# Patient Record
Sex: FEMALE | Race: WHITE | NOT HISPANIC OR LATINO | Employment: FULL TIME | URBAN - METROPOLITAN AREA
[De-identification: names, ages, dates, MRNs, and addresses within clinical notes are randomized per-mention and may not be internally consistent; named-entity substitution may affect disease eponyms.]

---

## 2017-01-30 ENCOUNTER — HISTORICAL (OUTPATIENT)
Dept: ADMINISTRATIVE | Facility: HOSPITAL | Age: 52
End: 2017-01-30

## 2017-01-30 LAB
ALBUMIN SERPL-MCNC: 4.4 G/DL (ref 3.1–4.7)
ALP SERPL-CCNC: 117 IU/L (ref 40–104)
ALT (SGPT): 30 IU/L (ref 3–33)
AST SERPL-CCNC: 30 IU/L (ref 10–40)
BASOPHILS NFR BLD: 0 K/UL (ref 0–0.2)
BASOPHILS NFR BLD: 0.6 %
BILIRUB SERPL-MCNC: 0.5 MG/DL (ref 0.3–1)
BUN SERPL-MCNC: 9 MG/DL (ref 8–20)
CALCIUM SERPL-MCNC: 9.5 MG/DL (ref 7.7–10.4)
CHLORIDE: 102 MMOL/L (ref 98–110)
CO2 SERPL-SCNC: 29.6 MMOL/L (ref 22.8–31.6)
CREATININE: 0.63 MG/DL (ref 0.6–1.4)
EOSINOPHIL NFR BLD: 0 K/UL (ref 0–0.7)
EOSINOPHIL NFR BLD: 0.2 %
ERYTHROCYTE [DISTWIDTH] IN BLOOD BY AUTOMATED COUNT: 18.5 % (ref 12.5–14.5)
GLUCOSE: 83 MG/DL (ref 70–99)
GRAN #: 3.6 K/UL (ref 1.4–6.5)
GRAN%: 71.5 %
HCT VFR BLD AUTO: 33.3 % (ref 36–48)
HGB BLD-MCNC: 11 G/DL (ref 12–15)
IMMATURE GRANS (ABS): 0 K/UL (ref 0–1)
IMMATURE GRANULOCYTES: 0.4 %
LYMPH #: 0.6 K/UL (ref 1.2–3.4)
LYMPH%: 11 %
MCH RBC QN AUTO: 32.6 PG (ref 25–35)
MCHC RBC AUTO-ENTMCNC: 33 G/DL (ref 31–36)
MCV RBC AUTO: 98.8 FL (ref 79–98)
MONO #: 0.8 K/UL (ref 0.1–0.6)
MONO%: 16.3 %
NUCLEATED RBCS: 0 %
NUCLEATED RED BLOOD CELLS: 0 /100 WBC
PERFORMED BY:: ABNORMAL
PLATELET # BLD AUTO: 308 K/UL (ref 140–440)
PMV BLD AUTO: 9.5 FL (ref 7.4–10.4)
POTASSIUM SERPL-SCNC: 3.4 MMOL/L (ref 3.5–5)
PROT SERPL-MCNC: 7.4 G/DL (ref 6–8.2)
RBC # BLD AUTO: 3.37 M/UL (ref 3.5–5.5)
SODIUM: 141 MMOL/L (ref 134–144)
WBC # BLD: 5 K/UL (ref 5–10)

## 2017-02-08 LAB
ALBUMIN SERPL-MCNC: 4.2 G/DL (ref 3.1–4.7)
ALP SERPL-CCNC: 114 IU/L (ref 40–104)
ALT (SGPT): 94 IU/L (ref 3–33)
AST SERPL-CCNC: 71 IU/L (ref 10–40)
BASOPHILS NFR BLD: 0 K/UL (ref 0–0.2)
BASOPHILS NFR BLD: 1.5 %
BILIRUB SERPL-MCNC: 0.7 MG/DL (ref 0.3–1)
BUN SERPL-MCNC: 9 MG/DL (ref 8–20)
CALCIUM SERPL-MCNC: 9.5 MG/DL (ref 7.7–10.4)
CHLORIDE: 98 MMOL/L (ref 98–110)
CO2 SERPL-SCNC: 29.4 MMOL/L (ref 22.8–31.6)
CREATININE: 0.63 MG/DL (ref 0.6–1.4)
EOSINOPHIL NFR BLD: 0 K/UL (ref 0–0.7)
EOSINOPHIL NFR BLD: 1.1 %
ERYTHROCYTE [DISTWIDTH] IN BLOOD BY AUTOMATED COUNT: 16.6 % (ref 12.5–14.5)
GLUCOSE: 86 MG/DL (ref 70–99)
GRAN #: 1.7 K/UL (ref 1.4–6.5)
GRAN%: 66.2 %
HCT VFR BLD AUTO: 31.5 % (ref 36–48)
HGB BLD-MCNC: 10.5 G/DL (ref 12–15)
IMMATURE GRANS (ABS): 0 K/UL (ref 0–1)
IMMATURE GRANULOCYTES: 0.8 %
LYMPH #: 0.6 K/UL (ref 1.2–3.4)
LYMPH%: 22.4 %
MCH RBC QN AUTO: 33.2 PG (ref 25–35)
MCHC RBC AUTO-ENTMCNC: 33.3 G/DL (ref 31–36)
MCV RBC AUTO: 99.7 FL (ref 79–98)
MONO #: 0.2 K/UL (ref 0.1–0.6)
MONO%: 8 %
NUCLEATED RBCS: 0 %
NUCLEATED RED BLOOD CELLS: 0 /100 WBC
PERFORMED BY:: ABNORMAL
PLATELET # BLD AUTO: 235 K/UL (ref 140–440)
PMV BLD AUTO: 9.9 FL (ref 7.4–10.4)
POTASSIUM SERPL-SCNC: 3.4 MMOL/L (ref 3.5–5)
PROT SERPL-MCNC: 7.1 G/DL (ref 6–8.2)
RBC # BLD AUTO: 3.16 M/UL (ref 3.5–5.5)
SODIUM: 138 MMOL/L (ref 134–144)
WBC # BLD: 2.6 K/UL (ref 5–10)

## 2017-02-14 LAB
ALBUMIN SERPL-MCNC: 4.1 G/DL (ref 3.1–4.7)
ALP SERPL-CCNC: 113 IU/L (ref 40–104)
ALT (SGPT): 49 IU/L (ref 3–33)
AST SERPL-CCNC: 33 IU/L (ref 10–40)
BASOPHILS NFR BLD: 0 K/UL (ref 0–0.2)
BASOPHILS NFR BLD: 0.9 %
BILIRUB SERPL-MCNC: 0.6 MG/DL (ref 0.3–1)
BUN SERPL-MCNC: 11 MG/DL (ref 8–20)
CALCIUM SERPL-MCNC: 9.4 MG/DL (ref 7.7–10.4)
CHLORIDE: 103 MMOL/L (ref 98–110)
CO2 SERPL-SCNC: 31.7 MMOL/L (ref 22.8–31.6)
CREATININE: 0.57 MG/DL (ref 0.6–1.4)
EOSINOPHIL NFR BLD: 0.3 K/UL (ref 0–0.7)
EOSINOPHIL NFR BLD: 8.2 %
ERYTHROCYTE [DISTWIDTH] IN BLOOD BY AUTOMATED COUNT: 16 % (ref 12.5–14.5)
GLUCOSE: 82 MG/DL (ref 70–99)
GRAN #: 1.8 K/UL (ref 1.4–6.5)
GRAN%: 55.6 %
HCT VFR BLD AUTO: 32.9 % (ref 36–48)
HGB BLD-MCNC: 10.7 G/DL (ref 12–15)
IMMATURE GRANS (ABS): 0 K/UL (ref 0–1)
IMMATURE GRANULOCYTES: 0.3 %
LYMPH #: 0.6 K/UL (ref 1.2–3.4)
LYMPH%: 19.9 %
MCH RBC QN AUTO: 32.8 PG (ref 25–35)
MCHC RBC AUTO-ENTMCNC: 32.5 G/DL (ref 31–36)
MCV RBC AUTO: 100.9 FL (ref 79–98)
MONO #: 0.5 K/UL (ref 0.1–0.6)
MONO%: 15.1 %
NUCLEATED RBCS: 0 %
NUCLEATED RED BLOOD CELLS: 0 /100 WBC
PERFORMED BY:: ABNORMAL
PLATELET # BLD AUTO: 231 K/UL (ref 140–440)
PMV BLD AUTO: 9.8 FL (ref 7.4–10.4)
POTASSIUM SERPL-SCNC: 3.6 MMOL/L (ref 3.5–5)
PROT SERPL-MCNC: 6.9 G/DL (ref 6–8.2)
RBC # BLD AUTO: 3.26 M/UL (ref 3.5–5.5)
SODIUM: 140 MMOL/L (ref 134–144)
WBC # BLD: 3.2 K/UL (ref 5–10)

## 2017-02-17 ENCOUNTER — OFFICE VISIT (OUTPATIENT)
Dept: PLASTIC SURGERY | Facility: CLINIC | Age: 52
End: 2017-02-17
Payer: COMMERCIAL

## 2017-02-17 DIAGNOSIS — Z09 SURGERY FOLLOW-UP EXAMINATION: Primary | ICD-10-CM

## 2017-02-17 PROCEDURE — 99999 PR PBB SHADOW E&M-EST. PATIENT-LVL I: CPT | Mod: PBBFAC,,, | Performed by: PHYSICIAN ASSISTANT

## 2017-02-17 PROCEDURE — 99024 POSTOP FOLLOW-UP VISIT: CPT | Mod: S$GLB,,, | Performed by: PHYSICIAN ASSISTANT

## 2017-02-17 NOTE — PROGRESS NOTES
[facility]anca [facility]2017 for a follow up visit for expansion of B breast TE.     Current Outpatient Prescriptions on File Prior to Visit   Medication Sig Dispense Refill    cyclobenzaprine (FLEXERIL) 10 MG tablet TK 1 T PO Q 8 H PRF MUSCLE SPASM  1    oxycodone-acetaminophen (PERCOCET) 5-325 mg per tablet Take 1 tablet by mouth every 4 (four) hours as needed for Pain. 41 tablet 0    oxycodone-acetaminophen (PERCOCET) 5-325 mg per tablet Take 1 tablet by mouth every 6 (six) hours as needed for Pain. 31 tablet 0    [DISCONTINUED] clindamycin (CLEOCIN) 300 MG capsule TK ONE C PO Q 8 H FOR 7 DAYS  0     No current facility-administered medications on file prior to visit.      There is no problem list on file for this patient.    No past surgical history on file.    Doing well today. Her last expansion was 10/28/2016, she is currently having chemo. Next infusion is Monday.     She met her radiation doctor and her would like for her to be fully expanded, or close, prior to starting XRT. This will allow for 3 expansions prior to planned start for XRT. I think this should be sufficient to her her expanded.     120ccNS to R TE  140ccNS to L TE    Tolerated well. Will return to clinic in 3 weeks for her next expansion.     The patient was advised to call the clinic with any questions or concerns prior to their next visit.

## 2017-02-21 LAB
ALBUMIN SERPL-MCNC: 4.4 G/DL (ref 3.1–4.7)
ALP SERPL-CCNC: 113 IU/L (ref 40–104)
ALT (SGPT): 41 IU/L (ref 3–33)
AST SERPL-CCNC: 42 IU/L (ref 10–40)
BASOPHILS NFR BLD: 0 K/UL (ref 0–0.2)
BASOPHILS NFR BLD: 1.2 %
BILIRUB SERPL-MCNC: 0.7 MG/DL (ref 0.3–1)
BUN SERPL-MCNC: 12 MG/DL (ref 8–20)
CALCIUM SERPL-MCNC: 9.6 MG/DL (ref 7.7–10.4)
CHLORIDE: 101 MMOL/L (ref 98–110)
CO2 SERPL-SCNC: 29.6 MMOL/L (ref 22.8–31.6)
CREATININE: 0.66 MG/DL (ref 0.6–1.4)
EOSINOPHIL NFR BLD: 0.3 K/UL (ref 0–0.7)
EOSINOPHIL NFR BLD: 7.7 %
ERYTHROCYTE [DISTWIDTH] IN BLOOD BY AUTOMATED COUNT: 15 % (ref 12.5–14.5)
GLUCOSE: 80 MG/DL (ref 70–99)
GRAN #: 1.7 K/UL (ref 1.4–6.5)
GRAN%: 53.6 %
HCT VFR BLD AUTO: 35.7 % (ref 36–48)
HGB BLD-MCNC: 11.8 G/DL (ref 12–15)
IMMATURE GRANS (ABS): 0 K/UL (ref 0–1)
IMMATURE GRANULOCYTES: 0.3 %
LYMPH #: 0.7 K/UL (ref 1.2–3.4)
LYMPH%: 21.8 %
MCH RBC QN AUTO: 33 PG (ref 25–35)
MCHC RBC AUTO-ENTMCNC: 33.1 G/DL (ref 31–36)
MCV RBC AUTO: 99.7 FL (ref 79–98)
MONO #: 0.5 K/UL (ref 0.1–0.6)
MONO%: 15.4 %
NUCLEATED RBCS: 0 %
NUCLEATED RED BLOOD CELLS: 0 /100 WBC
PERFORMED BY:: ABNORMAL
PLATELET # BLD AUTO: 220 K/UL (ref 140–440)
PMV BLD AUTO: 10.2 FL (ref 8.8–12.7)
POTASSIUM SERPL-SCNC: 3.8 MMOL/L (ref 3.5–5)
PROT SERPL-MCNC: 7.3 G/DL (ref 6–8.2)
RBC # BLD AUTO: 3.58 M/UL (ref 3.5–5.5)
SODIUM: 139 MMOL/L (ref 134–144)
WBC # BLD: 3.3 K/UL (ref 5–10)

## 2017-03-01 LAB
ALBUMIN SERPL-MCNC: 4.1 G/DL (ref 3.1–4.7)
ALP SERPL-CCNC: 107 IU/L (ref 40–104)
ALT (SGPT): 71 IU/L (ref 3–33)
AST SERPL-CCNC: 44 IU/L (ref 10–40)
BASOPHILS NFR BLD: 0 K/UL (ref 0–0.2)
BASOPHILS NFR BLD: 1.8 %
BILIRUB SERPL-MCNC: 0.5 MG/DL (ref 0.3–1)
BUN SERPL-MCNC: 11 MG/DL (ref 8–20)
CALCIUM SERPL-MCNC: 9.4 MG/DL (ref 7.7–10.4)
CHLORIDE: 104 MMOL/L (ref 98–110)
CO2 SERPL-SCNC: 26.7 MMOL/L (ref 22.8–31.6)
CREATININE: 0.64 MG/DL (ref 0.6–1.4)
EOSINOPHIL NFR BLD: 0.1 K/UL (ref 0–0.7)
EOSINOPHIL NFR BLD: 5.8 %
ERYTHROCYTE [DISTWIDTH] IN BLOOD BY AUTOMATED COUNT: 13.8 % (ref 12.5–14.5)
GLUCOSE: 89 MG/DL (ref 70–99)
GRAN #: 1.3 K/UL (ref 1.4–6.5)
GRAN%: 59.3 %
HCT VFR BLD AUTO: 33.6 % (ref 36–48)
HGB BLD-MCNC: 11.4 G/DL (ref 12–15)
IMMATURE GRANS (ABS): 0 K/UL (ref 0–1)
IMMATURE GRANULOCYTES: 0.9 %
LYMPH #: 0.6 K/UL (ref 1.2–3.4)
LYMPH%: 28.6 %
MCH RBC QN AUTO: 33.6 PG (ref 25–35)
MCHC RBC AUTO-ENTMCNC: 33.9 G/DL (ref 31–36)
MCV RBC AUTO: 99.1 FL (ref 79–98)
MONO #: 0.1 K/UL (ref 0.1–0.6)
MONO%: 3.6 %
NUCLEATED RBCS: 0 %
NUCLEATED RED BLOOD CELLS: 0 /100 WBC
PERFORMED BY:: ABNORMAL
PLATELET # BLD AUTO: 195 K/UL (ref 140–440)
PMV BLD AUTO: 10.3 FL (ref 8.8–12.7)
POTASSIUM SERPL-SCNC: 3.6 MMOL/L (ref 3.5–5)
PROT SERPL-MCNC: 7.1 G/DL (ref 6–8.2)
RBC # BLD AUTO: 3.39 M/UL (ref 3.5–5.5)
SODIUM: 139 MMOL/L (ref 134–144)
WBC # BLD: 2.2 K/UL (ref 5–10)

## 2017-03-07 ENCOUNTER — TELEPHONE (OUTPATIENT)
Dept: PLASTIC SURGERY | Facility: CLINIC | Age: 52
End: 2017-03-07

## 2017-03-07 ENCOUNTER — HISTORICAL (OUTPATIENT)
Dept: ADMINISTRATIVE | Facility: HOSPITAL | Age: 52
End: 2017-03-07

## 2017-03-07 LAB
ALBUMIN SERPL-MCNC: 4 G/DL (ref 3.1–4.7)
ALP SERPL-CCNC: 257 IU/L (ref 40–104)
ALT (SGPT): 74 IU/L (ref 3–33)
AST SERPL-CCNC: 53 IU/L (ref 10–40)
BASOPHILS NFR BLD: 0 K/UL (ref 0–0.2)
BASOPHILS NFR BLD: 0.1 %
BILIRUB SERPL-MCNC: 0.5 MG/DL (ref 0.3–1)
BUN SERPL-MCNC: 9 MG/DL (ref 8–20)
CALCIUM SERPL-MCNC: 9.3 MG/DL (ref 7.7–10.4)
CHLORIDE: 101 MMOL/L (ref 98–110)
CO2 SERPL-SCNC: 27.5 MMOL/L (ref 22.8–31.6)
CREATININE: 0.69 MG/DL (ref 0.6–1.4)
EOSINOPHIL NFR BLD: 0.2 K/UL (ref 0–0.7)
EOSINOPHIL NFR BLD: 0.6 %
ERYTHROCYTE [DISTWIDTH] IN BLOOD BY AUTOMATED COUNT: 14.5 % (ref 12.5–14.5)
GLUCOSE: 96 MG/DL (ref 70–99)
GRAN #: 20.3 K/UL (ref 1.4–6.5)
GRAN%: 72.8 %
HCT VFR BLD AUTO: 33.8 % (ref 36–48)
HGB BLD-MCNC: 11.2 G/DL (ref 12–15)
IMMATURE GRANS (ABS): 3.5 K/UL (ref 0–1)
IMMATURE GRANULOCYTES: 12.7 %
LYMPH #: 1.2 K/UL (ref 1.2–3.4)
LYMPH%: 4.3 %
MCH RBC QN AUTO: 32.8 PG (ref 25–35)
MCHC RBC AUTO-ENTMCNC: 33.1 G/DL (ref 31–36)
MCV RBC AUTO: 99.1 FL (ref 79–98)
MONO #: 2.7 K/UL (ref 0.1–0.6)
MONO%: 9.5 %
NUCLEATED RBCS: 0 %
NUCLEATED RED BLOOD CELLS: 0 /100 WBC
PERFORMED BY:: ABNORMAL
PLATELET # BLD AUTO: 166 K/UL (ref 140–440)
PMV BLD AUTO: 9.6 FL (ref 8.8–12.7)
POTASSIUM SERPL-SCNC: 3.2 MMOL/L (ref 3.5–5)
PROT SERPL-MCNC: 7.2 G/DL (ref 6–8.2)
RBC # BLD AUTO: 3.41 M/UL (ref 3.5–5.5)
SODIUM: 142 MMOL/L (ref 134–144)
WBC # BLD: 27.9 K/UL (ref 5–10)

## 2017-03-07 NOTE — TELEPHONE ENCOUNTER
Called pt in an attempt to re-schedule her 3/10 clinic appt to a later time due to MD schedule change. No answer. Left a voice message.

## 2017-03-10 ENCOUNTER — OFFICE VISIT (OUTPATIENT)
Dept: PLASTIC SURGERY | Facility: CLINIC | Age: 52
End: 2017-03-10
Payer: COMMERCIAL

## 2017-03-10 DIAGNOSIS — Z09 SURGERY FOLLOW-UP EXAMINATION: Primary | ICD-10-CM

## 2017-03-10 PROCEDURE — 99024 POSTOP FOLLOW-UP VISIT: CPT | Mod: S$GLB,,, | Performed by: SURGERY

## 2017-03-10 PROCEDURE — 99999 PR PBB SHADOW E&M-EST. PATIENT-LVL I: CPT | Mod: PBBFAC,,, | Performed by: SURGERY

## 2017-03-10 NOTE — PROGRESS NOTES
Ms. Loya presents [facility] the [facility], status post bilateral breast   reconstruction.  She was expanded today with 100 mL of saline into each implant.      CRB/PN  dd: 03/10/2017 15:01:40 (CST)  td: 03/11/2017 07:00:10 (CST)  Doc ID   #[id number]  Job ID #[id number]    CC:

## 2017-03-13 LAB
ALBUMIN SERPL-MCNC: 4.3 G/DL (ref 3.1–4.7)
ALP SERPL-CCNC: 169 IU/L (ref 40–104)
ALT (SGPT): 47 IU/L (ref 3–33)
AST SERPL-CCNC: 33 IU/L (ref 10–40)
BASOPHILS NFR BLD: 0 K/UL (ref 0–0.2)
BASOPHILS NFR BLD: 0.2 %
BILIRUB SERPL-MCNC: 0.6 MG/DL (ref 0.3–1)
BUN SERPL-MCNC: 13 MG/DL (ref 8–20)
CALCIUM SERPL-MCNC: 9.3 MG/DL (ref 7.7–10.4)
CHLORIDE: 102 MMOL/L (ref 98–110)
CO2 SERPL-SCNC: 29.3 MMOL/L (ref 22.8–31.6)
CREATININE: 0.6 MG/DL (ref 0.6–1.4)
EOSINOPHIL NFR BLD: 0.1 K/UL (ref 0–0.7)
EOSINOPHIL NFR BLD: 0.8 %
ERYTHROCYTE [DISTWIDTH] IN BLOOD BY AUTOMATED COUNT: 14.2 % (ref 12.5–14.5)
GLUCOSE: 83 MG/DL (ref 70–99)
GRAN #: 8 K/UL (ref 1.4–6.5)
GRAN%: 84.5 %
HCT VFR BLD AUTO: 34.8 % (ref 36–48)
HGB BLD-MCNC: 11.5 G/DL (ref 12–15)
IMMATURE GRANS (ABS): 0 K/UL (ref 0–1)
IMMATURE GRANULOCYTES: 0.4 %
LYMPH #: 0.8 K/UL (ref 1.2–3.4)
LYMPH%: 8.4 %
MCH RBC QN AUTO: 32.6 PG (ref 25–35)
MCHC RBC AUTO-ENTMCNC: 33 G/DL (ref 31–36)
MCV RBC AUTO: 98.6 FL (ref 79–98)
MONO #: 0.5 K/UL (ref 0.1–0.6)
MONO%: 5.7 %
NUCLEATED RBCS: 0 %
NUCLEATED RED BLOOD CELLS: 0 /100 WBC
PERFORMED BY:: ABNORMAL
PLATELET # BLD AUTO: 210 K/UL (ref 140–440)
PMV BLD AUTO: 10.5 FL (ref 8.8–12.7)
POTASSIUM SERPL-SCNC: 3.5 MMOL/L (ref 3.5–5)
PROT SERPL-MCNC: 7.2 G/DL (ref 6–8.2)
RBC # BLD AUTO: 3.53 M/UL (ref 3.5–5.5)
SODIUM: 140 MMOL/L (ref 134–144)
WBC # BLD: 9.5 K/UL (ref 5–10)

## 2017-03-21 LAB
ALBUMIN SERPL-MCNC: 4.3 G/DL (ref 3.1–4.7)
ALP SERPL-CCNC: 116 IU/L (ref 40–104)
ALT (SGPT): 37 IU/L (ref 3–33)
AST SERPL-CCNC: 35 IU/L (ref 10–40)
BASOPHILS NFR BLD: 0 K/UL (ref 0–0.2)
BASOPHILS NFR BLD: 0.6 %
BILIRUB SERPL-MCNC: 0.6 MG/DL (ref 0.3–1)
BUN SERPL-MCNC: 10 MG/DL (ref 8–20)
CALCIUM SERPL-MCNC: 9.4 MG/DL (ref 7.7–10.4)
CHLORIDE: 101 MMOL/L (ref 98–110)
CO2 SERPL-SCNC: 30.5 MMOL/L (ref 22.8–31.6)
CREATININE: 0.65 MG/DL (ref 0.6–1.4)
EOSINOPHIL NFR BLD: 0 K/UL (ref 0–0.7)
EOSINOPHIL NFR BLD: 1.1 %
ERYTHROCYTE [DISTWIDTH] IN BLOOD BY AUTOMATED COUNT: 14.3 % (ref 12.5–14.5)
GLUCOSE: 75 MG/DL (ref 70–99)
GRAN #: 2.2 K/UL (ref 1.4–6.5)
GRAN%: 60.9 %
HCT VFR BLD AUTO: 33.6 % (ref 36–48)
HGB BLD-MCNC: 10.9 G/DL (ref 12–15)
IMMATURE GRANS (ABS): 0 K/UL (ref 0–1)
IMMATURE GRANULOCYTES: 0.3 %
LYMPH #: 0.8 K/UL (ref 1.2–3.4)
LYMPH%: 22.3 %
MCH RBC QN AUTO: 32.1 PG (ref 25–35)
MCHC RBC AUTO-ENTMCNC: 32.4 G/DL (ref 31–36)
MCV RBC AUTO: 98.8 FL (ref 79–98)
MONO #: 0.5 K/UL (ref 0.1–0.6)
MONO%: 14.8 %
NUCLEATED RBCS: 0 %
NUCLEATED RED BLOOD CELLS: 0 /100 WBC
PERFORMED BY:: ABNORMAL
PLATELET # BLD AUTO: 259 K/UL (ref 140–440)
PMV BLD AUTO: 9.6 FL (ref 8.8–12.7)
POTASSIUM SERPL-SCNC: 3.5 MMOL/L (ref 3.5–5)
PROT SERPL-MCNC: 6.9 G/DL (ref 6–8.2)
RBC # BLD AUTO: 3.4 M/UL (ref 3.5–5.5)
SODIUM: 139 MMOL/L (ref 134–144)
WBC # BLD: 3.6 K/UL (ref 5–10)

## 2017-03-28 LAB
ALBUMIN SERPL-MCNC: 4.1 G/DL (ref 3.1–4.7)
ALP SERPL-CCNC: 106 IU/L (ref 40–104)
ALT (SGPT): 53 IU/L (ref 3–33)
AST SERPL-CCNC: 41 IU/L (ref 10–40)
BASOPHILS NFR BLD: 0 K/UL (ref 0–0.2)
BASOPHILS NFR BLD: 0.9 %
BILIRUB SERPL-MCNC: 0.6 MG/DL (ref 0.3–1)
BUN SERPL-MCNC: 12 MG/DL (ref 8–20)
CALCIUM SERPL-MCNC: 9.4 MG/DL (ref 7.7–10.4)
CHLORIDE: 102 MMOL/L (ref 98–110)
CO2 SERPL-SCNC: 30.9 MMOL/L (ref 22.8–31.6)
CREATININE: 0.64 MG/DL (ref 0.6–1.4)
EOSINOPHIL NFR BLD: 0.1 K/UL (ref 0–0.7)
EOSINOPHIL NFR BLD: 2.3 %
ERYTHROCYTE [DISTWIDTH] IN BLOOD BY AUTOMATED COUNT: 14.2 % (ref 12.5–14.5)
GLUCOSE: 85 MG/DL (ref 70–99)
GRAN #: 1.5 K/UL (ref 1.4–6.5)
GRAN%: 65.2 %
HCT VFR BLD AUTO: 34.4 % (ref 36–48)
HGB BLD-MCNC: 11.1 G/DL (ref 12–15)
IMMATURE GRANS (ABS): 0 K/UL (ref 0–1)
IMMATURE GRANULOCYTES: 0.5 %
LYMPH #: 0.6 K/UL (ref 1.2–3.4)
LYMPH%: 27.9 %
MCH RBC QN AUTO: 31.5 PG (ref 25–35)
MCHC RBC AUTO-ENTMCNC: 32.3 G/DL (ref 31–36)
MCV RBC AUTO: 97.7 FL (ref 79–98)
MONO #: 0.1 K/UL (ref 0.1–0.6)
MONO%: 3.2 %
NUCLEATED RBCS: 0 %
NUCLEATED RED BLOOD CELLS: 0 /100 WBC
PERFORMED BY:: ABNORMAL
PLATELET # BLD AUTO: 223 K/UL (ref 140–440)
PMV BLD AUTO: 10.6 FL (ref 8.8–12.7)
POTASSIUM SERPL-SCNC: 3.6 MMOL/L (ref 3.5–5)
PROT SERPL-MCNC: 6.8 G/DL (ref 6–8.2)
RBC # BLD AUTO: 3.52 M/UL (ref 3.5–5.5)
SODIUM: 138 MMOL/L (ref 134–144)
WBC # BLD: 2.2 K/UL (ref 5–10)

## 2017-04-04 LAB
ALBUMIN SERPL-MCNC: 4.3 G/DL (ref 3.1–4.7)
ALP SERPL-CCNC: 119 IU/L (ref 40–104)
ALT (SGPT): 38 IU/L (ref 3–33)
AST SERPL-CCNC: 34 IU/L (ref 10–40)
BASOPHILS NFR BLD: 0 K/UL (ref 0–0.2)
BASOPHILS NFR BLD: 1 %
BILIRUB SERPL-MCNC: 0.4 MG/DL (ref 0.3–1)
BUN SERPL-MCNC: 9 MG/DL (ref 8–20)
CALCIUM SERPL-MCNC: 9.4 MG/DL (ref 7.7–10.4)
CHLORIDE: 102 MMOL/L (ref 98–110)
CO2 SERPL-SCNC: 28.7 MMOL/L (ref 22.8–31.6)
CREATININE: 0.59 MG/DL (ref 0.6–1.4)
EOSINOPHIL NFR BLD: 0.1 K/UL (ref 0–0.7)
EOSINOPHIL NFR BLD: 3.1 %
ERYTHROCYTE [DISTWIDTH] IN BLOOD BY AUTOMATED COUNT: 13.9 % (ref 12.5–14.5)
GLUCOSE: 73 MG/DL (ref 70–99)
GRAN #: 1.6 K/UL (ref 1.4–6.5)
GRAN%: 54.1 %
HCT VFR BLD AUTO: 34.7 % (ref 36–48)
HGB BLD-MCNC: 11.2 G/DL (ref 12–15)
IMMATURE GRANS (ABS): 0 K/UL (ref 0–1)
IMMATURE GRANULOCYTES: 0.3 %
LYMPH #: 0.8 K/UL (ref 1.2–3.4)
LYMPH%: 27.7 %
MCH RBC QN AUTO: 31.5 PG (ref 25–35)
MCHC RBC AUTO-ENTMCNC: 32.3 G/DL (ref 31–36)
MCV RBC AUTO: 97.7 FL (ref 79–98)
MONO #: 0.4 K/UL (ref 0.1–0.6)
MONO%: 13.8 %
NUCLEATED RBCS: 0 %
NUCLEATED RED BLOOD CELLS: 0 /100 WBC
PERFORMED BY:: ABNORMAL
PLATELET # BLD AUTO: 216 K/UL (ref 140–440)
PMV BLD AUTO: 9.4 FL (ref 8.8–12.7)
POTASSIUM SERPL-SCNC: 3.4 MMOL/L (ref 3.5–5)
PROT SERPL-MCNC: 7.4 G/DL (ref 6–8.2)
RBC # BLD AUTO: 3.55 M/UL (ref 3.5–5.5)
SODIUM: 140 MMOL/L (ref 134–144)
WBC # BLD: 2.9 K/UL (ref 5–10)

## 2017-04-07 ENCOUNTER — OFFICE VISIT (OUTPATIENT)
Dept: PLASTIC SURGERY | Facility: CLINIC | Age: 52
End: 2017-04-07
Payer: COMMERCIAL

## 2017-04-07 VITALS — HEIGHT: 64 IN | WEIGHT: 140 LBS | BODY MASS INDEX: 23.9 KG/M2

## 2017-04-07 DIAGNOSIS — Z09 SURGERY FOLLOW-UP EXAMINATION: Primary | ICD-10-CM

## 2017-04-07 PROCEDURE — 99999 PR PBB SHADOW E&M-EST. PATIENT-LVL II: CPT | Mod: PBBFAC,,, | Performed by: PHYSICIAN ASSISTANT

## 2017-04-07 PROCEDURE — 1160F RVW MEDS BY RX/DR IN RCRD: CPT | Mod: S$GLB,,, | Performed by: PHYSICIAN ASSISTANT

## 2017-04-07 PROCEDURE — 99212 OFFICE O/P EST SF 10 MIN: CPT | Mod: S$GLB,,, | Performed by: PHYSICIAN ASSISTANT

## 2017-04-07 NOTE — PROGRESS NOTES
Blanca Loya [facility] on 4[facility]/2017 for a follow up visit for expansion of B breast TE for breast reconstruction     Current Outpatient Prescriptions on File Prior to Visit   Medication Sig Dispense Refill    cyclobenzaprine (FLEXERIL) 10 MG tablet TK 1 T PO Q 8 H PRF MUSCLE SPASM  1    oxycodone-acetaminophen (PERCOCET) 5-325 mg per tablet Take 1 tablet by mouth every 4 (four) hours as needed for Pain. 41 tablet 0    oxycodone-acetaminophen (PERCOCET) 5-325 mg per tablet Take 1 tablet by mouth every 6 (six) hours as needed for Pain. 31 tablet 0     No current facility-administered medications on file prior to visit.      There is no problem list on file for this patient.    No past surgical history on file.    Doing well today. Tolerated her last expansion well.     120ccNS to B TE. Tolerated well    Will return to clinic in 3 weeks for her next expansion. This will be her last expansion as she plans to start XRT following her last expansion.     She understands she will need to wait at least 6 months following completion of XRT for any further reconstruction    The patient was advised to call the clinic with any questions or concerns prior to their next visit.

## 2017-04-10 ENCOUNTER — HISTORICAL (OUTPATIENT)
Dept: ADMINISTRATIVE | Facility: HOSPITAL | Age: 52
End: 2017-04-10

## 2017-04-10 LAB
ALBUMIN SERPL-MCNC: 4.3 G/DL (ref 3.1–4.7)
ALP SERPL-CCNC: 110 IU/L (ref 40–104)
ALT (SGPT): 29 IU/L (ref 3–33)
AST SERPL-CCNC: 30 IU/L (ref 10–40)
BASOPHILS NFR BLD: 0 K/UL (ref 0–0.2)
BASOPHILS NFR BLD: 0.6 %
BILIRUB SERPL-MCNC: 0.7 MG/DL (ref 0.3–1)
BUN SERPL-MCNC: 12 MG/DL (ref 8–20)
CALCIUM SERPL-MCNC: 9.5 MG/DL (ref 7.7–10.4)
CHLORIDE: 98 MMOL/L (ref 98–110)
CO2 SERPL-SCNC: 28.8 MMOL/L (ref 22.8–31.6)
CREATININE: 0.66 MG/DL (ref 0.6–1.4)
EOSINOPHIL NFR BLD: 0.1 K/UL (ref 0–0.7)
EOSINOPHIL NFR BLD: 2.8 %
ERYTHROCYTE [DISTWIDTH] IN BLOOD BY AUTOMATED COUNT: 13.5 % (ref 12.5–14.5)
GLUCOSE: 87 MG/DL (ref 70–99)
GRAN #: 1.8 K/UL (ref 1.4–6.5)
GRAN%: 55.3 %
HCT VFR BLD AUTO: 37.2 % (ref 36–48)
HGB BLD-MCNC: 12.2 G/DL (ref 12–15)
IMMATURE GRANS (ABS): 0 K/UL (ref 0–1)
IMMATURE GRANULOCYTES: 0 %
LYMPH #: 0.9 K/UL (ref 1.2–3.4)
LYMPH%: 26.8 %
MCH RBC QN AUTO: 31.1 PG (ref 25–35)
MCHC RBC AUTO-ENTMCNC: 32.8 G/DL (ref 31–36)
MCV RBC AUTO: 94.9 FL (ref 79–98)
MONO #: 0.5 K/UL (ref 0.1–0.6)
MONO%: 14.5 %
NUCLEATED RBCS: 0 %
NUCLEATED RED BLOOD CELLS: 0 /100 WBC
PERFORMED BY:: ABNORMAL
PLATELET # BLD AUTO: 212 K/UL (ref 140–440)
PMV BLD AUTO: 9.8 FL (ref 8.8–12.7)
POTASSIUM SERPL-SCNC: 3.6 MMOL/L (ref 3.5–5)
PROT SERPL-MCNC: 7.5 G/DL (ref 6–8.2)
RBC # BLD AUTO: 3.92 M/UL (ref 3.5–5.5)
SODIUM: 140 MMOL/L (ref 134–144)
WBC # BLD: 3.2 K/UL (ref 5–10)

## 2017-04-19 LAB
ALBUMIN SERPL-MCNC: 4.3 G/DL (ref 3.1–4.7)
ALP SERPL-CCNC: 104 IU/L (ref 40–104)
ALT (SGPT): 52 IU/L (ref 3–33)
AST SERPL-CCNC: 38 IU/L (ref 10–40)
BASOPHILS NFR BLD: 0 K/UL (ref 0–0.2)
BASOPHILS NFR BLD: 0.9 %
BILIRUB SERPL-MCNC: 0.9 MG/DL (ref 0.3–1)
BUN SERPL-MCNC: 12 MG/DL (ref 8–20)
CALCIUM SERPL-MCNC: 9.5 MG/DL (ref 7.7–10.4)
CHLORIDE: 100 MMOL/L (ref 98–110)
CO2 SERPL-SCNC: 32.1 MMOL/L (ref 22.8–31.6)
CREATININE: 0.7 MG/DL (ref 0.6–1.4)
EOSINOPHIL NFR BLD: 0.1 K/UL (ref 0–0.7)
EOSINOPHIL NFR BLD: 4 %
ERYTHROCYTE [DISTWIDTH] IN BLOOD BY AUTOMATED COUNT: 13.6 % (ref 12.5–14.5)
GLUCOSE: 74 MG/DL (ref 70–99)
GRAN #: 1.3 K/UL (ref 1.4–6.5)
GRAN%: 55.8 %
HCT VFR BLD AUTO: 37.2 % (ref 36–48)
HGB BLD-MCNC: 12.2 G/DL (ref 12–15)
IMMATURE GRANS (ABS): 0 K/UL (ref 0–1)
IMMATURE GRANULOCYTES: 0.9 %
LYMPH #: 0.8 K/UL (ref 1.2–3.4)
LYMPH%: 35.3 %
MCH RBC QN AUTO: 30.8 PG (ref 25–35)
MCHC RBC AUTO-ENTMCNC: 32.8 G/DL (ref 31–36)
MCV RBC AUTO: 93.9 FL (ref 79–98)
MONO #: 0.1 K/UL (ref 0.1–0.6)
MONO%: 3.1 %
NUCLEATED RBCS: 0 %
NUCLEATED RED BLOOD CELLS: 0 /100 WBC
PERFORMED BY:: ABNORMAL
PLATELET # BLD AUTO: 197 K/UL (ref 140–440)
PMV BLD AUTO: 9.7 FL (ref 8.8–12.7)
POTASSIUM SERPL-SCNC: 3.3 MMOL/L (ref 3.5–5)
PROT SERPL-MCNC: 7.4 G/DL (ref 6–8.2)
RBC # BLD AUTO: 3.96 M/UL (ref 3.5–5.5)
SODIUM: 141 MMOL/L (ref 134–144)
WBC # BLD: 2.2 K/UL (ref 5–10)

## 2017-04-25 LAB
ALBUMIN SERPL-MCNC: 4.2 G/DL (ref 3.1–4.7)
ALP SERPL-CCNC: 106 IU/L (ref 40–104)
ALT (SGPT): 43 IU/L (ref 3–33)
AST SERPL-CCNC: 36 IU/L (ref 10–40)
BASOPHILS NFR BLD: 0 K/UL (ref 0–0.2)
BASOPHILS NFR BLD: 0.8 %
BILIRUB SERPL-MCNC: 0.7 MG/DL (ref 0.3–1)
BUN SERPL-MCNC: 11 MG/DL (ref 8–20)
CALCIUM SERPL-MCNC: 9.3 MG/DL (ref 7.7–10.4)
CHLORIDE: 101 MMOL/L (ref 98–110)
CO2 SERPL-SCNC: 28.8 MMOL/L (ref 22.8–31.6)
CREATININE: 0.64 MG/DL (ref 0.6–1.4)
EOSINOPHIL NFR BLD: 0.1 K/UL (ref 0–0.7)
EOSINOPHIL NFR BLD: 3.1 %
ERYTHROCYTE [DISTWIDTH] IN BLOOD BY AUTOMATED COUNT: 13.8 % (ref 12.5–14.5)
GLUCOSE: 71 MG/DL (ref 70–99)
GRAN #: 0.9 K/UL (ref 1.4–6.5)
GRAN%: 35.4 %
HCT VFR BLD AUTO: 36.1 % (ref 36–48)
HGB BLD-MCNC: 12 G/DL (ref 12–15)
IMMATURE GRANS (ABS): 0 K/UL (ref 0–1)
IMMATURE GRANULOCYTES: 0 %
LYMPH #: 1.2 K/UL (ref 1.2–3.4)
LYMPH%: 48.2 %
MCH RBC QN AUTO: 30.8 PG (ref 25–35)
MCHC RBC AUTO-ENTMCNC: 33.2 G/DL (ref 31–36)
MCV RBC AUTO: 92.8 FL (ref 79–98)
MONO #: 0.3 K/UL (ref 0.1–0.6)
MONO%: 12.5 %
NUCLEATED RBCS: 0 %
NUCLEATED RED BLOOD CELLS: 0 /100 WBC
PERFORMED BY:: ABNORMAL
PLATELET # BLD AUTO: 204 K/UL (ref 140–440)
PMV BLD AUTO: 9.6 FL (ref 8.8–12.7)
POTASSIUM SERPL-SCNC: 3.3 MMOL/L (ref 3.5–5)
PROT SERPL-MCNC: 7.3 G/DL (ref 6–8.2)
RBC # BLD AUTO: 3.89 M/UL (ref 3.5–5.5)
SODIUM: 138 MMOL/L (ref 134–144)
WBC # BLD: 2.6 K/UL (ref 5–10)

## 2017-04-28 ENCOUNTER — OFFICE VISIT (OUTPATIENT)
Dept: PLASTIC SURGERY | Facility: CLINIC | Age: 52
End: 2017-04-28
Payer: COMMERCIAL

## 2017-04-28 DIAGNOSIS — Z09 SURGERY FOLLOW-UP EXAMINATION: Primary | ICD-10-CM

## 2017-04-28 PROCEDURE — 99024 POSTOP FOLLOW-UP VISIT: CPT | Mod: S$GLB,,, | Performed by: SURGERY

## 2017-04-28 NOTE — PROGRESS NOTES
Ms. Loya is status post breast reconstruction.  I think she has reached her   maximum at expansion.  We need to wait for her to complete her radiation   treatment.  After a minimum of six months, we will go ahead and do an implant   exchange.  She obviously will need to have capsulotomies medially and   capsulorrhaphies laterally.  Other than that, she has a very nice   reconstruction.      LEXIS/ADRIENNE  dd: 04/28/2017 13:20:25 (CDT)  td: 04/28/2017 17:38:04 (CDT)  Doc ID   #[id number]  Job ID #[id number]    CC:

## 2017-05-02 LAB
ALBUMIN SERPL-MCNC: 4.4 G/DL (ref 3.1–4.7)
ALP SERPL-CCNC: 94 IU/L (ref 40–104)
ALT (SGPT): 30 IU/L (ref 3–33)
AST SERPL-CCNC: 31 IU/L (ref 10–40)
BASOPHILS NFR BLD: 0 K/UL (ref 0–0.2)
BASOPHILS NFR BLD: 0.7 %
BILIRUB SERPL-MCNC: 0.6 MG/DL (ref 0.3–1)
BUN SERPL-MCNC: 13 MG/DL (ref 8–20)
CALCIUM SERPL-MCNC: 9.5 MG/DL (ref 7.7–10.4)
CHLORIDE: 102 MMOL/L (ref 98–110)
CO2 SERPL-SCNC: 25.6 MMOL/L (ref 22.8–31.6)
CREATININE: 0.69 MG/DL (ref 0.6–1.4)
EOSINOPHIL NFR BLD: 0.1 K/UL (ref 0–0.7)
EOSINOPHIL NFR BLD: 1.5 %
ERYTHROCYTE [DISTWIDTH] IN BLOOD BY AUTOMATED COUNT: 14 % (ref 12.5–14.5)
GLUCOSE: 88 MG/DL (ref 70–99)
GRAN #: 1.9 K/UL (ref 1.4–6.5)
GRAN%: 48.3 %
HCT VFR BLD AUTO: 36.9 % (ref 36–48)
HGB BLD-MCNC: 12.2 G/DL (ref 12–15)
IMMATURE GRANS (ABS): 0 K/UL (ref 0–1)
IMMATURE GRANULOCYTES: 0.5 %
LYMPH #: 1.3 K/UL (ref 1.2–3.4)
LYMPH%: 33.1 %
MCH RBC QN AUTO: 30.7 PG (ref 25–35)
MCHC RBC AUTO-ENTMCNC: 33.1 G/DL (ref 31–36)
MCV RBC AUTO: 92.9 FL (ref 79–98)
MONO #: 0.6 K/UL (ref 0.1–0.6)
MONO%: 15.9 %
NUCLEATED RBCS: 0 %
NUCLEATED RED BLOOD CELLS: 0 /100 WBC
PERFORMED BY:: ABNORMAL
PLATELET # BLD AUTO: 237 K/UL (ref 140–440)
PMV BLD AUTO: 9.8 FL (ref 8.8–12.7)
POTASSIUM SERPL-SCNC: 3.2 MMOL/L (ref 3.5–5)
PROT SERPL-MCNC: 7.5 G/DL (ref 6–8.2)
RBC # BLD AUTO: 3.97 M/UL (ref 3.5–5.5)
SODIUM: 139 MMOL/L (ref 134–144)
WBC # BLD: 4 K/UL (ref 5–10)

## 2017-05-12 ENCOUNTER — DOCUMENTATION ONLY (OUTPATIENT)
Dept: RADIATION ONCOLOGY | Facility: CLINIC | Age: 52
End: 2017-05-12

## 2017-05-12 NOTE — PROGRESS NOTES
Blanca Loya  [id number]  1965  5/12/2017    DIAGNOSIS: IIA, [id number] IDC R breast, ER/RI+, her2-    CURRENT DOSE (cGy): 600/5000    CONCURRENT CHEMOTHERAPY: no    SUBJECTIVE:  Ms. Loya has begun her adjuvant CW RT. She has expanders in place. There is no posterior target for boost nor feasible to boost her scar; therefore planning 50Gy/25frx. No complaints at todays visit.    OBJECTIVE:  There were no vitals filed for this visit.  Weight:   KPS: 90%- Able to Carry on Normal Activity: Minor Symptoms of Disease  Nad, ao x 3  Alopecia resolving  No restricted ROM or swelling of R arm    Portal imaging reviewed and approved.    ASSESSMENT AND PLAN:  Doing well.    Will continue as planned.    Treatment chart and setup reviewed and approved.    PHYSICIAN: Rocco Gonsales Jr, MD

## 2017-05-18 ENCOUNTER — DOCUMENTATION ONLY (OUTPATIENT)
Dept: RADIATION ONCOLOGY | Facility: CLINIC | Age: 52
End: 2017-05-18

## 2017-05-18 NOTE — PROGRESS NOTES
Blanca Loya  [id number]  1965  5/18/2017    DIAGNOSIS: IIA, [id number] IDC R breast, ER/VA+, her2-    CURRENT DOSE (cGy): 1600/5000    CONCURRENT CHEMOTHERAPY: prior to RT    SUBJECTIVE:  No complaints. Comfortable with RT. No RUE swelling, no skin changes, no HA, balance or hearing changes.    OBJECTIVE:  There were no vitals filed for this visit.  Weight: 139  KPS: 100%- Normal, No Complaints, No Evidence of Disease  Nad, ao x 3; alopecia  No RT erythema  No restricted ROM or swelling of RUE    Portal imaging reviewed and approved.    ASSESSMENT AND PLAN:  Tolerating RT well. No sx mgmt required. Pt inquiring about imaging her schwannoma (RT in 2010). I recommended waiting until expander removal to permit MRI unless she becomes symptomatic which I don 't expect.    Will continue as planned.    Treatment chart and setup reviewed and approved.    PHYSICIAN: Rocco Gonsales Jr, MD

## 2017-05-23 DIAGNOSIS — Z17.0 ESTROGEN RECEPTOR POSITIVE: ICD-10-CM

## 2017-05-23 DIAGNOSIS — C50.411 MALIGNANT NEOPLASM OF UPPER-OUTER QUADRANT OF RIGHT FEMALE BREAST: ICD-10-CM

## 2017-05-23 RX ORDER — HEPARIN 100 UNIT/ML
500 SYRINGE INTRAVENOUS
Status: CANCELLED | OUTPATIENT
Start: 2017-05-24

## 2017-05-23 RX ORDER — SODIUM CHLORIDE 0.9 % (FLUSH) 0.9 %
10 SYRINGE (ML) INJECTION
Status: CANCELLED | OUTPATIENT
Start: 2017-05-24

## 2017-05-24 RX ORDER — HEPARIN 100 UNIT/ML
500 SYRINGE INTRAVENOUS
Status: CANCELLED | OUTPATIENT
Start: 2017-05-24

## 2017-05-24 RX ORDER — SODIUM CHLORIDE 0.9 % (FLUSH) 0.9 %
10 SYRINGE (ML) INJECTION
Status: CANCELLED | OUTPATIENT
Start: 2017-05-24

## 2017-05-25 ENCOUNTER — DOCUMENTATION ONLY (OUTPATIENT)
Dept: RADIATION ONCOLOGY | Facility: CLINIC | Age: 52
End: 2017-05-25

## 2017-05-25 NOTE — PROGRESS NOTES
Blanca Loya  [id number]  1965  5/25/2017    DIAGNOSIS: IIA, [id number] IDC R breast, ER/MI+, her2-    CURRENT DOSE (cGy): 2200/5000    CONCURRENT CHEMOTHERAPY: prior to RT    SUBJECTIVE:  Comfortable with RT. No RUE swelling, no skin changes, no HA, balance or hearing changes. Sore throat x 2days    OBJECTIVE:  There were no vitals filed for this visit.  KPS: 100%- Normal, No Complaints, No Evidence of Disease  Nad, ao x 3; alopecia  No RT erythema  No restricted ROM or swelling of RUE    Portal imaging reviewed and approved.    ASSESSMENT AND PLAN:  Tolerating RT well with expected esophagitis 2/2 SCV field. Will add esophagitis mix.    Will continue as planned.    Treatment chart and setup reviewed and approved.    PHYSICIAN: Rocco Gonsales Jr, MD

## 2017-06-01 ENCOUNTER — DOCUMENTATION ONLY (OUTPATIENT)
Dept: RADIATION ONCOLOGY | Facility: CLINIC | Age: 52
End: 2017-06-01

## 2017-06-01 NOTE — PROGRESS NOTES
Blanca Loya  [id number]  1965  6/1/2017    DIAGNOSIS: IIA, [id number] IDC R breast, ER/NJ+, her2-    CURRENT DOSE (cGy): 3200/5000    CONCURRENT CHEMOTHERAPY: prior to RT    SUBJECTIVE:  Pt having mild sore throat responsive to rare use of esophagitis mix. Minimal skin changes in UIQ    OBJECTIVE:  There were no vitals filed for this visit.  KPS: 100%- Normal, No Complaints, No Evidence of Disease  Nad, ao x 3; alopecia  Minimal UIQ RT erythema, g1  No restricted ROM or swelling of RUE    Portal imaging reviewed and approved.    ASSESSMENT AND PLAN:  Tolerating RT well with expected esophagitis 2/2 SCV field. Remains full PO and esophagitis mix helps. Aquaphor prn.    Will continue as planned.    Treatment chart and setup reviewed and approved.    PHYSICIAN: Rocco Gonsales Jr, MD

## 2017-06-08 ENCOUNTER — DOCUMENTATION ONLY (OUTPATIENT)
Dept: RADIATION ONCOLOGY | Facility: CLINIC | Age: 52
End: 2017-06-08

## 2017-06-08 NOTE — PROGRESS NOTES
Blanca Loya  [id number]  1965  6/8/2017    DIAGNOSIS: No diagnosis found.    CURRENT DOSE (cGy): 4200 cGy    CONCURRENT CHEMOTHERAPY: no     SUBJECTIVE:  Patient in the beginning of her fifth week of radiation therapy. We'll complete in 4 more fractions.  Clinically doing well having little bit of pruritus in the medial aspect of the chest wall pain symptoms to report remains active no focal bony pain no shortness of breath or cough    OBJECTIVE:  There were no vitals filed for this visit.  Weight:   KPS: 90%- Able to Carry on Normal Activity: Minor Symptoms of Disease    Portal imaging reviewed and approved.    ASSESSMENT AND PLAN:  We will continue with radiation therapy. I told her to start using [place]. She should complete without interruption upon completion she will follow-up with us in 3 weeks    Will continue as planned.    Treatment chart and setup reviewed and approved.    PHYSICIAN: Ren Barillas MD

## 2017-06-09 ENCOUNTER — OFFICE VISIT (OUTPATIENT)
Dept: PLASTIC SURGERY | Facility: CLINIC | Age: 52
End: 2017-06-09
Payer: COMMERCIAL

## 2017-06-09 VITALS — BODY MASS INDEX: 24.22 KG/M2 | WEIGHT: 141.13 LBS

## 2017-06-09 DIAGNOSIS — Z09 SURGERY FOLLOW-UP EXAMINATION: Primary | ICD-10-CM

## 2017-06-09 PROCEDURE — 99212 OFFICE O/P EST SF 10 MIN: CPT | Mod: S$GLB,,, | Performed by: SURGERY

## 2017-06-09 PROCEDURE — 99999 PR PBB SHADOW E&M-EST. PATIENT-LVL I: CPT | Mod: PBBFAC,,, | Performed by: SURGERY

## 2017-06-09 NOTE — PROGRESS NOTES
Blanca Loya [facility].  She had bilateral tissue   expander placement.  She just finished radiation treatment, she looks good.  She   has a small amount of capsular contracture.  She will not be able to have her   implant exchange for at least six months.  I will see her back in the office in   three months.      LEXIS/MADELIN  dd: 06/09/2017 12:06:59 (CDT)  td: 06/09/2017 23:23:56 (CDT)  Doc ID   #[id number]  Job ID #[id number]    CC:

## 2017-06-14 ENCOUNTER — TREATMENT (OUTPATIENT)
Dept: RADIATION ONCOLOGY | Facility: CLINIC | Age: 52
End: 2017-06-14
Payer: COMMERCIAL

## 2017-06-14 PROCEDURE — 77336 RADIATION PHYSICS CONSULT: CPT | Mod: ,,, | Performed by: RADIOLOGY

## 2017-06-14 PROCEDURE — 77014 PR  CT GUIDANCE PLACEMENT RAD THERAPY FIELDS: CPT | Mod: ,,, | Performed by: RADIOLOGY

## 2017-06-14 PROCEDURE — G6015 RADIATION TX DELIVERY IMRT: HCPCS | Mod: ,,, | Performed by: RADIOLOGY

## 2017-06-15 ENCOUNTER — TELEPHONE (OUTPATIENT)
Dept: HEMATOLOGY/ONCOLOGY | Facility: CLINIC | Age: 52
End: 2017-06-15

## 2017-06-15 ENCOUNTER — HISTORICAL (OUTPATIENT)
Dept: ADMINISTRATIVE | Facility: HOSPITAL | Age: 52
End: 2017-06-15

## 2017-06-15 DIAGNOSIS — C50.411 MALIGNANT NEOPLASM OF UPPER-OUTER QUADRANT OF RIGHT FEMALE BREAST: Primary | ICD-10-CM

## 2017-06-15 LAB
ALBUMIN SERPL-MCNC: 4.4 G/DL (ref 3.1–4.7)
ALP SERPL-CCNC: 168 IU/L (ref 40–104)
ALT (SGPT): 67 IU/L (ref 3–33)
AST SERPL-CCNC: 79 IU/L (ref 10–40)
BASOPHILS NFR BLD: 0 K/UL (ref 0–0.2)
BASOPHILS NFR BLD: 0.3 %
BILIRUB SERPL-MCNC: 1.2 MG/DL (ref 0.3–1)
BUN SERPL-MCNC: 11 MG/DL (ref 8–20)
CALCIUM SERPL-MCNC: 9.8 MG/DL (ref 7.7–10.4)
CHLORIDE: 102 MMOL/L (ref 98–110)
CO2 SERPL-SCNC: 29.4 MMOL/L (ref 22.8–31.6)
CREATININE: 0.66 MG/DL (ref 0.6–1.4)
EOSINOPHIL NFR BLD: 0.1 K/UL (ref 0–0.7)
EOSINOPHIL NFR BLD: 3.2 %
ERYTHROCYTE [DISTWIDTH] IN BLOOD BY AUTOMATED COUNT: 16 % (ref 12.5–14.5)
GLUCOSE: 87 MG/DL (ref 70–99)
GRAN #: 2.2 K/UL (ref 1.4–6.5)
GRAN%: 63.3 %
HCT VFR BLD AUTO: 37.4 % (ref 36–48)
HGB BLD-MCNC: 12.4 G/DL (ref 12–15)
IMMATURE GRANS (ABS): 0 K/UL (ref 0–1)
IMMATURE GRANULOCYTES: 0.3 %
LYMPH #: 0.5 K/UL (ref 1.2–3.4)
LYMPH%: 15 %
MCH RBC QN AUTO: 30.9 PG (ref 25–35)
MCHC RBC AUTO-ENTMCNC: 33.2 G/DL (ref 31–36)
MCV RBC AUTO: 93.3 FL (ref 79–98)
MONO #: 0.6 K/UL (ref 0.1–0.6)
MONO%: 17.9 %
NUCLEATED RBCS: 0 %
NUCLEATED RED BLOOD CELLS: 0 /100 WBC
PERFORMED BY:: ABNORMAL
PLATELET # BLD AUTO: 158 K/UL (ref 140–440)
PMV BLD AUTO: 9.1 FL (ref 8.8–12.7)
POTASSIUM SERPL-SCNC: 3.5 MMOL/L (ref 3.5–5)
PROT SERPL-MCNC: 7.7 G/DL (ref 6–8.2)
RBC # BLD AUTO: 4.01 M/UL (ref 3.5–5.5)
SODIUM: 140 MMOL/L (ref 134–144)
WBC # BLD: 3.5 K/UL (ref 5–10)

## 2017-06-19 ENCOUNTER — TELEPHONE (OUTPATIENT)
Dept: RADIATION ONCOLOGY | Facility: CLINIC | Age: 52
End: 2017-06-19

## 2017-06-21 ENCOUNTER — OFFICE VISIT (OUTPATIENT)
Dept: HEMATOLOGY/ONCOLOGY | Facility: CLINIC | Age: 52
End: 2017-06-21
Payer: COMMERCIAL

## 2017-06-21 VITALS
HEART RATE: 85 BPM | WEIGHT: 138.31 LBS | DIASTOLIC BLOOD PRESSURE: 74 MMHG | RESPIRATION RATE: 18 BRPM | BODY MASS INDEX: 23.61 KG/M2 | TEMPERATURE: 98 F | SYSTOLIC BLOOD PRESSURE: 111 MMHG | HEIGHT: 64 IN

## 2017-06-21 DIAGNOSIS — C50.411 MALIGNANT NEOPLASM OF UPPER-OUTER QUADRANT OF RIGHT FEMALE BREAST: Primary | ICD-10-CM

## 2017-06-21 DIAGNOSIS — Z17.0 ESTROGEN RECEPTOR POSITIVE: ICD-10-CM

## 2017-06-21 PROCEDURE — 99214 OFFICE O/P EST MOD 30 MIN: CPT | Mod: ,,, | Performed by: INTERNAL MEDICINE

## 2017-06-21 RX ORDER — ANASTROZOLE 1 MG/1
1 TABLET ORAL DAILY
Qty: 30 TABLET | Refills: 3 | Status: SHIPPED | OUTPATIENT
Start: 2017-06-21 | End: 2017-09-27 | Stop reason: SDUPTHER

## 2017-06-21 NOTE — LETTER
June 21, 2017      Chaparro Shepherd III, MD  [address]  [place] [address]           [facility]  [address] [facility] [address] [facility]  [address]  [place] [address]  Phone: [phone]  Fax: [phone]          Patient: Blanca Loya   MR Number: [id number]   YOB: 1965   Date of Visit: 6/21/2017       Dear Dr. Chaparro Shepherd III:    Thank you for referring Blanca Loya to me for evaluation. Attached you will find relevant portions of my assessment and plan of care.    If you have questions, please do not hesitate to call me. I look forward to following Blanca Loya along with you.    Sincerely,    Bronson Carrillo MD    Enclosure  CC:  No Recipients    If you would like to receive this communication electronically, please contact [email or web address] or [phone] to request more information on [employer] Link access.    For providers and/or their staff who would like to refer a patient to [facility], please contact us through our one-stop-shop provider referral line, [facility], at [phone].    If you feel you have received this communication in error or would no longer like to receive these types of communications, please e-mail [email or web address]

## 2017-06-21 NOTE — PROGRESS NOTES
"       [facility]            PROGRESS NOTE      Subjective:       Patient ID:   NAME: Blanca Loya : 1965     51 y.o. female    Referring Doc: Chaparro Shepherd III, *  Other Physicians: Lyndsay Gonsales [place]    Chief Complaint:  Breast ca    History of Present Illness:     Patient returns today for a regularly scheduled follow-up visit.  The patient completed XRT last Wednesday per direction of Dr Gonsales. Discussed the next step which is the antihormone medication. She is doing ok and had minimal burning. No Cp, SOB, Ha's or N/V. Some residual fatigue.             ROS:   GEN: normal without any fever, night sweats or weight loss  HEENT: normal with no HA's, sore throat, stiff neck, changes in vision  CV: normal with no CP, SOB, PND, RUGGIERO or orthopnea  PULM: normal with no SOB, cough, hemoptysis, sputum or pleuritic pain  GI: normal with no abdominal pain, nausea, vomiting, constipation, diarrhea, melanotic stools, BRBPR, or hematemesis  : normal with no hematuria, dysuria  BREAST: normal with no mass, discharge, pain  SKIN: normal with no rash, erythema, bruising, or swelling    Allergies:  Review of patient's allergies indicates:   Allergen Reactions    Demerol [meperidine] Anxiety    Pcn [penicillins] Rash       Medications:    Current Outpatient Prescriptions:     cyclobenzaprine (FLEXERIL) 10 MG tablet, TK 1 T PO Q 8 H PRF MUSCLE SPASM, Disp: , Rfl: 1    PMHx/PSHx Updates:  See patient's last visit with me on 5/3/2017.  See H&P on 2016      Pathology:  See path 16        Objective:     Vitals:  Blood pressure 111/74, pulse 85, temperature 98.3 °F (36.8 °C), resp. rate 18, height 5' 4" (1.626 m), weight 62.7 kg (138 lb 4.8 oz), last menstrual period 09/10/2016.    Physical Examination:   GEN: no apparent distress, comfortable; AAOx3  HEAD: atraumatic and normocephalic  EYES: no pallor, no icterus, PERRLA  ENT: OMM, no pharyngeal erythema, external ears WNL; no nasal " discharge; no thrush  NECK: no masses, thyroid normal, trachea midline, no LAD/LN's, supple  CV: RRR with no murmur; normal pulse; normal S1 and S2; no pedal edema  CHEST: Normal respiratory effort; CTAB; normal breath sounds; no wheeze or crackles  ABDOM: nontender and nondistended; soft; normal bowel sounds; no rebound/guarding  MUSC/Skeletal: ROM normal; no crepitus; joints normal; no deformities or arthropathy  EXTREM: no clubbing, cyanosis, inflammation or swelling  SKIN: no rashes, lesions, ulcers, petechiae or subcutaneous nodules  : no tyson  NEURO: grossly intact; motor/sensory WNL; AAOx3; no tremors  PSYCH: normal mood, affect and behavior  LYMPH: normal cervical, supraclavicular, axillary and groin LN's            Labs:   Lab Results   Component Value Date    WBC 3.5 (L) 06/15/2017    HGB 12.4 06/15/2017    HCT 37.4 06/15/2017     06/15/2017    CMP  Sodium   Date Value Ref Range Status   06/15/2017 140 134 - 144 mmol/L      Potassium   Date Value Ref Range Status   06/15/2017 3.5 3.5 - 5.0 mmol/L      Chloride   Date Value Ref Range Status   06/15/2017 102 98 - 110 mmol/L      CO2   Date Value Ref Range Status   06/15/2017 29.4 22.8 - 31.6 mmol/L      Glucose   Date Value Ref Range Status   06/15/2017 87 70 - 99 mg/dL      BUN, Bld   Date Value Ref Range Status   06/15/2017 11 8 - 20 mg/dL      Creatinine   Date Value Ref Range Status   06/15/2017 0.66 0.60 - 1.40 mg/dL      Calcium   Date Value Ref Range Status   06/15/2017 9.8 7.7 - 10.4 mg/dL      Total Protein   Date Value Ref Range Status   06/15/2017 7.7 6.0 - 8.2 g/dL      Albumin   Date Value Ref Range Status   06/15/2017 4.4 3.1 - 4.7 g/dL      Total Bilirubin   Date Value Ref Range Status   06/15/2017 1.2 (H) 0.3 - 1.0 mg/dL      Alkaline Phosphatase   Date Value Ref Range Status   06/15/2017 168 (H) 40 - 104 IU/L      AST   Date Value Ref Range Status   06/15/2017 79 (H) 10 - 40 IU/L      I have reviewed all available lab results and  radiology reports.    Radiology/Diagnostic Studies:        Assessment/Plan:   (1) 51 y.o. female with diagnosis of right breast cancer  - s/p bilateral mastectomies on 9/26/16 followed by reconstruction  - followed by Dr Bear with [facility]  - she had 2 out of 7 LN's that were positive  - she was a Stage IIA  - ER+/OK+ and her2nu neg  - s/p AC x4 and taxol x4  - she has seen Dr Gonsales with rad/onc for XRT and completed 5 weeks last Wednesday  - discussed the pro's and con's of tamoxifen versus arimidex, and in light of the pap issues, arimidex is probably the better choice    (2) Mild leucopenia/anemia secondary to chemotherapy  - latest hgb is recovered; wbc is at 3.5    (3) Prior abnormal PAP issues - followed by Dr Cline with GYN    (4) Hx of acoustic neuroma in past - s/p XRT in 2010    (5) Chronic fibromyalgia    PLAN:  1. She needs to f/u with Dr Ren Cline with GYN  2. Start her on arimidex oral antihormone  -   3. obtain consents for the arimidex  4. Will plan to order a repeat PET in Sept 2017  5. check labs monthly and PF q 4-6 weeks  6. Fax note to Dr Bear, Ilda, Marlyn Shepherd, and jaz      I spent over 25 mins with the patient, of which over half was face to face with the patient.         Discussion:     I have explained all of the above in detail and the patient understands all of the current recommendation(s). I have answered all of their questions to the best of my ability and to their complete satisfaction.   The patient is to continue with the current management plan.    RTC in  1 month          Electronically signed by Bronson Carrillo MD

## 2017-06-22 ENCOUNTER — TELEPHONE (OUTPATIENT)
Dept: HEMATOLOGY/ONCOLOGY | Facility: CLINIC | Age: 52
End: 2017-06-22

## 2017-06-22 NOTE — TELEPHONE ENCOUNTER
----- Message from Marcia Barcenas sent at 6/22/2017 10:21 AM CDT -----  Contact: call back [phone]   Pt called in she had an appoitnment yesterday but forgot to ask when her next port flush is. Pt said her last one ws 4 weeks ago and would like a nurse to call her back to advise when to get it done again.

## 2017-07-05 ENCOUNTER — OFFICE VISIT (OUTPATIENT)
Dept: RADIATION ONCOLOGY | Facility: CLINIC | Age: 52
End: 2017-07-05
Payer: COMMERCIAL

## 2017-07-05 VITALS — WEIGHT: 140 LBS | BODY MASS INDEX: 24.03 KG/M2

## 2017-07-05 DIAGNOSIS — C50.411 MALIGNANT NEOPLASM OF UPPER-OUTER QUADRANT OF RIGHT FEMALE BREAST: Primary | ICD-10-CM

## 2017-07-05 PROCEDURE — 99024 POSTOP FOLLOW-UP VISIT: CPT | Mod: ,,, | Performed by: RADIOLOGY

## 2017-07-05 NOTE — PROGRESS NOTES
"Blanca Loya  [id number]  1965  7/5/2017  Bronson Carrillo Md  [address]  [address]  [place] [address]    DIAGNOSIS: IIA, [id number] IDC R breast, ER/MD+, her2-  REASON FOR VISIT: Routine scheduled follow-up.    HISTORY OF PRESENT ILLNESS:   51F with mammogram detected, bx proven multifocal IDC of R breast with B mastectomies + expander placement. Pathology revealed a 12mm and 7mm IDC with 2/7 LNs+ and close posterior margin; ER/MD+, her2-. She completed ACT chemotherapy.    Completed 50Gy EBRT to R "breast" and comp jamie groups of ax I-III/SCV/IM LNs on 6/14/17.    INTERVAL HISTORY:   Ms. Loya reports near complete skin recovery from RT. No swelling in RUE or restricted ROM at shoulder. No dysphagia. + residual fatigue. Has plans to meet with surgeon in September and eventual implant placement in 01/18. She has started anti-estrogen Rx with Dr. Carrillo. PET/CT planned for September.    Review of Systems   Constitutional: Positive for fatigue. Negative for appetite change, chills, fever and unexpected weight change.   HENT:   Negative for lump/mass, mouth sores, sore throat, trouble swallowing and voice change.    Eyes: Negative for eye problems and icterus.   Respiratory: Negative for cough, hemoptysis and shortness of breath.    Cardiovascular: Negative for chest pain and leg swelling.   Gastrointestinal: Negative for abdominal pain, constipation, diarrhea, nausea and vomiting.   Genitourinary: Negative for dysuria, frequency, hematuria, nocturia and vaginal bleeding.    Musculoskeletal: Negative for back pain, gait problem, neck pain and neck stiffness.   Neurological: Negative for extremity weakness, gait problem, headaches, numbness and seizures.   Hematological: Negative for adenopathy.     Past Medical History:   Diagnosis Date    Estrogen receptor positive 5/23/2017    Malignant neoplasm of upper-outer quadrant of right female breast 5/23/2017     No past surgical history on file.  Social " History     Social History    Marital status:      Spouse name: N/A    Number of children: N/A    Years of education: N/A     Social History Main Topics    Smoking status: Never Smoker    Smokeless tobacco: Never Used    Alcohol use No    Drug use: No    Sexual activity: Not on file     Other Topics Concern    Not on file     Social History Narrative    No narrative on file     Family History   Problem Relation Age of Onset    No Known Problems Mother     No Known Problems Father      Medication List with Changes/Refills   Current Medications    ANASTROZOLE (ARIMIDEX) 1 MG TAB    Take 1 tablet (1 mg total) by mouth once daily.    CYCLOBENZAPRINE (FLEXERIL) 10 MG TABLET    TK 1 T PO Q 8 H PRF MUSCLE SPASM     Review of patient's allergies indicates:   Allergen Reactions    Demerol [meperidine] Anxiety    Pcn [penicillins] Rash       QUALITY OF LIFE: 90%- Able to Carry on Normal Activity: Minor Symptoms of Disease    Vitals:    07/05/17 1101   Weight: 63.5 kg (140 lb)   PainSc: 0-No pain       PHYSICAL EXAM:   GENERAL: alert; in no apparent distress.   HEAD: normocephalic, atraumatic. Hair growing back  EYES: pupils are equal, round, reactive to light and accommodation. Sclera anicteric. Conjunctiva not injected.   NOSE/THROAT: no nasal erythema or rhinorrhea. Oropharynx pink, without erythema, ulcerations or thrush.   NECK: no cervical motion rigidity; supple with no masses.  CHEST:  Patient is speaking comfortably on room air with normal work of breathing without using accessory muscles of respiration.  MUSCULOSKELETAL: no tenderness to palpation along the spine or scapulae. Normal range of motion.  NEUROLOGIC: cranial nerves II-XII intact bilaterally. Strength 5/5 in bilateral upper and lower extremities. Normal gait.  LYMPHATIC: no cervical, supraclavicular or axillary adenopathy appreciated bilaterally.   EXTREMITIES: no clubbing, cyanosis, edema.  SKIN: no erythema, rashes, ulcerations  noted.   BREAST: B expanders in place; no nodularity or blanca[reciable skin toxicity; incisions are well healed and smooth; no fibrosis.    ANCILLARY DATA: none    ASSESSMENT: 51F with stage IIA, [id number] IDC R breast, ER/TN+, her2- s/p mastectomy and adjuvant RT ending 6/17.  PLAN:  Ms. Loya did very well with RT and by ROS and PE today is without evidence of disease. She is on anti-estrogen therapy and has plans for PET/CT f/u in Sept. She will be meeting with Dr. Umnaa and plans for implant placement in ~1/18. I'd like to see her following this at ~6mos and she will contact us to arrange. We will also arrange for MRI B imaging of her treated schwannoma at that time.     All questions answered and contact information provided. Patient understands free to call us anytime with any questions or concerns regarding radiation therapy.    TIME SPENT WITH PATIENT: I have personally seen and evaluated this patient. Approximately 30 minutes were spent with the patient discussing follow-up careplan.     PHYSICIAN: Rocco Gonsales Jr, MD

## 2017-07-06 RX ORDER — HEPARIN 100 UNIT/ML
500 SYRINGE INTRAVENOUS
Status: CANCELLED | OUTPATIENT
Start: 2017-07-06

## 2017-07-06 RX ORDER — SODIUM CHLORIDE 0.9 % (FLUSH) 0.9 %
10 SYRINGE (ML) INJECTION
Status: CANCELLED | OUTPATIENT
Start: 2017-07-06

## 2017-07-17 ENCOUNTER — HISTORICAL (OUTPATIENT)
Dept: ADMINISTRATIVE | Facility: HOSPITAL | Age: 52
End: 2017-07-17

## 2017-07-17 LAB
ALBUMIN SERPL-MCNC: 4.4 G/DL (ref 3.1–4.7)
ALP SERPL-CCNC: 173 IU/L (ref 40–104)
ALT (SGPT): 41 IU/L (ref 3–33)
AST SERPL-CCNC: 42 IU/L (ref 10–40)
BASOPHILS NFR BLD: 0 K/UL (ref 0–0.2)
BASOPHILS NFR BLD: 0.3 %
BILIRUB SERPL-MCNC: 0.9 MG/DL (ref 0.3–1)
BUN SERPL-MCNC: 13 MG/DL (ref 8–20)
CALCIUM SERPL-MCNC: 9.9 MG/DL (ref 7.7–10.4)
CHLORIDE: 102 MMOL/L (ref 98–110)
CO2 SERPL-SCNC: 28.8 MMOL/L (ref 22.8–31.6)
CREATININE: 0.65 MG/DL (ref 0.6–1.4)
EOSINOPHIL NFR BLD: 0.1 K/UL (ref 0–0.7)
EOSINOPHIL NFR BLD: 2.3 %
ERYTHROCYTE [DISTWIDTH] IN BLOOD BY AUTOMATED COUNT: 15.1 % (ref 12.5–14.5)
GLUCOSE: 88 MG/DL (ref 70–99)
GRAN #: 3.8 K/UL (ref 1.4–6.5)
GRAN%: 66.9 %
HCT VFR BLD AUTO: 37 % (ref 36–48)
HGB BLD-MCNC: 12.5 G/DL (ref 12–15)
IMMATURE GRANS (ABS): 0 K/UL (ref 0–1)
IMMATURE GRANULOCYTES: 0.3 %
LYMPH #: 1.2 K/UL (ref 1.2–3.4)
LYMPH%: 21.1 %
MCH RBC QN AUTO: 32.1 PG (ref 25–35)
MCHC RBC AUTO-ENTMCNC: 33.8 G/DL (ref 31–36)
MCV RBC AUTO: 95.1 FL (ref 79–98)
MONO #: 0.5 K/UL (ref 0.1–0.6)
MONO%: 9.1 %
NUCLEATED RBCS: 0 %
NUCLEATED RED BLOOD CELLS: 0 /100 WBC
PERFORMED BY:: ABNORMAL
PLATELET # BLD AUTO: 196 K/UL (ref 140–440)
PMV BLD AUTO: 9.3 FL (ref 8.8–12.7)
POTASSIUM SERPL-SCNC: 3.5 MMOL/L (ref 3.5–5)
PROT SERPL-MCNC: 7.7 G/DL (ref 6–8.2)
RBC # BLD AUTO: 3.89 M/UL (ref 3.5–5.5)
SODIUM: 141 MMOL/L (ref 134–144)
WBC # BLD: 5.7 K/UL (ref 5–10)

## 2017-07-26 ENCOUNTER — OFFICE VISIT (OUTPATIENT)
Dept: HEMATOLOGY/ONCOLOGY | Facility: CLINIC | Age: 52
End: 2017-07-26
Payer: COMMERCIAL

## 2017-07-26 VITALS
TEMPERATURE: 98 F | BODY MASS INDEX: 24.18 KG/M2 | SYSTOLIC BLOOD PRESSURE: 109 MMHG | WEIGHT: 141.63 LBS | RESPIRATION RATE: 18 BRPM | HEIGHT: 64 IN | HEART RATE: 79 BPM | DIASTOLIC BLOOD PRESSURE: 67 MMHG

## 2017-07-26 DIAGNOSIS — Z17.0 ESTROGEN RECEPTOR POSITIVE: ICD-10-CM

## 2017-07-26 DIAGNOSIS — Z17.0 MALIGNANT NEOPLASM OF UPPER-OUTER QUADRANT OF RIGHT BREAST IN FEMALE, ESTROGEN RECEPTOR POSITIVE: Primary | ICD-10-CM

## 2017-07-26 DIAGNOSIS — C50.411 MALIGNANT NEOPLASM OF UPPER-OUTER QUADRANT OF RIGHT BREAST IN FEMALE, ESTROGEN RECEPTOR POSITIVE: Primary | ICD-10-CM

## 2017-07-26 PROCEDURE — 99214 OFFICE O/P EST MOD 30 MIN: CPT | Mod: ,,, | Performed by: INTERNAL MEDICINE

## 2017-07-26 NOTE — LETTER
July 26, 2017      Chaparro Shepherd III, MD  [address]  [place] [address]           [facility]  [address] [facility]  [address]  [place] [address]  Phone: [phone]  Fax: [phone]          Patient: Blanca Loya   MR Number: [id number]   YOB: 1965   Date of Visit: 7/26/2017       Dear Dr. Chaparro Shepherd III:    Thank you for referring Blanca Loya to me for evaluation. Attached you will find relevant portions of my assessment and plan of care.    If you have questions, please do not hesitate to call me. I look forward to following Blanca Loya along with you.    Sincerely,    Bronson Carrillo MD    Enclosure  CC:  No Recipients    If you would like to receive this communication electronically, please contact [email or web address] or [phone] to request more information on [employer] Link access.    For providers and/or their staff who would like to refer a patient to [facility], please contact us through our one-stop-shop provider referral line, [facility], at [phone].    If you feel you have received this communication in error or would no longer like to receive these types of communications, please e-mail [email or web address]

## 2017-07-26 NOTE — PROGRESS NOTES
"   [facility] Hematology/Oncology  PROGRESS NOTE      Subjective:       Patient ID:   NAME: Blanca Loya : 1965     51 y.o. female    Referring Doc: Cora  Other Physicians: Barbara Gonsales [place], [place]    Chief Complaint:  Breast ca f/u    History of Present Illness:     Patient returns today for a regularly scheduled follow-up visit.  The patient is here with her . She has had some mood swings and weight gain with the arimidex. She has been on the arimidex for about a month now. No CP, SOB, HA's or N/V.             ROS:   GEN: normal without any fever, night sweats or weight loss  HEENT: normal with no HA's, sore throat, stiff neck, changes in vision  CV: normal with no CP, SOB, PND, RUGGIERO or orthopnea  PULM: normal with no SOB, cough, hemoptysis, sputum or pleuritic pain  GI: normal with no abdominal pain, nausea, vomiting, constipation, diarrhea, melanotic stools, BRBPR, or hematemesis  : normal with no hematuria, dysuria  BREAST: normal with no mass, discharge, pain  SKIN: normal with no rash, erythema, bruising, or swelling    Allergies:  Review of patient's allergies indicates:   Allergen Reactions    Demerol [meperidine] Anxiety    Pcn [penicillins] Rash       Medications:    Current Outpatient Prescriptions:     anastrozole (ARIMIDEX) 1 mg Tab, Take 1 tablet (1 mg total) by mouth once daily., Disp: 30 tablet, Rfl: 3    PMHx/PSHx Updates:  See patient's last visit with me on 2017.  See H&P on 2016      Pathology:    Malignant neoplasm of upper-outer quadrant of right female breast    Staging form: Onc Breast AJCC V7    - Pathologic: Stage IIA (T1c, N1a, cM0) - Signed by Rocco Gonsales Jr., MD on 2017    See path on 2016        Objective:     Vitals:  Blood pressure 109/67, pulse 79, temperature 98.2 °F (36.8 °C), resp. rate 18, height 5' 4" (1.626 m), weight 64.2 kg (141 lb 9.6 oz), last menstrual period 09/10/2016.    Physical Examination:   GEN: no apparent distress, " comfortable; AAOx3  HEAD: atraumatic and normocephalic  EYES: no pallor, no icterus, PERRLA  ENT: OMM, no pharyngeal erythema, external ears WNL; no nasal discharge; no thrush  NECK: no masses, thyroid normal, trachea midline, no LAD/LN's, supple  CV: RRR with no murmur; normal pulse; normal S1 and S2; no pedal edema  CHEST: Normal respiratory effort; CTAB; normal breath sounds; no wheeze or crackles  ABDOM: nontender and nondistended; soft; normal bowel sounds; no rebound/guarding  MUSC/Skeletal: ROM normal; no crepitus; joints normal; no deformities or arthropathy  EXTREM: no clubbing, cyanosis, inflammation or swelling  SKIN: no rashes, lesions, ulcers, petechiae or subcutaneous nodules  : no tyson  NEURO: grossly intact; motor/sensory WNL; AAOx3; no tremors  PSYCH: normal mood, affect and behavior  LYMPH: normal cervical, supraclavicular, axillary and groin LN's  BREAST: no changes          Labs:     7/17      Radiology/Diagnostic Studies:    No results found.    I have reviewed all available lab results and radiology reports.    Assessment/Plan:   (1) 51 y.o. female with diagnosis of right breast cancer  - s/p bilateral mastectomies on 9/26/16 followed by reconstruction  - followed by Dr Bear with [facility]urg  - she had 2 out of 7 LN's that were positive  - she was a Stage IIA  - ER+/[place]+ and her2nu neg  - s/p AC x4 and taxol x4  - she has seen Dr Gonsales with rad/onc for XRT and completed 5 weeks last month  - discussed the pro's and con's of tamoxifen versus arimidex, and in light of the pap issues, arimidex is probably the better choice  - she has been on arimidex for about month now  - she has some mood swings and weight gain     (2) Mild leucopenia/anemia secondary to chemotherapy  - latest hgb is recovered; wbc is at 5.7  - latest hgb normal at 12.5    (3) Prior abnormal PAP issues - followed by Dr Cline with GYN     (4) Hx of acoustic neuroma in past - s/p XRT in 2010     (5) Chronic  fibromyalgia    (6) LFT's improved    (7) RUE -possible lymphedema issues - will refer her to PT         PLAN:  1. To see Dr Cline on 8/15  2. Continue arimidex   3. PET - will schedule for Sept 2017  4. F/u with PCP  RTC in 3 months  Fax note to Chaparro Shepherd III, *, Marlyn Bear Mannina,     I spent over 25 mins with the patient, of which over half was face to face with the patient. Reviewing materials, labs, reports and studies. Making treatment and analytical decisions. Ordering necessary labs, tests and studies.      Discussion:     I have explained all of the above in detail and the patient understands all of the current recommendation(s). I have answered all of their questions to the best of my ability and to their complete satisfaction.   The patient is to continue with the current management plan.            Electronically signed by Bronson Carrillo MD

## 2017-08-17 RX ORDER — SODIUM CHLORIDE 0.9 % (FLUSH) 0.9 %
10 SYRINGE (ML) INJECTION
Status: CANCELLED | OUTPATIENT
Start: 2017-08-17

## 2017-08-17 RX ORDER — HEPARIN 100 UNIT/ML
500 SYRINGE INTRAVENOUS
Status: CANCELLED | OUTPATIENT
Start: 2017-08-17

## 2017-09-14 ENCOUNTER — TELEPHONE (OUTPATIENT)
Dept: PLASTIC SURGERY | Facility: CLINIC | Age: 52
End: 2017-09-14

## 2017-09-21 LAB — GLUCOSE SERPL-MCNC: 86 MG/DL (ref 70–99)

## 2017-09-27 ENCOUNTER — OFFICE VISIT (OUTPATIENT)
Dept: HEMATOLOGY/ONCOLOGY | Facility: CLINIC | Age: 52
End: 2017-09-27
Payer: COMMERCIAL

## 2017-09-27 VITALS
BODY MASS INDEX: 24 KG/M2 | DIASTOLIC BLOOD PRESSURE: 65 MMHG | WEIGHT: 139.81 LBS | RESPIRATION RATE: 18 BRPM | HEART RATE: 77 BPM | TEMPERATURE: 98 F | SYSTOLIC BLOOD PRESSURE: 96 MMHG

## 2017-09-27 DIAGNOSIS — Z17.0 MALIGNANT NEOPLASM OF UPPER-OUTER QUADRANT OF RIGHT BREAST IN FEMALE, ESTROGEN RECEPTOR POSITIVE: Primary | ICD-10-CM

## 2017-09-27 DIAGNOSIS — Z17.0 ESTROGEN RECEPTOR POSITIVE: ICD-10-CM

## 2017-09-27 DIAGNOSIS — C50.411 MALIGNANT NEOPLASM OF UPPER-OUTER QUADRANT OF RIGHT BREAST IN FEMALE, ESTROGEN RECEPTOR POSITIVE: Primary | ICD-10-CM

## 2017-09-27 PROCEDURE — 99214 OFFICE O/P EST MOD 30 MIN: CPT | Mod: ,,, | Performed by: INTERNAL MEDICINE

## 2017-09-27 PROCEDURE — 3008F BODY MASS INDEX DOCD: CPT | Mod: ,,, | Performed by: INTERNAL MEDICINE

## 2017-09-27 RX ORDER — FLUOXETINE HYDROCHLORIDE 20 MG/1
CAPSULE ORAL
Refills: 0 | COMMUNITY
Start: 2017-09-25 | End: 2017-12-22

## 2017-09-27 RX ORDER — ANASTROZOLE 1 MG/1
1 TABLET ORAL DAILY
Qty: 30 TABLET | Refills: 3 | OUTPATIENT
Start: 2017-09-27 | End: 2017-10-10 | Stop reason: SDUPTHER

## 2017-09-27 NOTE — PROGRESS NOTES
[facility] Hematology/Oncology  PROGRESS NOTE      Subjective:       Patient ID:   NAME: Blanca Loya : 1965     52 y.o. female    Referring Doc: Cora  Other Physicians: Lyndsay Gonsales [place], [place]    Chief Complaint:  Breast ca f/u    History of Present Illness:     Patient returns today for a regularly scheduled follow-up visit.  The patient returns to go over results of the recent PET scan. She is here with her . She is doing well with no new issues. No CP, SOB, HA's or N/V. She is doing ok with the arimidex. She is seeing Dr Umana this Friday.             ROS:   GEN: normal without any fever, night sweats or weight loss  HEENT: normal with no HA's, sore throat, stiff neck, changes in vision  CV: normal with no CP, SOB, PND, RUGGIERO or orthopnea  PULM: normal with no SOB, cough, hemoptysis, sputum or pleuritic pain  GI: normal with no abdominal pain, nausea, vomiting, constipation, diarrhea, melanotic stools, BRBPR, or hematemesis  : normal with no hematuria, dysuria  BREAST: normal with no mass, discharge, pain  SKIN: normal with no rash, erythema, bruising, or swelling    Allergies:  Review of patient's allergies indicates:   Allergen Reactions    Demerol [meperidine] Anxiety    Pcn [penicillins] Rash       Medications:    Current Outpatient Prescriptions:     anastrozole (ARIMIDEX) 1 mg Tab, Take 1 tablet (1 mg total) by mouth once daily., Disp: 30 tablet, Rfl: 3    fluoxetine (PROZAC) 20 MG capsule, TK 1 C PO QD DISCONTINUE LEXAPRO, Disp: , Rfl: 0    PMHx/PSHx Updates:  See patient's last visit with me on 2017.  See H&P on 2016        Pathology:  Malignant neoplasm of upper-outer quadrant of right female breast    Staging form: Onc Breast AJCC V7    - Pathologic: Stage IIA (T1c, N1a, cM0) - Signed by Rocco Gonsales Jr., MD on 2017    See path note on 2016      Objective:     Vitals:  Blood pressure 96/65, pulse 77, temperature 98.3 °F (36.8 °C), temperature source  Oral, resp. rate 18, weight 63.4 kg (139 lb 12.8 oz), last menstrual period 09/10/2016.    Physical Examination:   GEN: no apparent distress, comfortable; AAOx3  HEAD: atraumatic and normocephalic  EYES: no pallor, no icterus, PERRLA  ENT: OMM, no pharyngeal erythema, external ears WNL; no nasal discharge; no thrush  NECK: no masses, thyroid normal, trachea midline, no LAD/LN's, supple  CV: RRR with no murmur; normal pulse; normal S1 and S2; no pedal edema  CHEST: Normal respiratory effort; CTAB; normal breath sounds; no wheeze or crackles  ABDOM: nontender and nondistended; soft; normal bowel sounds; no rebound/guarding  MUSC/Skeletal: ROM normal; no crepitus; joints normal; no deformities or arthropathy  EXTREM: no clubbing, cyanosis, inflammation or swelling  SKIN: no rashes, lesions, ulcers, petechiae or subcutaneous nodules  : no tyson  NEURO: grossly intact; motor/sensory WNL; AAOx3; no tremors  PSYCH: normal mood, affect and behavior  LYMPH: normal cervical, supraclavicular, axillary and groin LN's            Labs:     7/17/2017  Lab Results   Component Value Date    WBC 5.7 07/17/2017    HGB 12.5 07/17/2017    HCT 37.0 07/17/2017     07/17/2017     BMP  Lab Results   Component Value Date     07/17/2017    K 3.5 07/17/2017     07/17/2017    CO2 28.8 07/17/2017    BUN 13 07/17/2017    CREATININE 0.65 07/17/2017    CALCIUM 9.9 07/17/2017           Radiology/Diagnostic Studies:    [facility] Unknown Rad Eap    Result Date: 9/21/2017  INTEGRATED PET CT WITH IMAGE FUSION HISTORY:  c50.411 z17.0 subsequent treatment evaluation for right breast cancer diagnosed in August 2016. Bilateral mastectomies with chemotherapy and radiation. TECHNIQUE: Following the injection of 12.2 mCi of F-18 labeled FDG into a left antecubital vein, PET CT was performed from the vertex of the skull through the proximal thighs with an integrated full ring PET CT scanner with image fusion. The patient's serum glucose at the  time of the exam was 86 mg/dL. COMPARISON: 09/20/2016 FINDINGS: There is no abnormal FDG activity to suggest recurrent or metastatic disease. CT of the head and neck show no intra-axial lesions or cervical adenopathy. The patient has had a left occipital craniotomy. CT of the chest demonstrates bilateral breast implants. There is no hilar, mediastinal or axillary adenopathy. There are no pulmonary nodules, infiltrates or pleural effusions. CT of the abdomen and pelvis demonstrates physiologic activity within the GI tract and urinary system. There is no adenopathy. The adrenal glands are normal. There are no lytic or blastic lesions.     IMPRESSION: No evidence of metastatic disease Prior bilateral mastectomies Read and electronically signed by: Mireya Rodriguez MD on 9/21/2017 1:01 PM CDT MIREYA RODRIGUEZ MD      I have reviewed all available lab results and radiology reports.    Assessment/Plan:   (1) 51 y.o. female with diagnosis of right breast cancer  - s/p bilateral mastectomies on 9/26/16 followed by reconstruction  - followed by Dr Bear with [facility]  - she had 2 out of 7 LN's that were positive  - she was a Stage IIA  - ER+/[place]+ and her2nu neg  - s/p AC x4 and taxol x4  - she has seen Dr Gonsales with rad/onc for XRT and completed 5 weeks last month  - discussed the pro's and con's of tamoxifen versus arimidex, and in light of the pap issues, arimidex is probably the better choice  - she has been on arimidex for about month now  - she has some mood swings and weight gain  - PET done on 9/21 with no evidence of mets     (2) Mild leucopenia/anemia secondary to chemotherapy  - latest hgb is recovered; wbc is at 5.7  - latest hgb normal at 12.5     (3) Prior abnormal PAP issues - followed by Dr Cline with GYN; latest pap was negative per patient and she was also HPV negative     (4) Hx of acoustic neuroma in past - s/p XRT in 2010     (5) Chronic fibromyalgia     (6) LFT's improved     (7) RUE -possible  lymphedema issues - will refer her to PT            PLAN:  1. Check up to date labs  2. F/U with PCP, Surg and GYN  3. Continue arimidex - refill  4. RTC in 3-4 months  5. She follows up with Dr Umana this Friday; she wants to get portacath removed which is ok with me  Fax note to Chaparro Shepherd III, *, Marlyn Bear Mannina,      I spent over 25 mins of time with the patient. Over half of this time was spent couseling and coordinating care.    Discussion:     I have explained all of the above in detail and the patient understands all of the current recommendation(s). I have answered all of their questions to the best of my ability and to their complete satisfaction.   The patient is to continue with the current management plan.            Electronically signed by Bronson Carrillo MD

## 2017-09-28 ENCOUNTER — HISTORICAL (OUTPATIENT)
Dept: ADMINISTRATIVE | Facility: HOSPITAL | Age: 52
End: 2017-09-28

## 2017-09-28 LAB
ALBUMIN SERPL-MCNC: 4.2 G/DL (ref 3.1–4.7)
ALP SERPL-CCNC: 109 IU/L (ref 40–104)
ALT (SGPT): 30 IU/L (ref 3–33)
AST SERPL-CCNC: 39 IU/L (ref 10–40)
BASOPHILS NFR BLD: 0 K/UL (ref 0–0.2)
BASOPHILS NFR BLD: 0.6 %
BILIRUB SERPL-MCNC: 0.9 MG/DL (ref 0.3–1)
BUN SERPL-MCNC: 14 MG/DL (ref 8–20)
CALCIUM SERPL-MCNC: 9.4 MG/DL (ref 7.7–10.4)
CHLORIDE: 102 MMOL/L (ref 98–110)
CO2 SERPL-SCNC: 25.3 MMOL/L (ref 22.8–31.6)
CREATININE: 0.59 MG/DL (ref 0.6–1.4)
EOSINOPHIL NFR BLD: 0.1 K/UL (ref 0–0.7)
EOSINOPHIL NFR BLD: 2.1 %
ERYTHROCYTE [DISTWIDTH] IN BLOOD BY AUTOMATED COUNT: 13.2 % (ref 12.5–14.5)
GLUCOSE: 91 MG/DL (ref 70–99)
GRAN #: 3 K/UL (ref 1.4–6.5)
GRAN%: 63.2 %
HCT VFR BLD AUTO: 35.7 % (ref 36–48)
HGB BLD-MCNC: 12.4 G/DL (ref 12–15)
IMMATURE GRANS (ABS): 0 K/UL (ref 0–1)
IMMATURE GRANULOCYTES: 0.2 %
LYMPH #: 1.1 K/UL (ref 1.2–3.4)
LYMPH%: 22.2 %
MCH RBC QN AUTO: 33.2 PG (ref 25–35)
MCHC RBC AUTO-ENTMCNC: 34.7 G/DL (ref 31–36)
MCV RBC AUTO: 95.5 FL (ref 79–98)
MONO #: 0.6 K/UL (ref 0.1–0.6)
MONO%: 11.7 %
NUCLEATED RBCS: 0 %
NUCLEATED RED BLOOD CELLS: 0 /100 WBC
PERFORMED BY:: ABNORMAL
PLATELET # BLD AUTO: 219 K/UL (ref 140–440)
PMV BLD AUTO: 10.7 FL (ref 8.8–12.7)
POTASSIUM SERPL-SCNC: 3.1 MMOL/L (ref 3.5–5)
PROT SERPL-MCNC: 7.4 G/DL (ref 6–8.2)
RBC # BLD AUTO: 3.74 M/UL (ref 3.5–5.5)
SODIUM: 137 MMOL/L (ref 134–144)
WBC # BLD: 4.8 K/UL (ref 5–10)

## 2017-09-28 RX ORDER — HEPARIN 100 UNIT/ML
500 SYRINGE INTRAVENOUS
Status: CANCELLED | OUTPATIENT
Start: 2017-09-28

## 2017-09-28 RX ORDER — SODIUM CHLORIDE 0.9 % (FLUSH) 0.9 %
10 SYRINGE (ML) INJECTION
Status: CANCELLED | OUTPATIENT
Start: 2017-09-28

## 2017-09-29 ENCOUNTER — OFFICE VISIT (OUTPATIENT)
Dept: PLASTIC SURGERY | Facility: CLINIC | Age: 52
End: 2017-09-29
Payer: COMMERCIAL

## 2017-09-29 VITALS — SYSTOLIC BLOOD PRESSURE: 120 MMHG | DIASTOLIC BLOOD PRESSURE: 54 MMHG | HEART RATE: 74 BPM

## 2017-09-29 DIAGNOSIS — Z09 SURGERY FOLLOW-UP EXAMINATION: Primary | ICD-10-CM

## 2017-09-29 PROCEDURE — 99024 POSTOP FOLLOW-UP VISIT: CPT | Mod: S$GLB,,, | Performed by: SURGERY

## 2017-09-29 NOTE — PROGRESS NOTES
Patient SP tissue expander placement. Post radiation treatment.  Finished 3 months. Implant exchange in January. She would like January 15th.

## 2017-10-03 ENCOUNTER — TELEPHONE (OUTPATIENT)
Dept: HEMATOLOGY/ONCOLOGY | Facility: CLINIC | Age: 52
End: 2017-10-03

## 2017-10-03 ENCOUNTER — PATIENT MESSAGE (OUTPATIENT)
Dept: PLASTIC SURGERY | Facility: CLINIC | Age: 52
End: 2017-10-03

## 2017-10-03 NOTE — TELEPHONE ENCOUNTER
Left msg. Asking her to return call. Her potassium is low. It does not appear that she takes any potassium supplements at this time      Patient returned call. She doesn't take a potassium supplement but will get an otc one to take and try to also eat more potassium rich food. She says she is not feeling any symptoms  And doesn't want to take a prescription of anything

## 2017-10-03 NOTE — TELEPHONE ENCOUNTER
----- Message from Bronson Carrillo MD sent at 10/2/2017  6:50 PM CDT -----  Is she taking potassium supplements?

## 2017-10-10 ENCOUNTER — TELEPHONE (OUTPATIENT)
Dept: PLASTIC SURGERY | Facility: CLINIC | Age: 52
End: 2017-10-10

## 2017-10-10 DIAGNOSIS — Z17.0 ESTROGEN RECEPTOR POSITIVE: ICD-10-CM

## 2017-10-10 DIAGNOSIS — Z17.0 MALIGNANT NEOPLASM OF UPPER-OUTER QUADRANT OF RIGHT BREAST IN FEMALE, ESTROGEN RECEPTOR POSITIVE: ICD-10-CM

## 2017-10-10 DIAGNOSIS — C50.411 MALIGNANT NEOPLASM OF UPPER-OUTER QUADRANT OF RIGHT BREAST IN FEMALE, ESTROGEN RECEPTOR POSITIVE: ICD-10-CM

## 2017-10-10 DIAGNOSIS — Z85.3 PERSONAL HISTORY OF BREAST CANCER: Primary | ICD-10-CM

## 2017-10-10 RX ORDER — ANASTROZOLE 1 MG/1
TABLET ORAL
Qty: 30 TABLET | Refills: 0 | Status: SHIPPED | OUTPATIENT
Start: 2017-10-10 | End: 2017-11-15 | Stop reason: SDUPTHER

## 2017-11-02 ENCOUNTER — TELEPHONE (OUTPATIENT)
Dept: PLASTIC SURGERY | Facility: CLINIC | Age: 52
End: 2017-11-02

## 2017-11-02 NOTE — TELEPHONE ENCOUNTER
Left message for pt advising that I have scheduled her sx + pre and post op appts in which has been maiiled to her home.  Advised to call us for any questions

## 2017-11-10 ENCOUNTER — TELEPHONE (OUTPATIENT)
Dept: PLASTIC SURGERY | Facility: CLINIC | Age: 52
End: 2017-11-10

## 2017-11-10 NOTE — TELEPHONE ENCOUNTER
----- Message from Jenaro Peña sent at 11/10/2017  1:29 PM CST -----  Patient states that (s)he needs to speak with nurse in ref to rescheduling her post op appt for another day and time at another location//please call back at [phone]//thank you

## 2017-11-15 DIAGNOSIS — C50.411 MALIGNANT NEOPLASM OF UPPER-OUTER QUADRANT OF RIGHT BREAST IN FEMALE, ESTROGEN RECEPTOR POSITIVE: ICD-10-CM

## 2017-11-15 DIAGNOSIS — Z17.0 ESTROGEN RECEPTOR POSITIVE: ICD-10-CM

## 2017-11-15 DIAGNOSIS — Z17.0 MALIGNANT NEOPLASM OF UPPER-OUTER QUADRANT OF RIGHT BREAST IN FEMALE, ESTROGEN RECEPTOR POSITIVE: ICD-10-CM

## 2017-11-16 RX ORDER — ANASTROZOLE 1 MG/1
TABLET ORAL
Qty: 30 TABLET | Refills: 0 | Status: SHIPPED | OUTPATIENT
Start: 2017-11-16 | End: 2017-12-17 | Stop reason: SDUPTHER

## 2017-12-05 ENCOUNTER — TELEPHONE (OUTPATIENT)
Dept: SURGERY | Facility: CLINIC | Age: 52
End: 2017-12-05

## 2017-12-05 NOTE — TELEPHONE ENCOUNTER
Spoke to the pt and scheduled the pt to see Dr. Umana for preop on 12/22/17. Appt reminder mailied to the pts home.

## 2017-12-17 DIAGNOSIS — Z17.0 ESTROGEN RECEPTOR POSITIVE: ICD-10-CM

## 2017-12-17 DIAGNOSIS — Z17.0 MALIGNANT NEOPLASM OF UPPER-OUTER QUADRANT OF RIGHT BREAST IN FEMALE, ESTROGEN RECEPTOR POSITIVE: ICD-10-CM

## 2017-12-17 DIAGNOSIS — C50.411 MALIGNANT NEOPLASM OF UPPER-OUTER QUADRANT OF RIGHT BREAST IN FEMALE, ESTROGEN RECEPTOR POSITIVE: ICD-10-CM

## 2017-12-18 RX ORDER — ANASTROZOLE 1 MG/1
TABLET ORAL
Qty: 30 TABLET | Refills: 0 | Status: ON HOLD | OUTPATIENT
Start: 2017-12-18 | End: 2018-01-12 | Stop reason: SDUPTHER

## 2017-12-21 LAB
ALBUMIN SERPL-MCNC: 4.3 G/DL (ref 3.1–4.7)
ALP SERPL-CCNC: 103 IU/L (ref 40–104)
ALT (SGPT): 31 IU/L (ref 3–33)
AST SERPL-CCNC: 39 IU/L (ref 10–40)
BASOPHILS NFR BLD: 0 K/UL (ref 0–0.2)
BASOPHILS NFR BLD: 0.9 %
BILIRUB SERPL-MCNC: 0.9 MG/DL (ref 0.3–1)
BUN SERPL-MCNC: 15 MG/DL (ref 8–20)
CALCIUM SERPL-MCNC: 9.7 MG/DL (ref 7.7–10.4)
CHLORIDE: 104 MMOL/L (ref 98–110)
CO2 SERPL-SCNC: 27 MMOL/L (ref 22.8–31.6)
CREATININE: 0.57 MG/DL (ref 0.6–1.4)
EOSINOPHIL NFR BLD: 0.1 K/UL (ref 0–0.7)
EOSINOPHIL NFR BLD: 2.9 %
ERYTHROCYTE [DISTWIDTH] IN BLOOD BY AUTOMATED COUNT: 13.4 % (ref 12.5–14.5)
GLUCOSE: 92 MG/DL (ref 70–99)
GRAN #: 2 K/UL (ref 1.4–6.5)
GRAN%: 56.4 %
HCT VFR BLD AUTO: 35.8 % (ref 36–48)
HGB BLD-MCNC: 12.4 G/DL (ref 12–15)
IMMATURE GRANS (ABS): 0 K/UL (ref 0–1)
IMMATURE GRANULOCYTES: 0.3 %
LYMPH #: 0.9 K/UL (ref 1.2–3.4)
LYMPH%: 26.5 %
MCH RBC QN AUTO: 32.5 PG (ref 25–35)
MCHC RBC AUTO-ENTMCNC: 34.6 G/DL (ref 31–36)
MCV RBC AUTO: 94 FL (ref 79–98)
MONO #: 0.5 K/UL (ref 0.1–0.6)
MONO%: 13 %
NUCLEATED RBCS: 0 %
NUCLEATED RED BLOOD CELLS: 0 /100 WBC
PERFORMED BY:: ABNORMAL
PLATELET # BLD AUTO: 246 K/UL (ref 140–440)
PMV BLD AUTO: 10.8 FL (ref 8.8–12.7)
POTASSIUM SERPL-SCNC: 3.1 MMOL/L (ref 3.5–5)
PROT SERPL-MCNC: 7.4 G/DL (ref 6–8.2)
RBC # BLD AUTO: 3.81 M/UL (ref 3.5–5.5)
SODIUM: 139 MMOL/L (ref 134–144)
WBC # BLD: 3.5 K/UL (ref 5–10)

## 2017-12-21 RX ORDER — SODIUM CHLORIDE 0.9 % (FLUSH) 0.9 %
10 SYRINGE (ML) INJECTION
Status: CANCELLED | OUTPATIENT
Start: 2017-12-21

## 2017-12-21 RX ORDER — HEPARIN 100 UNIT/ML
500 SYRINGE INTRAVENOUS
Status: CANCELLED | OUTPATIENT
Start: 2017-12-21

## 2017-12-22 ENCOUNTER — OFFICE VISIT (OUTPATIENT)
Dept: PLASTIC SURGERY | Facility: CLINIC | Age: 52
End: 2017-12-22
Payer: COMMERCIAL

## 2017-12-22 VITALS
DIASTOLIC BLOOD PRESSURE: 54 MMHG | SYSTOLIC BLOOD PRESSURE: 113 MMHG | BODY MASS INDEX: 23.73 KG/M2 | HEIGHT: 64 IN | WEIGHT: 139 LBS | HEART RATE: 88 BPM

## 2017-12-22 DIAGNOSIS — Z09 SURGERY FOLLOW-UP EXAMINATION: Primary | ICD-10-CM

## 2017-12-22 PROCEDURE — 99024 POSTOP FOLLOW-UP VISIT: CPT | Mod: S$GLB,,, | Performed by: SURGERY

## 2017-12-22 RX ORDER — LEVOTHYROXINE SODIUM 50 UG/1
TABLET ORAL
COMMUNITY
Start: 2017-11-30 | End: 2017-12-27 | Stop reason: CLARIF

## 2017-12-22 RX ORDER — DULOXETIN HYDROCHLORIDE 20 MG/1
CAPSULE, DELAYED RELEASE ORAL
Refills: 1 | COMMUNITY
Start: 2017-12-12 | End: 2018-04-17

## 2017-12-22 RX ORDER — LEVOTHYROXINE SODIUM 100 UG/1
TABLET ORAL
Refills: 0 | COMMUNITY
Start: 2017-12-12 | End: 2020-03-09 | Stop reason: SDUPTHER

## 2017-12-22 NOTE — PROGRESS NOTES
Blanca Loya [facility] the [facility] to discuss her upcoming   implant exchange.  The patient had bilateral tissue expanders placed.  She has   had radiation.  She is going to need an implant exchange with free fat grafting.    The procedure was explained in detail to the patient.  The patient is   scheduled for 01/15/2018.      LEXIS/MADELIN  dd: 12/22/2017 12:39:01 (CST)  td: 12/23/2017 04:59:42 (CST)  Doc ID   #[id number]  Job ID #[id number]    CC:

## 2017-12-26 ENCOUNTER — TELEPHONE (OUTPATIENT)
Dept: HEMATOLOGY/ONCOLOGY | Facility: CLINIC | Age: 52
End: 2017-12-26

## 2017-12-27 ENCOUNTER — ANESTHESIA EVENT (OUTPATIENT)
Dept: SURGERY | Facility: HOSPITAL | Age: 52
End: 2017-12-27
Payer: COMMERCIAL

## 2017-12-27 RX ORDER — MULTIVIT WITH MINERALS/HERBS
1 TABLET ORAL DAILY
COMMUNITY

## 2017-12-27 NOTE — PRE ADMISSION SCREENING
Anesthesia Assessment: Preoperative EQUATION    Planned Procedure: Procedure(s) (LRB):  EXCHANGE IMPLANT-BREAST- Implant Exchange, Bilateral Capsulotomy & Capsulorrhaphy (Bilateral)  CAPSULOTOMY-BREAST (Bilateral)  CAPSULORRHAPHY (Bilateral)  Requested Anesthesia Type:General  Surgeon: Holger Umana MD  Service: Plastics  Known or anticipated Date of Surgery:1/15/2018    Surgeon notes: reviewed    Electronic QUestionnaire Assessment completed via nurse interview with patient.        NO AQ      Triage considerations:     The patient has no apparent active cardiac condition (No unstable coronary Syndrome such as severe unstable angina or recent [<1 month] myocardial infarction, decompensated CHF, severe valvular   disease or significant arrhythmia)    Previous anesthesia records:GETA, No problems and Not available    Last PCP note: 6-12 months ago , outside [facility]   Subspecialty notes: Hematology/Oncology    Other important co-morbidities: HX breast cancer, Hypothyroidism, chronic pain/fibromylagia     Tests already available:  Available tests,  within 1 month . 12/21/17  CMP and CBC. NO EKG            Instructions given. (See in Nurse's note)    Optimization:  Anesthesia Preop Clinic Assessment  Indicated-not required for this procedure       Plan:    Testing:  none     Patient  has previously scheduled Medical Appointment:none    Navigation:            Straight Line to surgery.               No tests, anesthesia preop clinic visit, or consult required.

## 2017-12-27 NOTE — ANESTHESIA PREPROCEDURE EVALUATION
Pre Admission Screening  Dotty Randall RN      []Hide copied text  []Hover for attribution information  Anesthesia Assessment: Preoperative EQUATION     Planned Procedure: Procedure(s) (LRB):  EXCHANGE IMPLANT-BREAST- Implant Exchange, Bilateral Capsulotomy & Capsulorrhaphy (Bilateral)  CAPSULOTOMY-BREAST (Bilateral)  CAPSULORRHAPHY (Bilateral)  Requested Anesthesia Type:General  Surgeon: Holger Umana MD  Service: Plastics  Known or anticipated Date of Surgery:1/15/2018     Surgeon notes: reviewed     Electronic QUestionnaire Assessment completed via nurse interview with patient.         NO AQ        Triage considerations:      The patient has no apparent active cardiac condition (No unstable coronary Syndrome such as severe unstable angina or recent [<1 month] myocardial infarction, decompensated CHF, severe valvular   disease or significant arrhythmia)     Previous anesthesia records:GETA, No problems and Not available     Last PCP note: 6-12 months ago , outside [facility]   Subspecialty notes: Hematology/Oncology     Other important co-morbidities: HX breast cancer, Hypothyroidism, chronic pain/fibromylagia     Tests already available:  Available tests,  within 1 month . 12/21/17  CMP and CBC. NO EKG                            Instructions given. (See in Nurse's note)     Optimization:  Anesthesia Preop Clinic Assessment  Indicated-not required for this procedure                Plan:    Testing:  none                           Patient  has previously scheduled Medical Appointment:none     Navigation:              Straight Line to surgery.                          No tests, anesthesia preop clinic visit, or consult required.                                                       Electronically signed by Dotty Randall RN at 12/27/2017  2:43 PM        Pre-admit on 1/15/2018            Detailed Report                                                                                                                       12/27/2017  Blanca Loya is a 52 y.o., female.    Anesthesia Evaluation    I have reviewed the Patient Summary Reports.     I have reviewed the Medications.     Review of Systems  Anesthesia Hx:  Hx of Anesthetic complications Pt has  TMJ, neck pain and limited motion. History of prior surgery of interest to airway management or planning: Previous anesthesia: General 9/2016 mastectomy with general anesthesia.  Denies Family Hx of Anesthesia complications.  Personal Hx of Anesthesia complications  Temporomandibular Joint Symptoms   Social:  Non-Smoker    Hematology/Oncology:  Hematology Normal      Current/Recent Cancer. Breast bilateral axillary node dissection chemotherapy, radiation and surgery Oncology Comments: NO BP or IVs to right arm.  Pt has a port in her chest that she wants removed     EENT/Dental:  EENT/Dental Normal  Jaw Problems:  Clicking (TMJ), Stiffness and Limited mouth opening   Cardiovascular:  Cardiovascular Normal Exercise tolerance: good   Denies Angina.    Pulmonary:   Denies Shortness of breath.  Denies Recent URI.    Renal/:  Renal/ Normal     Hepatic/GI:  Hepatic/GI Normal    Musculoskeletal:  Musculoskeletal General/Symptoms: joint pain, neck pain. Functional capacity is ambulatory without assistance.  Spine Disorders: cervical  Cervical Spine Disorder, Cervical Disc Disease (limited neck motion and pain)    Neurological:  Neurology Normal Left side of throat numb from a previous acoustic neuroma surgery as part of the surgery-- not anesthesia/intubation related Pain  Pain Etiology/Diagnosis, Fibromayalgia    Endocrine:   Hypothyroidism On synthroid. Changed dose 3 weeks ago. Feeling fine.  Thyroid Disease Hypothyroidism    Dermatological:  Skin Normal    Psych:  Psychiatric Normal           Physical Exam  General:  Well nourished    Airway/Jaw/Neck:  Airway Findings: Mouth Opening: Normal Tongue: Normal  General Airway Assessment: Adult  Mallampati: II  TM Distance:  Normal, at least 6 cm  Jaw/Neck Findings:  Neck ROM: Normal ROM     Eyes/Ears/Nose:  EYES/EARS/NOSE FINDINGS: Normal   Dental:  Dental Findings: In tact   Chest/Lungs:  Chest/Lungs Findings: Normal Respiratory Rate     Heart/Vascular:  Heart Findings: Rate: Normal  Rhythm: Regular Rhythm     Abdomen:  Abdomen Findings: Normal    Musculoskeletal:  Musculoskeletal Findings: Normal   Skin:  Skin Findings: Normal    Mental Status:  Mental Status Findings:  Cooperative, Alert and Oriented         Anesthesia Plan  Type of Anesthesia, risks & benefits discussed:  Anesthesia Type:  general  Patient's Preference: General  Intra-op Monitoring Plan: standard ASA monitors  Intra-op Monitoring Plan Comments:   Post Op Pain Control Plan: per primary service following discharge from PACU and IV/PO Opioids PRN  Post Op Pain Control Plan Comments:   Induction:   IV  Beta Blocker:  Patient is not currently on a Beta-Blocker (No further documentation required).       Informed Consent: Patient understands risks and agrees with Anesthesia plan.  Questions answered. Anesthesia consent signed with patient.  ASA Score: 2     Day of Surgery Review of History & Physical:        Anesthesia Plan Notes: Patient has TMJ. No complaints today. Good mouth opening. Plan LMA.    OMKAR Mccray        Ready For Surgery From Anesthesia Perspective.

## 2018-01-11 ENCOUNTER — PATIENT MESSAGE (OUTPATIENT)
Dept: HEMATOLOGY/ONCOLOGY | Facility: CLINIC | Age: 53
End: 2018-01-11

## 2018-01-12 ENCOUNTER — TELEPHONE (OUTPATIENT)
Dept: HEMATOLOGY/ONCOLOGY | Facility: CLINIC | Age: 53
End: 2018-01-12

## 2018-01-12 ENCOUNTER — TELEPHONE (OUTPATIENT)
Dept: PLASTIC SURGERY | Facility: CLINIC | Age: 53
End: 2018-01-12

## 2018-01-12 DIAGNOSIS — Z17.0 ESTROGEN RECEPTOR POSITIVE: ICD-10-CM

## 2018-01-12 DIAGNOSIS — C50.411 MALIGNANT NEOPLASM OF UPPER-OUTER QUADRANT OF RIGHT BREAST IN FEMALE, ESTROGEN RECEPTOR POSITIVE: ICD-10-CM

## 2018-01-12 DIAGNOSIS — Z17.0 MALIGNANT NEOPLASM OF UPPER-OUTER QUADRANT OF RIGHT BREAST IN FEMALE, ESTROGEN RECEPTOR POSITIVE: ICD-10-CM

## 2018-01-12 NOTE — TELEPHONE ENCOUNTER
----- Message from Marcia Barcenas sent at 1/11/2018 11:56 AM CST -----  Pt called in having reconstruction surgery on Monday and says she needs a note from dr ortiz saying its okay for her port to come out.     Pt call back # [phone]

## 2018-01-12 NOTE — TELEPHONE ENCOUNTER
Left message with Service at Dr. Umana' office that ok for patient's port to be removed and script in media section.

## 2018-01-12 NOTE — TELEPHONE ENCOUNTER
----- Message from Bronson Carrillo MD sent at 1/11/2018  5:47 PM CST -----  write a note on script for her and I will sign it       ----- Message -----  From: Jamar Davila, RN  Sent: 1/11/2018  12:10 PM  To: Bronson Carrillo MD        ----- Message -----  From: Marcia Barcenas  Sent: 1/11/2018  11:56 AM  To: Gerardo Dobson Staff    Pt called in having reconstruction surgery on Monday and says she needs a note from dr carrillo saying its okay for her port to come out.     Pt call back # [phone]

## 2018-01-12 NOTE — TELEPHONE ENCOUNTER
I assured Mrs. Loya that the order to remove her port was placed and Dr. Umana will include this procedure with scheduled services.    ----- Message from Bernie Henry sent at 1/12/2018  9:29 AM CST -----  Contact:    [phone] Ext [phone]    Caller states she would like to speak with nurse in reference to order stating it is ok to remove port please call  back @ [phone] Ext [phone] Thank You :)

## 2018-01-15 ENCOUNTER — HOSPITAL ENCOUNTER (OUTPATIENT)
Facility: HOSPITAL | Age: 53
Discharge: HOME | End: 2018-01-15
Attending: SURGERY | Admitting: SURGERY
Payer: COMMERCIAL

## 2018-01-15 ENCOUNTER — SURGERY (OUTPATIENT)
Age: 53
End: 2018-01-15

## 2018-01-15 ENCOUNTER — ANESTHESIA (OUTPATIENT)
Dept: SURGERY | Facility: HOSPITAL | Age: 53
End: 2018-01-15
Payer: COMMERCIAL

## 2018-01-15 VITALS
TEMPERATURE: 98 F | WEIGHT: 140 LBS | HEART RATE: 82 BPM | BODY MASS INDEX: 23.9 KG/M2 | HEIGHT: 64 IN | SYSTOLIC BLOOD PRESSURE: 113 MMHG | RESPIRATION RATE: 16 BRPM | OXYGEN SATURATION: 100 % | DIASTOLIC BLOOD PRESSURE: 71 MMHG

## 2018-01-15 DIAGNOSIS — C50.919 BREAST CANCER: Primary | ICD-10-CM

## 2018-01-15 PROCEDURE — 36000707: Performed by: SURGERY

## 2018-01-15 PROCEDURE — 19370 REVJ PERI-IMPLT CAPSULE BRST: CPT | Mod: 50,,, | Performed by: SURGERY

## 2018-01-15 PROCEDURE — 25000003 PHARM REV CODE 250: Performed by: NURSE ANESTHETIST, CERTIFIED REGISTERED

## 2018-01-15 PROCEDURE — D9220A PRA ANESTHESIA: Mod: ANES,,, | Performed by: ANESTHESIOLOGY

## 2018-01-15 PROCEDURE — 11970 RPLCMT TISS XPNDR PERM IMPLT: CPT | Mod: 50,59,, | Performed by: SURGERY

## 2018-01-15 PROCEDURE — 27000221 HC OXYGEN, UP TO 24 HOURS

## 2018-01-15 PROCEDURE — 71000033 HC RECOVERY, INTIAL HOUR: Performed by: SURGERY

## 2018-01-15 PROCEDURE — 71000016 HC POSTOP RECOV ADDL HR: Performed by: SURGERY

## 2018-01-15 PROCEDURE — 37000008 HC ANESTHESIA 1ST 15 MINUTES: Performed by: SURGERY

## 2018-01-15 PROCEDURE — 63600175 PHARM REV CODE 636 W HCPCS: Performed by: SURGERY

## 2018-01-15 PROCEDURE — 25000003 PHARM REV CODE 250: Performed by: STUDENT IN AN ORGANIZED HEALTH CARE EDUCATION/TRAINING PROGRAM

## 2018-01-15 PROCEDURE — 71000015 HC POSTOP RECOV 1ST HR: Performed by: SURGERY

## 2018-01-15 PROCEDURE — 25000003 PHARM REV CODE 250: Performed by: SURGERY

## 2018-01-15 PROCEDURE — S0077 INJECTION, CLINDAMYCIN PHOSP: HCPCS | Performed by: SURGERY

## 2018-01-15 PROCEDURE — 36000706: Performed by: SURGERY

## 2018-01-15 PROCEDURE — 63600175 PHARM REV CODE 636 W HCPCS: Performed by: NURSE ANESTHETIST, CERTIFIED REGISTERED

## 2018-01-15 PROCEDURE — 37000009 HC ANESTHESIA EA ADD 15 MINS: Performed by: SURGERY

## 2018-01-15 PROCEDURE — 88300 SURGICAL PATH GROSS: CPT

## 2018-01-15 PROCEDURE — 63600175 PHARM REV CODE 636 W HCPCS

## 2018-01-15 PROCEDURE — C1729 CATH, DRAINAGE: HCPCS | Performed by: SURGERY

## 2018-01-15 PROCEDURE — D9220A PRA ANESTHESIA: Mod: CRNA,,, | Performed by: NURSE ANESTHETIST, CERTIFIED REGISTERED

## 2018-01-15 PROCEDURE — 36590 REMOVAL TUNNELED CV CATH: CPT | Mod: 51,,, | Performed by: SURGERY

## 2018-01-15 PROCEDURE — 94761 N-INVAS EAR/PLS OXIMETRY MLT: CPT

## 2018-01-15 PROCEDURE — C1789 PROSTHESIS, BREAST, IMP: HCPCS | Performed by: SURGERY

## 2018-01-15 PROCEDURE — 71000039 HC RECOVERY, EACH ADD'L HOUR: Performed by: SURGERY

## 2018-01-15 PROCEDURE — S0077 INJECTION, CLINDAMYCIN PHOSP: HCPCS | Performed by: STUDENT IN AN ORGANIZED HEALTH CARE EDUCATION/TRAINING PROGRAM

## 2018-01-15 PROCEDURE — 88300 SURGICAL PATH GROSS: CPT | Mod: 26,,,

## 2018-01-15 DEVICE — IMPLANTABLE DEVICE: Type: IMPLANTABLE DEVICE | Site: BREAST | Status: FUNCTIONAL

## 2018-01-15 RX ORDER — BACITRACIN 50000 [IU]/1
INJECTION, POWDER, FOR SOLUTION INTRAMUSCULAR
Status: DISCONTINUED | OUTPATIENT
Start: 2018-01-15 | End: 2018-01-15 | Stop reason: HOSPADM

## 2018-01-15 RX ORDER — LIDOCAINE HYDROCHLORIDE 10 MG/ML
INJECTION, SOLUTION EPIDURAL; INFILTRATION; INTRACAUDAL; PERINEURAL
Status: DISCONTINUED | OUTPATIENT
Start: 2018-01-15 | End: 2018-01-15 | Stop reason: HOSPADM

## 2018-01-15 RX ORDER — CIPROFLOXACIN 2 MG/ML
INJECTION, SOLUTION INTRAVENOUS CONTINUOUS PRN
Status: DISCONTINUED | OUTPATIENT
Start: 2018-01-15 | End: 2018-01-15 | Stop reason: HOSPADM

## 2018-01-15 RX ORDER — FENTANYL CITRATE 50 UG/ML
INJECTION, SOLUTION INTRAMUSCULAR; INTRAVENOUS
Status: DISCONTINUED | OUTPATIENT
Start: 2018-01-15 | End: 2018-01-15

## 2018-01-15 RX ORDER — CLINDAMYCIN PHOSPHATE 900 MG/50ML
900 INJECTION, SOLUTION INTRAVENOUS
Status: COMPLETED | OUTPATIENT
Start: 2018-01-15 | End: 2018-01-15

## 2018-01-15 RX ORDER — EPINEPHRINE CONVENIENCE KIT 1 MG/ML(1)
KIT INTRAMUSCULAR; SUBCUTANEOUS
Status: DISCONTINUED | OUTPATIENT
Start: 2018-01-15 | End: 2018-01-15 | Stop reason: HOSPADM

## 2018-01-15 RX ORDER — ACETAMINOPHEN 10 MG/ML
INJECTION, SOLUTION INTRAVENOUS
Status: COMPLETED
Start: 2018-01-15 | End: 2018-01-15

## 2018-01-15 RX ORDER — SODIUM BICARBONATE 1 MEQ/ML
SYRINGE (ML) INTRAVENOUS
Status: DISCONTINUED | OUTPATIENT
Start: 2018-01-15 | End: 2018-01-15 | Stop reason: HOSPADM

## 2018-01-15 RX ORDER — TRIAMCINOLONE ACETONIDE 40 MG/ML
INJECTION, SUSPENSION INTRA-ARTICULAR; INTRAMUSCULAR
Status: DISCONTINUED | OUTPATIENT
Start: 2018-01-15 | End: 2018-01-15 | Stop reason: HOSPADM

## 2018-01-15 RX ORDER — GLYCOPYRROLATE 0.2 MG/ML
INJECTION INTRAMUSCULAR; INTRAVENOUS
Status: DISCONTINUED | OUTPATIENT
Start: 2018-01-15 | End: 2018-01-15

## 2018-01-15 RX ORDER — ANASTROZOLE 1 MG/1
TABLET ORAL
Qty: 30 TABLET | Refills: 0 | Status: SHIPPED | OUTPATIENT
Start: 2018-01-15 | End: 2018-02-01 | Stop reason: SDUPTHER

## 2018-01-15 RX ORDER — CLINDAMYCIN HYDROCHLORIDE 300 MG/1
300 CAPSULE ORAL 3 TIMES DAILY
Qty: 30 CAPSULE | Refills: 0 | Status: SHIPPED | OUTPATIENT
Start: 2018-01-15 | End: 2018-01-25

## 2018-01-15 RX ORDER — OXYCODONE AND ACETAMINOPHEN 10; 325 MG/1; MG/1
1 TABLET ORAL EVERY 4 HOURS PRN
Qty: 31 TABLET | Refills: 0 | Status: SHIPPED | OUTPATIENT
Start: 2018-01-15 | End: 2018-03-06

## 2018-01-15 RX ORDER — OXYCODONE AND ACETAMINOPHEN 10; 325 MG/1; MG/1
1 TABLET ORAL ONCE AS NEEDED
Status: COMPLETED | OUTPATIENT
Start: 2018-01-15 | End: 2018-01-15

## 2018-01-15 RX ORDER — KETOROLAC TROMETHAMINE 30 MG/ML
15 INJECTION, SOLUTION INTRAMUSCULAR; INTRAVENOUS EVERY 8 HOURS PRN
Status: DISCONTINUED | OUTPATIENT
Start: 2018-01-15 | End: 2018-01-15 | Stop reason: HOSPADM

## 2018-01-15 RX ORDER — MIDAZOLAM HYDROCHLORIDE 1 MG/ML
INJECTION, SOLUTION INTRAMUSCULAR; INTRAVENOUS
Status: DISCONTINUED | OUTPATIENT
Start: 2018-01-15 | End: 2018-01-15

## 2018-01-15 RX ORDER — KETOROLAC TROMETHAMINE 30 MG/ML
INJECTION, SOLUTION INTRAMUSCULAR; INTRAVENOUS
Status: COMPLETED
Start: 2018-01-15 | End: 2018-01-15

## 2018-01-15 RX ORDER — SODIUM CHLORIDE 9 MG/ML
INJECTION, SOLUTION INTRAVENOUS CONTINUOUS
Status: DISCONTINUED | OUTPATIENT
Start: 2018-01-15 | End: 2018-01-15 | Stop reason: HOSPADM

## 2018-01-15 RX ORDER — ACETAMINOPHEN 10 MG/ML
1000 INJECTION, SOLUTION INTRAVENOUS ONCE
Status: COMPLETED | OUTPATIENT
Start: 2018-01-15 | End: 2018-01-15

## 2018-01-15 RX ORDER — METOCLOPRAMIDE HYDROCHLORIDE 5 MG/ML
10 INJECTION INTRAMUSCULAR; INTRAVENOUS EVERY 10 MIN PRN
Status: COMPLETED | OUTPATIENT
Start: 2018-01-15 | End: 2018-01-15

## 2018-01-15 RX ORDER — PROPOFOL 10 MG/ML
VIAL (ML) INTRAVENOUS
Status: DISCONTINUED | OUTPATIENT
Start: 2018-01-15 | End: 2018-01-15

## 2018-01-15 RX ORDER — DEXAMETHASONE SODIUM PHOSPHATE 4 MG/ML
INJECTION, SOLUTION INTRA-ARTICULAR; INTRALESIONAL; INTRAMUSCULAR; INTRAVENOUS; SOFT TISSUE
Status: DISCONTINUED | OUTPATIENT
Start: 2018-01-15 | End: 2018-01-15

## 2018-01-15 RX ORDER — ONDANSETRON 2 MG/ML
INJECTION INTRAMUSCULAR; INTRAVENOUS
Status: DISCONTINUED | OUTPATIENT
Start: 2018-01-15 | End: 2018-01-15

## 2018-01-15 RX ORDER — METOCLOPRAMIDE HYDROCHLORIDE 5 MG/ML
INJECTION INTRAMUSCULAR; INTRAVENOUS
Status: COMPLETED
Start: 2018-01-15 | End: 2018-01-15

## 2018-01-15 RX ORDER — PHENYLEPHRINE HYDROCHLORIDE 10 MG/ML
INJECTION INTRAVENOUS
Status: DISCONTINUED | OUTPATIENT
Start: 2018-01-15 | End: 2018-01-15

## 2018-01-15 RX ADMIN — EPHEDRINE SULFATE 5 MG: 50 INJECTION, SOLUTION INTRAMUSCULAR; INTRAVENOUS; SUBCUTANEOUS at 08:01

## 2018-01-15 RX ADMIN — CIPROFLOXACIN 400 MG: 2 INJECTION, SOLUTION INTRAVENOUS at 08:01

## 2018-01-15 RX ADMIN — CLINDAMYCIN PHOSPHATE 150 MG: 150 INJECTION, SOLUTION, CONCENTRATE INTRAVENOUS at 08:01

## 2018-01-15 RX ADMIN — MIDAZOLAM HYDROCHLORIDE 2 MG: 1 INJECTION, SOLUTION INTRAMUSCULAR; INTRAVENOUS at 07:01

## 2018-01-15 RX ADMIN — EPHEDRINE SULFATE 10 MG: 50 INJECTION, SOLUTION INTRAMUSCULAR; INTRAVENOUS; SUBCUTANEOUS at 08:01

## 2018-01-15 RX ADMIN — METOCLOPRAMIDE HYDROCHLORIDE 10 MG: 5 INJECTION INTRAMUSCULAR; INTRAVENOUS at 11:01

## 2018-01-15 RX ADMIN — OXYCODONE HYDROCHLORIDE AND ACETAMINOPHEN 1 TABLET: 10; 325 TABLET ORAL at 10:01

## 2018-01-15 RX ADMIN — CLINDAMYCIN IN 5 PERCENT DEXTROSE 900 MG: 18 INJECTION, SOLUTION INTRAVENOUS at 07:01

## 2018-01-15 RX ADMIN — PROPOFOL 50 MG: 10 INJECTION, EMULSION INTRAVENOUS at 07:01

## 2018-01-15 RX ADMIN — FENTANYL CITRATE 50 MCG: 50 INJECTION, SOLUTION INTRAMUSCULAR; INTRAVENOUS at 07:01

## 2018-01-15 RX ADMIN — EPHEDRINE SULFATE 5 MG: 50 INJECTION, SOLUTION INTRAMUSCULAR; INTRAVENOUS; SUBCUTANEOUS at 09:01

## 2018-01-15 RX ADMIN — SODIUM BICARBONATE 10 MEQ: 84 INJECTION, SOLUTION INTRAVENOUS at 08:01

## 2018-01-15 RX ADMIN — PHENYLEPHRINE HYDROCHLORIDE 100 MCG: 10 INJECTION INTRAVENOUS at 08:01

## 2018-01-15 RX ADMIN — METOCLOPRAMIDE 10 MG: 5 INJECTION, SOLUTION INTRAMUSCULAR; INTRAVENOUS at 11:01

## 2018-01-15 RX ADMIN — FENTANYL CITRATE 25 MCG: 50 INJECTION, SOLUTION INTRAMUSCULAR; INTRAVENOUS at 10:01

## 2018-01-15 RX ADMIN — GLYCOPYRROLATE 0.2 MG: 0.2 INJECTION, SOLUTION INTRAMUSCULAR; INTRAVENOUS at 08:01

## 2018-01-15 RX ADMIN — KETOROLAC TROMETHAMINE 15 MG: 30 INJECTION, SOLUTION INTRAMUSCULAR at 11:01

## 2018-01-15 RX ADMIN — ACETAMINOPHEN 1000 MG: 10 INJECTION, SOLUTION INTRAVENOUS at 10:01

## 2018-01-15 RX ADMIN — SODIUM CHLORIDE: 0.9 INJECTION, SOLUTION INTRAVENOUS at 07:01

## 2018-01-15 RX ADMIN — SODIUM CHLORIDE, SODIUM ACETATE ANHYDROUS, SODIUM GLUCONATE, POTASSIUM CHLORIDE, AND MAGNESIUM CHLORIDE: 526; 222; 502; 37; 30 INJECTION, SOLUTION INTRAVENOUS at 08:01

## 2018-01-15 RX ADMIN — TRIAMCINOLONE ACETONIDE 40 MG: 40 INJECTION, SUSPENSION INTRA-ARTICULAR; INTRAMUSCULAR at 08:01

## 2018-01-15 RX ADMIN — PROPOFOL 150 MG: 10 INJECTION, EMULSION INTRAVENOUS at 07:01

## 2018-01-15 RX ADMIN — KETOROLAC TROMETHAMINE 15 MG: 30 INJECTION, SOLUTION INTRAMUSCULAR; INTRAVENOUS at 11:01

## 2018-01-15 RX ADMIN — BACITRACIN 50000 UNITS: 50000 INJECTION, POWDER, LYOPHILIZED, FOR SOLUTION INTRAMUSCULAR at 08:01

## 2018-01-15 RX ADMIN — DEXAMETHASONE SODIUM PHOSPHATE 8 MG: 4 INJECTION, SOLUTION INTRAMUSCULAR; INTRAVENOUS at 08:01

## 2018-01-15 RX ADMIN — EPINEPHRINE 1 MG: 1 INJECTION, SOLUTION INTRAMUSCULAR; SUBCUTANEOUS at 08:01

## 2018-01-15 RX ADMIN — LIDOCAINE HYDROCHLORIDE 37.5 ML: 10 INJECTION, SOLUTION EPIDURAL; INFILTRATION; INTRACAUDAL; PERINEURAL at 08:01

## 2018-01-15 RX ADMIN — ONDANSETRON 4 MG: 2 INJECTION INTRAMUSCULAR; INTRAVENOUS at 09:01

## 2018-01-15 NOTE — ANESTHESIA POSTPROCEDURE EVALUATION
"Anesthesia Post Evaluation    Patient: Blanca Loya    Procedure(s) Performed: Procedure(s) (LRB):  EXCHANGE IMPLANT-BREAST- Implant Exchange, Bilateral Capsulotomy & Capsulorrhaphy (Bilateral)  CAPSULORRHAPHY (Bilateral)  REMOVAL-PORT-A-CATH (Left)    Final Anesthesia Type: general  Patient location during evaluation: PACU  Patient participation: Yes- Able to Participate  Level of consciousness: awake and alert and oriented  Post-procedure vital signs: reviewed and stable  Pain management: adequate  Airway patency: patent  PONV status at discharge: No PONV  Anesthetic complications: no      Cardiovascular status: blood pressure returned to baseline and hemodynamically stable  Respiratory status: unassisted, room air and spontaneous ventilation  Hydration status: euvolemic  Follow-up not needed.        Visit Vitals  /67   Pulse 91   Temp 36.5 °C (97.7 °F) (Tympanic)   Resp 16   Ht 5' 4" (1.626 m)   Wt 63.5 kg (140 lb)   LMP 09/10/2016   SpO2 99%   Breastfeeding? No   BMI 24.03 kg/m²       Pain/Ning Score: Pain Assessment Performed: Yes (1/15/2018 12:44 PM)  Presence of Pain: complains of pain/discomfort (1/15/2018 12:44 PM)  Pain Rating Prior to Med Admin: 3 (1/15/2018 11:05 AM)  Pain Rating Post Med Admin: 2 (1/15/2018 12:25 PM)  Ning Score: 10 (1/15/2018 12:44 PM)      "

## 2018-01-15 NOTE — H&P (VIEW-ONLY)
Blanca Loya presents to the Plastic Surgery Clinic to discuss her upcoming   implant exchange.  The patient had bilateral tissue expanders placed.  She has   had radiation.  She is going to need an implant exchange with free fat grafting.    The procedure was explained in detail to the patient.  The patient is   scheduled for 01/15/2018.      LEXIS/MADELIN  dd: 12/22/2017 12:39:01 (CST)  td: 12/23/2017 04:59:42 (CST)  Doc ID   #[id number]  Job ID #[id number]    CC:

## 2018-01-15 NOTE — PROGRESS NOTES
Patient recovery complete, spouse took prescriptions to in house pharmacy and will call when ready for patient to be transported to car, patient in no distress and denies needs at this time.

## 2018-01-15 NOTE — TRANSFER OF CARE
"Anesthesia Transfer of Care Note    Patient: Blanca Loya    Procedure(s) Performed: Procedure(s) (LRB):  EXCHANGE IMPLANT-BREAST- Implant Exchange, Bilateral Capsulotomy & Capsulorrhaphy (Bilateral)  CAPSULORRHAPHY (Bilateral)  REMOVAL-PORT-A-CATH (Left)    Patient location: PACU    Transport from OR: Transported from OR on 6-10 L/min O2 by face mask with adequate spontaneous ventilation    Post pain: adequate analgesia    Post assessment: no apparent anesthetic complications and tolerated procedure well    Post vital signs: stable    Level of consciousness: sedated    Nausea/Vomiting: no vomiting    Complications: none    Transfer of care protocol was followed      Last vitals:   Visit Vitals  /69 (BP Location: Left arm, Patient Position: Lying)   Pulse 72   Temp 36.6 °C (97.8 °F) (Oral)   Resp 16   Ht 5' 4" (1.626 m)   Wt 63.5 kg (140 lb)   LMP 09/10/2016   SpO2 100%   Breastfeeding? No   BMI 24.03 kg/m²     "

## 2018-01-15 NOTE — PLAN OF CARE
Patient c/o mild pain and denies nausea.  Tolerates clear liquids well.  VSS.  Discharge instructions given with verbal understanding received.  Anesthesia and procedure consents in chart.

## 2018-01-15 NOTE — BRIEF OP NOTE
[facility]  Brief Operative Note     SUMMARY     Surgery Date: 1/15/2018     Surgeon(s) and Role:     * Holger Umana MD - Primary     * Rivas William MD - Fellow     * Trena Mercado MD - resident    Assisting Surgeon: None    Pre-op Diagnosis:  Personal history of breast cancer [Z85.3]    Post-op Diagnosis:  Post-Op Diagnosis Codes:     * Personal history of breast cancer [Z85.3]    Procedure(s) (LRB):  EXCHANGE IMPLANT-BREAST- Implant Exchange, Bilateral Capsulotomy & Capsulorrhaphy (Bilateral)  CAPSULORRHAPHY (Bilateral)  REMOVAL-PORT-A-CATH (Left)    Anesthesia: General    Description of the findings of the procedure: Bilateral tissue expanders replaced with silicone implants. Left port-a-cath.    Findings/Key Components: Removal of tissue expanders bilaterally and replacement with bilateral silicone implants (500cc each). Lateral capsulorraphy with fat grafting from thighs to breasts bilaterally. Removal of left port-a-cath.    Estimated Blood Loss: 20mL         Specimens:   Specimen (12h ago through future)    Start     Ordered    01/15/18 0929  Specimen to Pathology - Surgery  Once     Comments:  1.) Bilateral tissue expander- Gross only.2.) Port- a- cath- Gross only.      01/15/18 0929          Discharge Note    SUMMARY     Admit Date: 1/15/2018    Discharge Date and Time:  01/15/2018 10:36 AM    Hospital Course (synopsis of major diagnoses, care, treatment, and services provided during the course of the hospital stay): Patient underwent replacement of tissue expanders with silicone implants, capsulorrhaphy and fat grafting from thighs to breasts bilaterally. She also had removal of left port-a-cath. Patient tolerated procedure well and was discharged home in good condition on POD#0.     Final Diagnosis: Post-Op Diagnosis Codes:     * Personal history of breast cancer [Z85.3]    Disposition: Home or Self Care    Follow Up/Patient Instructions:     Medications:  Reconciled  Home Medications:   Current Discharge Medication List      START taking these medications    Details   clindamycin (CLEOCIN) 300 MG capsule Take 1 capsule (300 mg total) by mouth 3 (three) times daily.  Qty: 30 capsule, Refills: 0      oxyCODONE-acetaminophen (PERCOCET)  mg per tablet Take 1 tablet by mouth every 4 (four) hours as needed for Pain.  Qty: 31 tablet, Refills: 0         CONTINUE these medications which have NOT CHANGED    Details   b complex vitamins tablet Take 1 tablet by mouth once daily.      CHROMIUM ORAL Take by mouth.      DULoxetine (CYMBALTA) 20 MG capsule TK 1 C PO QD  Refills: 1      levothyroxine (SYNTHROID) 100 MCG tablet TK 1 T PO QD  Refills: 0      anastrozole (ARIMIDEX) 1 mg Tab TAKE 1 TABLET BY MOUTH DAILY  Qty: 30 tablet, Refills: 0    Associated Diagnoses: Malignant neoplasm of upper-outer quadrant of right breast in female, estrogen receptor positive; Estrogen receptor positive             Discharge Procedure Orders  Diet Adult Regular     No driving until:   Order Comments: Until off prescription pain medications.     Activity as tolerated     Shower on day dressing removed (No bath)   Order Comments: Shower after 48 hours after surgery, do not submerge incisions for 6 weeks.     Notify your health care provider if you experience any of the following:  temperature >100.4     Notify your health care provider if you experience any of the following:  persistent nausea and vomiting or diarrhea     Notify your health care provider if you experience any of the following:  severe uncontrolled pain     Notify your health care provider if you experience any of the following:  redness, tenderness, or signs of infection (pain, swelling, redness, odor or green/yellow discharge around incision site)     Remove dressing in 48 hours   Order Comments: Incisions covered with dermabond (superglue), no need to keep covered after 48 hours but may keep covered as needed to protect clothing.        Follow-up Information     Holger Umana MD In 1 week.    Specialty:  Plastic Surgery  Why:  Post-op removal of tissue expanders and replaced with silicone implants  Contact information:  [facility] [address]  [facility] [id number] [phone]                 Trena Mercado, PGY-1  General Surgery  [phone]

## 2018-01-15 NOTE — OP NOTE
DATE OF PROCEDURE:  01/15/2018.    PREOPERATIVE DIAGNOSIS:  History of breast cancer.    POSTOPERATIVE DIAGNOSIS:  History of breast cancer.    PROCEDURES PERFORMED:  1.  Bilateral implant exchange.  2.  Bilateral medial capsulotomy.  3.  Bilateral lateral capsulorrhaphy.  4.  Removal of Port-A-Cath.    SURGEON:  Holger Umana M.D., FLyleA.C.S.    ANESTHESIA:  General.    DESCRIPTION OF PROCEDURE:  The patient was evaluated in the preoperative holding   area.  It was noted that the implants were in a too-far-away lateral position.    A medial capsulotomy and lateral capsulorrhaphy were marked.  The patient has   also given us permission to remove the Port-A-Cath.  The patient was taken to   the Operating Room, placed in the supine position after adequate general   endotracheal anesthesia, and was prepped and draped in the normal sterile   fashion.  The lateral aspect of the previous mastectomy incisions was opened.    The muscle was then divided and the capsule divided above this level.  Tissue   expanders were removed.  Under direct vision using a lighted retractor, medial   capsulotomy was performed.  A lateral capsulorrhaphy was performed using 2-0 PDS   sutures.  Similar procedure was performed on the opposite side.  Gloves were   changed.  Chest wall was then reprepped and redraped.  Two smooth 500 mL smooth   silicone implants were opened on the back table, soaked in antibiotic solution,   and placed in the pockets.  The muscle was closed using running 2-0 Vicryl and   the skin using interrupted 3-0 Monocryl, followed by a running 4-0 Monocryl   subcuticular suture.  Next, the previous Port-A-Cath incision was opened.  The   Port-A-Cath was then freed.  A suture was then placed beneath the Port-A-Cath,   the Port-A-Cath was removed, and the suture tied.  It was closed using   interrupted 3-0 Monocryl, followed by a running 4-0 Monocryl subcuticular   suture.  There were no complications with this  procedure.      RADHAB/MADELIN  dd: 01/15/2018 15:54:57 (CST)  td: 01/15/2018 17:52:42 (CST)  Doc ID   #[id number]  Job ID #[id number]    CC:

## 2018-01-15 NOTE — INTERVAL H&P NOTE
The patient has been examined and the H&P has been reviewed:    I concur with the findings and no changes have occurred since H&P was written.    Anesthesia/Surgery risks, benefits and alternative options discussed and understood by patient/family.          Active Hospital Problems    Diagnosis  POA    Breast cancer [C50.919]  Yes      Resolved Hospital Problems    Diagnosis Date Resolved POA   No resolved problems to display.     Trena Mercado, PGY-1  General Surgery  [phone]

## 2018-01-17 NOTE — H&P
Blanca Loya presents to PLS for planned outpatient surgery for B breast reconstruction    Review of patient's allergies indicates:   Allergen Reactions    Demerol [meperidine] Anxiety    Pcn [penicillins] Rash     No current facility-administered medications on file prior to encounter.      No current outpatient prescriptions on file prior to encounter.     Patient Active Problem List   Diagnosis    Malignant neoplasm of upper-outer quadrant of right female breast    Estrogen receptor positive    Breast cancer     PHYSICAL EXAMINATION  Vitals:    01/15/18 1330   BP: 113/71   Pulse: 82   Resp: 16   Temp: 97.6 °F (36.4 °C)     WD WN NAD  VSS  Normal resp effort  Breast -incision healed, no erythema/drainage, expanders intact    ASSESSMENT/PLAN  52 y.o. F s/p B breast recon, here for implant exchange  - procedure discussed in detail  - risks, benefits and alternatives discussed  - informed consent obtained  - pt marked for surgery  - Proceed to OR as planned

## 2018-01-19 ENCOUNTER — OFFICE VISIT (OUTPATIENT)
Dept: PLASTIC SURGERY | Facility: CLINIC | Age: 53
End: 2018-01-19
Payer: COMMERCIAL

## 2018-01-19 VITALS
DIASTOLIC BLOOD PRESSURE: 55 MMHG | HEART RATE: 87 BPM | SYSTOLIC BLOOD PRESSURE: 130 MMHG | TEMPERATURE: 98 F | HEIGHT: 64 IN | BODY MASS INDEX: 23.9 KG/M2 | WEIGHT: 140 LBS

## 2018-01-19 DIAGNOSIS — Z09 SURGERY FOLLOW-UP EXAMINATION: Primary | ICD-10-CM

## 2018-01-19 PROCEDURE — 99024 POSTOP FOLLOW-UP VISIT: CPT | Mod: S$GLB,,, | Performed by: SURGERY

## 2018-01-19 RX ORDER — SULFAMETHOXAZOLE AND TRIMETHOPRIM 800; 160 MG/1; MG/1
1 TABLET ORAL 2 TIMES DAILY
Qty: 14 TABLET | Refills: 0 | Status: SHIPPED | OUTPATIENT
Start: 2018-01-19 | End: 2018-01-26

## 2018-01-19 NOTE — PROGRESS NOTES
Ms. Loya is status post implant exchange, fat grafting.  She has a very nice   result.  The patient believes that she might be a bit small, but are limited in   the expansion that we were able to do.  I am more than happy in the future after   the swelling goes down if she continues to think that she has too small, we may   have to put larger implants or more free fat grafting.  I think she has a very,   very nice result.  We will see her again in two weeks.  She did have some type   of reaction possibly to clindamycin, it was changed to Bactrim today.      LEXIS/MADELIN  dd: 01/19/2018 12:23:04 (CST)  td: 01/20/2018 08:17:15 (CST)  Doc ID   #[id number]  Job ID #[id number]    CC:

## 2018-01-29 ENCOUNTER — TELEPHONE (OUTPATIENT)
Dept: PLASTIC SURGERY | Facility: CLINIC | Age: 53
End: 2018-01-29

## 2018-01-31 ENCOUNTER — TELEPHONE (OUTPATIENT)
Dept: HEMATOLOGY/ONCOLOGY | Facility: CLINIC | Age: 53
End: 2018-01-31

## 2018-02-01 ENCOUNTER — OFFICE VISIT (OUTPATIENT)
Dept: HEMATOLOGY/ONCOLOGY | Facility: CLINIC | Age: 53
End: 2018-02-01
Payer: COMMERCIAL

## 2018-02-01 VITALS
HEIGHT: 64 IN | SYSTOLIC BLOOD PRESSURE: 128 MMHG | WEIGHT: 139.5 LBS | DIASTOLIC BLOOD PRESSURE: 84 MMHG | HEART RATE: 90 BPM | BODY MASS INDEX: 23.82 KG/M2 | TEMPERATURE: 98 F | RESPIRATION RATE: 18 BRPM

## 2018-02-01 DIAGNOSIS — Z17.0 ESTROGEN RECEPTOR POSITIVE: ICD-10-CM

## 2018-02-01 DIAGNOSIS — C50.411 MALIGNANT NEOPLASM OF UPPER-OUTER QUADRANT OF RIGHT BREAST IN FEMALE, ESTROGEN RECEPTOR POSITIVE: Primary | ICD-10-CM

## 2018-02-01 DIAGNOSIS — Z17.0 MALIGNANT NEOPLASM OF UPPER-OUTER QUADRANT OF RIGHT BREAST IN FEMALE, ESTROGEN RECEPTOR POSITIVE: Primary | ICD-10-CM

## 2018-02-01 PROCEDURE — 99214 OFFICE O/P EST MOD 30 MIN: CPT | Mod: ,,, | Performed by: INTERNAL MEDICINE

## 2018-02-01 PROCEDURE — 3008F BODY MASS INDEX DOCD: CPT | Mod: ,,, | Performed by: INTERNAL MEDICINE

## 2018-02-01 RX ORDER — ANASTROZOLE 1 MG/1
1 TABLET ORAL DAILY
Qty: 90 TABLET | Refills: 1 | Status: SHIPPED | OUTPATIENT
Start: 2018-02-01 | End: 2018-05-29 | Stop reason: SDUPTHER

## 2018-02-01 RX ORDER — BUTALBITAL, ACETAMINOPHEN AND CAFFEINE 50; 325; 40 MG/1; MG/1; MG/1
TABLET ORAL
COMMUNITY
Start: 2018-01-08 | End: 2020-07-30 | Stop reason: SDUPTHER

## 2018-02-01 RX ORDER — POTASSIUM CHLORIDE 750 MG/1
10 CAPSULE, EXTENDED RELEASE ORAL DAILY
COMMUNITY
Start: 2018-01-09 | End: 2018-04-17

## 2018-02-01 NOTE — PROGRESS NOTES
[facility] Hematology/Oncology  PROGRESS NOTE      Subjective:       Patient ID:   NAME: Blanca Loya : 1965     52 y.o. female    Referring Doc: Cora  Other Physicians: Lyndsay Gonsales, Marlyn Barillas    Chief Complaint:  Breast ca f/u    History of Present Illness:     Patient returns today for a regularly scheduled follow-up visit.  She is doing well with no new issues. No CP, SOB, HA's or N/V. She is doing ok with the arimidex. She has reconstruction 2 weeks ago with Dr Umana and she is doing ok.  She reports that she is back to normal.             ROS:   GEN: normal without any fever, night sweats or weight loss  HEENT: normal with no HA's, sore throat, stiff neck, changes in vision  CV: normal with no CP, SOB, PND, RUGGIERO or orthopnea  PULM: normal with no SOB, cough, hemoptysis, sputum or pleuritic pain  GI: normal with no abdominal pain, nausea, vomiting, constipation, diarrhea, melanotic stools, BRBPR, or hematemesis  : normal with no hematuria, dysuria  BREAST: normal with no mass, discharge, pain  SKIN: normal with no rash, erythema, bruising, or swelling    Allergies:  Review of patient's allergies indicates:   Allergen Reactions    Demerol [meperidine] Anxiety    Pcn [penicillins] Rash       Medications:    Current Outpatient Prescriptions:     anastrozole (ARIMIDEX) 1 mg Tab, TAKE 1 TABLET BY MOUTH DAILY, Disp: 30 tablet, Rfl: 0    b complex vitamins tablet, Take 1 tablet by mouth once daily., Disp: , Rfl:     butalbital-acetaminophen-caffeine -40 mg (FIORICET, ESGIC) -40 mg per tablet, , Disp: , Rfl:     CHROMIUM ORAL, Take by mouth., Disp: , Rfl:     DULoxetine (CYMBALTA) 20 MG capsule, TK 1 C PO QD, Disp: , Rfl: 1    levothyroxine (SYNTHROID) 100 MCG tablet, TK 1 T PO QD, Disp: , Rfl: 0    oxyCODONE-acetaminophen (PERCOCET)  mg per tablet, Take 1 tablet by mouth every 4 (four) hours as needed for Pain., Disp: 31 tablet, Rfl: 0    potassium chloride (MICRO-K)  "10 MEQ CpSR, , Disp: , Rfl:     PMHx/PSHx Updates:  See patient's last visit with me on 9/27/2017.  See H&P on 9/8/2016        Pathology:  Malignant neoplasm of upper-outer quadrant of right female breast    Staging form: Onc Breast AJCC V7    - Pathologic: Stage IIA (T1c, N1a, cM0) - Signed by Rocco Gonsales Jr., MD on 7/5/2017    See path note on 8/30/2016      Objective:     Vitals:  Blood pressure 128/84, pulse 90, temperature 98.3 °F (36.8 °C), resp. rate 18, height 5' 4" (1.626 m), weight 63.3 kg (139 lb 8 oz), last menstrual period 09/10/2016.    Physical Examination:   GEN: no apparent distress, comfortable; AAOx3  HEAD: atraumatic and normocephalic  EYES: no pallor, no icterus, PERRLA  ENT: OMM, no pharyngeal erythema, external ears WNL; no nasal discharge; no thrush  NECK: no masses, thyroid normal, trachea midline, no LAD/LN's, supple  CV: RRR with no murmur; normal pulse; normal S1 and S2; no pedal edema  CHEST: Normal respiratory effort; CTAB; normal breath sounds; no wheeze or crackles  ABDOM: nontender and nondistended; soft; normal bowel sounds; no rebound/guarding  MUSC/Skeletal: ROM normal; no crepitus; joints normal; no deformities or arthropathy  EXTREM: no clubbing, cyanosis, inflammation or swelling  SKIN: no rashes, lesions, ulcers, petechiae or subcutaneous nodules  : no tyson  NEURO: grossly intact; motor/sensory WNL; AAOx3; no tremors  PSYCH: normal mood, affect and behavior  LYMPH: normal cervical, supraclavicular, axillary and groin LN's  Breast: healing well post-reconstruction            Labs:     12/21/2017  Lab Results   Component Value Date    WBC 3.5 (L) 12/21/2017    HGB 12.4 12/21/2017    HCT 35.8 (L) 12/21/2017     12/21/2017     BMP  Lab Results   Component Value Date     01/08/2018    K 3.4 (L) 01/08/2018     01/08/2018    CO2 28.6 01/08/2018    BUN 12 01/08/2018    CREATININE 0.49 (L) 01/08/2018    CALCIUM 9.8 01/08/2018     Lab Results   Component Value " Date    AST 42 (H) 01/08/2018    ALKPHOS 101 01/08/2018    BILITOT 1.3 (H) 01/08/2018           Radiology/Diagnostic Studies:    [facility] Unknown Rad Eap    Result Date: 9/21/2017  INTEGRATED PET CT WITH IMAGE FUSION HISTORY:  c50.411 z17.0 subsequent treatment evaluation for right breast cancer diagnosed in August 2016. Bilateral mastectomies with chemotherapy and radiation. TECHNIQUE: Following the injection of 12.2 mCi of F-18 labeled FDG into a left antecubital vein, PET CT was performed from the vertex of the skull through the proximal thighs with an integrated full ring PET CT scanner with image fusion. The patient's serum glucose at the time of the exam was 86 mg/dL. COMPARISON: 09/20/2016 FINDINGS: There is no abnormal FDG activity to suggest recurrent or metastatic disease. CT of the head and neck show no intra-axial lesions or cervical adenopathy. The patient has had a left occipital craniotomy. CT of the chest demonstrates bilateral breast implants. There is no hilar, mediastinal or axillary adenopathy. There are no pulmonary nodules, infiltrates or pleural effusions. CT of the abdomen and pelvis demonstrates physiologic activity within the GI tract and urinary system. There is no adenopathy. The adrenal glands are normal. There are no lytic or blastic lesions.     IMPRESSION: No evidence of metastatic disease Prior bilateral mastectomies Read and electronically signed by: Mireya Carias MD on 9/21/2017 1:01 PM CDT MIREYA CARIAS MD      I have reviewed all available lab results and radiology reports.    Assessment/Plan:   (1) 51 y.o. female with diagnosis of right breast cancer  - s/p bilateral mastectomies on 9/26/16 followed by reconstruction  - followed by Dr Bear with [facility]urg  - she had 2 out of 7 LN's that were positive  - she was a Stage IIA  - ER+/KS+ and her2nu neg  - s/p AC x4 and taxol x4  - she has seen Dr Gonsales with rad/onc for XRT and completed 5 weeks last month  - discussed the  pro's and con's of tamoxifen versus arimidex, and in light of the pap issues, arimidex is probably the better choice  - she has been on arimidex for about month now  - she has some mood swings and weight gain  - PET done on 9/21 with no evidence of mets  - recent reconstruction with Dr Umana 2 weeks ago     (2) Mild leucopenia/anemia secondary to chemotherapy  - latest hgb is recovered; wbc is at 3.5- latest hgb normal at 12.4     (3) Prior abnormal PAP issues - followed by Dr Cline with GYN; latest pap was negative per patient and she was also HPV negative     (4) Hx of acoustic neuroma in past - s/p XRT in 2010     (5) Chronic fibromyalgia   jer    (6) Mild elevation LFT's      (7) RUE -possible lymphedema issues - will refer her to PT            PLAN:  1. F/u with Rad/onc next week  2. F/U with PCP, Surg and GYN  3. Continue arimidex - refill  4. RTC in 3-4 months  5. She follows up with Dr Umana tomorrow  Fax note to Chaparro Shepherd III, *, Marlyn Bear, Lyndsay Gonsales     I spent over 25 mins of time with the patient. Over half of this time was spent couseling and coordinating care.    Discussion:     I have explained all of the above in detail and the patient understands all of the current recommendation(s). I have answered all of their questions to the best of my ability and to their complete satisfaction.   The patient is to continue with the current management plan.            Electronically signed by Bronson Carrillo MD

## 2018-02-01 NOTE — LETTER
February 1, 2018      Chaparro Shepherd III, MD  [address]  [place] [address]           Critical access hospital Hematology Oncology  [address] [facility]  [address]  [place] [address]  Phone: [phone]  Fax: [phone]          Patient: Blanca Loya   MR Number: [id number]   YOB: 1965   Date of Visit: 2/1/2018       Dear Dr. Chaparro Shepherd III:    Thank you for referring Blanca Loya to me for evaluation. Attached you will find relevant portions of my assessment and plan of care.    If you have questions, please do not hesitate to call me. I look forward to following Blanca Loya along with you.    Sincerely,    Bronson Carrillo MD    Enclosure  CC:  No Recipients    If you would like to receive this communication electronically, please contact [email or web address] or [phone] to request more information on [employer] access.    For providers and/or their staff who would like to refer a patient to [facility], please contact us through our one-stop-shop provider referral line, [facility], at [phone].    If you feel you have received this communication in error or would no longer like to receive these types of communications, please e-mail [email or web address]

## 2018-02-02 ENCOUNTER — OFFICE VISIT (OUTPATIENT)
Dept: PLASTIC SURGERY | Facility: CLINIC | Age: 53
End: 2018-02-02
Payer: COMMERCIAL

## 2018-02-02 VITALS
TEMPERATURE: 100 F | SYSTOLIC BLOOD PRESSURE: 105 MMHG | DIASTOLIC BLOOD PRESSURE: 59 MMHG | HEIGHT: 64 IN | WEIGHT: 140.44 LBS | HEART RATE: 82 BPM | BODY MASS INDEX: 23.98 KG/M2

## 2018-02-02 DIAGNOSIS — Z09 SURGERY FOLLOW-UP EXAMINATION: Primary | ICD-10-CM

## 2018-02-02 PROCEDURE — 99024 POSTOP FOLLOW-UP VISIT: CPT | Mod: S$GLB,,, | Performed by: PHYSICIAN ASSISTANT

## 2018-02-02 NOTE — PROGRESS NOTES
Blanca Loya presents to Plastic Surgery Clinic on 2/2/2018 for a follow up visit status post B breast implant exchange for breast reconstruction on 01/15/2018. She is doing well today with no issues since her last visit    Review of patient's allergies indicates:   Allergen Reactions    Demerol [meperidine] Anxiety    Pcn [penicillins] Rash     Current Outpatient Prescriptions on File Prior to Visit   Medication Sig Dispense Refill    anastrozole (ARIMIDEX) 1 mg Tab Take 1 tablet (1 mg total) by mouth once daily. 90 tablet 1    b complex vitamins tablet Take 1 tablet by mouth once daily.      butalbital-acetaminophen-caffeine -40 mg (FIORICET, ESGIC) -40 mg per tablet       CHROMIUM ORAL Take by mouth.      DULoxetine (CYMBALTA) 20 MG capsule TK 1 C PO QD  1    levothyroxine (SYNTHROID) 100 MCG tablet TK 1 T PO QD  0    oxyCODONE-acetaminophen (PERCOCET)  mg per tablet Take 1 tablet by mouth every 4 (four) hours as needed for Pain. 31 tablet 0    potassium chloride (MICRO-K) 10 MEQ CpSR        No current facility-administered medications on file prior to visit.      Patient Active Problem List   Diagnosis    Malignant neoplasm of upper-outer quadrant of right female breast    Estrogen receptor positive    Breast cancer     Past Surgical History:   Procedure Laterality Date    BREAST RECONSTRUCTION      BREAST SURGERY      NEUROMA SURGERY      [place] ear     PHYSICAL EXAMINATION  Vitals:    02/02/18 1046   BP: (!) 105/59   Pulse: 82   Temp: 99.8 °F (37.7 °C)     WD WN NAD  VSS  Normal resp effort  R Breast - incision CDI, no erythema/drainage  L Breast - incision CDI, no erythema/drainage    ASSESSMENT/PLAN  52 y.o. F s/p B implant exchange  - Doing well, no issues  - incisional tape removed, Monocryl loops clipped on B breast, nylon sutures removed from B inguinal crease  - She has amanda nice, symmetrical result but still feels like she is too small  - Will RTC in 6 weeks     All  questions were answered. The patient was advised to call the clinic with any questions or concerns prior to their next visit.

## 2018-02-08 ENCOUNTER — OFFICE VISIT (OUTPATIENT)
Dept: RADIATION ONCOLOGY | Facility: CLINIC | Age: 53
End: 2018-02-08
Payer: COMMERCIAL

## 2018-02-08 VITALS
HEART RATE: 64 BPM | BODY MASS INDEX: 24.03 KG/M2 | DIASTOLIC BLOOD PRESSURE: 62 MMHG | SYSTOLIC BLOOD PRESSURE: 124 MMHG | WEIGHT: 140 LBS

## 2018-02-08 DIAGNOSIS — D33.3 VESTIBULAR SCHWANNOMA: ICD-10-CM

## 2018-02-08 DIAGNOSIS — Z17.0 MALIGNANT NEOPLASM OF UPPER-OUTER QUADRANT OF RIGHT BREAST IN FEMALE, ESTROGEN RECEPTOR POSITIVE: Primary | ICD-10-CM

## 2018-02-08 DIAGNOSIS — C50.411 MALIGNANT NEOPLASM OF UPPER-OUTER QUADRANT OF RIGHT BREAST IN FEMALE, ESTROGEN RECEPTOR POSITIVE: Primary | ICD-10-CM

## 2018-02-08 PROCEDURE — 99215 OFFICE O/P EST HI 40 MIN: CPT | Mod: ,,, | Performed by: RADIOLOGY

## 2018-02-08 PROCEDURE — 3008F BODY MASS INDEX DOCD: CPT | Mod: ,,, | Performed by: RADIOLOGY

## 2018-02-12 ENCOUNTER — TELEPHONE (OUTPATIENT)
Dept: PLASTIC SURGERY | Facility: CLINIC | Age: 53
End: 2018-02-12

## 2018-02-12 NOTE — TELEPHONE ENCOUNTER
CALLED MS. SALAZAR MANY TIMES THIS AND LEFT HER A MESSAGE ASKING HER TO CALL IN TO RESCHEDULE HER DOCTOR APPT. DR[facility] [place]. NO ANSWER.

## 2018-02-26 LAB — ISTAT CREATININE: 0.7 MG/DL (ref 0.6–1.4)

## 2018-03-06 ENCOUNTER — OFFICE VISIT (OUTPATIENT)
Dept: ORTHOPEDICS | Facility: CLINIC | Age: 53
End: 2018-03-06
Payer: COMMERCIAL

## 2018-03-06 VITALS
HEIGHT: 64 IN | HEART RATE: 81 BPM | BODY MASS INDEX: 23.9 KG/M2 | DIASTOLIC BLOOD PRESSURE: 62 MMHG | WEIGHT: 140 LBS | SYSTOLIC BLOOD PRESSURE: 118 MMHG

## 2018-03-06 DIAGNOSIS — M75.01 ADHESIVE CAPSULITIS OF RIGHT SHOULDER: Primary | ICD-10-CM

## 2018-03-06 DIAGNOSIS — M75.30 CALCIFIC BURSITIS OF SHOULDER: ICD-10-CM

## 2018-03-06 PROCEDURE — 20610 DRAIN/INJ JOINT/BURSA W/O US: CPT | Mod: RT,,, | Performed by: ORTHOPAEDIC SURGERY

## 2018-03-06 PROCEDURE — 73030 X-RAY EXAM OF SHOULDER: CPT | Mod: FY,RT,, | Performed by: ORTHOPAEDIC SURGERY

## 2018-03-06 PROCEDURE — 99204 OFFICE O/P NEW MOD 45 MIN: CPT | Mod: 25,,, | Performed by: ORTHOPAEDIC SURGERY

## 2018-03-06 RX ORDER — TRIAMCINOLONE ACETONIDE 40 MG/ML
80 INJECTION, SUSPENSION INTRA-ARTICULAR; INTRAMUSCULAR
Status: DISCONTINUED | OUTPATIENT
Start: 2018-03-06 | End: 2018-03-06 | Stop reason: HOSPADM

## 2018-03-06 RX ADMIN — TRIAMCINOLONE ACETONIDE 80 MG: 40 INJECTION, SUSPENSION INTRA-ARTICULAR; INTRAMUSCULAR at 05:03

## 2018-03-06 NOTE — PROGRESS NOTES
Subjective:       Chief Complaint    Chief Complaint   Patient presents with    Shoulder Pain     NP, right shoulder.  Pain started approx. in 10/2017.  Patient had 6 months of chemo & 5 weeks of radiation tx form breast cancer of the right side.  Pain has increased over the last month.  Pain starts in her shoulder area & radiates down into her upper arm.  Unable to lift arm up.  Pain w/ certain movements.  No xrays.  She also had lymph nodes removed from her right side & a masectomy recently.  Hx of bulging discs in the neck near c4-5 or 5-6.       HPI  Blanca Loya is a 52 y.o.  female who presents Discomfort around her neck and shoulder area since starting treatment for breast cancer.      Past History  Past Medical History:   Diagnosis Date    Acquired deafness     Estrogen receptor positive 5/23/2017    Malignant neoplasm of upper-outer quadrant of right female breast 5/23/2017     Past Surgical History:   Procedure Laterality Date    BREAST RECONSTRUCTION      BREAST SURGERY      NEUROMA SURGERY      [place] ear     Social History     Social History    Marital status:      Spouse name: N/A    Number of children: N/A    Years of education: N/A     Occupational History    Not on file.     Social History Main Topics    Smoking status: Never Smoker    Smokeless tobacco: Never Used    Alcohol use No    Drug use: No    Sexual activity: Not on file     Other Topics Concern    Not on file     Social History Narrative    No narrative on file         Medications  Current Outpatient Prescriptions   Medication Sig    anastrozole (ARIMIDEX) 1 mg Tab Take 1 tablet (1 mg total) by mouth once daily.    b complex vitamins tablet Take 1 tablet by mouth once daily.    butalbital-acetaminophen-caffeine -40 mg (FIORICET, ESGIC) -40 mg per tablet     CHROMIUM ORAL Take by mouth.    levothyroxine (SYNTHROID) 100 MCG tablet TK 1 T PO QD    potassium chloride (MICRO-K) 10 MEQ CpSR Take 10 mEq  by mouth once daily.     DULoxetine (CYMBALTA) 20 MG capsule TK 1 C PO QD     No current facility-administered medications for this visit.        Allergies  Review of patient's allergies indicates:   Allergen Reactions    Demerol [meperidine] Anxiety    Pcn [penicillins] Rash         Review of Systems     Constitutional: Negative    HENT: Negative  Eyes: Negative  Respiratory: Negative  Cardiovascular: Negative  Musculoskeletal: HPI  Skin: Negative  Neurological: Negative  Hematological: Negative  Endocrine: Negative      Physical Exam    Vitals:    03/06/18 1436   BP: 118/62   Pulse: 81     Physical Examination:Right shoulder examination scaption is 70°, external rotation 70°, forward flexion 95°, abduction 95°, internal rotation to the right iliolumbar area. All motion is on very uncomfortable or painful. She has good strength in her upper extremities. She has limited right and left turn in her neck. She is very tender over the trapezius, interscapular area on the right.     Skin-clear  General appearance -  well appearing, and in no distress  Mental status - awake  Neck - supple  Chest -  symmetric air entry  Heart - normal rate   Abdomen - soft      Assessment/Plan   Adhesive capsulitis of right shoulder  -     X-ray Shoulder 2 or More Views Right  -     Large Joint Aspiration/Injection    Calcific bursitis of shoulder  -     Large Joint Aspiration/Injection      Instructed in close chain stretching.Inversion traction recommended for her neck.      This note was dictated using voice recognition software and may contain grammatical errors.

## 2018-03-06 NOTE — PROCEDURES
Large Joint Aspiration/Injection  Date/Time: 3/6/2018 5:40 PM  Performed by: ZAFAR ALLEN JR  Authorized by: ZAFAR ALLEN JR     Consent Done?:  Yes (Verbal)  Indications:  Pain  Procedure site marked: Yes    Timeout: Prior to procedure the correct patient, procedure, and site was verified      Location:  Shoulder  Site:  R subacromial bursa  Prep: Patient was prepped and draped in usual sterile fashion    Ultrasonic Guidance for needle placement: No  Needle size:  25 G  Approach:  Lateral  Medications:  80 mg triamcinolone acetonide 40 mg/mL  Patient tolerance:  Patient tolerated the procedure well with no immediate complications

## 2018-03-16 ENCOUNTER — OFFICE VISIT (OUTPATIENT)
Dept: PLASTIC SURGERY | Facility: CLINIC | Age: 53
End: 2018-03-16
Payer: COMMERCIAL

## 2018-03-16 VITALS
HEART RATE: 75 BPM | HEIGHT: 64 IN | WEIGHT: 140 LBS | SYSTOLIC BLOOD PRESSURE: 116 MMHG | BODY MASS INDEX: 23.9 KG/M2 | TEMPERATURE: 98 F | DIASTOLIC BLOOD PRESSURE: 72 MMHG

## 2018-03-16 DIAGNOSIS — Z09 SURGERY FOLLOW-UP EXAMINATION: Primary | ICD-10-CM

## 2018-03-16 PROCEDURE — 99024 POSTOP FOLLOW-UP VISIT: CPT | Mod: S$GLB,,, | Performed by: SURGERY

## 2018-03-16 PROCEDURE — 99999 PR PBB SHADOW E&M-EST. PATIENT-LVL III: CPT | Mod: PBBFAC,,, | Performed by: SURGERY

## 2018-03-16 NOTE — PROGRESS NOTES
Ms. Loya presents after having bilateral breast reconstruction.  She had her   implant exchanged.  On the radiated implant side, she has contracted.  So she is   much tighter on the right and higher on the right than she is on the left.  I   told her we should probably wait a few months for that to contract as much as   this is going to and then try to match the left breast, which is lower now to   the right side.  So what we will do is we will see her back in two months, but I   will put her on the schedule for about two and a half months.      JOELLE  dd: 03/16/2018 11:23:11 (CDT)  td: 03/17/2018 01:38:25 (CDT)  Doc ID   #[id number]  Job ID #[id number]    CC:

## 2018-04-02 DIAGNOSIS — Z17.0 MALIGNANT NEOPLASM OF UPPER-OUTER QUADRANT OF RIGHT BREAST IN FEMALE, ESTROGEN RECEPTOR POSITIVE: Primary | ICD-10-CM

## 2018-04-02 DIAGNOSIS — C50.411 MALIGNANT NEOPLASM OF UPPER-OUTER QUADRANT OF RIGHT BREAST IN FEMALE, ESTROGEN RECEPTOR POSITIVE: Primary | ICD-10-CM

## 2018-04-17 ENCOUNTER — OFFICE VISIT (OUTPATIENT)
Dept: ORTHOPEDICS | Facility: CLINIC | Age: 53
End: 2018-04-17
Payer: COMMERCIAL

## 2018-04-17 VITALS
DIASTOLIC BLOOD PRESSURE: 60 MMHG | HEART RATE: 90 BPM | SYSTOLIC BLOOD PRESSURE: 102 MMHG | BODY MASS INDEX: 23.9 KG/M2 | WEIGHT: 140 LBS | HEIGHT: 64 IN

## 2018-04-17 DIAGNOSIS — M75.30 CALCIFIC BURSITIS OF SHOULDER: ICD-10-CM

## 2018-04-17 DIAGNOSIS — M75.01 ADHESIVE CAPSULITIS OF RIGHT SHOULDER: Primary | ICD-10-CM

## 2018-04-17 PROCEDURE — 99212 OFFICE O/P EST SF 10 MIN: CPT | Mod: ,,, | Performed by: ORTHOPAEDIC SURGERY

## 2018-04-17 NOTE — PROGRESS NOTES
Subjective:       Chief Complaint    Chief Complaint   Patient presents with    Follow-up     right shoulder.  Pain started approx. in 10/2017.  Injection given on 3/6/18 visit helped very much.  She had a reaction approx. 2 hours after the injection that included hot flashes and flushing of the skin.       HPI  Blanca Loya is a 52 y.o.  female who presents Follow-up on right shoulder injection. Had a reaction to the steroid injection. Beyond flushing. She also experiences hot flashes, which is unusual for her.        Past History  Past Medical History:   Diagnosis Date    Acquired deafness     Estrogen receptor positive 5/23/2017    Malignant neoplasm of upper-outer quadrant of right female breast 5/23/2017     Past Surgical History:   Procedure Laterality Date    BREAST RECONSTRUCTION      BREAST SURGERY      NEUROMA SURGERY      [place] ear     Social History     Social History    Marital status:      Spouse name: N/A    Number of children: N/A    Years of education: N/A     Occupational History    Not on file.     Social History Main Topics    Smoking status: Never Smoker    Smokeless tobacco: Never Used    Alcohol use No    Drug use: No    Sexual activity: Not on file     Other Topics Concern    Not on file     Social History Narrative    No narrative on file         Medications  Current Outpatient Prescriptions   Medication Sig    anastrozole (ARIMIDEX) 1 mg Tab Take 1 tablet (1 mg total) by mouth once daily.    b complex vitamins tablet Take 1 tablet by mouth once daily.    butalbital-acetaminophen-caffeine -40 mg (FIORICET, ESGIC) -40 mg per tablet     CHROMIUM ORAL Take by mouth.    levothyroxine (SYNTHROID) 100 MCG tablet TK 1 T PO QD     No current facility-administered medications for this visit.        Allergies  Review of patient's allergies indicates:   Allergen Reactions    Demerol [meperidine] Anxiety    Kenalog [triamcinolone acetonide] Other (See  Comments)     Hot flashes, flushing of the skin.  (sensitivity to injection noted that was given on 3/6/18)    Micro-k [potassium chloride] Itching     Headache      Pcn [penicillins] Rash         Review of Systems     Constitutional: Negative    HENT: Negative  Eyes: Negative  Respiratory: Negative  Cardiovascular: Negative  Musculoskeletal: HPI  Skin: Negative  Neurological: Negative  Hematological: Negative  Endocrine: Negative      Physical Exam    Vitals:    04/17/18 1332   BP: 102/60   Pulse: 90     Physical Examination:Scaption 70°, internal rotation T 12. Forward flexion, abduction 160°. External rotation 70°. Good strength. Rotator cuff testing, but uncomfortable  with stress.  Skin-clear  General appearance -  well appearing, and in no distress  Mental status - awake  Neck - supple  Chest -  symmetric air entry  Heart - normal rate   Abdomen - soft      Assessment/Plan   Adhesive capsulitis of right shoulder    Calcific bursitis of shoulder    Encouraged to continue stretching. Encouraged to join a yoga class.        This note was dictated using voice recognition software and may contain grammatical errors.

## 2018-04-19 ENCOUNTER — PATIENT MESSAGE (OUTPATIENT)
Dept: PLASTIC SURGERY | Facility: CLINIC | Age: 53
End: 2018-04-19

## 2018-05-24 ENCOUNTER — TELEPHONE (OUTPATIENT)
Dept: HEMATOLOGY/ONCOLOGY | Facility: CLINIC | Age: 53
End: 2018-05-24

## 2018-05-24 ENCOUNTER — HISTORICAL (OUTPATIENT)
Dept: ADMINISTRATIVE | Facility: HOSPITAL | Age: 53
End: 2018-05-24

## 2018-05-24 LAB
ALBUMIN SERPL-MCNC: 4.3 G/DL (ref 3.1–4.7)
ALP SERPL-CCNC: 91 IU/L (ref 40–104)
ALT (SGPT): 28 IU/L (ref 3–33)
AST SERPL-CCNC: 35 IU/L (ref 10–40)
BASOPHILS NFR BLD: 0 K/UL (ref 0–0.2)
BASOPHILS NFR BLD: 0.4 %
BILIRUB SERPL-MCNC: 1.1 MG/DL (ref 0.3–1)
BUN SERPL-MCNC: 8 MG/DL (ref 8–20)
CALCIUM SERPL-MCNC: 9.6 MG/DL (ref 7.7–10.4)
CHLORIDE: 100 MMOL/L (ref 98–110)
CO2 SERPL-SCNC: 29.7 MMOL/L (ref 22.8–31.6)
CREATININE: 0.77 MG/DL (ref 0.6–1.4)
EOSINOPHIL NFR BLD: 0 K/UL (ref 0–0.7)
EOSINOPHIL NFR BLD: 0.9 %
ERYTHROCYTE [DISTWIDTH] IN BLOOD BY AUTOMATED COUNT: 13.9 % (ref 12.5–14.5)
GLUCOSE: 89 MG/DL (ref 70–99)
GRAN #: 3.2 K/UL (ref 1.4–6.5)
GRAN%: 70.8 %
HCT VFR BLD AUTO: 39.3 % (ref 36–48)
HGB BLD-MCNC: 12.9 G/DL (ref 12–15)
IMMATURE GRANS (ABS): 0 K/UL (ref 0–1)
IMMATURE GRANULOCYTES: 0.2 %
LYMPH #: 0.8 K/UL (ref 1.2–3.4)
LYMPH%: 17.1 %
MCH RBC QN AUTO: 32.5 PG (ref 25–35)
MCHC RBC AUTO-ENTMCNC: 32.8 G/DL (ref 31–36)
MCV RBC AUTO: 99 FL (ref 79–98)
MONO #: 0.5 K/UL (ref 0.1–0.6)
MONO%: 10.6 %
NUCLEATED RBCS: 0 %
NUCLEATED RED BLOOD CELLS: 0 /100 WBC
PERFORMED BY:: ABNORMAL
PLATELET # BLD AUTO: 229 K/UL (ref 140–440)
PMV BLD AUTO: 9.8 FL (ref 8.8–12.7)
POTASSIUM SERPL-SCNC: 3.5 MMOL/L (ref 3.5–5)
PROT SERPL-MCNC: 7.2 G/DL (ref 6–8.2)
RBC # BLD AUTO: 3.97 M/UL (ref 3.5–5.5)
SODIUM: 138 MMOL/L (ref 134–144)
WBC # BLD: 4.5 K/UL (ref 5–10)

## 2018-05-24 NOTE — TELEPHONE ENCOUNTER
Called pt to make sure she was going to do blood work before apt on 05/29/2018. Pt stated that she is doing the blood work today.

## 2018-05-29 ENCOUNTER — OFFICE VISIT (OUTPATIENT)
Dept: HEMATOLOGY/ONCOLOGY | Facility: CLINIC | Age: 53
End: 2018-05-29
Payer: COMMERCIAL

## 2018-05-29 VITALS
BODY MASS INDEX: 23.45 KG/M2 | HEIGHT: 64 IN | WEIGHT: 137.38 LBS | SYSTOLIC BLOOD PRESSURE: 111 MMHG | HEART RATE: 77 BPM | TEMPERATURE: 98 F | DIASTOLIC BLOOD PRESSURE: 75 MMHG

## 2018-05-29 DIAGNOSIS — Z17.0 MALIGNANT NEOPLASM OF UPPER-OUTER QUADRANT OF RIGHT BREAST IN FEMALE, ESTROGEN RECEPTOR POSITIVE: Primary | ICD-10-CM

## 2018-05-29 DIAGNOSIS — Z17.0 ESTROGEN RECEPTOR POSITIVE: ICD-10-CM

## 2018-05-29 DIAGNOSIS — C50.411 MALIGNANT NEOPLASM OF UPPER-OUTER QUADRANT OF RIGHT BREAST IN FEMALE, ESTROGEN RECEPTOR POSITIVE: Primary | ICD-10-CM

## 2018-05-29 PROCEDURE — 99214 OFFICE O/P EST MOD 30 MIN: CPT | Mod: ,,, | Performed by: INTERNAL MEDICINE

## 2018-05-29 PROCEDURE — 3008F BODY MASS INDEX DOCD: CPT | Mod: ,,, | Performed by: INTERNAL MEDICINE

## 2018-05-29 RX ORDER — ANASTROZOLE 1 MG/1
1 TABLET ORAL DAILY
Qty: 30 TABLET | Refills: 6 | Status: SHIPPED | OUTPATIENT
Start: 2018-05-29 | End: 2019-03-13 | Stop reason: SDUPTHER

## 2018-05-29 NOTE — PROGRESS NOTES
[facility] Hematology/Oncology  PROGRESS NOTE      Subjective:       Patient ID:   NAME: Blanca Loya : 1965     52 y.o. female    Referring Doc: Cora  Other Physicians: Lyndsay Gonsales [place], [place]    Chief Complaint:  Breast ca f/u    History of Present Illness:     Patient returns today for a regularly scheduled follow-up visit.  She is doing well with no new issues. No CP, SOB, HA's or N/V. She is doing ok with the arimidex. She has cancelled a planned surgery/reconstruction but she sees  Dr Umana this Friday.  She reports that she is otherwise back to normal. She is here by herself.             ROS:   GEN: normal without any fever, night sweats or weight loss  HEENT: normal with no HA's, sore throat, stiff neck, changes in vision  CV: normal with no CP, SOB, PND, RUGGIERO or orthopnea  PULM: normal with no SOB, cough, hemoptysis, sputum or pleuritic pain  GI: normal with no abdominal pain, nausea, vomiting, constipation, diarrhea, melanotic stools, BRBPR, or hematemesis  : normal with no hematuria, dysuria  BREAST: normal with no mass, discharge, pain  SKIN: normal with no rash, erythema, bruising, or swelling    Allergies:  Review of patient's allergies indicates:   Allergen Reactions    Demerol [meperidine] Anxiety    Pcn [penicillins] Rash       Medications:    Current Outpatient Prescriptions:     anastrozole (ARIMIDEX) 1 mg Tab, Take 1 tablet (1 mg total) by mouth once daily., Disp: 90 tablet, Rfl: 1    b complex vitamins tablet, Take 1 tablet by mouth once daily., Disp: , Rfl:     butalbital-acetaminophen-caffeine -40 mg (FIORICET, ESGIC) -40 mg per tablet, , Disp: , Rfl:     CHROMIUM ORAL, Take by mouth., Disp: , Rfl:     levothyroxine (SYNTHROID) 100 MCG tablet, TK 1 T PO QD, Disp: , Rfl: 0    PMHx/PSHx Updates:  See patient's last visit with me on 2018.  See H&P on 2016        Pathology:      See path note on 2016      Objective:     Vitals:  Blood pressure  "111/75, pulse 77, temperature 98.4 °F (36.9 °C), height 5' 4" (1.626 m), weight 62.3 kg (137 lb 6.4 oz), last menstrual period 09/10/2016.    Physical Examination:   GEN: no apparent distress, comfortable; AAOx3  HEAD: atraumatic and normocephalic  EYES: no pallor, no icterus, PERRLA  ENT: OMM, no pharyngeal erythema, external ears WNL; no nasal discharge; no thrush  NECK: no masses, thyroid normal, trachea midline, no LAD/LN's, supple  CV: RRR with no murmur; normal pulse; normal S1 and S2; no pedal edema  CHEST: Normal respiratory effort; CTAB; normal breath sounds; no wheeze or crackles  ABDOM: nontender and nondistended; soft; normal bowel sounds; no rebound/guarding  MUSC/Skeletal: ROM normal; no crepitus; joints normal; no deformities or arthropathy  EXTREM: no clubbing, cyanosis, inflammation or swelling  SKIN: no rashes, lesions, ulcers, petechiae or subcutaneous nodules  : no tyson  NEURO: grossly intact; motor/sensory WNL; AAOx3; no tremors  PSYCH: normal mood, affect and behavior  LYMPH: normal cervical, supraclavicular, axillary and groin LN's  Breast: no charges; dropping a little on the left side          Labs:     5/24/2018    Lab Results   Component Value Date    WBC 4.5 (L) 05/24/2018    HGB 12.9 05/24/2018    HCT 39.3 05/24/2018     05/24/2018     BMP  Lab Results   Component Value Date     05/24/2018    K 3.5 05/24/2018     05/24/2018    CO2 29.7 05/24/2018    BUN 8 05/24/2018    CREATININE 0.77 05/24/2018    CALCIUM 9.6 05/24/2018    ESTGFRAFRICA 115 02/23/2018    EGFRNONAA 100 02/23/2018     Lab Results   Component Value Date    AST 35 05/24/2018    ALKPHOS 91 05/24/2018    BILITOT 1.1 (H) 05/24/2018           Radiology/Diagnostic Studies:    [facility] Unknown Rad Eap    Result Date: 9/21/2017  INTEGRATED PET CT WITH IMAGE FUSION HISTORY:  c50.411 z17.0 subsequent treatment evaluation for right breast cancer diagnosed in August 2016. Bilateral mastectomies with chemotherapy and " radiation. TECHNIQUE: Following the injection of 12.2 mCi of F-18 labeled FDG into a left antecubital vein, PET CT was performed from the vertex of the skull through the proximal thighs with an integrated full ring PET CT scanner with image fusion. The patient's serum glucose at the time of the exam was 86 mg/dL. COMPARISON: 09/20/2016 FINDINGS: There is no abnormal FDG activity to suggest recurrent or metastatic disease. CT of the head and neck show no intra-axial lesions or cervical adenopathy. The patient has had a left occipital craniotomy. CT of the chest demonstrates bilateral breast implants. There is no hilar, mediastinal or axillary adenopathy. There are no pulmonary nodules, infiltrates or pleural effusions. CT of the abdomen and pelvis demonstrates physiologic activity within the GI tract and urinary system. There is no adenopathy. The adrenal glands are normal. There are no lytic or blastic lesions.     IMPRESSION: No evidence of metastatic disease Prior bilateral mastectomies Read and electronically signed by: Mireya Rodriguez MD on 9/21/2017 1:01 PM CDT MIREYA RODRIGUEZ MD      I have reviewed all available lab results and radiology reports.    Assessment/Plan:   (1) 51 y.o. female with diagnosis of right breast cancer  - s/p bilateral mastectomies on 9/26/16 followed by reconstruction  - followed by Dr Bear with [facility]  - she had 2 out of 7 LN's that were positive  - she was a Stage IIA  - ER+/MS+ and her2nu neg  - s/p AC x4 and taxol x4  - she has seen Dr Gonsales with rad/onc for XRT and completed 5 weeks last month  - discussed the pro's and con's of tamoxifen versus arimidex, and in light of the pap issues, arimidex is probably the better choice  - she has been on arimidex for about month now  - she has some mood swings and weight gain  - PET done on 9/21/17 with no evidence of mets  - prior reconstruction with Dr Umana with repeat evaluation by him this coming Friday     (2) Mild  leucopenia/anemia secondary to chemotherapy  - latest hgb is recovered; wbc is at 3.5- latest hgb normal at 12.9     (3) Prior abnormal PAP issues - followed by Dr Cline with GYN; latest pap was negative per patient and she was also HPV negative     (4) Hx of acoustic neuroma in past - s/p XRT in 2010     (5) Chronic fibromyalgia   jer    (6) Mild elevation bili - stable      (7) RUE - resolved - possible lymphedema issues - she no longer sees PT  - also seen by Dr Liao            PLAN:  1. Set up repeat PET in Sept 2018  2. F/U with PCP, Surg and GYN  3. Continue arimidex - refill  4. RTC in 3-4 months  5. She follows up with Dr Umana friday  Fax note to Chaparro Shepherd III, *, Marlyn Bear, Lyndsay Gonsales     I spent over 25 mins of time with the patient. Over half of this time was spent couseling and coordinating care.    Discussion:     I have explained all of the above in detail and the patient understands all of the current recommendation(s). I have answered all of their questions to the best of my ability and to their complete satisfaction.   The patient is to continue with the current management plan.            Electronically signed by Bronson Carrillo MD

## 2018-05-29 NOTE — LETTER
May 29, 2018      Chaparro Shepherd III, MD  [address]  [address]  [place] [address]           [facility]  [address]  [address]  [place] [address]  Phone: [phone]  Fax: [phone]          Patient: Blanca Loya   MR Number: [id number]   YOB: 1965   Date of Visit: 5/29/2018       Dear Dr. Chaparro Shepherd III:    Thank you for referring Blanca Loya to me for evaluation. Attached you will find relevant portions of my assessment and plan of care.    If you have questions, please do not hesitate to call me. I look forward to following Blanca Loya along with you.    Sincerely,    Bronson Carrillo MD    Enclosure  CC:  No Recipients    If you would like to receive this communication electronically, please contact [email or web address] or [phone] to request more information on [employer] ([employer]) Link access.    For providers and/or their staff who would like to refer a patient to [facility], please contact us through our one-stop-shop provider referral line, [facility], at [phone].    If you feel you have received this communication in error or would no longer like to receive these types of communications, please e-mail [email or web address]

## 2018-06-01 ENCOUNTER — OFFICE VISIT (OUTPATIENT)
Dept: PLASTIC SURGERY | Facility: CLINIC | Age: 53
End: 2018-06-01
Payer: COMMERCIAL

## 2018-06-01 VITALS
HEART RATE: 85 BPM | BODY MASS INDEX: 23.75 KG/M2 | WEIGHT: 139.13 LBS | DIASTOLIC BLOOD PRESSURE: 72 MMHG | HEIGHT: 64 IN | SYSTOLIC BLOOD PRESSURE: 129 MMHG

## 2018-06-01 DIAGNOSIS — Z85.3 PERSONAL HISTORY OF BREAST CANCER: Primary | ICD-10-CM

## 2018-06-01 PROCEDURE — 99212 OFFICE O/P EST SF 10 MIN: CPT | Mod: S$GLB,,, | Performed by: SURGERY

## 2018-06-01 PROCEDURE — 3008F BODY MASS INDEX DOCD: CPT | Mod: CPTII,S$GLB,, | Performed by: SURGERY

## 2018-06-01 PROCEDURE — 99999 PR PBB SHADOW E&M-EST. PATIENT-LVL II: CPT | Mod: PBBFAC,,, | Performed by: SURGERY

## 2018-06-01 NOTE — PROGRESS NOTES
Ms. Loya wants to delay her surgery.  She needs reconstruction of the left   inframammary fold.  She needs to have it elevated with an AlloDerm sling.  She   wants to have it in August.  We will go ahead and make the adjustment.      JOELLE  dd: 06/01/2018 11:56:21 (CDT)  td: 06/02/2018 00:16:47 (CDT)  Doc ID   #[id number]  Job ID #[id number]    CC:

## 2018-06-11 DIAGNOSIS — Z85.3 PERSONAL HISTORY OF BREAST CANCER: Primary | ICD-10-CM

## 2018-08-06 ENCOUNTER — OFFICE VISIT (OUTPATIENT)
Dept: RADIATION ONCOLOGY | Facility: CLINIC | Age: 53
End: 2018-08-06
Payer: COMMERCIAL

## 2018-08-06 VITALS
SYSTOLIC BLOOD PRESSURE: 111 MMHG | TEMPERATURE: 98 F | DIASTOLIC BLOOD PRESSURE: 68 MMHG | WEIGHT: 131 LBS | BODY MASS INDEX: 22.36 KG/M2 | RESPIRATION RATE: 16 BRPM | HEIGHT: 64 IN | HEART RATE: 70 BPM

## 2018-08-06 DIAGNOSIS — Z17.0 MALIGNANT NEOPLASM OF UPPER-OUTER QUADRANT OF RIGHT BREAST IN FEMALE, ESTROGEN RECEPTOR POSITIVE: Primary | ICD-10-CM

## 2018-08-06 DIAGNOSIS — C50.411 MALIGNANT NEOPLASM OF UPPER-OUTER QUADRANT OF RIGHT BREAST IN FEMALE, ESTROGEN RECEPTOR POSITIVE: Primary | ICD-10-CM

## 2018-08-06 PROCEDURE — 3008F BODY MASS INDEX DOCD: CPT | Mod: ,,, | Performed by: RADIOLOGY

## 2018-08-06 PROCEDURE — 99214 OFFICE O/P EST MOD 30 MIN: CPT | Mod: ,,, | Performed by: RADIOLOGY

## 2018-08-06 NOTE — PROGRESS NOTES
"Blanca Loya  [id number]  1965  8/6/2018  Bronson Carrillo Md  [address]  [address]  [place] [address]    DIAGNOSIS: IIA, [id number] IDC R breast, ER/MN+, her2-  REASON FOR VISIT: Routine scheduled follow-up.    HISTORY OF PRESENT ILLNESS:   51F with mammogram detected, bx proven multifocal IDC of R breast with B mastectomies + expander placement. Pathology revealed a 12mm and 7mm IDC with 2/7 LNs+ and close posterior margin; ER/MN+, her2-. She completed ACT chemotherapy.    Completed 50Gy EBRT to R "breast" and comp jamie groups of ax I-III/SCV/IM LNs on 6/14/17.    On arimidex.    INTERVAL HISTORY:   Patient has pending left-sided breast lift with Dr. Umana at the end of the month. At today's visit she is without fever, chills, chest pain, shortness of breath, cough, hemoptysis, bone pain. She reports good range of motion right upper extremity though recently was some shoulder pain for which she is getting injections. She denies swelling of the right upper extremity. She denies new lumps or bumps. She has mild arthralgias. She denies headaches, vision or hearing changes, speech changes, cognition changes, hearing or balance changes.    Review of Systems   Constitutional: Positive for fatigue. Negative for appetite change, chills, fever and unexpected weight change.   HENT:   Negative for lump/mass, mouth sores, sore throat, trouble swallowing and voice change.    Eyes: Negative for eye problems and icterus.   Respiratory: Negative for cough, hemoptysis and shortness of breath.    Cardiovascular: Negative for chest pain and leg swelling.   Gastrointestinal: Negative for abdominal pain, constipation, diarrhea, nausea and vomiting.   Genitourinary: Negative for dysuria, frequency, hematuria, nocturia and vaginal bleeding.    Musculoskeletal: Positive for arthralgias. Negative for back pain, gait problem, neck pain and neck stiffness.   Neurological: Negative for extremity weakness, gait problem, " "headaches, numbness and seizures.   Hematological: Negative for adenopathy.     Past Medical History:   Diagnosis Date    Acquired deafness     Estrogen receptor positive 5/23/2017    Malignant neoplasm of upper-outer quadrant of right female breast 5/23/2017     Past Surgical History:   Procedure Laterality Date    BREAST RECONSTRUCTION      BREAST SURGERY      NEUROMA SURGERY      [place] ear     Social History     Social History    Marital status:      Spouse name: N/A    Number of children: N/A    Years of education: N/A     Social History Main Topics    Smoking status: Never Smoker    Smokeless tobacco: Never Used    Alcohol use No    Drug use: No    Sexual activity: Not Asked     Other Topics Concern    None     Social History Narrative    None     Family History   Problem Relation Age of Onset    No Known Problems Mother     No Known Problems Father      Medication List with Changes/Refills   Current Medications    ANASTROZOLE (ARIMIDEX) 1 MG TAB    Take 1 tablet (1 mg total) by mouth once daily.    B COMPLEX VITAMINS TABLET    Take 1 tablet by mouth once daily.    BUTALBITAL-ACETAMINOPHEN-CAFFEINE -40 MG (FIORICET, ESGIC) -40 MG PER TABLET        CHROMIUM ORAL    Take by mouth.    LEVOTHYROXINE (SYNTHROID) 100 MCG TABLET    TK 1 T PO QD     Review of patient's allergies indicates:   Allergen Reactions    Demerol [meperidine] Anxiety    Pcn [penicillins] Rash       QUALITY OF LIFE: 100%    Vitals:    08/06/18 1005   BP: 111/68   Pulse: 70   Resp: 16   Temp: 97.8 °F (36.6 °C)   TempSrc: Oral   Weight: 59.4 kg (131 lb)   Height: 5' 4" (1.626 m)   PainSc: 0-No pain       PHYSICAL EXAM:   GENERAL: alert; in no apparent distress.   HEAD: normocephalic, atraumatic. Hair growing back  EYES: pupils are equal, round, reactive to light and accommodation. Sclera anicteric. Conjunctiva not injected.   NOSE/THROAT: no nasal erythema or rhinorrhea. Oropharynx pink, without erythema, " ulcerations or thrush.   NECK: no cervical motion rigidity; supple with no masses.  CHEST:  Patient is speaking comfortably on room air with normal work of breathing without using accessory muscles of respiration.  MUSCULOSKELETAL: no tenderness to palpation along the spine or scapulae. Normal range of motion.  NEUROLOGIC: cranial nerves II-XII intact bilaterally. Strength 5/5 in bilateral upper and lower extremities. Normal gait.  LYMPHATIC: no cervical, supraclavicular or axillary adenopathy appreciated bilaterally.   EXTREMITIES: no clubbing, cyanosis, edema.  SKIN: no erythema, rashes, ulcerations noted.   BREAST: B reconstructions with minimal bruising incisions are clean dry and intact, well-healed without evidence of nodularity, seroma, infection. Exam is benign and well tolerated. Left-sided reconstruction sets lower than right    ANCILLARY DATA  9/17 PET/CT: alexander    ASSESSMENT: 51F with stage IIA, [id number] IDC R breast, ER/AL+, her2- s/p mastectomy and adjuvant RT ending 6/17, now on arimidex and reconsructed.  PLAN:  Ms. Loya presents today a sense without any complaints. She is tolerating antiestrogen therapy well and reports no discomfort within the bilateral reconstructed breasts. She has plan for a left-sided breast left. She has recently seen Dr. Carrillo and is planned for a PET CT scan in September of this year. She will continue to follow with her plastic surgeon as well. I'm very pleased with her progress and tolerance to treatment and appreciate minimal chronic toxicity from radiotherapy. By today's exam she is without evidence of disease. Return to clinic in 6 months.  All questions answered and contact information provided. Patient understands free to call us anytime with any questions or concerns regarding radiation therapy.    TIME SPENT WITH PATIENT: I have personally seen and evaluated this patient. Approximately 30 minutes were spent with the patient discussing follow-up careplan.      PHYSICIAN: Rocco Gonsales Jr, MD

## 2018-08-09 ENCOUNTER — TELEPHONE (OUTPATIENT)
Dept: HEMATOLOGY/ONCOLOGY | Facility: CLINIC | Age: 53
End: 2018-08-09

## 2018-08-09 DIAGNOSIS — C50.411 MALIGNANT NEOPLASM OF UPPER-OUTER QUADRANT OF RIGHT BREAST IN FEMALE, ESTROGEN RECEPTOR POSITIVE: Primary | ICD-10-CM

## 2018-08-09 DIAGNOSIS — Z17.0 ESTROGEN RECEPTOR POSITIVE: ICD-10-CM

## 2018-08-09 DIAGNOSIS — Z17.0 MALIGNANT NEOPLASM OF UPPER-OUTER QUADRANT OF RIGHT BREAST IN FEMALE, ESTROGEN RECEPTOR POSITIVE: Primary | ICD-10-CM

## 2018-08-16 ENCOUNTER — TELEPHONE (OUTPATIENT)
Dept: PLASTIC SURGERY | Facility: CLINIC | Age: 53
End: 2018-08-16

## 2018-08-16 NOTE — TELEPHONE ENCOUNTER
8.16.2018 at 2:50 p     Pt came in for pre op appt.  Pt advised to arrived 2nd floor DOSC on day of surgery. Also advised that she will receive a call the day before surgery with arrival time. We thoroughly went over pre and post op instructions and patient advised to call with any questions. Pt left educated and with responses to all questions/concerns.

## 2018-08-22 ENCOUNTER — ANESTHESIA EVENT (OUTPATIENT)
Dept: SURGERY | Facility: HOSPITAL | Age: 53
End: 2018-08-22
Payer: COMMERCIAL

## 2018-08-22 DIAGNOSIS — Z01.818 PREOPERATIVE TESTING: Primary | ICD-10-CM

## 2018-08-22 RX ORDER — IBUPROFEN 100 MG/5ML
1000 SUSPENSION, ORAL (FINAL DOSE FORM) ORAL DAILY
COMMUNITY

## 2018-08-22 NOTE — ANESTHESIA PREPROCEDURE EVALUATION
Dotty Randall, RN   Registered Nurse      Pre Admission Screening   Signed                             [x]Hide copied text    []Hover for details      Anesthesia Assessment: Preoperative EQUATION     Planned Procedure: Procedure(s) (LRB):  BREAST REVISION SURGERY LEFT IMF ELEVATION WITH ALLODERM (Left)  Insertion-Implant ALLODERM LEFT (Left)  Requested Anesthesia Type:General  Surgeon: Holger Umana MD  Service: Plastics  Known or anticipated Date of Surgery:8/30/2018     Surgeon notes: reviewed     Electronic QUestionnaire Assessment completed via nurse interview with patient.         NO AQ     Triage considerations:      The patient has no apparent active cardiac condition (No unstable coronary Syndrome such as severe unstable angina or recent [<1 month] myocardial infarction, decompensated CHF, severe valvular   disease or significant arrhythmia)     Previous anesthesia records:GETA and LMA General  01/15/18 Placement Time: 0736 Inserted by: Anesthesia MD Airway Device: LMA Mask Ventilation: Easy Intubated: Postinduction Airway Device Size: 3.0 Style: Cuffed Placement Verified By: Auscultation;Capnometry Complicating Factors: Small mouth Findings Post-Intubation: Positive EtCO2;Bilateral breath sounds;Atraumatic/Condition of teeth unchanged Secured at: Lips Complications: None Breath Sounds: Equal Bilateral Insertion attempts (enter comment if more than 2 attempts): 2 , Unable to seat #4 LMA, #3 inserted successfully    Airway/Jaw/Neck:  Airway Findings: Mouth Opening: Normal Tongue: Normal  General Airway Assessment: Adult  Mallampati: II  TM Distance: Normal, at least 6 cm  Jaw/Neck Findings:  Neck ROM: Normal ROM           Last PCP note: 6-12 months ago , outside [facility]   Subspecialty notes: Hematology/Oncology     Other important co-morbidities: HX breast cancer, Hypothyroidism, chronic pain/fibromylagia     Tests already available:  Available tests,  3-6 months ago . 5/24/18 CBC, CMP. NO  EKG.                            Instructions given. (See in Nurse's note)     Optimization:  Anesthesia Preop Clinic Assessment  Indicated-not required for this procedure                Plan:    Testing:  Hemoglobin                           Patient  has previously scheduled Medical Appointment: none     Navigation: Tests Scheduled. Am of surgery                                    Straight Line to surgery.                                  Electronically signed by Dotty Randall RN at 8/22/2018 12:03 PM       Pre-admit on 8/30/2018            Detailed Report                                                                                                                      08/22/2018  Blanca Loya is a 52 y.o., female.    Anesthesia Evaluation    I have reviewed the Patient Summary Reports.    I have reviewed the Nursing Notes.   I have reviewed the Medications.     Review of Systems  Anesthesia Hx:  No problems with previous Anesthesia  History of prior surgery of interest to airway management or planning: Previous anesthesia: General 1/2018 breast implant with general anesthesia.  Procedure performed at an [facility]. Airway issues documented on chart review include laryngeal mask airway used  Denies Family Hx of Anesthesia complications.   Denies Personal Hx of Anesthesia complications.   Hematology/Oncology:  Hematology Normal       -- Cancer in past history:  Breast chemotherapy, radiation and surgery  Oncology Comments: NO BP OR IVS TO RIGHT ARM     EENT/Dental:   Jaw Problems:  Clicking (TMJ)   Cardiovascular:  Cardiovascular Normal Exercise tolerance: good   Denies Angina.    Pulmonary:  Pulmonary Normal  Denies Shortness of breath.  Denies Recent URI.    Renal/:  Renal/ Normal     Hepatic/GI:  Hepatic/GI Normal    Musculoskeletal:  Musculoskeletal General/Symptoms: neck pain. Functional capacity is ambulatory without assistance.  Cervical Spine Disorder, Cervical Disc Disease Limited neck motion    Neurological:  Neurology Normal Denies TIA.  Denies CVA. Denies Seizures.  Pain Etiology/Diagnosis, Fibromayalgia    Endocrine:   Hypothyroidism    Psych:  Psychiatric Normal           Physical Exam  General:  Well nourished    Airway/Jaw/Neck:  Airway Findings: Mouth Opening: Normal Tongue: Normal  General Airway Assessment: Adult  Mallampati: II  TM Distance: 4 - 6 cm  Jaw/Neck Findings:  Neck ROM: Normal ROM      Dental:  Dental Findings: In tact        Mental Status:  Mental Status Findings:  Cooperative, Alert and Oriented         Anesthesia Plan  Type of Anesthesia, risks & benefits discussed:  Anesthesia Type:  general  Patient's Preference:   Intra-op Monitoring Plan: standard ASA monitors  Intra-op Monitoring Plan Comments:   Post Op Pain Control Plan: per primary service following discharge from PACU and multimodal analgesia  Post Op Pain Control Plan Comments:   Induction:   IV  Beta Blocker:  Patient is not currently on a Beta-Blocker (No further documentation required).       Informed Consent: Patient understands risks and agrees with Anesthesia plan.  Questions answered. Anesthesia consent signed with patient.  ASA Score: 2     Day of Surgery Review of History & Physical:    H&P update referred to the surgeon.         Ready For Surgery From Anesthesia Perspective.

## 2018-08-22 NOTE — PRE ADMISSION SCREENING
Anesthesia Assessment: Preoperative EQUATION    Planned Procedure: Procedure(s) (LRB):  BREAST REVISION SURGERY LEFT IMF ELEVATION WITH ALLODERM (Left)  Insertion-Implant ALLODERM LEFT (Left)  Requested Anesthesia Type:General  Surgeon: Holger Umana MD  Service: Plastics  Known or anticipated Date of Surgery:8/30/2018    Surgeon notes: reviewed    Electronic QUestionnaire Assessment completed via nurse interview with patient.        NO AQ    Triage considerations:     The patient has no apparent active cardiac condition (No unstable coronary Syndrome such as severe unstable angina or recent [<1 month] myocardial infarction, decompensated CHF, severe valvular   disease or significant arrhythmia)    Previous anesthesia records:GETA and LMA General  01/15/18 Placement Time: 0736 Inserted by: Anesthesia MD Airway Device: LMA Mask Ventilation: Easy Intubated: Postinduction Airway Device Size: 3.0 Style: Cuffed Placement Verified By: Auscultation;Capnometry Complicating Factors: Small mouth Findings Post-Intubation: Positive EtCO2;Bilateral breath sounds;Atraumatic/Condition of teeth unchanged Secured at: Lips Complications: None Breath Sounds: Equal Bilateral Insertion attempts (enter comment if more than 2 attempts): 2 , Unable to seat #4 LMA, #3 inserted successfully    Airway/Jaw/Neck:  Airway Findings: Mouth Opening: Normal Tongue: Normal  General Airway Assessment: Adult  Mallampati: II  TM Distance: Normal, at least 6 cm  Jaw/Neck Findings:  Neck ROM: Normal ROM         Last PCP note: 6-12 months ago , outside [facility]   Subspecialty notes: Hematology/Oncology    Other important co-morbidities: HX breast cancer, Hypothyroidism, chronic pain/fibromylagia     Tests already available:  Available tests,  3-6 months ago . 5/24/18 CBC, CMP. NO EKG.            Instructions given. (See in Nurse's note)    Optimization:  Anesthesia Preop Clinic Assessment  Indicated-not required for this procedure       Plan:     Testing:  Hemoglobin     Patient  has previously scheduled Medical Appointment: none    Navigation: Tests Scheduled. Am of surgery                     Straight Line to surgery.

## 2018-08-28 ENCOUNTER — TELEPHONE (OUTPATIENT)
Dept: HEMATOLOGY/ONCOLOGY | Facility: CLINIC | Age: 53
End: 2018-08-28

## 2018-08-29 ENCOUNTER — TELEPHONE (OUTPATIENT)
Dept: PLASTIC SURGERY | Facility: CLINIC | Age: 53
End: 2018-08-29

## 2018-08-29 NOTE — TELEPHONE ENCOUNTER
Spoke with pt regarding surgery, pt advised to arrive to [facility] at 0530 for 0730 surgery, pt verbalized understanding, pre-op education reinforced, all questions answered at this time, pt given reassurance

## 2018-08-30 ENCOUNTER — ANESTHESIA (OUTPATIENT)
Dept: SURGERY | Facility: HOSPITAL | Age: 53
End: 2018-08-30
Payer: COMMERCIAL

## 2018-08-30 ENCOUNTER — HOSPITAL ENCOUNTER (OUTPATIENT)
Facility: HOSPITAL | Age: 53
Discharge: HOME | End: 2018-08-30
Attending: SURGERY | Admitting: SURGERY
Payer: COMMERCIAL

## 2018-08-30 VITALS
BODY MASS INDEX: 22.2 KG/M2 | TEMPERATURE: 98 F | RESPIRATION RATE: 16 BRPM | WEIGHT: 130 LBS | SYSTOLIC BLOOD PRESSURE: 104 MMHG | HEART RATE: 74 BPM | DIASTOLIC BLOOD PRESSURE: 49 MMHG | HEIGHT: 64 IN | OXYGEN SATURATION: 98 %

## 2018-08-30 DIAGNOSIS — Z17.0 MALIGNANT NEOPLASM OF UPPER-OUTER QUADRANT OF RIGHT BREAST IN FEMALE, ESTROGEN RECEPTOR POSITIVE: Primary | ICD-10-CM

## 2018-08-30 DIAGNOSIS — Z01.818 PREOPERATIVE TESTING: ICD-10-CM

## 2018-08-30 DIAGNOSIS — C50.411 MALIGNANT NEOPLASM OF UPPER-OUTER QUADRANT OF RIGHT BREAST IN FEMALE, ESTROGEN RECEPTOR POSITIVE: Primary | ICD-10-CM

## 2018-08-30 LAB — HGB BLD-MCNC: 11.6 G/DL

## 2018-08-30 PROCEDURE — 25000003 PHARM REV CODE 250: Performed by: NURSE ANESTHETIST, CERTIFIED REGISTERED

## 2018-08-30 PROCEDURE — D9220A PRA ANESTHESIA: Mod: CRNA,,, | Performed by: NURSE ANESTHETIST, CERTIFIED REGISTERED

## 2018-08-30 PROCEDURE — 63600175 PHARM REV CODE 636 W HCPCS: Performed by: STUDENT IN AN ORGANIZED HEALTH CARE EDUCATION/TRAINING PROGRAM

## 2018-08-30 PROCEDURE — 63600175 PHARM REV CODE 636 W HCPCS: Performed by: ANESTHESIOLOGY

## 2018-08-30 PROCEDURE — 71000015 HC POSTOP RECOV 1ST HR: Performed by: SURGERY

## 2018-08-30 PROCEDURE — 94761 N-INVAS EAR/PLS OXIMETRY MLT: CPT

## 2018-08-30 PROCEDURE — 85018 HEMOGLOBIN: CPT

## 2018-08-30 PROCEDURE — C1729 CATH, DRAINAGE: HCPCS | Performed by: SURGERY

## 2018-08-30 PROCEDURE — 76942 ECHO GUIDE FOR BIOPSY: CPT | Mod: 26,,, | Performed by: ANESTHESIOLOGY

## 2018-08-30 PROCEDURE — S0077 INJECTION, CLINDAMYCIN PHOSP: HCPCS | Performed by: SURGERY

## 2018-08-30 PROCEDURE — 19380 REVJ RECONSTRUCTED BREAST: CPT | Mod: LT,,, | Performed by: SURGERY

## 2018-08-30 PROCEDURE — 63600175 PHARM REV CODE 636 W HCPCS: Performed by: NURSE ANESTHETIST, CERTIFIED REGISTERED

## 2018-08-30 PROCEDURE — 25000003 PHARM REV CODE 250: Performed by: ANESTHESIOLOGY

## 2018-08-30 PROCEDURE — 63600175 PHARM REV CODE 636 W HCPCS: Performed by: SURGERY

## 2018-08-30 PROCEDURE — 25000003 PHARM REV CODE 250: Performed by: SURGERY

## 2018-08-30 PROCEDURE — 36000707: Performed by: SURGERY

## 2018-08-30 PROCEDURE — 71000016 HC POSTOP RECOV ADDL HR: Performed by: SURGERY

## 2018-08-30 PROCEDURE — 36000706: Performed by: SURGERY

## 2018-08-30 PROCEDURE — 71000033 HC RECOVERY, INTIAL HOUR: Performed by: SURGERY

## 2018-08-30 PROCEDURE — 27000221 HC OXYGEN, UP TO 24 HOURS

## 2018-08-30 PROCEDURE — 37000008 HC ANESTHESIA 1ST 15 MINUTES: Performed by: SURGERY

## 2018-08-30 PROCEDURE — D9220A PRA ANESTHESIA: Mod: ANES,,, | Performed by: ANESTHESIOLOGY

## 2018-08-30 PROCEDURE — 64450 NJX AA&/STRD OTHER PN/BRANCH: CPT | Performed by: ANESTHESIOLOGY

## 2018-08-30 PROCEDURE — 71000039 HC RECOVERY, EACH ADD'L HOUR: Performed by: SURGERY

## 2018-08-30 PROCEDURE — 15777 ACELLULAR DERM MATRIX IMPLT: CPT | Mod: LT,,, | Performed by: SURGERY

## 2018-08-30 PROCEDURE — 37000009 HC ANESTHESIA EA ADD 15 MINS: Performed by: SURGERY

## 2018-08-30 PROCEDURE — S0077 INJECTION, CLINDAMYCIN PHOSP: HCPCS | Performed by: NURSE ANESTHETIST, CERTIFIED REGISTERED

## 2018-08-30 DEVICE — GRAFT PERF 9.6X19.3 THCK TISS: Type: IMPLANTABLE DEVICE | Site: BREAST | Status: FUNCTIONAL

## 2018-08-30 RX ORDER — BUPIVACAINE HYDROCHLORIDE AND EPINEPHRINE 2.5; 5 MG/ML; UG/ML
INJECTION, SOLUTION EPIDURAL; INFILTRATION; INTRACAUDAL; PERINEURAL
Status: COMPLETED | OUTPATIENT
Start: 2018-08-30 | End: 2018-08-30

## 2018-08-30 RX ORDER — FENTANYL CITRATE 50 UG/ML
25 INJECTION, SOLUTION INTRAMUSCULAR; INTRAVENOUS EVERY 5 MIN PRN
Status: DISCONTINUED | OUTPATIENT
Start: 2018-08-30 | End: 2018-08-30 | Stop reason: HOSPADM

## 2018-08-30 RX ORDER — LIDOCAINE HCL/PF 100 MG/5ML
SYRINGE (ML) INTRAVENOUS
Status: DISCONTINUED | OUTPATIENT
Start: 2018-08-30 | End: 2018-08-30

## 2018-08-30 RX ORDER — CLINDAMYCIN PHOSPHATE 900 MG/50ML
900 INJECTION, SOLUTION INTRAVENOUS
Status: DISCONTINUED | OUTPATIENT
Start: 2018-08-30 | End: 2018-08-30 | Stop reason: HOSPADM

## 2018-08-30 RX ORDER — MIDAZOLAM HYDROCHLORIDE 1 MG/ML
0.5 INJECTION INTRAMUSCULAR; INTRAVENOUS
Status: DISCONTINUED | OUTPATIENT
Start: 2018-08-30 | End: 2018-08-30 | Stop reason: HOSPADM

## 2018-08-30 RX ORDER — FENTANYL CITRATE 50 UG/ML
INJECTION, SOLUTION INTRAMUSCULAR; INTRAVENOUS
Status: DISCONTINUED | OUTPATIENT
Start: 2018-08-30 | End: 2018-08-30

## 2018-08-30 RX ORDER — CLINDAMYCIN PHOSPHATE 900 MG/50ML
INJECTION, SOLUTION INTRAVENOUS
Status: DISCONTINUED | OUTPATIENT
Start: 2018-08-30 | End: 2018-08-30

## 2018-08-30 RX ORDER — LIDOCAINE HYDROCHLORIDE 10 MG/ML
1 INJECTION, SOLUTION EPIDURAL; INFILTRATION; INTRACAUDAL; PERINEURAL ONCE
Status: COMPLETED | OUTPATIENT
Start: 2018-08-30 | End: 2018-08-30

## 2018-08-30 RX ORDER — CYCLOBENZAPRINE HCL 10 MG
10 TABLET ORAL 3 TIMES DAILY PRN
Qty: 28 TABLET | Refills: 0 | Status: SHIPPED | OUTPATIENT
Start: 2018-08-30 | End: 2018-09-09

## 2018-08-30 RX ORDER — ONDANSETRON 2 MG/ML
4 INJECTION INTRAMUSCULAR; INTRAVENOUS EVERY 12 HOURS PRN
Status: DISCONTINUED | OUTPATIENT
Start: 2018-08-30 | End: 2018-08-30 | Stop reason: HOSPADM

## 2018-08-30 RX ORDER — CLINDAMYCIN HYDROCHLORIDE 300 MG/1
300 CAPSULE ORAL EVERY 8 HOURS
Qty: 21 CAPSULE | Refills: 0 | Status: SHIPPED | OUTPATIENT
Start: 2018-08-30 | End: 2018-09-06

## 2018-08-30 RX ORDER — PROPOFOL 10 MG/ML
VIAL (ML) INTRAVENOUS
Status: DISCONTINUED | OUTPATIENT
Start: 2018-08-30 | End: 2018-08-30

## 2018-08-30 RX ORDER — ROCURONIUM BROMIDE 10 MG/ML
INJECTION, SOLUTION INTRAVENOUS
Status: DISCONTINUED | OUTPATIENT
Start: 2018-08-30 | End: 2018-08-30

## 2018-08-30 RX ORDER — LIDOCAINE HYDROCHLORIDE 10 MG/ML
1 INJECTION, SOLUTION EPIDURAL; INFILTRATION; INTRACAUDAL; PERINEURAL ONCE
Status: DISCONTINUED | OUTPATIENT
Start: 2018-08-30 | End: 2018-08-30 | Stop reason: HOSPADM

## 2018-08-30 RX ORDER — HYDROCODONE BITARTRATE AND ACETAMINOPHEN 5; 325 MG/1; MG/1
1 TABLET ORAL ONCE
Status: COMPLETED | OUTPATIENT
Start: 2018-08-30 | End: 2018-08-30

## 2018-08-30 RX ORDER — HYDROCODONE BITARTRATE AND ACETAMINOPHEN 5; 325 MG/1; MG/1
1 TABLET ORAL EVERY 4 HOURS PRN
Status: DISCONTINUED | OUTPATIENT
Start: 2018-08-30 | End: 2018-08-30 | Stop reason: HOSPADM

## 2018-08-30 RX ORDER — ONDANSETRON 2 MG/ML
INJECTION INTRAMUSCULAR; INTRAVENOUS
Status: DISCONTINUED | OUTPATIENT
Start: 2018-08-30 | End: 2018-08-30

## 2018-08-30 RX ORDER — BACITRACIN 50000 [IU]/1
INJECTION, POWDER, FOR SOLUTION INTRAMUSCULAR
Status: DISCONTINUED | OUTPATIENT
Start: 2018-08-30 | End: 2018-08-30 | Stop reason: HOSPADM

## 2018-08-30 RX ORDER — HEPARIN SODIUM 5000 [USP'U]/ML
5000 INJECTION, SOLUTION INTRAVENOUS; SUBCUTANEOUS ONCE
Status: COMPLETED | OUTPATIENT
Start: 2018-08-30 | End: 2018-08-30

## 2018-08-30 RX ORDER — NEOSTIGMINE METHYLSULFATE 1 MG/ML
INJECTION, SOLUTION INTRAVENOUS
Status: DISCONTINUED | OUTPATIENT
Start: 2018-08-30 | End: 2018-08-30

## 2018-08-30 RX ORDER — CLINDAMYCIN PHOSPHATE 150 MG/ML
INJECTION, SOLUTION INTRAVENOUS
Status: DISCONTINUED | OUTPATIENT
Start: 2018-08-30 | End: 2018-08-30 | Stop reason: HOSPADM

## 2018-08-30 RX ORDER — CIPROFLOXACIN 2 MG/ML
INJECTION, SOLUTION INTRAVENOUS CONTINUOUS PRN
Status: COMPLETED | OUTPATIENT
Start: 2018-08-30 | End: 2018-08-30

## 2018-08-30 RX ORDER — EPHEDRINE SULFATE 50 MG/ML
INJECTION, SOLUTION INTRAVENOUS
Status: DISCONTINUED | OUTPATIENT
Start: 2018-08-30 | End: 2018-08-30

## 2018-08-30 RX ORDER — GLYCOPYRROLATE 0.2 MG/ML
INJECTION INTRAMUSCULAR; INTRAVENOUS
Status: DISCONTINUED | OUTPATIENT
Start: 2018-08-30 | End: 2018-08-30

## 2018-08-30 RX ORDER — OXYCODONE AND ACETAMINOPHEN 5; 325 MG/1; MG/1
1 TABLET ORAL EVERY 6 HOURS PRN
Qty: 28 TABLET | Refills: 0 | Status: SHIPPED | OUTPATIENT
Start: 2018-08-30 | End: 2022-04-29

## 2018-08-30 RX ORDER — SODIUM CHLORIDE 9 MG/ML
INJECTION, SOLUTION INTRAVENOUS CONTINUOUS
Status: DISCONTINUED | OUTPATIENT
Start: 2018-08-30 | End: 2018-08-30 | Stop reason: HOSPADM

## 2018-08-30 RX ORDER — ACETAMINOPHEN 10 MG/ML
INJECTION, SOLUTION INTRAVENOUS
Status: DISCONTINUED | OUTPATIENT
Start: 2018-08-30 | End: 2018-08-30

## 2018-08-30 RX ADMIN — PROPOFOL 50 MG: 10 INJECTION, EMULSION INTRAVENOUS at 09:08

## 2018-08-30 RX ADMIN — MIDAZOLAM HYDROCHLORIDE 2 MG: 1 INJECTION, SOLUTION INTRAMUSCULAR; INTRAVENOUS at 07:08

## 2018-08-30 RX ADMIN — FENTANYL CITRATE 50 MCG: 50 INJECTION, SOLUTION INTRAMUSCULAR; INTRAVENOUS at 08:08

## 2018-08-30 RX ADMIN — SODIUM CHLORIDE: 0.9 INJECTION, SOLUTION INTRAVENOUS at 06:08

## 2018-08-30 RX ADMIN — LIDOCAINE HYDROCHLORIDE 50 MG: 20 INJECTION, SOLUTION INTRAVENOUS at 08:08

## 2018-08-30 RX ADMIN — EPHEDRINE SULFATE 5 MG: 50 INJECTION, SOLUTION INTRAMUSCULAR; INTRAVENOUS; SUBCUTANEOUS at 09:08

## 2018-08-30 RX ADMIN — SODIUM CHLORIDE, SODIUM GLUCONATE, SODIUM ACETATE, POTASSIUM CHLORIDE, MAGNESIUM CHLORIDE, SODIUM PHOSPHATE, DIBASIC, AND POTASSIUM PHOSPHATE: .53; .5; .37; .037; .03; .012; .00082 INJECTION, SOLUTION INTRAVENOUS at 09:08

## 2018-08-30 RX ADMIN — NEOSTIGMINE METHYLSULFATE 8 MG: 1 INJECTION INTRAVENOUS at 09:08

## 2018-08-30 RX ADMIN — BUPIVACAINE HYDROCHLORIDE AND EPINEPHRINE BITARTRATE 30 ML: 2.5; .0091 INJECTION, SOLUTION EPIDURAL; INFILTRATION; INTRACAUDAL; PERINEURAL at 08:08

## 2018-08-30 RX ADMIN — ROCURONIUM BROMIDE 10 MG: 10 INJECTION, SOLUTION INTRAVENOUS at 08:08

## 2018-08-30 RX ADMIN — CLINDAMYCIN PHOSPHATE 900 MG: 18 INJECTION, SOLUTION INTRAVENOUS at 08:08

## 2018-08-30 RX ADMIN — EPHEDRINE SULFATE 10 MG: 50 INJECTION, SOLUTION INTRAMUSCULAR; INTRAVENOUS; SUBCUTANEOUS at 09:08

## 2018-08-30 RX ADMIN — EPHEDRINE SULFATE 5 MG: 50 INJECTION, SOLUTION INTRAMUSCULAR; INTRAVENOUS; SUBCUTANEOUS at 08:08

## 2018-08-30 RX ADMIN — FENTANYL CITRATE 50 MCG: 50 INJECTION, SOLUTION INTRAMUSCULAR; INTRAVENOUS at 09:08

## 2018-08-30 RX ADMIN — ROCURONIUM BROMIDE 40 MG: 10 INJECTION, SOLUTION INTRAVENOUS at 08:08

## 2018-08-30 RX ADMIN — PROPOFOL 150 MG: 10 INJECTION, EMULSION INTRAVENOUS at 08:08

## 2018-08-30 RX ADMIN — EPHEDRINE SULFATE 10 MG: 50 INJECTION, SOLUTION INTRAMUSCULAR; INTRAVENOUS; SUBCUTANEOUS at 08:08

## 2018-08-30 RX ADMIN — EPHEDRINE SULFATE 10 MG: 50 INJECTION, SOLUTION INTRAMUSCULAR; INTRAVENOUS; SUBCUTANEOUS at 10:08

## 2018-08-30 RX ADMIN — HEPARIN SODIUM 5000 UNITS: 5000 INJECTION, SOLUTION INTRAVENOUS; SUBCUTANEOUS at 08:08

## 2018-08-30 RX ADMIN — GLYCOPYRROLATE 0.4 MG: 0.2 INJECTION, SOLUTION INTRAMUSCULAR; INTRAVENOUS at 09:08

## 2018-08-30 RX ADMIN — LIDOCAINE HYDROCHLORIDE 0.3 MG: 10 INJECTION, SOLUTION EPIDURAL; INFILTRATION; INTRACAUDAL; PERINEURAL at 06:08

## 2018-08-30 RX ADMIN — HYDROCODONE BITARTRATE AND ACETAMINOPHEN 1 TABLET: 5; 325 TABLET ORAL at 10:08

## 2018-08-30 RX ADMIN — ONDANSETRON 4 MG: 2 INJECTION INTRAMUSCULAR; INTRAVENOUS at 09:08

## 2018-08-30 RX ADMIN — ACETAMINOPHEN 1000 MG: 10 INJECTION, SOLUTION INTRAVENOUS at 09:08

## 2018-08-30 NOTE — TRANSFER OF CARE
"Anesthesia Transfer of Care Note    Patient: Blanca Loya    Procedure(s) Performed: Procedure(s) (LRB):  BREAST REVISION SURGERY LEFT IMF ELEVATION WITH ALLODERM (Left)  REVISION- SOFT TISSUE (Left)    Patient location: PACU    Anesthesia Type: general    Transport from OR: Transported from OR on 6-10 L/min O2 by face mask with adequate spontaneous ventilation    Post pain: adequate analgesia    Post assessment: no apparent anesthetic complications    Post vital signs: stable    Level of consciousness: awake    Nausea/Vomiting: no nausea/vomiting    Complications: none    Transfer of care protocol was followed      Last vitals:   Visit Vitals  BP (!) 117/59 (BP Location: Left arm, Patient Position: Lying)   Pulse 92   Temp 36.4 °C (97.5 °F) (Temporal)   Resp 18   Ht 5' 4" (1.626 m)   Wt 59 kg (130 lb)   LMP 09/10/2016   SpO2 100%   Breastfeeding? No   BMI 22.31 kg/m²     "

## 2018-08-30 NOTE — PLAN OF CARE
Problem: Patient Care Overview  Goal: Plan of Care Review  Outcome: Outcome(s) achieved Date Met: 08/30/18  Awake and alert. VSS. Denies pain or nausea. Tolerating liquids well. Voiding well. DC instructions given to patient and family and they verbalize understanding.

## 2018-08-30 NOTE — DISCHARGE SUMMARY
Discharge Note    SUMMARY     Admit Date: 8/30/2018    Discharge Date and Time:  08/30/2018 10:30 AM    Hospital Course (synopsis of major diagnoses, care, treatment, and services provided during the course of the hospital stay): outpt surgery as scheduled, DC home     Final Diagnosis: Post-Op Diagnosis Codes:     * Personal history of breast cancer [Z85.3]    Disposition: Home or Self Care    Follow Up/Patient Instructions:   Drain care as instructed. Strip drain. Shower in 2 days  F/u 1 week    Medications:  Reconciled Home Medications:      Medication List      ASK your doctor about these medications    anastrozole 1 mg Tab  Commonly known as:  ARIMIDEX  Take 1 tablet (1 mg total) by mouth once daily.     b complex vitamins tablet  Take 1 tablet by mouth once daily.     butalbital-acetaminophen-caffeine -40 mg -40 mg per tablet  Commonly known as:  FIORICET, ESGIC     CHROMIUM ORAL  Take by mouth.     levothyroxine 100 MCG tablet  Commonly known as:  SYNTHROID  TK 1 T PO QD     VITAMIN C 1000 MG tablet  Generic drug:  ascorbic acid (vitamin C)  Take 1,000 mg by mouth once daily.

## 2018-08-30 NOTE — OP NOTE
DATE OF PROCEDURE:  08/30/2018.    PREOPERATIVE DIAGNOSIS:  History of breast cancer.    POSTOPERATIVE DIAGNOSIS:  History of breast cancer.    PROCEDURES PERFORMED:  1.  Recreation of the left inframammary fold.  2.  Placement of AlloDerm along the inframammary fold.  3.  Open capsulotomy.    SURGEON:  Holger Umana M.D., F.A.C.S.    ANESTHESIA:  General.    INDICATIONS FOR PROCEDURE:  The patient was evaluated in the preoperative   holding area.  The patient had bilateral tissue expander, followed by implant   reconstruction.  She had radiation to the right side.  Right breast is   contracted.  I had a long talk with the patient that the right breast does look   good.  The left breast is more natural.  I told her that there is extremely high   incidence of recurrent capsular contracture on the right side.  It does not   cause her any pain.  She thinks it looks fine on the right side, but she would   like the two breasts to match.  I discussed with her that I think it would be   prudent since the radiation was on the right side and not operated on the   breast.  We will try to elevate the left inframammary fold in order to give her   some more balance and more symmetry to her breasts.  The patient was in   agreement with this.  The risks and possible complications including, but not   limited to infection, bleeding, scarring, breast asymmetry, breast deformity,   loss of sensation to the nipple, and partial or total loss of the nipple were   explained.    DESCRIPTION OF PROCEDURE:  The patient was taken to the Operative Room, placed   in the supine position after adequate general endotracheal anesthesia, and was   prepped and draped in the normal sterile fashion.  The new nipple-areolar   complex was prepped and draped in the normal sterile fashion.  Previous   mastectomy incision was then reexcised.  An extracapsular dissection then   proceeded inferiorly down to the chest wall.  Dissection then proceeded  under   the capsule of the implant up to the new site for the inframammary fold.  An   open capsulotomy was then performed after the implant was removed.  The capsule   was opened in a trap door fashion and that superior flap would then be used to   suture to the new AlloDerm, which would be used to recreate the inframammary   fold.  Temporary Vicryl sutures were then placed at the site of the new   inframammary fold.  A 395 mL temporary sizer was placed.  The patient was placed   in the upright position.  Adjustments need to be made.  She was placed in the   supine position.  This was redone and placed in the upright position.  Again,   this gave good balance and good symmetry.  It was also noted in the upright   position that the patient had a little extra skin, which gave little asymmetry   between the right and left breasts.  This was marked for excision as well.    Next, the AlloDerm was then sutured to the inframammary fold using a running 2-0   PDS suture.  Next, the chest wall was then reprepped and redraped.  Gloves were   changed and the pocket was irrigated with triple-antibiotic solution.  The   previous implant was placed in the pocket.  The new AlloDerm superior aspect was   sutured to the old capsule tightly, but it should be using 2-0 Vicryl.  It   should be noted that there was a large amount of excess AlloDerm and this was   placed underneath the superior capsule in a vest-over-pants type fashion in   order to thicken the skin in that region.  This was performed and the superior   layer was tacked using Vicryl sutures.  Next, a drain was placed.  The incisions   were closed using interrupted 3-0 Monocryl, followed by a running 4-0 Monocryl   subcuticular suture.  There were no complications.      CRB/HN  dd: 08/30/2018 17:39:00 (CDT)  td: 08/30/2018 17:55:48 (CDT)  Doc ID   #[id number]  Job ID #[id number]    CC:

## 2018-08-30 NOTE — ANESTHESIA RELEASE NOTE
"Anesthesia Release from PACU Note    Patient: Blanca Loya    Procedure(s) Performed: Procedure(s) (LRB):  BREAST REVISION SURGERY LEFT IMF ELEVATION WITH ALLODERM (Left)  REVISION- SOFT TISSUE (Left)    Anesthesia type: general    Post pain: Adequate analgesia    Post assessment: no apparent anesthetic complications    Last Vitals:   Visit Vitals  BP (!) 104/57 (BP Location: Left arm, Patient Position: Lying)   Pulse 74   Temp 36.4 °C (97.5 °F) (Temporal)   Resp 16   Ht 5' 4" (1.626 m)   Wt 59 kg (130 lb)   LMP 09/10/2016   SpO2 100%   Breastfeeding? No   BMI 22.31 kg/m²       Post vital signs: stable    Level of consciousness: awake, alert  and oriented    Nausea/Vomiting: no nausea/no vomiting    Complications: none    Airway Patency: patent    Respiratory: unassisted, spontaneous ventilation, room air    Cardiovascular: stable    Hydration: euvolemic  "

## 2018-08-30 NOTE — H&P
[facility]  History & Physical  Plastic Surgery    SUBJECTIVE:     Chief Complaint: L breast reconstruction revision    History of Present Illness:  Patient is a 52 y.o. female presents for revision of her L breast reconstruction, planned for IMF revision with alloderm    PTA Medications   Medication Sig    anastrozole (ARIMIDEX) 1 mg Tab Take 1 tablet (1 mg total) by mouth once daily.    ascorbic acid, vitamin C, (VITAMIN C) 1000 MG tablet Take 1,000 mg by mouth once daily.    b complex vitamins tablet Take 1 tablet by mouth once daily.    butalbital-acetaminophen-caffeine -40 mg (FIORICET, ESGIC) -40 mg per tablet     CHROMIUM ORAL Take by mouth.    levothyroxine (SYNTHROID) 100 MCG tablet TK 1 T PO QD       Review of patient's allergies indicates:   Allergen Reactions    Demerol [meperidine] Anxiety    Ibuprofen Nausea Only    Kenalog [triamcinolone acetonide] Other (See Comments)     Hot flashes, flushing of the skin.  (sensitivity to injection noted that was given on 3/6/18)    Micro-k [potassium chloride] Itching     Headache      Pcn [penicillins] Rash       Past Medical History:   Diagnosis Date    Acquired deafness     Estrogen receptor positive 5/23/2017    Malignant neoplasm of upper-outer quadrant of right female breast 5/23/2017     Past Surgical History:   Procedure Laterality Date    BREAST RECONSTRUCTION      BREAST SURGERY      NEUROMA SURGERY      [place] ear     Family History   Problem Relation Age of Onset    No Known Problems Mother     No Known Problems Father      Social History     Tobacco Use    Smoking status: Never Smoker    Smokeless tobacco: Never Used   Substance Use Topics    Alcohol use: No    Drug use: No        Review of Systems:  Constitutional: no fever or chills  ENT: no nasal congestion or sore throat  Respiratory: no cough or shortness of breath  Gastrointestinal: no nausea or vomiting, tolerating diet  Integument/Breast: no  rash or pruritis  Neurological: no seizures or tremors    OBJECTIVE:     Vital Signs (Most Recent):  Temp: 98.1 °F (36.7 °C) (08/30/18 0617)  Pulse: 75 (08/30/18 0617)  BP: 120/65 (08/30/18 0617)  SpO2: 100 % (08/30/18 0617)    Physical Exam:  General: no distress  Lungs:  clear to auscultation bilaterally and normal respiratory effort  Breasts:  normal appearance, no masses or tenderness  Abdomen: soft, non-tender non-distented; bowel sounds normal; no masses,  no organomegaly  Extremities: no cyanosis or edema, or clubbing        ASSESSMENT/PLAN:     52F w/ hx of R breast cancer, s/p reconstruction, radiation. Exchange performed in January. Here for L IMF revision with alloderm  -surgery as scheduled  -planned for home afterwards  -abx course after surgery, f/u 1 week

## 2018-08-30 NOTE — BRIEF OP NOTE
[facility]  Brief Operative Note     SUMMARY     Surgery Date: 8/30/2018     Surgeon(s) and Role:     * Holger Umana MD - Primary     * Zeeshan Matthews MD - Fellow    Assisting Surgeon: None    Pre-op Diagnosis:  Personal history of breast cancer [Z85.3]    Post-op Diagnosis:  Post-Op Diagnosis Codes:     * Personal history of breast cancer [Z85.3]    Procedure(s) (LRB):  BREAST REVISION SURGERY LEFT IMF ELEVATION WITH ALLODERM (Left)  REVISION- SOFT TISSUE (Left)    Anesthesia: General    Description of the findings of the procedure: revision L IMF, with alloderm sling    Findings/Key Components: as above, see note    Estimated Blood Loss: * No values recorded between 8/30/2018  8:33 AM and 8/30/2018 10:26 AM *         Specimens:   Specimen (12h ago, onward)    None            No discharge procedures on file.

## 2018-08-30 NOTE — PLAN OF CARE
Patient is warm and comfortable. Pain is controlled, denies PONV. Vital signs are stable and within normal limit. Wound site is clean and cry-dressing in place, surgical bra in place.

## 2018-08-30 NOTE — ASSESSMENT & PLAN NOTE
To OR today for breast revision surgery with elevation of the left inframammary fold with an Alloderm sling.

## 2018-08-30 NOTE — DISCHARGE INSTRUCTIONS
Discharge Instructions: Caring for Your Daren-Jerez Drainage Tube  Your doctor discharges you with a Daren-Jerez drainage tube. Doctors commonly leave this drain within the abdominal cavity after surgery. It helps drain and collect blood and body fluid after surgery. This can prevent swelling and reduces the risk for infection. The tube is held in place by a few stitches. It is covered with a bandage. Your doctor will remove the drain when he or she determines you no longer need it.  Home care  · Dont sleep on the same side as the tube.  · Secure the tube and bag inside your clothing with a safety pin. This helps keep the tube from being pulled out.  · Empty your drain at least twice a day. Empty it more often if the drain is full. Wash  and dry your hands before emptying the drain.  ¨ Lift the opening on the drain.  ¨ Drain the fluid into a measuring cup.  ¨ Record the amount of fluid each time you empty the drain. Include the date and time it was emptied. Share this information with your doctor on your next visit.  ¨ Squeeze the bulb with your hands until you hear air coming out of the bulb if your doctor has instructed you to do so (sometimes the bulb is used as a reservoir without suction). Check with your doctor about specific drain instructions.  ¨ Close the opening.  · Change the dressing around the tube every day.  ¨ Wash your hands.  ¨ Remove the old bandage.  ¨ Wash your hands again.  ¨ Wet a cotton swab and clean the skin around the incision and tube site. Use normal saline solution (salt and water). Or, you can use warm, soapy water.  ¨ Put a new bandage on the incision and tube site. Make the bandage large enough to cover the whole incision area.  ¨ Tape the bandage in place.  · Keep the bandage and tube site dry when you shower. Ask your healthcare provider about the best way to do this.  · Stripping the tube helps keep blood clots from blocking the tube. Ask your nurse how often you should  strip the tube. Stripping may not be needed, depending on where and why your doctor placed the tube. It may even be dangerous in some cases.   ¨ Hold the tubing where it leaves the skin, with one hand. This keeps it from pulling on the skin.  ¨ Pinch the tubing with the thumb and first finger of your other hand.  ¨ Slowly and firmly pull your thumb and first finger down the tubing. You may find it helpful to hold an alcohol swab between your fingers and the tube to lubricate the tubing.  ¨ If the pulling hurts or feels like its coming out of the skin, stop. Begin again more gently.  Follow-up care  Make a follow-up appointment as directed by our staff.     When to seek medical care  Call your healthcare provider right away if you have any of the following:  · New or increased pain around the tube  · Redness, swelling, or warmth around the incision or tube  · Drainage that is foul-smelling  · Vomiting  · Fever of 100.4°F (38°C)  · Fluid leaking around the tube  · Incision seems not to be healing  · Stitches become loose  · Tube falls out or breaks  · Drainage that changes from light pink to dark red  · Blood clots in the drainage bulb  · A sudden increase or decrease in the amount of drainage (over 30 mL)   Date Last Reviewed: 2/1/2017  © 8940-9902 [employer]. [address] [place] [address], [place] [address]. All rights reserved. This information is not intended as a substitute for professional medical care. Always follow your healthcare professional's instructions.      Shower in 48 hours.   Keep bra and dressing on until first shower  Keep bra on except for showers.   Record daily drain output. Strip tubing every few hours  Keep left arm down at the side

## 2018-08-30 NOTE — ANESTHESIA PROCEDURE NOTES
Pec Single Injection Block(s), LEFT    Patient location during procedure: pre-op   Block not for primary anesthetic.  Reason for block: at surgeon's request and post-op pain management   Post-op Pain Location: left chest   Start time: 8/30/2018 7:23 AM  Timeout: 8/30/2018 7:21 AM   End time: 8/30/2018 7:33 AM  Surgery related to: left breast revision  Staffing  Anesthesiologist: Lora Soler MD  Resident/CRNA: Daphne Myers MD  Performed: resident/CRNA   Preanesthetic Checklist  Completed: patient identified, site marked, surgical consent, pre-op evaluation, timeout performed, IV checked, risks and benefits discussed and monitors and equipment checked  Peripheral Block  Patient position: supine  Prep: ChloraPrep  Patient monitoring: heart rate, cardiac monitor, continuous pulse ox, continuous capnometry and frequent blood pressure checks  Block type: pectoral  Laterality: left  Injection technique: single shot  Needle  Needle type: Stimuplex   Needle gauge: 21 G  Needle length: 4 in  Needle localization: anatomical landmarks and ultrasound guidance   -ultrasound image captured on disc.  Assessment  Injection assessment: negative aspiration, negative parasthesia and local visualized surrounding nerve  Paresthesia pain: none  Heart rate change: no  Slow fractionated injection: yes  Additional Notes  50 mcg clonidine added 1mg dexamethasone added   VSS.  [facility] RN monitoring vitals throughout procedure.  Patient tolerated procedure well.

## 2018-08-30 NOTE — SUBJECTIVE & OBJECTIVE
No current facility-administered medications on file prior to encounter.      Current Outpatient Medications on File Prior to Encounter   Medication Sig    anastrozole (ARIMIDEX) 1 mg Tab Take 1 tablet (1 mg total) by mouth once daily.    ascorbic acid, vitamin C, (VITAMIN C) 1000 MG tablet Take 1,000 mg by mouth once daily.    b complex vitamins tablet Take 1 tablet by mouth once daily.    butalbital-acetaminophen-caffeine -40 mg (FIORICET, ESGIC) -40 mg per tablet     CHROMIUM ORAL Take by mouth.    levothyroxine (SYNTHROID) 100 MCG tablet TK 1 T PO QD       Review of patient's allergies indicates:   Allergen Reactions    Demerol [meperidine] Anxiety    Ibuprofen Nausea Only    Kenalog [triamcinolone acetonide] Other (See Comments)     Hot flashes, flushing of the skin.  (sensitivity to injection noted that was given on 3/6/18)    Micro-k [potassium chloride] Itching     Headache      Pcn [penicillins] Rash       Past Medical History:   Diagnosis Date    Acquired deafness     Estrogen receptor positive 5/23/2017    Malignant neoplasm of upper-outer quadrant of right female breast 5/23/2017     Past Surgical History:   Procedure Laterality Date    BREAST RECONSTRUCTION      BREAST SURGERY      NEUROMA SURGERY      Leaft ear     Family History     Problem Relation (Age of Onset)    No Known Problems Mother, Father        Tobacco Use    Smoking status: Never Smoker    Smokeless tobacco: Never Used   Substance and Sexual Activity    Alcohol use: No    Drug use: No    Sexual activity: Not on file     Review of Systems   Constitutional: Negative for chills, fatigue and fever.   Respiratory: Negative for shortness of breath.    Cardiovascular: Negative for chest pain.   Gastrointestinal: Negative for abdominal pain, constipation and diarrhea.   Genitourinary: Negative for difficulty urinating and dysuria.   Neurological: Negative for dizziness, weakness and headaches.     Objective:      Vital Signs (Most Recent):  Temp: 98.1 °F (36.7 °C) (08/30/18 0617)  Pulse: 75 (08/30/18 0617)  BP: 120/65 (08/30/18 0617)  SpO2: 100 % (08/30/18 0617) Vital Signs (24h Range):  Temp:  [98.1 °F (36.7 °C)] 98.1 °F (36.7 °C)  Pulse:  [75] 75  SpO2:  [100 %] 100 %  BP: (120)/(65) 120/65     Weight: 59 kg (130 lb)  Body mass index is 22.31 kg/m².    Physical Exam       Significant Labs:      Significant Diagnostics:

## 2018-08-30 NOTE — ANESTHESIA POSTPROCEDURE EVALUATION
"Anesthesia Post Evaluation    Patient: Blanca Loya    Procedure(s) Performed: Procedure(s) (LRB):  BREAST REVISION SURGERY LEFT IMF ELEVATION WITH ALLODERM (Left)  REVISION- SOFT TISSUE (Left)    Final Anesthesia Type: general  Patient location during evaluation: PACU  Patient participation: Yes- Able to Participate  Level of consciousness: awake and alert  Post-procedure vital signs: reviewed and stable  Pain management: adequate  Airway patency: patent  PONV status at discharge: No PONV  Anesthetic complications: no      Cardiovascular status: hemodynamically stable  Respiratory status: unassisted, spontaneous ventilation and room air  Hydration status: euvolemic  Follow-up not needed.        Visit Vitals  BP (!) 104/57 (BP Location: Left arm, Patient Position: Lying)   Pulse 74   Temp 36.4 °C (97.5 °F) (Temporal)   Resp 16   Ht 5' 4" (1.626 m)   Wt 59 kg (130 lb)   LMP 09/10/2016   SpO2 100%   Breastfeeding? No   BMI 22.31 kg/m²       Pain/Ning Score: Pain Assessment Performed: Yes (8/30/2018 10:30 AM)  Presence of Pain: denies (8/30/2018 10:30 AM)  Pain Rating Prior to Med Admin: 2 (8/30/2018 10:44 AM)  Ning Score: 8 (8/30/2018 10:30 AM)        "

## 2018-08-30 NOTE — HPI
Blnaca Loya is a 52-year-old female with a history of right breast cancer. She is s/p bilateral mastectomy and breast reconstruction using tissue expanders and AlloDerm in 9/2016. She underwent radiation therapy to right breast in 5/2017 -6/2017. She underwent bilateral implant exchange in 1/2018. On the radiated implant side, she has contracted.  So she is much tighter on the right and higher on the right than she is on the left. She presents for breast revision surgery with elevation of the left inframammary fold with an Alloderm sling.

## 2018-08-31 ENCOUNTER — TELEPHONE (OUTPATIENT)
Dept: PLASTIC SURGERY | Facility: CLINIC | Age: 53
End: 2018-08-31

## 2018-08-31 RX ORDER — SULFAMETHOXAZOLE AND TRIMETHOPRIM 800; 160 MG/1; MG/1
1 TABLET ORAL 2 TIMES DAILY
Qty: 14 TABLET | Refills: 0 | Status: SHIPPED | OUTPATIENT
Start: 2018-08-31 | End: 2018-09-07

## 2018-08-31 NOTE — TELEPHONE ENCOUNTER
Called pt to confirm allergy list to which pt states when she took clindamycin this January 2018 she broke out in rash with itching. Updated allergy list, MD notified. RODRICK, CIPRIANO

## 2018-08-31 NOTE — ANESTHESIA POSTPROCEDURE EVALUATION
"Anesthesia Post Evaluation    Patient: Blanca Loya    Procedure(s) Performed: Procedure(s) (LRB):  BREAST REVISION SURGERY LEFT IMF ELEVATION WITH ALLODERM (Left)  REVISION- SOFT TISSUE (Left)    Final Anesthesia Type: general  Patient location during evaluation: PACU  Patient participation: Yes- Able to Participate  Level of consciousness: awake and alert and oriented  Post-procedure vital signs: reviewed and stable  Pain management: adequate  Airway patency: patent  PONV status at discharge: No PONV  Anesthetic complications: no      Cardiovascular status: hemodynamically stable  Respiratory status: unassisted and spontaneous ventilation  Hydration status: euvolemic  Follow-up not needed.        Visit Vitals  BP (!) 104/49   Pulse 74   Temp 36.5 °C (97.7 °F) (Temporal)   Resp 16   Ht 5' 4" (1.626 m)   Wt 59 kg (130 lb)   LMP 09/10/2016   SpO2 98%   Breastfeeding? No   BMI 22.31 kg/m²       Pain/Ning Score: Pain Assessment Performed: Yes (8/30/2018  2:27 PM)  Presence of Pain: denies (8/30/2018  2:27 PM)  Pain Rating Prior to Med Admin: 2 (8/30/2018 10:44 AM)  Ning Score: 10 (8/30/2018 11:23 AM)        "

## 2018-08-31 NOTE — TELEPHONE ENCOUNTER
Called to inform pt abx has been changed to Bactrim DS and was sent to [facility] [address] in [place] per Iliana PADRON with verbal understanding. RODRICK, BERNIEN

## 2018-08-31 NOTE — TELEPHONE ENCOUNTER
----- Message from Kristinadeborah Jones sent at 8/31/2018  1:35 PM CDT -----  Pt called stating she is allergic to the antibiotics that was prescribed to her after surgery.     Call [phone]

## 2018-09-07 ENCOUNTER — OFFICE VISIT (OUTPATIENT)
Dept: PLASTIC SURGERY | Facility: CLINIC | Age: 53
End: 2018-09-07
Payer: COMMERCIAL

## 2018-09-07 DIAGNOSIS — Z09 SURGERY FOLLOW-UP EXAMINATION: Primary | ICD-10-CM

## 2018-09-07 PROCEDURE — 99999 PR PBB SHADOW E&M-EST. PATIENT-LVL I: CPT | Mod: PBBFAC,,, | Performed by: SURGERY

## 2018-09-07 PROCEDURE — 99024 POSTOP FOLLOW-UP VISIT: CPT | Mod: S$GLB,,, | Performed by: SURGERY

## 2018-09-07 NOTE — PROGRESS NOTES
[facility] after having breast   reconstruction.  She had a recreation of the inframammary fold.  She looks   great.  Her drain was removed today.  She will return in two weeks.      LEXIS/MAEDLIN  dd: 09/07/2018 11:39:49 (CDT)  td: 09/08/2018 06:34:44 (CDT)  Doc ID   #[id number]  Job ID #[id number]    CC:

## 2018-09-21 ENCOUNTER — OFFICE VISIT (OUTPATIENT)
Dept: PLASTIC SURGERY | Facility: CLINIC | Age: 53
End: 2018-09-21
Payer: COMMERCIAL

## 2018-09-21 DIAGNOSIS — Z09 SURGERY FOLLOW-UP EXAMINATION: Primary | ICD-10-CM

## 2018-09-21 PROCEDURE — 99024 POSTOP FOLLOW-UP VISIT: CPT | Mod: S$GLB,,, | Performed by: SURGERY

## 2018-09-21 PROCEDURE — 99999 PR PBB SHADOW E&M-EST. PATIENT-LVL I: CPT | Mod: PBBFAC,,, | Performed by: SURGERY

## 2018-09-21 NOTE — PROGRESS NOTES
[facility] after undergoing breast   reconstruction.  She most recently had reconstruction of the inframammary fold.    She looks great.  I think her breasts look very nice.  We do again see some   thinning of the skin on the right breast in the inferior pole.  We will keep our   eye on this.  On the left, she has done very, very well.  She is not really   interested in any nipple reconstruction.      RADHAB/MADELIN  dd: 09/21/2018 11:06:17 (CDT)  td: 09/22/2018 01:47:04 (CDT)  Doc ID   #[id number]  Job ID #[id number]    CC:

## 2018-10-01 ENCOUNTER — TELEPHONE (OUTPATIENT)
Dept: HEMATOLOGY/ONCOLOGY | Facility: CLINIC | Age: 53
End: 2018-10-01

## 2018-10-01 ENCOUNTER — OFFICE VISIT (OUTPATIENT)
Dept: HEMATOLOGY/ONCOLOGY | Facility: CLINIC | Age: 53
End: 2018-10-01
Payer: COMMERCIAL

## 2018-10-01 VITALS
WEIGHT: 131.38 LBS | SYSTOLIC BLOOD PRESSURE: 105 MMHG | DIASTOLIC BLOOD PRESSURE: 72 MMHG | HEART RATE: 74 BPM | HEIGHT: 67 IN | BODY MASS INDEX: 20.62 KG/M2 | TEMPERATURE: 98 F

## 2018-10-01 DIAGNOSIS — Z17.0 ESTROGEN RECEPTOR POSITIVE: ICD-10-CM

## 2018-10-01 DIAGNOSIS — C50.411 MALIGNANT NEOPLASM OF UPPER-OUTER QUADRANT OF RIGHT BREAST IN FEMALE, ESTROGEN RECEPTOR POSITIVE: Primary | ICD-10-CM

## 2018-10-01 DIAGNOSIS — Z17.0 MALIGNANT NEOPLASM OF UPPER-OUTER QUADRANT OF RIGHT BREAST IN FEMALE, ESTROGEN RECEPTOR POSITIVE: Primary | ICD-10-CM

## 2018-10-01 PROCEDURE — 99214 OFFICE O/P EST MOD 30 MIN: CPT | Mod: ,,, | Performed by: INTERNAL MEDICINE

## 2018-10-01 PROCEDURE — 3008F BODY MASS INDEX DOCD: CPT | Mod: ,,, | Performed by: INTERNAL MEDICINE

## 2018-10-01 NOTE — LETTER
October 1, 2018      Chaparro Shepherd III, MD  [address]  [address]  [place] [address]           [facility]  [address]  [address]  [place] [address]  Phone: [phone]  Fax: [phone]          Patient: Blanca Loya   MR Number: [id number]   YOB: 1965   Date of Visit: 10/1/2018       Dear Dr. Chaparro Shepherd III:    Thank you for referring Blanca Loya to me for evaluation. Attached you will find relevant portions of my assessment and plan of care.    If you have questions, please do not hesitate to call me. I look forward to following Blanca Loya along with you.    Sincerely,    Bronson Carrillo MD    Enclosure  CC:  No Recipients    If you would like to receive this communication electronically, please contact [email or web address] or [phone] to request more information on [employer] access.    For providers and/or their staff who would like to refer a patient to [facility], please contact us through our one-stop-shop provider referral line, [facility], at [phone].    If you feel you have received this communication in error or would no longer like to receive these types of communications, please e-mail [email or web address]

## 2018-10-01 NOTE — PROGRESS NOTES
[facility] Hematology/Oncology  PROGRESS NOTE      Subjective:       Patient ID:   NAME: Blanca Loya : 1965     53 y.o. female    Referring Doc: Cora  Other Physicians: Lyndsay Gonsales, CARLY Barillas    Chief Complaint:  Breast ca f/u    History of Present Illness:     Patient returns today for a regularly scheduled follow-up visit.  She is doing well with no new issues. No CP, SOB, HA's or N/V. She is doing ok with the arimidex. She had surgery/reconstruction with Dr Umana but is slow to recover.  She reports that she is otherwise back to normal. She is here by herself. She had recent CT scans on 2018. Some occasional sensitivity of the feet. She saw Dr Cline in Aug 2018.           ROS:   GEN: normal without any fever, night sweats or weight loss  HEENT: normal with no HA's, sore throat, stiff neck, changes in vision  CV: normal with no CP, SOB, PND, RUGGIERO or orthopnea  PULM: normal with no SOB, cough, hemoptysis, sputum or pleuritic pain  GI: normal with no abdominal pain, nausea, vomiting, constipation, diarrhea, melanotic stools, BRBPR, or hematemesis  : normal with no hematuria, dysuria  BREAST: normal with no mass, discharge, pain  SKIN: normal with no rash, erythema, bruising, or swelling    Allergies:  Review of patient's allergies indicates:   Allergen Reactions    Demerol [meperidine] Anxiety    Pcn [penicillins] Rash       Medications:    Current Outpatient Medications:     anastrozole (ARIMIDEX) 1 mg Tab, Take 1 tablet (1 mg total) by mouth once daily., Disp: 30 tablet, Rfl: 6    ascorbic acid, vitamin C, (VITAMIN C) 1000 MG tablet, Take 1,000 mg by mouth once daily., Disp: , Rfl:     b complex vitamins tablet, Take 1 tablet by mouth once daily., Disp: , Rfl:     butalbital-acetaminophen-caffeine -40 mg (FIORICET, ESGIC) -40 mg per tablet, , Disp: , Rfl:     CHROMIUM ORAL, Take by mouth., Disp: , Rfl:     levothyroxine (SYNTHROID) 100 MCG tablet, TK 1 T PO  "QD, Disp: , Rfl: 0    oxyCODONE-acetaminophen (PERCOCET) 5-325 mg per tablet, Take 1 tablet by mouth every 6 (six) hours as needed for Pain., Disp: 28 tablet, Rfl: 0    PMHx/PSHx Updates:  See patient's last visit with me on 5/29/2018  See H&P on 9/8/2016        Pathology:      See path note on 8/30/2016      Objective:     Vitals:  Blood pressure 105/72, pulse 74, temperature 98.1 °F (36.7 °C), height 5' 7" (1.702 m), weight 59.6 kg (131 lb 6.4 oz), last menstrual period 09/10/2016.    Physical Examination:   GEN: no apparent distress, comfortable; AAOx3  HEAD: atraumatic and normocephalic  EYES: no pallor, no icterus, PERRLA  ENT: OMM, no pharyngeal erythema, external ears WNL; no nasal discharge; no thrush  NECK: no masses, thyroid normal, trachea midline, no LAD/LN's, supple  CV: RRR with no murmur; normal pulse; normal S1 and S2; no pedal edema  CHEST: Normal respiratory effort; CTAB; normal breath sounds; no wheeze or crackles  ABDOM: nontender and nondistended; soft; normal bowel sounds; no rebound/guarding  MUSC/Skeletal: ROM normal; no crepitus; joints normal; no deformities or arthropathy  EXTREM: no clubbing, cyanosis, inflammation or swelling  SKIN: no rashes, lesions, ulcers, petechiae or subcutaneous nodules  : no tyson  NEURO: grossly intact; motor/sensory WNL; AAOx3; no tremors  PSYCH: normal mood, affect and behavior  LYMPH: normal cervical, supraclavicular, axillary and groin LN's  Breast: no charges; dropping a little on the left side          Labs:     5/24/2018    Lab Results   Component Value Date    WBC 4.5 (L) 05/24/2018    HGB 11.6 (L) 08/30/2018    HCT 39.3 05/24/2018     05/24/2018 8/23/2018  BMP  Lab Results   Component Value Date     08/23/2018    K 3.8 08/23/2018     08/23/2018    CO2 31 08/23/2018    BUN 9 08/23/2018    CREATININE 0.65 08/23/2018    CALCIUM 9.6 08/23/2018    ESTGFRAFRICA 118 08/23/2018    EGFRNONAA 102 08/23/2018     Lab Results   Component " Value Date    ALT 21 08/23/2018    AST 25 08/23/2018    ALKPHOS 109 08/23/2018    BILITOT 0.9 08/23/2018           Radiology/Diagnostic Studies:    Chest/Abdom CT  8/24/2018:      IMPRESSION:  1. No evidence of metastatic disease in the chest, abdomen, or the pelvis.  2. Additional observations as above        [facility] Unknown Rad Eap    Result Date: 9/21/2017  INTEGRATED PET CT WITH IMAGE FUSION HISTORY:  c50.411 z17.0 subsequent treatment evaluation for right breast cancer diagnosed in August 2016. Bilateral mastectomies with chemotherapy and radiation. TECHNIQUE: Following the injection of 12.2 mCi of F-18 labeled FDG into a left antecubital vein, PET CT was performed from the vertex of the skull through the proximal thighs with an integrated full ring PET CT scanner with image fusion. The patient's serum glucose at the time of the exam was 86 mg/dL. COMPARISON: 09/20/2016 FINDINGS: There is no abnormal FDG activity to suggest recurrent or metastatic disease. CT of the head and neck show no intra-axial lesions or cervical adenopathy. The patient has had a left occipital craniotomy. CT of the chest demonstrates bilateral breast implants. There is no hilar, mediastinal or axillary adenopathy. There are no pulmonary nodules, infiltrates or pleural effusions. CT of the abdomen and pelvis demonstrates physiologic activity within the GI tract and urinary system. There is no adenopathy. The adrenal glands are normal. There are no lytic or blastic lesions.     IMPRESSION: No evidence of metastatic disease Prior bilateral mastectomies Read and electronically signed by: Mireya Carias MD on 9/21/2017 1:01 PM CDT MIREYA CARIAS MD      I have reviewed all available lab results and radiology reports.    Assessment/Plan:   (1) 53 y.o. female with diagnosis of right breast cancer  - s/p bilateral mastectomies on 9/26/16 followed by reconstruction  - followed by Dr Bear with [facility]  - she had 2 out of 7 LN's that were  positive  - she was a Stage IIA  - ER+/TX+ and her2nu neg  - s/p AC x4 and taxol x4  - she has seen Dr Gonsales with rad/onc for XRT and completed 5 weeks last month  - discussed the pro's and con's of tamoxifen versus arimidex, and in light of the pap issues, arimidex is probably the better choice  - she has been on arimidex for about month now  - she has some mood swings and weight gain  - PET done on 9/21/17 with no evidence of mets; recent CT chest/abdom on 8/24/2018 showed no evidence on cancer  - recent repeat surgery with right-sided lift with Dr Umana with repeat evaluation in Nov 2018 - she saw him this past Friday     (2) Mild leucopenia/anemia secondary to chemotherapy  - latest wbc is at 4.5- latest hgb was at 11.6     (3) Prior abnormal PAP issues - followed by Dr Cline with GYN; latest pap was negative per patient and she was also HPV negative     (4) Hx of acoustic neuroma in past - s/p XRT in 2010     (5) Chronic fibromyalgia   jer    (6) Mild elevation bili - stable      (7) RUE - resolved - possible lymphedema issues - she no longer sees PT  - also seen by Dr Liao        1. Malignant neoplasm of upper-outer quadrant of right breast in female, estrogen receptor positive     2. Estrogen receptor positive             PLAN:  1. Check up to date labs every 3-4 months  2. F/U with PCP, Surg and GYN  3. Continue arimidex - refill  4. RTC in 3-4 months  5. She follows up with Dr Umana in Nov 2018  Fax note to Chaparro Shepherd III, *, CARLY Bear Mannina, [place]     I spent over 25 mins of time with the patient. Over half of this time was spent couseling and coordinating care.    Discussion:     I have explained all of the above in detail and the patient understands all of the current recommendation(s). I have answered all of their questions to the best of my ability and to their complete satisfaction.   The patient is to continue with the current management  plan.            Electronically signed by Bronson Carrillo MD

## 2018-10-04 LAB
ALBUMIN SERPL-MCNC: 4.1 G/DL (ref 3.1–4.7)
ALP SERPL-CCNC: 131 IU/L (ref 40–104)
ALT (SGPT): 36 IU/L (ref 3–33)
AST SERPL-CCNC: 40 IU/L (ref 10–40)
BASOPHILS NFR BLD: 0 K/UL (ref 0–0.2)
BASOPHILS NFR BLD: 0.7 %
BILIRUB SERPL-MCNC: 1 MG/DL (ref 0.3–1)
BUN SERPL-MCNC: 11 MG/DL (ref 8–20)
CALCIUM SERPL-MCNC: 9.7 MG/DL (ref 7.7–10.4)
CHLORIDE: 102 MMOL/L (ref 98–110)
CO2 SERPL-SCNC: 29.3 MMOL/L (ref 22.8–31.6)
CREATININE: 0.69 MG/DL (ref 0.6–1.4)
EOSINOPHIL NFR BLD: 0.1 K/UL (ref 0–0.7)
EOSINOPHIL NFR BLD: 1.4 %
ERYTHROCYTE [DISTWIDTH] IN BLOOD BY AUTOMATED COUNT: 14.2 % (ref 11.7–14.9)
GLUCOSE: 95 MG/DL (ref 70–99)
GRAN #: 2.6 K/UL (ref 1.4–6.5)
GRAN%: 63.6 %
HCT VFR BLD AUTO: 38.5 % (ref 36–48)
HGB BLD-MCNC: 13 G/DL (ref 12–15)
IMMATURE GRANS (ABS): 0 K/UL (ref 0–1)
IMMATURE GRANULOCYTES: 0.2 %
LYMPH #: 1 K/UL (ref 1.2–3.4)
LYMPH%: 23.2 %
MCH RBC QN AUTO: 31.8 PG (ref 25–35)
MCHC RBC AUTO-ENTMCNC: 33.8 G/DL (ref 31–36)
MCV RBC AUTO: 94.1 FL (ref 79–98)
MONO #: 0.5 K/UL (ref 0.1–0.6)
MONO%: 10.9 %
NUCLEATED RBCS: 0 %
PLATELET # BLD AUTO: 242 K/UL (ref 140–440)
PMV BLD AUTO: 10.2 FL (ref 8.8–12.7)
POTASSIUM SERPL-SCNC: 4.1 MMOL/L (ref 3.5–5)
PROT SERPL-MCNC: 7 G/DL (ref 6–8.2)
RBC # BLD AUTO: 4.09 M/UL (ref 3.5–5.5)
SODIUM: 138 MMOL/L (ref 134–144)
WBC # BLD AUTO: 4.1 K/UL (ref 5–10)

## 2018-11-02 ENCOUNTER — OFFICE VISIT (OUTPATIENT)
Dept: PLASTIC SURGERY | Facility: CLINIC | Age: 53
End: 2018-11-02
Payer: COMMERCIAL

## 2018-11-02 VITALS
WEIGHT: 133 LBS | HEART RATE: 67 BPM | DIASTOLIC BLOOD PRESSURE: 74 MMHG | TEMPERATURE: 99 F | RESPIRATION RATE: 12 BRPM | BODY MASS INDEX: 20.88 KG/M2 | HEIGHT: 67 IN | SYSTOLIC BLOOD PRESSURE: 121 MMHG

## 2018-11-02 DIAGNOSIS — Z09 SURGERY FOLLOW-UP EXAMINATION: Primary | ICD-10-CM

## 2018-11-02 PROCEDURE — 99999 PR PBB SHADOW E&M-EST. PATIENT-LVL III: CPT | Mod: PBBFAC,,, | Performed by: SURGERY

## 2018-11-02 PROCEDURE — 99024 POSTOP FOLLOW-UP VISIT: CPT | Mod: S$GLB,,, | Performed by: SURGERY

## 2018-11-02 PROCEDURE — 3008F BODY MASS INDEX DOCD: CPT | Mod: CPTII,S$GLB,, | Performed by: SURGERY

## 2018-11-02 NOTE — PROGRESS NOTES
Miss Loya has a nice result from her DTI breast recon.  She has had a first round of free fat injected and is much improved.  She would benefit with a second round of free fat but would like to wait   For several months.  We will see her in January.

## 2019-01-04 ENCOUNTER — TELEPHONE (OUTPATIENT)
Dept: HEMATOLOGY/ONCOLOGY | Facility: CLINIC | Age: 54
End: 2019-01-04

## 2019-01-04 DIAGNOSIS — Z17.0 MALIGNANT NEOPLASM OF UPPER-OUTER QUADRANT OF RIGHT BREAST IN FEMALE, ESTROGEN RECEPTOR POSITIVE: Primary | ICD-10-CM

## 2019-01-04 DIAGNOSIS — C50.411 MALIGNANT NEOPLASM OF UPPER-OUTER QUADRANT OF RIGHT BREAST IN FEMALE, ESTROGEN RECEPTOR POSITIVE: Primary | ICD-10-CM

## 2019-01-04 LAB
ALBUMIN SERPL-MCNC: 4 G/DL (ref 3.1–4.7)
ALP SERPL-CCNC: 119 IU/L (ref 40–104)
ALT (SGPT): 27 IU/L (ref 3–33)
AST SERPL-CCNC: 34 IU/L (ref 10–40)
BASOPHILS NFR BLD: 0 K/UL (ref 0–0.2)
BASOPHILS NFR BLD: 0.5 %
BILIRUB SERPL-MCNC: 1.2 MG/DL (ref 0.3–1)
BUN SERPL-MCNC: 12 MG/DL (ref 8–20)
CALCIUM SERPL-MCNC: 9.4 MG/DL (ref 7.7–10.4)
CHLORIDE: 101 MMOL/L (ref 98–110)
CO2 SERPL-SCNC: 29.8 MMOL/L (ref 22.8–31.6)
CREATININE: 0.68 MG/DL (ref 0.6–1.4)
EOSINOPHIL NFR BLD: 0.1 K/UL (ref 0–0.7)
EOSINOPHIL NFR BLD: 1.4 %
ERYTHROCYTE [DISTWIDTH] IN BLOOD BY AUTOMATED COUNT: 13.2 % (ref 11.7–14.9)
GLUCOSE: 84 MG/DL (ref 70–99)
GRAN #: 2.5 K/UL (ref 1.4–6.5)
GRAN%: 59.5 %
HCT VFR BLD AUTO: 37.5 % (ref 36–48)
HGB BLD-MCNC: 12.4 G/DL (ref 12–15)
IMMATURE GRANS (ABS): 0 K/UL (ref 0–1)
IMMATURE GRANULOCYTES: 0.2 %
LYMPH #: 1.2 K/UL (ref 1.2–3.4)
LYMPH%: 27.4 %
MCH RBC QN AUTO: 31.5 PG (ref 25–35)
MCHC RBC AUTO-ENTMCNC: 33.1 G/DL (ref 31–36)
MCV RBC AUTO: 95.2 FL (ref 79–98)
MONO #: 0.5 K/UL (ref 0.1–0.6)
MONO%: 11 %
NUCLEATED RBCS: 0 %
PLATELET # BLD AUTO: 245 K/UL (ref 140–440)
PMV BLD AUTO: 11.2 FL (ref 8.8–12.7)
POTASSIUM SERPL-SCNC: 2.9 MMOL/L (ref 3.5–5)
PROT SERPL-MCNC: 7.1 G/DL (ref 6–8.2)
RBC # BLD AUTO: 3.94 M/UL (ref 3.5–5.5)
SODIUM: 140 MMOL/L (ref 134–144)
WBC # BLD AUTO: 4.2 K/UL (ref 5–10)

## 2019-01-07 ENCOUNTER — TELEPHONE (OUTPATIENT)
Dept: HEMATOLOGY/ONCOLOGY | Facility: CLINIC | Age: 54
End: 2019-01-07

## 2019-01-07 ENCOUNTER — OFFICE VISIT (OUTPATIENT)
Dept: HEMATOLOGY/ONCOLOGY | Facility: CLINIC | Age: 54
End: 2019-01-07
Payer: COMMERCIAL

## 2019-01-07 VITALS
BODY MASS INDEX: 20.97 KG/M2 | RESPIRATION RATE: 20 BRPM | TEMPERATURE: 98 F | WEIGHT: 133.88 LBS | DIASTOLIC BLOOD PRESSURE: 71 MMHG | HEART RATE: 71 BPM | SYSTOLIC BLOOD PRESSURE: 104 MMHG

## 2019-01-07 DIAGNOSIS — C50.411 MALIGNANT NEOPLASM OF UPPER-OUTER QUADRANT OF RIGHT BREAST IN FEMALE, ESTROGEN RECEPTOR POSITIVE: Primary | ICD-10-CM

## 2019-01-07 DIAGNOSIS — Z17.0 MALIGNANT NEOPLASM OF UPPER-OUTER QUADRANT OF RIGHT BREAST IN FEMALE, ESTROGEN RECEPTOR POSITIVE: Primary | ICD-10-CM

## 2019-01-07 DIAGNOSIS — Z17.0 ESTROGEN RECEPTOR POSITIVE: ICD-10-CM

## 2019-01-07 PROCEDURE — 99214 PR OFFICE/OUTPT VISIT, EST, LEVL IV, 30-39 MIN: ICD-10-PCS | Mod: ,,, | Performed by: INTERNAL MEDICINE

## 2019-01-07 PROCEDURE — 99214 OFFICE O/P EST MOD 30 MIN: CPT | Mod: ,,, | Performed by: INTERNAL MEDICINE

## 2019-01-07 PROCEDURE — 3008F PR BODY MASS INDEX (BMI) DOCUMENTED: ICD-10-PCS | Mod: ,,, | Performed by: INTERNAL MEDICINE

## 2019-01-07 PROCEDURE — 3008F BODY MASS INDEX DOCD: CPT | Mod: ,,, | Performed by: INTERNAL MEDICINE

## 2019-01-07 NOTE — PROGRESS NOTES
[facility] Hematology/Oncology  PROGRESS NOTE      Subjective:       Patient ID:   NAME: Blanca Loya : 1965     53 y.o. female    Referring Doc: Cora  Other Physicians: Lyndsay Gonsales, CARLY Barillas    Chief Complaint:  Breast ca f/u    History of Present Illness:     Patient returns today for a regularly scheduled follow-up visit.  She is doing well with no new issues. No CP, SOB, HA's or N/V. She is doing ok with the arimidex. She previously had surgery/reconstruction with Dr Umana but had a slow postop recovery.   She is here with her . She is having some increase joint issues.       ROS:   GEN: normal without any fever, night sweats or weight loss  HEENT: normal with no HA's, sore throat, stiff neck, changes in vision  CV: normal with no CP, SOB, PND, RUGGIERO or orthopnea  PULM: normal with no SOB, cough, hemoptysis, sputum or pleuritic pain  GI: normal with no abdominal pain, nausea, vomiting, constipation, diarrhea, melanotic stools, BRBPR, or hematemesis  : normal with no hematuria, dysuria  BREAST: normal with no mass, discharge, pain  SKIN: normal with no rash, erythema, bruising, or swelling    Allergies:  Review of patient's allergies indicates:   Allergen Reactions    Demerol [meperidine] Anxiety    Pcn [penicillins] Rash       Medications:    Current Outpatient Medications:     anastrozole (ARIMIDEX) 1 mg Tab, Take 1 tablet (1 mg total) by mouth once daily., Disp: 30 tablet, Rfl: 6    ascorbic acid, vitamin C, (VITAMIN C) 1000 MG tablet, Take 1,000 mg by mouth once daily., Disp: , Rfl:     b complex vitamins tablet, Take 1 tablet by mouth once daily., Disp: , Rfl:     butalbital-acetaminophen-caffeine -40 mg (FIORICET, ESGIC) -40 mg per tablet, , Disp: , Rfl:     CHROMIUM ORAL, Take by mouth., Disp: , Rfl:     levothyroxine (SYNTHROID) 100 MCG tablet, TK 1 T PO QD, Disp: , Rfl: 0    oxyCODONE-acetaminophen (PERCOCET) 5-325 mg per tablet, Take 1 tablet by  mouth every 6 (six) hours as needed for Pain., Disp: 28 tablet, Rfl: 0    PMHx/PSHx Updates:  See patient's last visit with me on 10/1/2018  See H&P on 9/8/2016        Pathology:      See path note on 8/30/2016      Objective:     Vitals:  Blood pressure 104/71, pulse 71, temperature 97.7 °F (36.5 °C), resp. rate 20, weight 60.7 kg (133 lb 14.4 oz), last menstrual period 09/10/2016.    Physical Examination:   GEN: no apparent distress, comfortable; AAOx3  HEAD: atraumatic and normocephalic  EYES: no pallor, no icterus, PERRLA  ENT: OMM, no pharyngeal erythema, external ears WNL; no nasal discharge; no thrush  NECK: no masses, thyroid normal, trachea midline, no LAD/LN's, supple  CV: RRR with no murmur; normal pulse; normal S1 and S2; no pedal edema  CHEST: Normal respiratory effort; CTAB; normal breath sounds; no wheeze or crackles  ABDOM: nontender and nondistended; soft; normal bowel sounds; no rebound/guarding  MUSC/Skeletal: ROM normal; no crepitus; joints normal; no deformities or arthropathy  EXTREM: no clubbing, cyanosis, inflammation or swelling  SKIN: no rashes, lesions, ulcers, petechiae or subcutaneous nodules  : no tyson  NEURO: grossly intact; motor/sensory WNL; AAOx3; no tremors  PSYCH: normal mood, affect and behavior  LYMPH: normal cervical, supraclavicular, axillary and groin LN's  Breast: no changes          Labs:     1/4/2019    Lab Results   Component Value Date    WBC 4.2 (L) 01/04/2019    HGB 12.4 01/04/2019    HCT 37.5 01/04/2019    MCV 95.2 01/04/2019     01/04/2019 8/23/2018  BMP  Lab Results   Component Value Date     01/04/2019    K 2.9 (LL) 01/04/2019     01/04/2019    CO2 29.8 01/04/2019    BUN 12 01/04/2019    CREATININE 0.68 01/04/2019    CALCIUM 9.4 01/04/2019    ESTGFRAFRICA 118 08/23/2018    EGFRNONAA 102 08/23/2018     Lab Results   Component Value Date    ALT 21 08/23/2018    AST 34 01/04/2019    ALKPHOS 119 (H) 01/04/2019    BILITOT 1.2 (H) 01/04/2019            Radiology/Diagnostic Studies:    Chest/Abdom CT  8/24/2018:      IMPRESSION:  1. No evidence of metastatic disease in the chest, abdomen, or the pelvis.  2. Additional observations as above        [facility] Unknown Rad Eap    Result Date: 9/21/2017  INTEGRATED PET CT WITH IMAGE FUSION HISTORY:  c50.411 z17.0 subsequent treatment evaluation for right breast cancer diagnosed in August 2016. Bilateral mastectomies with chemotherapy and radiation. TECHNIQUE: Following the injection of 12.2 mCi of F-18 labeled FDG into a left antecubital vein, PET CT was performed from the vertex of the skull through the proximal thighs with an integrated full ring PET CT scanner with image fusion. The patient's serum glucose at the time of the exam was 86 mg/dL. COMPARISON: 09/20/2016 FINDINGS: There is no abnormal FDG activity to suggest recurrent or metastatic disease. CT of the head and neck show no intra-axial lesions or cervical adenopathy. The patient has had a left occipital craniotomy. CT of the chest demonstrates bilateral breast implants. There is no hilar, mediastinal or axillary adenopathy. There are no pulmonary nodules, infiltrates or pleural effusions. CT of the abdomen and pelvis demonstrates physiologic activity within the GI tract and urinary system. There is no adenopathy. The adrenal glands are normal. There are no lytic or blastic lesions.     IMPRESSION: No evidence of metastatic disease Prior bilateral mastectomies Read and electronically signed by: Mireya Carias MD on 9/21/2017 1:01 PM CDT MIREYA CARIAS MD      I have reviewed all available lab results and radiology reports.    Assessment/Plan:   (1) 53 y.o. female with diagnosis of right breast cancer  - s/p bilateral mastectomies on 9/26/16 followed by reconstruction  - [facility]  - she had 2 out of 7 LN's that were positive  - she was a Stage IIA  - ER+/MI+ and her2nu neg  - s/p AC x4 and taxol x4  - she has seen   Ilda with rad/onc for XRT and completed 5 weeks last month  - discussed the pro's and con's of tamoxifen versus arimidex, and in light of the pap issues, arimidex is probably the better choice  - she has been on arimidex for about month now  - she has some mood swings and weight gain  - PET done on 9/21/17 with no evidence of mets;   - recent CT chest/abdom on 8/24/2018 showed no evidence on cancer  - s/p prior repeat surgery with right-sided lift with Dr Umana with repeat evaluation in Nov 2018      (2) Mild leucopenia/anemia secondary to chemotherapy  - latest wbc is at 4.2 - latest hgb was at 12.4     (3) Prior abnormal PAP issues - followed by Dr Cline with GYN; latest pap was negative per patient and she was also HPV negative     (4) Hx of acoustic neuroma in past - s/p XRT in 2010     (5) Chronic fibromyalgia - she is not being followed by anyone; she may be having a flare-up      (6) Mild elevation bili and alk phos - stable      (7) RUE - resolved - possible lymphedema issues - she no longer sees PT  - also seen by Dr Liao    (8) Hypokalemia - will add supplements - patient wants to try OTC and does not want prescription med        1. Malignant neoplasm of upper-outer quadrant of right breast in female, estrogen receptor positive     2. Estrogen receptor positive             PLAN:  1. Check up to date labs every 3-4 months  2. F/U with PCP, Surg and GYN  3. Continue arimidex   4. RTC in 3-4 months    Fax note to Chaparro Shepherd III, *, CARLY Bear Mannina, [place]     I spent over 25 mins of time with the patient. Over half of this time was spent couseling and coordinating care.    Discussion:     I have explained all of the above in detail and the patient understands all of the current recommendation(s). I have answered all of their questions to the best of my ability and to their complete satisfaction.   The patient is to continue with the current management  plan.            Electronically signed by Bronson Carrillo MD

## 2019-01-07 NOTE — TELEPHONE ENCOUNTER
Called in prescription and called patient         ----- Message from Bronson Carrillo MD sent at 1/6/2019  9:18 PM CST -----  Potassium is low - please put her on potassium supplements

## 2019-01-07 NOTE — LETTER
January 7, 2019      Chaparro Shepherd III, MD  [address]  [address]  [place] [address]           [facility] - Hematology Oncology  [address]  [address]  [place] [address]  Phone: [phone]  Fax: [phone]          Patient: Blanca Loya   MR Number: [id number]   YOB: 1965   Date of Visit: 1/7/2019       Dear Dr. Chaparro Shepherd III:    Thank you for referring Blanca Loya to me for evaluation. Attached you will find relevant portions of my assessment and plan of care.    If you have questions, please do not hesitate to call me. I look forward to following Blanca Loya along with you.    Sincerely,    Bronson Carrillo MD    Enclosure  CC:  No Recipients    If you would like to receive this communication electronically, please contact [email or web address] or [phone] to request more information on [employer] access.    For providers and/or their staff who would like to refer a patient to [facility], please contact us through our one-stop-shop provider referral line, [facility], at [phone].    If you feel you have received this communication in error or would no longer like to receive these types of communications, please e-mail [email or web address]

## 2019-02-05 ENCOUNTER — OFFICE VISIT (OUTPATIENT)
Dept: RADIATION ONCOLOGY | Facility: CLINIC | Age: 54
End: 2019-02-05
Payer: COMMERCIAL

## 2019-02-05 VITALS
RESPIRATION RATE: 12 BRPM | BODY MASS INDEX: 21.3 KG/M2 | OXYGEN SATURATION: 99 % | DIASTOLIC BLOOD PRESSURE: 68 MMHG | SYSTOLIC BLOOD PRESSURE: 112 MMHG | HEART RATE: 81 BPM | WEIGHT: 136 LBS | TEMPERATURE: 98 F

## 2019-02-05 DIAGNOSIS — C50.411 MALIGNANT NEOPLASM OF UPPER-OUTER QUADRANT OF RIGHT BREAST IN FEMALE, ESTROGEN RECEPTOR POSITIVE: Primary | ICD-10-CM

## 2019-02-05 DIAGNOSIS — D33.3 LEFT ACOUSTIC NEUROMA: ICD-10-CM

## 2019-02-05 DIAGNOSIS — Z17.0 MALIGNANT NEOPLASM OF UPPER-OUTER QUADRANT OF RIGHT BREAST IN FEMALE, ESTROGEN RECEPTOR POSITIVE: Primary | ICD-10-CM

## 2019-02-05 PROCEDURE — 99214 PR OFFICE/OUTPT VISIT, EST, LEVL IV, 30-39 MIN: ICD-10-PCS | Mod: ,,, | Performed by: RADIOLOGY

## 2019-02-05 PROCEDURE — 3008F BODY MASS INDEX DOCD: CPT | Mod: ,,, | Performed by: RADIOLOGY

## 2019-02-05 PROCEDURE — 99214 OFFICE O/P EST MOD 30 MIN: CPT | Mod: ,,, | Performed by: RADIOLOGY

## 2019-02-05 PROCEDURE — 3008F PR BODY MASS INDEX (BMI) DOCUMENTED: ICD-10-PCS | Mod: ,,, | Performed by: RADIOLOGY

## 2019-02-05 NOTE — PROGRESS NOTES
"Blanca Loya  [id number]  1965  2/5/2019  Bronson Carrillo Md  [address]  [address]  [place] [address]    DIAGNOSIS: IIA, [id number] IDC R breast, ER/CA+, her2-  REASON FOR VISIT: Routine scheduled follow-up.    HISTORY OF PRESENT ILLNESS:   51F with mammogram detected, bx proven multifocal IDC of R breast with B mastectomies + expander placement. Pathology revealed a 12mm and 7mm IDC with 2/7 LNs+ and close posterior margin; ER/CA+, her2-. She completed ACT chemotherapy.    Completed 50Gy EBRT to R "breast" and comp jamie groups of ax I-III/SCV/IM LNs on 6/14/17.    On arimidex.    Patient also has diagnosis of left acoustic neuroma status post 18 gray in 3 fractions in December 2010 with documented radiographic stability.    INTERVAL HISTORY:   Today patient is without complaints. On questioning she does have mild diaphoresis associated with antiestrogen therapy but denies weight gain. She has mild arthritic pains of the right shoulder She reports good range of motion of the bilateral upper extremities and no discomfort in the right reconstructed breast. She denies fever, chills, chest pain, shortness of breath, cough, hemoptysis, bone pain. She denies appetite, energy or weight changes    Review of Systems   Constitutional: Positive for diaphoresis. Negative for appetite change, chills, fever and unexpected weight change.   HENT:   Negative for lump/mass, mouth sores, sore throat, trouble swallowing and voice change.    Eyes: Negative for eye problems and icterus.   Respiratory: Negative for cough, hemoptysis and shortness of breath.    Cardiovascular: Negative for chest pain and leg swelling.   Gastrointestinal: Negative for abdominal pain, constipation, diarrhea, nausea and vomiting.   Genitourinary: Negative for dysuria, frequency, hematuria, nocturia and vaginal bleeding.    Musculoskeletal: Positive for arthralgias. Negative for back pain, gait problem, neck pain and neck stiffness. "   Neurological: Negative for extremity weakness, gait problem, headaches, numbness and seizures.   Hematological: Negative for adenopathy.     Past Medical History:   Diagnosis Date    Acquired deafness     Estrogen receptor positive 5/23/2017    Malignant neoplasm of upper-outer quadrant of right female breast 5/23/2017     Past Surgical History:   Procedure Laterality Date    BREAST RECONSTRUCTION      BREAST REVISION SURGERY LEFT IMF ELEVATION WITH ALLODERM Left 8/30/2018    Performed by [facility]ger [facility]a MD at [facility] OR [address] [place] [address]    BREAST SURGERY      CAPSULORRHAPHY Bilateral 1/15/2018    Performed by [facility]ger [facility]a MD at [facility] OR [address] [place] [address]    EXCHANGE IMPLANT-BREAST- Implant Exchange, Bilateral Capsulotomy & Capsulorrhaphy Bilateral 1/15/2018    Performed by Holger [facility]a MD at [facility] OR [address] [place] [address]    NEUROMA SURGERY      [place] ear    REMOVAL-PORT-A-CATH Left 1/15/2018    Performed by Holger Umana MD at [facility] OR [address] [place] [address]    REVISION- SOFT TISSUE Left 8/30/2018    Performed by Holger [facility]a MD at [facility] OR [address] [place] [address]     Social History     Socioeconomic History    Marital status:      Spouse name: None    Number of children: None    Years of education: None    Highest education level: None   Social Needs    Financial resource strain: None    Food insecurity - worry: None    Food insecurity - inability: None    Transportation needs - medical: None    Transportation needs - non-medical: None   Occupational History    None   Tobacco Use    Smoking status: Never Smoker    Smokeless tobacco: Never Used   Substance and Sexual Activity    Alcohol use: No    Drug use: No    Sexual activity: None   Other Topics Concern    None   Social History Narrative    None     Family History   Problem Relation Age of Onset    No Known Problems Mother     No Known Problems Father      Medication List with Changes/Refills   Current Medications    ANASTROZOLE (ARIMIDEX) 1  MG TAB    Take 1 tablet (1 mg total) by mouth once daily.    ASCORBIC ACID, VITAMIN C, (VITAMIN C) 1000 MG TABLET    Take 1,000 mg by mouth once daily.    B COMPLEX VITAMINS TABLET    Take 1 tablet by mouth once daily.    BUTALBITAL-ACETAMINOPHEN-CAFFEINE -40 MG (FIORICET, ESGIC) -40 MG PER TABLET        CHROMIUM ORAL    Take by mouth.    LEVOTHYROXINE (SYNTHROID) 100 MCG TABLET    TK 1 T PO QD    OXYCODONE-ACETAMINOPHEN (PERCOCET) 5-325 MG PER TABLET    Take 1 tablet by mouth every 6 (six) hours as needed for Pain.     Review of patient's allergies indicates:   Allergen Reactions    Demerol [meperidine] Anxiety    Pcn [penicillins] Rash       QUALITY OF LIFE: 100%    Vitals:    02/05/19 1317   BP: 112/68   Pulse: 81   Resp: 12   Temp: 97.6 °F (36.4 °C)   SpO2: 99%   Weight: 61.7 kg (136 lb)   PainSc:   1   PainLoc: Breast       PHYSICAL EXAM:   GENERAL: alert; in no apparent distress.   HEAD: normocephalic, atraumatic. Hair growing back  EYES: pupils are equal, round, reactive to light and accommodation. Sclera anicteric. Conjunctiva not injected.   NOSE/THROAT: no nasal erythema or rhinorrhea. Oropharynx pink, without erythema, ulcerations or thrush.   NECK: no cervical motion rigidity; supple with no masses.  CHEST:  Patient is speaking comfortably on room air with normal work of breathing without using accessory muscles of respiration. Clear bilaterally  MUSCULOSKELETAL: no tenderness to palpation along the spine or scapulae. Normal range of motion.  NEUROLOGIC: cranial nerves II-XII intact bilaterally. Strength 5/5 in bilateral upper and lower extremities. Normal gait.  LYMPHATIC: no cervical, supraclavicular or axillary adenopathy appreciated bilaterally.   EXTREMITIES: no clubbing, cyanosis, edema.  SKIN: no erythema, rashes, ulcerations noted.   BREAST: B reconstructions  well-healed without evidence of nodularity, seroma, infection. Exam is benign and well tolerated    ANCILLARY DATA  8/18 CT  C/A/P: alexander    ASSESSMENT: 51F with stage IIA, [id number] IDC R breast, ER/UT+, her2- s/p mastectomy and adjuvant RT ending 6/17, now on arimidex and reconstructed.  PLAN:  Ms. Loya presents today without any complaints. By review of systems, physical exam, CT imaging from August she is without evidence of disease. She reports good tolerance of anastrozole very pleased with her clinical outcome. She will continue to follow Dr. Carrillo who is ordering her yearly imaging and I have asked her to return to clinic in 1 year at which time I'll also order MRI of the brain to follow-up her acoustic neuroma which we're imaging every 2 years.    TIME SPENT WITH PATIENT: I have personally seen and evaluated this patient. Approximately 30 minutes were spent with the patient discussing follow-up careplan.     PHYSICIAN: Rocco Gonsales Jr, MD

## 2019-03-13 DIAGNOSIS — Z17.0 MALIGNANT NEOPLASM OF UPPER-OUTER QUADRANT OF RIGHT BREAST IN FEMALE, ESTROGEN RECEPTOR POSITIVE: ICD-10-CM

## 2019-03-13 DIAGNOSIS — C50.411 MALIGNANT NEOPLASM OF UPPER-OUTER QUADRANT OF RIGHT BREAST IN FEMALE, ESTROGEN RECEPTOR POSITIVE: ICD-10-CM

## 2019-03-13 DIAGNOSIS — Z17.0 ESTROGEN RECEPTOR POSITIVE: ICD-10-CM

## 2019-03-14 RX ORDER — ANASTROZOLE 1 MG/1
TABLET ORAL
Qty: 30 TABLET | Refills: 0 | Status: SHIPPED | OUTPATIENT
Start: 2019-03-14 | End: 2019-04-12 | Stop reason: SDUPTHER

## 2019-04-12 DIAGNOSIS — Z17.0 MALIGNANT NEOPLASM OF UPPER-OUTER QUADRANT OF RIGHT BREAST IN FEMALE, ESTROGEN RECEPTOR POSITIVE: ICD-10-CM

## 2019-04-12 DIAGNOSIS — C50.411 MALIGNANT NEOPLASM OF UPPER-OUTER QUADRANT OF RIGHT BREAST IN FEMALE, ESTROGEN RECEPTOR POSITIVE: ICD-10-CM

## 2019-04-12 DIAGNOSIS — Z17.0 ESTROGEN RECEPTOR POSITIVE: ICD-10-CM

## 2019-04-12 RX ORDER — ANASTROZOLE 1 MG/1
TABLET ORAL
Qty: 30 TABLET | Refills: 0 | Status: SHIPPED | OUTPATIENT
Start: 2019-04-12 | End: 2019-05-10 | Stop reason: SDUPTHER

## 2019-04-29 ENCOUNTER — OFFICE VISIT (OUTPATIENT)
Dept: HEMATOLOGY/ONCOLOGY | Facility: CLINIC | Age: 54
End: 2019-04-29
Payer: COMMERCIAL

## 2019-04-29 VITALS
DIASTOLIC BLOOD PRESSURE: 74 MMHG | BODY MASS INDEX: 21.54 KG/M2 | HEART RATE: 76 BPM | TEMPERATURE: 99 F | SYSTOLIC BLOOD PRESSURE: 130 MMHG | RESPIRATION RATE: 20 BRPM | WEIGHT: 137.5 LBS

## 2019-04-29 DIAGNOSIS — C50.411 MALIGNANT NEOPLASM OF UPPER-OUTER QUADRANT OF RIGHT BREAST IN FEMALE, ESTROGEN RECEPTOR POSITIVE: Primary | ICD-10-CM

## 2019-04-29 DIAGNOSIS — N64.4 PAIN OF BOTH BREASTS: ICD-10-CM

## 2019-04-29 DIAGNOSIS — Z17.0 MALIGNANT NEOPLASM OF UPPER-OUTER QUADRANT OF RIGHT BREAST IN FEMALE, ESTROGEN RECEPTOR POSITIVE: Primary | ICD-10-CM

## 2019-04-29 DIAGNOSIS — Z17.0 ESTROGEN RECEPTOR POSITIVE: ICD-10-CM

## 2019-04-29 DIAGNOSIS — R07.89 CHEST WALL PAIN: ICD-10-CM

## 2019-04-29 PROCEDURE — 3008F BODY MASS INDEX DOCD: CPT | Mod: ,,, | Performed by: INTERNAL MEDICINE

## 2019-04-29 PROCEDURE — 99214 OFFICE O/P EST MOD 30 MIN: CPT | Mod: ,,, | Performed by: INTERNAL MEDICINE

## 2019-04-29 PROCEDURE — 3008F PR BODY MASS INDEX (BMI) DOCUMENTED: ICD-10-PCS | Mod: ,,, | Performed by: INTERNAL MEDICINE

## 2019-04-29 PROCEDURE — 99214 PR OFFICE/OUTPT VISIT, EST, LEVL IV, 30-39 MIN: ICD-10-PCS | Mod: ,,, | Performed by: INTERNAL MEDICINE

## 2019-04-29 NOTE — PROGRESS NOTES
[facility] Hematology/Oncology  PROGRESS NOTE      Subjective:       Patient ID:   NAME: Blanca Loya : 1965     53 y.o. female    Referring Doc: Cora  Other Physicians: Lyndsay Gonsales, CARLY Barillas    Chief Complaint:  Breast ca f/u    History of Present Illness:     Patient returns today for a regularly scheduled follow-up visit.  She is doing well with no new issues. No CP, SOB, HA's or N/V. She is doing ok with the arimidex. She previously had surgery/reconstruction with Dr Umana but had a difficult postop recovery.   She is here with her . She is having some aches and pains with the reconstructed breast. Dr Umana wanted to do another surgery but she is hesitant.     ROS:   GEN: normal without any fever, night sweats or weight loss  HEENT: normal with no HA's, sore throat, stiff neck, changes in vision  CV: normal with no CP, SOB, PND, RUGGIERO or orthopnea  PULM: normal with no SOB, cough, hemoptysis, sputum or pleuritic pain  GI: normal with no abdominal pain, nausea, vomiting, constipation, diarrhea, melanotic stools, BRBPR, or hematemesis  : normal with no hematuria, dysuria  BREAST: normal with no mass, discharge, pain  SKIN: normal with no rash, erythema, bruising, or swelling    Allergies:  Review of patient's allergies indicates:   Allergen Reactions    Demerol [meperidine] Anxiety    Pcn [penicillins] Rash       Medications:    Current Outpatient Medications:     anastrozole (ARIMIDEX) 1 mg Tab, TAKE 1 TABLET(1 MG) BY MOUTH EVERY DAY, Disp: 30 tablet, Rfl: 0    ascorbic acid, vitamin C, (VITAMIN C) 1000 MG tablet, Take 1,000 mg by mouth once daily., Disp: , Rfl:     b complex vitamins tablet, Take 1 tablet by mouth once daily., Disp: , Rfl:     butalbital-acetaminophen-caffeine -40 mg (FIORICET, ESGIC) -40 mg per tablet, , Disp: , Rfl:     CHROMIUM ORAL, Take by mouth., Disp: , Rfl:     levothyroxine (SYNTHROID) 100 MCG tablet, TK 1 T PO QD, Disp: , Rfl:  0    oxyCODONE-acetaminophen (PERCOCET) 5-325 mg per tablet, Take 1 tablet by mouth every 6 (six) hours as needed for Pain., Disp: 28 tablet, Rfl: 0    PMHx/PSHx Updates:  See patient's last visit with me on 1/7/2019  See H&P on 9/8/2016        Pathology:      See path note on 8/30/2016      Objective:     Vitals:  Blood pressure 130/74, pulse 76, temperature 98.6 °F (37 °C), resp. rate 20, weight 62.4 kg (137 lb 8 oz), last menstrual period 09/10/2016.    Physical Examination:   GEN: no apparent distress, comfortable; AAOx3  HEAD: atraumatic and normocephalic  EYES: no pallor, no icterus, PERRLA  ENT: OMM, no pharyngeal erythema, external ears WNL; no nasal discharge; no thrush  NECK: no masses, thyroid normal, trachea midline, no LAD/LN's, supple  CV: RRR with no murmur; normal pulse; normal S1 and S2; no pedal edema  CHEST: Normal respiratory effort; CTAB; normal breath sounds; no wheeze or crackles  ABDOM: nontender and nondistended; soft; normal bowel sounds; no rebound/guarding  MUSC/Skeletal: ROM normal; no crepitus; joints normal; no deformities or arthropathy  EXTREM: no clubbing, cyanosis, inflammation or swelling  SKIN: no rashes, lesions, ulcers, petechiae or subcutaneous nodules  : no tyson  NEURO: grossly intact; motor/sensory WNL; AAOx3; no tremors  PSYCH: normal mood, affect and behavior  LYMPH: normal cervical, supraclavicular, axillary and groin LN's  Breast: no changes          Labs:     4/23/2019    Lab Results   Component Value Date    WBC 5.2 04/23/2019    HGB 13.1 04/23/2019    HCT 39.7 04/23/2019    MCV 96.1 04/23/2019     04/23/2019 4/23/2019  BMP  Lab Results   Component Value Date     04/23/2019    K 3.8 04/23/2019     04/23/2019    CO2 29.4 04/23/2019    BUN 13 04/23/2019    CREATININE 0.60 04/23/2019    CALCIUM 9.9 04/23/2019    ESTGFRAFRICA 101 02/19/2019    EGFRNONAA 87 02/19/2019     Lab Results   Component Value Date    ALT 21 08/23/2018    AST 39  04/23/2019    ALKPHOS 124 (H) 04/23/2019    BILITOT 1.0 04/23/2019           Radiology/Diagnostic Studies:    Chest/Abdom CT  8/24/2018:      IMPRESSION:  1. No evidence of metastatic disease in the chest, abdomen, or the pelvis.  2. Additional observations as above        Sm[facility] Unknown Rad Eap    Result Date: 9/21/2017  INTEGRATED PET CT WITH IMAGE FUSION HISTORY:  c50.411 z17.0 subsequent treatment evaluation for right breast cancer diagnosed in August 2016. Bilateral mastectomies with chemotherapy and radiation. TECHNIQUE: Following the injection of 12.2 mCi of F-18 labeled FDG into a left antecubital vein, PET CT was performed from the vertex of the skull through the proximal thighs with an integrated full ring PET CT scanner with image fusion. The patient's serum glucose at the time of the exam was 86 mg/dL. COMPARISON: 09/20/2016 FINDINGS: There is no abnormal FDG activity to suggest recurrent or metastatic disease. CT of the head and neck show no intra-axial lesions or cervical adenopathy. The patient has had a left occipital craniotomy. CT of the chest demonstrates bilateral breast implants. There is no hilar, mediastinal or axillary adenopathy. There are no pulmonary nodules, infiltrates or pleural effusions. CT of the abdomen and pelvis demonstrates physiologic activity within the GI tract and urinary system. There is no adenopathy. The adrenal glands are normal. There are no lytic or blastic lesions.     IMPRESSION: No evidence of metastatic disease Prior bilateral mastectomies Read and electronically signed by: Mireya Carias MD on 9/21/2017 1:01 PM CDT MIREYA CARIAS MD      I have reviewed all available lab results and radiology reports.    Assessment/Plan:   (1) 53 y.o. female with diagnosis of right breast cancer  - s/p bilateral mastectomies on 9/26/16 followed by reconstruction  - followed by Dr Bear with [facility]urg  - she had 2 out of 7 LN's that were positive  - she was a Stage IIA  -  ER+/DE+ and her2nu neg  - s/p AC x4 and taxol x4  - she has seen Dr Gonsales with rad/onc for XRT and completed 5 weeks last month  - discussed the pro's and con's of tamoxifen versus arimidex, and in light of the pap issues, arimidex is probably the better choice  - she has been on arimidex for about month now  - she has some mood swings and weight gain  - PET done on 9/21/17 with no evidence of mets;   - recent CT chest/abdom on 8/24/2018 showed no evidence on cancer  - s/p prior repeat surgery with right-sided lift with Dr Umana with repeat evaluation in Nov 2018   - she had a slow post-op recovery and has residual aches and pains     (2) Mild leucopenia/anemia secondary to chemotherapy  - latest wbc is at 4.2 - latest hgb was at 12.4     (3) Prior abnormal PAP issues - followed by Dr Cline with GYN; latest pap was negative per patient and she was also HPV negative     (4) Hx of acoustic neuroma in past - s/p XRT in 2010; followed by Dr Gonsales     (5) Chronic fibromyalgia - she is not being followed by anyone; she may be having a flare-up      (6) Mild elevation bili and alk phos - stable - will refer to GI     (7) RUE - resolved - possible lymphedema issues - she no longer sees PT  - also seen by Dr Liao    (8) Hypokalemia - will add supplements - patient wants to try OTC and does not want prescription med        1. Malignant neoplasm of upper-outer quadrant of right breast in female, estrogen receptor positive     2. Estrogen receptor positive             PLAN:  1. Check up to date labs every 3-4 months  2. F/U with PCP, Surg and GYN  3. Continue arimidex   4. Schedule PET to evaluate the pian issues  5. Refer to Dr Edmonds with GI for LFT evaluation  6. RTC in 3-4 months    Fax note to Chaparro Shepherd III, *, CARLY Bear Mannina, Babycos     I spent over 25 mins of time with the patient. Over half of this time was spent couseling and coordinating care.    Discussion:     I have  explained all of the above in detail and the patient understands all of the current recommendation(s). I have answered all of their questions to the best of my ability and to their complete satisfaction.   The patient is to continue with the current management plan.            Electronically signed by Brosnon Carrillo MD

## 2019-04-29 NOTE — LETTER
April 29, 2019      Chaparro Shepherd III, MD  [address] [facility]  [address]  [place] [address]           [facility] - Hematology Oncology  [address]  [address]  [place] [address]  Phone: [phone]  Fax: [phone]          Patient: Blanca Loya   MR Number: [id number]   YOB: 1965   Date of Visit: 4/29/2019       Dear Dr. Chaparro Shepherd III:    Thank you for referring Blanca Loya to me for evaluation. Attached you will find relevant portions of my assessment and plan of care.    If you have questions, please do not hesitate to call me. I look forward to following Blanca Loya along with you.    Sincerely,    Bronson Carrillo MD    Enclosure  CC:  No Recipients    If you would like to receive this communication electronically, please contact [email or web address] or [phone] to request more information on [employer] Link access.    For providers and/or their staff who would like to refer a patient to [facility], please contact us through our one-stop-shop provider referral line, [facility], at [phone].    If you feel you have received this communication in error or would no longer like to receive these types of communications, please e-mail [email or web address]

## 2019-05-10 DIAGNOSIS — Z17.0 MALIGNANT NEOPLASM OF UPPER-OUTER QUADRANT OF RIGHT BREAST IN FEMALE, ESTROGEN RECEPTOR POSITIVE: ICD-10-CM

## 2019-05-10 DIAGNOSIS — Z17.0 ESTROGEN RECEPTOR POSITIVE: ICD-10-CM

## 2019-05-10 DIAGNOSIS — C50.411 MALIGNANT NEOPLASM OF UPPER-OUTER QUADRANT OF RIGHT BREAST IN FEMALE, ESTROGEN RECEPTOR POSITIVE: ICD-10-CM

## 2019-05-13 RX ORDER — ANASTROZOLE 1 MG/1
TABLET ORAL
Qty: 30 TABLET | Refills: 0 | Status: SHIPPED | OUTPATIENT
Start: 2019-05-13 | End: 2019-06-09 | Stop reason: SDUPTHER

## 2019-05-15 ENCOUNTER — TELEPHONE (OUTPATIENT)
Dept: HEMATOLOGY/ONCOLOGY | Facility: CLINIC | Age: 54
End: 2019-05-15

## 2019-05-15 NOTE — TELEPHONE ENCOUNTER
Called patient with report and called in antibiotic     ----- Message from Bronson Carrillo MD sent at 5/14/2019 11:03 AM CDT -----  Call her with the adequate report

## 2019-06-09 DIAGNOSIS — Z17.0 MALIGNANT NEOPLASM OF UPPER-OUTER QUADRANT OF RIGHT BREAST IN FEMALE, ESTROGEN RECEPTOR POSITIVE: ICD-10-CM

## 2019-06-09 DIAGNOSIS — C50.411 MALIGNANT NEOPLASM OF UPPER-OUTER QUADRANT OF RIGHT BREAST IN FEMALE, ESTROGEN RECEPTOR POSITIVE: ICD-10-CM

## 2019-06-09 DIAGNOSIS — Z17.0 ESTROGEN RECEPTOR POSITIVE: ICD-10-CM

## 2019-06-09 RX ORDER — ANASTROZOLE 1 MG/1
TABLET ORAL
Qty: 30 TABLET | Refills: 0 | Status: SHIPPED | OUTPATIENT
Start: 2019-06-09 | End: 2019-07-08 | Stop reason: SDUPTHER

## 2019-07-08 DIAGNOSIS — Z17.0 ESTROGEN RECEPTOR POSITIVE: ICD-10-CM

## 2019-07-08 DIAGNOSIS — Z17.0 MALIGNANT NEOPLASM OF UPPER-OUTER QUADRANT OF RIGHT BREAST IN FEMALE, ESTROGEN RECEPTOR POSITIVE: ICD-10-CM

## 2019-07-08 DIAGNOSIS — C50.411 MALIGNANT NEOPLASM OF UPPER-OUTER QUADRANT OF RIGHT BREAST IN FEMALE, ESTROGEN RECEPTOR POSITIVE: ICD-10-CM

## 2019-07-09 RX ORDER — ANASTROZOLE 1 MG/1
TABLET ORAL
Qty: 30 TABLET | Refills: 0 | Status: SHIPPED | OUTPATIENT
Start: 2019-07-09 | End: 2019-10-24

## 2019-07-30 ENCOUNTER — LAB VISIT (OUTPATIENT)
Dept: LAB | Facility: HOSPITAL | Age: 54
End: 2019-07-30
Attending: INTERNAL MEDICINE
Payer: COMMERCIAL

## 2019-07-30 DIAGNOSIS — Z17.0 ESTROGEN RECEPTOR POSITIVE: ICD-10-CM

## 2019-07-30 DIAGNOSIS — C50.411 MALIGNANT NEOPLASM OF UPPER-OUTER QUADRANT OF RIGHT FEMALE BREAST: Primary | ICD-10-CM

## 2019-07-30 LAB
ALBUMIN SERPL BCP-MCNC: 4.3 G/DL (ref 3.5–5.2)
ALP SERPL-CCNC: 104 U/L (ref 55–135)
ALT SERPL W/O P-5'-P-CCNC: 27 U/L (ref 10–44)
ANION GAP SERPL CALC-SCNC: 8 MMOL/L (ref 8–16)
AST SERPL-CCNC: 32 U/L (ref 10–40)
BASOPHILS # BLD AUTO: 0.03 K/UL (ref 0–0.2)
BASOPHILS NFR BLD: 0.6 % (ref 0–1.9)
BILIRUB SERPL-MCNC: 0.9 MG/DL (ref 0.1–1)
BUN SERPL-MCNC: 14 MG/DL (ref 6–20)
CALCIUM SERPL-MCNC: 10.1 MG/DL (ref 8.7–10.5)
CHLORIDE SERPL-SCNC: 101 MMOL/L (ref 95–110)
CO2 SERPL-SCNC: 31 MMOL/L (ref 23–29)
CREAT SERPL-MCNC: 0.8 MG/DL (ref 0.5–1.4)
DIFFERENTIAL METHOD: ABNORMAL
EOSINOPHIL # BLD AUTO: 0.1 K/UL (ref 0–0.5)
EOSINOPHIL NFR BLD: 1.1 % (ref 0–8)
ERYTHROCYTE [DISTWIDTH] IN BLOOD BY AUTOMATED COUNT: 12.7 % (ref 11.5–14.5)
EST. GFR  (AFRICAN AMERICAN): >60 ML/MIN/1.73 M^2
EST. GFR  (NON AFRICAN AMERICAN): >60 ML/MIN/1.73 M^2
GLUCOSE SERPL-MCNC: 87 MG/DL (ref 70–110)
HCT VFR BLD AUTO: 39.2 % (ref 37–48.5)
HGB BLD-MCNC: 13.3 G/DL (ref 12–16)
IMM GRANULOCYTES # BLD AUTO: 0.02 K/UL (ref 0–0.04)
IMM GRANULOCYTES NFR BLD AUTO: 0.4 % (ref 0–0.5)
LYMPHOCYTES # BLD AUTO: 1.2 K/UL (ref 1–4.8)
LYMPHOCYTES NFR BLD: 25.8 % (ref 18–48)
MCH RBC QN AUTO: 32 PG (ref 27–31)
MCHC RBC AUTO-ENTMCNC: 33.9 G/DL (ref 32–36)
MCV RBC AUTO: 94 FL (ref 82–98)
MONOCYTES # BLD AUTO: 0.6 K/UL (ref 0.3–1)
MONOCYTES NFR BLD: 12 % (ref 4–15)
NEUTROPHILS # BLD AUTO: 2.9 K/UL (ref 1.8–7.7)
NEUTROPHILS NFR BLD: 60.1 % (ref 38–73)
NRBC BLD-RTO: 0 /100 WBC
PLATELET # BLD AUTO: 257 K/UL (ref 150–350)
PMV BLD AUTO: 10.8 FL (ref 9.2–12.9)
POTASSIUM SERPL-SCNC: 3.2 MMOL/L (ref 3.5–5.1)
PROT SERPL-MCNC: 7.6 G/DL (ref 6–8.4)
RBC # BLD AUTO: 4.16 M/UL (ref 4–5.4)
SODIUM SERPL-SCNC: 140 MMOL/L (ref 136–145)
WBC # BLD AUTO: 4.76 K/UL (ref 3.9–12.7)

## 2019-07-30 PROCEDURE — 80053 COMPREHEN METABOLIC PANEL: CPT

## 2019-07-30 PROCEDURE — 85025 COMPLETE CBC W/AUTO DIFF WBC: CPT

## 2019-07-31 ENCOUNTER — TELEPHONE (OUTPATIENT)
Dept: HEMATOLOGY/ONCOLOGY | Facility: CLINIC | Age: 54
End: 2019-07-31

## 2019-07-31 NOTE — TELEPHONE ENCOUNTER
Patient made aware to increase intake of potassium rich foods     ----- Message from Bronson Carrillo MD sent at 7/31/2019  8:12 AM CDT -----  Potassium is a little low

## 2019-08-04 NOTE — PROGRESS NOTES
[facility] Hematology/Oncology  PROGRESS NOTE      Subjective:       Patient ID:   NAME: Blanca Loya : 1965     53 y.o. female    Referring Doc: Cora  Other Physicians: Lyndsay Gonsales, CARLY Barillas    Chief Complaint:  Breast ca f/u    History of Present Illness:     Patient returns today for a regularly scheduled follow-up visit.  She is doing well with no new issues. No CP, SOB, HA's or N/V. She is doing ok with the arimidex. She previously had surgery/reconstruction with Dr Umana but had a difficult postop recovery.   She is here with her sister.     She is having some aches and pains with the reconstructed breast. Dr Umana wanted to do another surgery but she is hesitant.     ROS:   GEN: normal without any fever, night sweats or weight loss  HEENT: normal with no HA's, sore throat, stiff neck, changes in vision  CV: normal with no CP, SOB, PND, RUGGIERO or orthopnea  PULM: normal with no SOB, cough, hemoptysis, sputum or pleuritic pain  GI: normal with no abdominal pain, nausea, vomiting, constipation, diarrhea, melanotic stools, BRBPR, or hematemesis  : normal with no hematuria, dysuria  BREAST: normal with no mass, discharge, pain  SKIN: normal with no rash, erythema, bruising, or swelling    Allergies:  Review of patient's allergies indicates:   Allergen Reactions    Demerol [meperidine] Anxiety    Pcn [penicillins] Rash       Medications:    Current Outpatient Medications:     ALPRAZolam (XANAX) 0.5 MG tablet, TK 1 T PO QD PRN, Disp: , Rfl: 0    anastrozole (ARIMIDEX) 1 mg Tab, TAKE 1 TABLET(1 MG) BY MOUTH EVERY DAY, Disp: 30 tablet, Rfl: 0    ascorbic acid, vitamin C, (VITAMIN C) 1000 MG tablet, Take 1,000 mg by mouth once daily., Disp: , Rfl:     b complex vitamins tablet, Take 1 tablet by mouth once daily., Disp: , Rfl:     butalbital-acetaminophen-caffeine -40 mg (FIORICET, ESGIC) -40 mg per tablet, , Disp: , Rfl:     CHROMIUM ORAL, Take by mouth., Disp: , Rfl:      levothyroxine (SYNTHROID) 100 MCG tablet, TK 1 T PO QD, Disp: , Rfl: 0    oxyCODONE-acetaminophen (PERCOCET) 5-325 mg per tablet, Take 1 tablet by mouth every 6 (six) hours as needed for Pain., Disp: 28 tablet, Rfl: 0    sucralfate (CARAFATE) 1 gram tablet, Carafate 1 gram tablet  Take 1 tablet 4 times a day by oral route for 30 days.  as needed for abd upset, Disp: , Rfl:     PMHx/PSHx Updates:  See patient's last visit with me on 4/29/2019  See H&P on 9/8/2016        Pathology:      See path note on 8/30/2016      Objective:     Vitals:  Blood pressure 108/71, pulse 81, temperature 98.6 °F (37 °C), resp. rate 18, weight 60.2 kg (132 lb 12.8 oz), last menstrual period 09/10/2016.    Physical Examination:   GEN: no apparent distress, comfortable; AAOx3  HEAD: atraumatic and normocephalic  EYES: no pallor, no icterus, PERRLA  ENT: OMM, no pharyngeal erythema, external ears WNL; no nasal discharge; no thrush  NECK: no masses, thyroid normal, trachea midline, no LAD/LN's, supple  CV: RRR with no murmur; normal pulse; normal S1 and S2; no pedal edema  CHEST: Normal respiratory effort; CTAB; normal breath sounds; no wheeze or crackles  ABDOM: nontender and nondistended; soft; normal bowel sounds; no rebound/guarding  MUSC/Skeletal: ROM normal; no crepitus; joints normal; no deformities or arthropathy  EXTREM: no clubbing, cyanosis, inflammation or swelling  SKIN: no rashes, lesions, ulcers, petechiae or subcutaneous nodules  : no tyson  NEURO: grossly intact; motor/sensory WNL; AAOx3; no tremors  PSYCH: normal mood, affect and behavior  LYMPH: normal cervical, supraclavicular, axillary and groin LN's  Breast: no changes          Labs:     7/30/2019    Lab Results   Component Value Date    WBC 4.76 07/30/2019    HGB 13.3 07/30/2019    HCT 39.2 07/30/2019    MCV 94 07/30/2019     07/30/2019          BMP  Lab Results   Component Value Date     07/30/2019    K 3.2 (L) 07/30/2019     07/30/2019    CO2  31 (H) 07/30/2019    BUN 14 07/30/2019    CREATININE 0.8 07/30/2019    CALCIUM 10.1 07/30/2019    ANIONGAP 8 07/30/2019    ESTGFRAFRICA >60.0 07/30/2019    EGFRNONAA >60.0 07/30/2019     Lab Results   Component Value Date    ALT 27 07/30/2019    AST 32 07/30/2019    ALKPHOS 104 07/30/2019    BILITOT 0.9 07/30/2019           Radiology/Diagnostic Studies:    PET/CT  5/13/2019:    IMPRESSION:    1. No evidence of FDG avid metastatic disease.  2. Incidental findings as above.          Chest/Abdom CT  8/24/2018:      IMPRESSION:  1. No evidence of metastatic disease in the chest, abdomen, or the pelvis.  2. Additional observations as above        [facility] Unknown Rad Eap    Result Date: 9/21/2017  INTEGRATED PET CT WITH IMAGE FUSION HISTORY:  c50.411 z17.0 subsequent treatment evaluation for right breast cancer diagnosed in August 2016. Bilateral mastectomies with chemotherapy and radiation. TECHNIQUE: Following the injection of 12.2 mCi of F-18 labeled FDG into a left antecubital vein, PET CT was performed from the vertex of the skull through the proximal thighs with an integrated full ring PET CT scanner with image fusion. The patient's serum glucose at the time of the exam was 86 mg/dL. COMPARISON: 09/20/2016 FINDINGS: There is no abnormal FDG activity to suggest recurrent or metastatic disease. CT of the head and neck show no intra-axial lesions or cervical adenopathy. The patient has had a left occipital craniotomy. CT of the chest demonstrates bilateral breast implants. There is no hilar, mediastinal or axillary adenopathy. There are no pulmonary nodules, infiltrates or pleural effusions. CT of the abdomen and pelvis demonstrates physiologic activity within the GI tract and urinary system. There is no adenopathy. The adrenal glands are normal. There are no lytic or blastic lesions.     IMPRESSION: No evidence of metastatic disease Prior bilateral mastectomies Read and electronically signed by: Mireya Rodriguez MD on  "9/21/2017 1:01 PM CDT WALLACE CARIAS MD      I have reviewed all available lab results and radiology reports.    Assessment/Plan:   (1) 53 y.o. female with diagnosis of right breast cancer  - s/p bilateral mastectomies on 9/26/16 followed by reconstruction  - followed by Dr Bear with [facility]  - she had 2 out of 7 LN's that were positive  - she was a Stage IIA  - ER+/LA+ and her2nu neg  - s/p AC x4 and taxol x4  - she has seen Dr Gonsales with rad/onc for XRT and completed 5 weeks last month  - discussed the pro's and con's of tamoxifen versus arimidex, and in light of the pap issues, arimidex is probably the better choice  - she has been on arimidex for about month now  - she has some mood swings and weight gain  - PET done on 9/21/17 with no evidence of mets;   - recent CT chest/abdom on 8/24/2018 showed no evidence on cancer  - s/p prior repeat surgery with right-sided lift with Dr Umana with repeat evaluation in Nov 2018   - she had a slow post-op recovery and has residual aches and pains  - she had PET on 5/13/2019     (2) Mild leucopenia/anemia secondary to chemotherapy (resolved)  - latest wbc is at 4.76 - latest hgb was at 13.3     (3) Prior abnormal PAP issues - followed by Dr Cline with GYN; latest pap was negative per patient and she was also HPV negative     (4) Hx of acoustic neuroma in past - s/p XRT in 2010; followed by Dr Gonsales     (5) Chronic fibromyalgia - she is not being followed by anyone; she may be having a flare-up      (6) GI issues - seen by Dr Edmonds with GI  - prior mild elevation bili and alk phos - resolved  - she had liver biopsy and EGD with "inflammation" per patient  - s/p esophageal dilation  - Dr Edmonds is concerned it may be from the arimidex     (7) RUE - resolved - possible lymphedema issues - she no longer sees PT  - also seen by Dr Liao    (8) Hypokalemia -on supplements - patient wanted to try OTC and did not want prescription meds        1. Malignant " neoplasm of upper-outer quadrant of right breast in female, estrogen receptor positive     2. Estrogen receptor positive             PLAN:  1. Check up to date labs every 3-4 months  2. F/U with PCP, Surg and GYN  3. Continue oral antihormone but change to femara after a 2-3 week break  4. Need the notes and reports from Dr Edmonds  5. F/u with Dr Edmonds as directed by him  6. RTC in  6 weeks    Fax note to Chaparro Shepherd III, *, CARLY Bear Mannina, Babycos; Grey     I spent over 25 mins of time with the patient. Over half of this time was spent couseling and coordinating care.    Discussion:     I have explained all of the above in detail and the patient understands all of the current recommendation(s). I have answered all of their questions to the best of my ability and to their complete satisfaction.   The patient is to continue with the current management plan.            Electronically signed by Bronson Carrillo MD

## 2019-08-05 ENCOUNTER — OFFICE VISIT (OUTPATIENT)
Dept: HEMATOLOGY/ONCOLOGY | Facility: CLINIC | Age: 54
End: 2019-08-05
Payer: COMMERCIAL

## 2019-08-05 VITALS
TEMPERATURE: 99 F | BODY MASS INDEX: 20.8 KG/M2 | DIASTOLIC BLOOD PRESSURE: 71 MMHG | RESPIRATION RATE: 18 BRPM | SYSTOLIC BLOOD PRESSURE: 108 MMHG | HEART RATE: 81 BPM | WEIGHT: 132.81 LBS

## 2019-08-05 DIAGNOSIS — Z17.0 ESTROGEN RECEPTOR POSITIVE: ICD-10-CM

## 2019-08-05 DIAGNOSIS — C50.411 MALIGNANT NEOPLASM OF UPPER-OUTER QUADRANT OF RIGHT BREAST IN FEMALE, ESTROGEN RECEPTOR POSITIVE: Primary | ICD-10-CM

## 2019-08-05 DIAGNOSIS — Z17.0 MALIGNANT NEOPLASM OF UPPER-OUTER QUADRANT OF RIGHT BREAST IN FEMALE, ESTROGEN RECEPTOR POSITIVE: Primary | ICD-10-CM

## 2019-08-05 PROCEDURE — 3008F BODY MASS INDEX DOCD: CPT | Mod: S$GLB,,, | Performed by: INTERNAL MEDICINE

## 2019-08-05 PROCEDURE — 3008F PR BODY MASS INDEX (BMI) DOCUMENTED: ICD-10-PCS | Mod: S$GLB,,, | Performed by: INTERNAL MEDICINE

## 2019-08-05 PROCEDURE — 99214 OFFICE O/P EST MOD 30 MIN: CPT | Mod: S$GLB,,, | Performed by: INTERNAL MEDICINE

## 2019-08-05 PROCEDURE — 99214 PR OFFICE/OUTPT VISIT, EST, LEVL IV, 30-39 MIN: ICD-10-PCS | Mod: S$GLB,,, | Performed by: INTERNAL MEDICINE

## 2019-08-05 RX ORDER — LETROZOLE 2.5 MG/1
2.5 TABLET, FILM COATED ORAL DAILY
Qty: 30 TABLET | Refills: 3 | Status: SHIPPED | OUTPATIENT
Start: 2019-08-05 | End: 2019-10-24

## 2019-08-05 RX ORDER — SUCRALFATE 1 G/1
TABLET ORAL
COMMUNITY
End: 2022-04-29

## 2019-08-05 RX ORDER — ALPRAZOLAM 0.5 MG/1
TABLET ORAL
Refills: 0 | COMMUNITY
Start: 2019-05-31 | End: 2020-08-25 | Stop reason: SDUPTHER

## 2019-09-15 NOTE — PROGRESS NOTES
[facility] Hematology/Oncology  PROGRESS NOTE      Subjective:       Patient ID:   NAME: Blanca Loya : 1965     54 y.o. female    Referring Doc: Cora  Other Physicians: Lyndsay Gonsales, CARLY Barillas    Chief Complaint:  Breast ca f/u    History of Present Illness:     Patient returns today for a regularly scheduled follow-up visit.  She is doing well with no new issues. No CP, SOB, HA's or N/V. She is now off of the arimidex and feeling better. She is here with her  today.     She is having some aches and pains with the reconstructed breast. Dr Umana had wanted to do another surgery but she is hesitant.     ROS:   GEN: normal without any fever, night sweats or weight loss  HEENT: normal with no HA's, sore throat, stiff neck, changes in vision  CV: normal with no CP, SOB, PND, RUGGIERO or orthopnea  PULM: normal with no SOB, cough, hemoptysis, sputum or pleuritic pain  GI: normal with no abdominal pain, nausea, vomiting, constipation, diarrhea, melanotic stools, BRBPR, or hematemesis  : normal with no hematuria, dysuria  BREAST: normal with no mass, discharge, pain  SKIN: normal with no rash, erythema, bruising, or swelling    Allergies:  Review of patient's allergies indicates:   Allergen Reactions    Demerol [meperidine] Anxiety    Pcn [penicillins] Rash       Medications:    Current Outpatient Medications:     ALPRAZolam (XANAX) 0.5 MG tablet, TK 1 T PO QD PRN, Disp: , Rfl: 0    anastrozole (ARIMIDEX) 1 mg Tab, TAKE 1 TABLET(1 MG) BY MOUTH EVERY DAY, Disp: 30 tablet, Rfl: 0    ascorbic acid, vitamin C, (VITAMIN C) 1000 MG tablet, Take 1,000 mg by mouth once daily., Disp: , Rfl:     b complex vitamins tablet, Take 1 tablet by mouth once daily., Disp: , Rfl:     butalbital-acetaminophen-caffeine -40 mg (FIORICET, ESGIC) -40 mg per tablet, , Disp: , Rfl:     CHROMIUM ORAL, Take by mouth., Disp: , Rfl:     letrozole (FEMARA) 2.5 mg Tab, Take 1 tablet (2.5 mg total) by mouth  once daily., Disp: 30 tablet, Rfl: 3    levothyroxine (SYNTHROID) 100 MCG tablet, TK 1 T PO QD, Disp: , Rfl: 0    oxyCODONE-acetaminophen (PERCOCET) 5-325 mg per tablet, Take 1 tablet by mouth every 6 (six) hours as needed for Pain., Disp: 28 tablet, Rfl: 0    sucralfate (CARAFATE) 1 gram tablet, Carafate 1 gram tablet  Take 1 tablet 4 times a day by oral route for 30 days.  as needed for abd upset, Disp: , Rfl:     PMHx/PSHx Updates:  See patient's last visit with me on 8/5/2019  See H&P on 9/8/2016        Pathology:      See path note on 8/30/2016      Objective:     Vitals:  Blood pressure 110/74, pulse 87, temperature 98.6 °F (37 °C), resp. rate 18, weight 60.4 kg (133 lb 3.2 oz), last menstrual period 09/10/2016.    Physical Examination:   GEN: no apparent distress, comfortable; AAOx3  HEAD: atraumatic and normocephalic  EYES: no pallor, no icterus, PERRLA  ENT: OMM, no pharyngeal erythema, external ears WNL; no nasal discharge; no thrush  NECK: no masses, thyroid normal, trachea midline, no LAD/LN's, supple  CV: RRR with no murmur; normal pulse; normal S1 and S2; no pedal edema  CHEST: Normal respiratory effort; CTAB; normal breath sounds; no wheeze or crackles  ABDOM: nontender and nondistended; soft; normal bowel sounds; no rebound/guarding  MUSC/Skeletal: ROM normal; no crepitus; joints normal; no deformities or arthropathy  EXTREM: no clubbing, cyanosis, inflammation or swelling  SKIN: no rashes, lesions, ulcers, petechiae or subcutaneous nodules  : no tyson  NEURO: grossly intact; motor/sensory WNL; AAOx3; no tremors  PSYCH: normal mood, affect and behavior  LYMPH: normal cervical, supraclavicular, axillary and groin LN's  Breast: no changes          Labs:     7/30/2019    Lab Results   Component Value Date    WBC 4.76 07/30/2019    HGB 13.3 07/30/2019    HCT 39.2 07/30/2019    MCV 94 07/30/2019     07/30/2019 8/27/2019  BMP  Lab Results   Component Value Date     08/27/2019    K  3.9 08/27/2019     08/27/2019    CO2 26 08/27/2019    BUN 7 08/27/2019    CREATININE 0.68 08/27/2019    CALCIUM 9.7 08/27/2019    ANIONGAP 8 07/30/2019    ESTGFRAFRICA 116 08/27/2019    EGFRNONAA 100 08/27/2019     Lab Results   Component Value Date    ALT 28 08/27/2019    AST 33 08/27/2019    ALKPHOS 130 08/27/2019    BILITOT 0.6 08/27/2019           Radiology/Diagnostic Studies:    PET/CT  5/13/2019:    IMPRESSION:    1. No evidence of FDG avid metastatic disease.  2. Incidental findings as above.          Chest/Abdom CT  8/24/2018:      IMPRESSION:  1. No evidence of metastatic disease in the chest, abdomen, or the pelvis.  2. Additional observations as above        [facility] Unknown Rad Eap    Result Date: 9/21/2017  INTEGRATED PET CT WITH IMAGE FUSION HISTORY:  c50.411 z17.0 subsequent treatment evaluation for right breast cancer diagnosed in August 2016. Bilateral mastectomies with chemotherapy and radiation. TECHNIQUE: Following the injection of 12.2 mCi of F-18 labeled FDG into a left antecubital vein, PET CT was performed from the vertex of the skull through the proximal thighs with an integrated full ring PET CT scanner with image fusion. The patient's serum glucose at the time of the exam was 86 mg/dL. COMPARISON: 09/20/2016 FINDINGS: There is no abnormal FDG activity to suggest recurrent or metastatic disease. CT of the head and neck show no intra-axial lesions or cervical adenopathy. The patient has had a left occipital craniotomy. CT of the chest demonstrates bilateral breast implants. There is no hilar, mediastinal or axillary adenopathy. There are no pulmonary nodules, infiltrates or pleural effusions. CT of the abdomen and pelvis demonstrates physiologic activity within the GI tract and urinary system. There is no adenopathy. The adrenal glands are normal. There are no lytic or blastic lesions.     IMPRESSION: No evidence of metastatic disease Prior bilateral mastectomies Read and electronically  "signed by: Mireya Rodriguez MD on 9/21/2017 1:01 PM CDT MIREYA RODRIGUEZ MD      I have reviewed all available lab results and radiology reports.    Assessment/Plan:   (1) 53 y.o. female with diagnosis of right breast cancer  - s/p bilateral mastectomies on 9/26/16 followed by reconstruction  - followed by Dr Bear with [facility]  - she had 2 out of 7 LN's that were positive  - she was a Stage IIA  - ER+/WY+ and her2nu neg  - s/p AC x4 and taxol x4  - she has seen Dr Gonsales with rad/onc for XRT and completed 5 weeks last month  - discussed the pro's and con's of tamoxifen versus arimidex, and in light of the pap issues, arimidex is probably the better choice  - she had been on arimidex but it was discontinued because of her liver  - she has some mood swings and weight gain  - PET done on 9/21/17 with no evidence of mets;   - recent CT chest/abdom on 8/24/2018 showed no evidence on cancer  - s/p prior repeat surgery with right-sided lift with Dr Umana with repeat evaluation in Nov 2018   - she had a slow post-op recovery and has residual aches and pains  - she had PET on 5/13/2019  - she has been off of the arimidex and is now on femara after a several week break; she is having some generalized aches and pains; she no longer has the fogginess.      (2) Mild leucopenia/anemia secondary to chemotherapy (resolved)  - latest wbc is at 4.76 - latest hgb was at 13.3     (3) Prior abnormal PAP issues - followed by Dr Cline with GYN; latest pap was negative per patient and she was also HPV negative     (4) Hx of acoustic neuroma in past - s/p XRT in 2010; followed by Dr Gonsales     (5) Chronic fibromyalgia - she is not being followed by anyone; she may be having a flare-up      (6) GI issues - seen by Dr Edmonds with GI  - prior mild elevation bili and alk phos - resolved  - she had liver biopsy and EGD with "inflammation" per patient  - s/p esophageal dilation  - Dr Edmonds was concerned it may be from the " arimidex  - she is having an repeat US in Jan 2019  - she has been off of the arimidex and is now on femara     (7) RUE - resolved - possible lymphedema issues - she no longer sees PT  - also seen by Dr Liao    (8) Hypokalemia -on supplements - patient wanted to try OTC and did not want prescription meds        1. Malignant neoplasm of upper-outer quadrant of right breast in female, estrogen receptor positive     2. Estrogen receptor positive             PLAN:  1. Check up to date labs every 3-4 months  2. F/U with PCP, Surg and GYN  3. Continue with the femara for now and will re-evaluate in a month - may need to change to Tamoxifen if she has any excessive symptoms   4.  F/u with Dr Edmonds as directed by him with planned US in Jan 2020 - I still need his notes  RTC in 4-6 weeks    Fax note to Chaparro Shepherd III, *, CARLY Bear Mannina, Babycos; Grey     I spent over 25 mins of time with the patient. Over half of this time was spent couseling and coordinating care.    Discussion:     I have explained all of the above in detail and the patient understands all of the current recommendation(s). I have answered all of their questions to the best of my ability and to their complete satisfaction.   The patient is to continue with the current management plan.            Electronically signed by Bronson Carrillo MD

## 2019-09-16 ENCOUNTER — OFFICE VISIT (OUTPATIENT)
Dept: HEMATOLOGY/ONCOLOGY | Facility: CLINIC | Age: 54
End: 2019-09-16
Payer: COMMERCIAL

## 2019-09-16 VITALS
WEIGHT: 133.19 LBS | RESPIRATION RATE: 18 BRPM | HEART RATE: 87 BPM | SYSTOLIC BLOOD PRESSURE: 110 MMHG | DIASTOLIC BLOOD PRESSURE: 74 MMHG | BODY MASS INDEX: 20.86 KG/M2 | TEMPERATURE: 99 F

## 2019-09-16 DIAGNOSIS — Z17.0 ESTROGEN RECEPTOR POSITIVE: ICD-10-CM

## 2019-09-16 DIAGNOSIS — Z17.0 MALIGNANT NEOPLASM OF UPPER-OUTER QUADRANT OF RIGHT BREAST IN FEMALE, ESTROGEN RECEPTOR POSITIVE: Primary | ICD-10-CM

## 2019-09-16 DIAGNOSIS — C50.411 MALIGNANT NEOPLASM OF UPPER-OUTER QUADRANT OF RIGHT BREAST IN FEMALE, ESTROGEN RECEPTOR POSITIVE: Primary | ICD-10-CM

## 2019-09-16 PROCEDURE — 99214 OFFICE O/P EST MOD 30 MIN: CPT | Mod: S$GLB,,, | Performed by: INTERNAL MEDICINE

## 2019-09-16 PROCEDURE — 3008F PR BODY MASS INDEX (BMI) DOCUMENTED: ICD-10-PCS | Mod: S$GLB,,, | Performed by: INTERNAL MEDICINE

## 2019-09-16 PROCEDURE — 99214 PR OFFICE/OUTPT VISIT, EST, LEVL IV, 30-39 MIN: ICD-10-PCS | Mod: S$GLB,,, | Performed by: INTERNAL MEDICINE

## 2019-09-16 PROCEDURE — 3008F BODY MASS INDEX DOCD: CPT | Mod: S$GLB,,, | Performed by: INTERNAL MEDICINE

## 2019-09-18 DIAGNOSIS — Z13.820 SCREENING FOR OSTEOPOROSIS: ICD-10-CM

## 2019-09-18 DIAGNOSIS — Z78.0 MENOPAUSE: Primary | ICD-10-CM

## 2019-09-24 ENCOUNTER — HOSPITAL ENCOUNTER (OUTPATIENT)
Dept: RADIOLOGY | Facility: HOSPITAL | Age: 54
Discharge: HOME | End: 2019-09-24
Attending: SPECIALIST

## 2019-09-24 DIAGNOSIS — Z13.820 SCREENING FOR OSTEOPOROSIS: ICD-10-CM

## 2019-09-24 DIAGNOSIS — Z78.0 MENOPAUSE: ICD-10-CM

## 2019-09-24 PROCEDURE — 77080 DXA BONE DENSITY AXIAL: CPT | Mod: TC,PO

## 2019-09-30 ENCOUNTER — TELEPHONE (OUTPATIENT)
Dept: HEMATOLOGY/ONCOLOGY | Facility: CLINIC | Age: 54
End: 2019-09-30

## 2019-09-30 NOTE — TELEPHONE ENCOUNTER
Patient called and said that she is feeling depressed fatigued and also aches and pains. She said she was going to try to continue it until her followup 10/24. She was going to try to continue it until then and discuss what different medication she could take. She feels she can not continue it and chooses to stop the femera now and see if the symptoms subside and then will discuss this with Dr. Carrillo at her next office visit. Dr. Carrillo made aware of this.

## 2019-10-21 ENCOUNTER — TELEPHONE (OUTPATIENT)
Dept: HEMATOLOGY/ONCOLOGY | Facility: CLINIC | Age: 54
End: 2019-10-21

## 2019-10-21 DIAGNOSIS — Z17.0 MALIGNANT NEOPLASM OF UPPER-OUTER QUADRANT OF RIGHT BREAST IN FEMALE, ESTROGEN RECEPTOR POSITIVE: Primary | ICD-10-CM

## 2019-10-21 DIAGNOSIS — C50.411 MALIGNANT NEOPLASM OF UPPER-OUTER QUADRANT OF RIGHT BREAST IN FEMALE, ESTROGEN RECEPTOR POSITIVE: Primary | ICD-10-CM

## 2019-10-21 DIAGNOSIS — Z17.0 ESTROGEN RECEPTOR POSITIVE: ICD-10-CM

## 2019-10-21 NOTE — TELEPHONE ENCOUNTER
Called to see if the pt had any labs done prior to f/u appt.    Pt will have the labs done @ [facility].

## 2019-10-22 ENCOUNTER — LAB VISIT (OUTPATIENT)
Dept: LAB | Facility: HOSPITAL | Age: 54
End: 2019-10-22
Attending: INTERNAL MEDICINE
Payer: COMMERCIAL

## 2019-10-22 DIAGNOSIS — C50.411 MALIGNANT NEOPLASM OF UPPER-OUTER QUADRANT OF RIGHT FEMALE BREAST: Primary | ICD-10-CM

## 2019-10-22 DIAGNOSIS — Z17.0 ESTROGEN RECEPTOR POSITIVE: ICD-10-CM

## 2019-10-22 LAB
ALBUMIN SERPL BCP-MCNC: 4.5 G/DL (ref 3.5–5.2)
ALP SERPL-CCNC: 99 U/L (ref 55–135)
ALT SERPL W/O P-5'-P-CCNC: 25 U/L (ref 10–44)
ANION GAP SERPL CALC-SCNC: 8 MMOL/L (ref 8–16)
AST SERPL-CCNC: 32 U/L (ref 10–40)
BASOPHILS # BLD AUTO: 0.04 K/UL (ref 0–0.2)
BASOPHILS NFR BLD: 0.7 % (ref 0–1.9)
BILIRUB SERPL-MCNC: 0.9 MG/DL (ref 0.1–1)
BUN SERPL-MCNC: 13 MG/DL (ref 6–20)
CALCIUM SERPL-MCNC: 9.6 MG/DL (ref 8.7–10.5)
CHLORIDE SERPL-SCNC: 105 MMOL/L (ref 95–110)
CO2 SERPL-SCNC: 29 MMOL/L (ref 23–29)
CREAT SERPL-MCNC: 0.7 MG/DL (ref 0.5–1.4)
DIFFERENTIAL METHOD: ABNORMAL
EOSINOPHIL # BLD AUTO: 0 K/UL (ref 0–0.5)
EOSINOPHIL NFR BLD: 0.7 % (ref 0–8)
ERYTHROCYTE [DISTWIDTH] IN BLOOD BY AUTOMATED COUNT: 13.3 % (ref 11.5–14.5)
EST. GFR  (AFRICAN AMERICAN): >60 ML/MIN/1.73 M^2
EST. GFR  (NON AFRICAN AMERICAN): >60 ML/MIN/1.73 M^2
GLUCOSE SERPL-MCNC: 82 MG/DL (ref 70–110)
HCT VFR BLD AUTO: 38.4 % (ref 37–48.5)
HGB BLD-MCNC: 12.8 G/DL (ref 12–16)
IMM GRANULOCYTES # BLD AUTO: 0.02 K/UL (ref 0–0.04)
IMM GRANULOCYTES NFR BLD AUTO: 0.4 % (ref 0–0.5)
LYMPHOCYTES # BLD AUTO: 1.2 K/UL (ref 1–4.8)
LYMPHOCYTES NFR BLD: 20.8 % (ref 18–48)
MCH RBC QN AUTO: 31.8 PG (ref 27–31)
MCHC RBC AUTO-ENTMCNC: 33.3 G/DL (ref 32–36)
MCV RBC AUTO: 96 FL (ref 82–98)
MONOCYTES # BLD AUTO: 0.5 K/UL (ref 0.3–1)
MONOCYTES NFR BLD: 8.8 % (ref 4–15)
NEUTROPHILS # BLD AUTO: 3.8 K/UL (ref 1.8–7.7)
NEUTROPHILS NFR BLD: 68.6 % (ref 38–73)
NRBC BLD-RTO: 0 /100 WBC
PLATELET # BLD AUTO: 247 K/UL (ref 150–350)
PMV BLD AUTO: 11.1 FL (ref 9.2–12.9)
POTASSIUM SERPL-SCNC: 3.4 MMOL/L (ref 3.5–5.1)
PROT SERPL-MCNC: 7.4 G/DL (ref 6–8.4)
RBC # BLD AUTO: 4.02 M/UL (ref 4–5.4)
SODIUM SERPL-SCNC: 142 MMOL/L (ref 136–145)
WBC # BLD AUTO: 5.54 K/UL (ref 3.9–12.7)

## 2019-10-22 PROCEDURE — 36415 COLL VENOUS BLD VENIPUNCTURE: CPT

## 2019-10-22 PROCEDURE — 85025 COMPLETE CBC W/AUTO DIFF WBC: CPT

## 2019-10-22 PROCEDURE — 80053 COMPREHEN METABOLIC PANEL: CPT

## 2019-10-23 PROBLEM — Z90.13 S/P BILATERAL MASTECTOMY: Status: ACTIVE | Noted: 2019-10-23

## 2019-10-23 NOTE — PROGRESS NOTES
[facility] Hematology/Oncology  PROGRESS NOTE      Subjective:       Patient ID:   NAME: Balnca Loya : 1965     54 y.o. female    Referring Doc: Cora  Other Physicians: Lyndsay Gonsales, CARLY Barillas    Chief Complaint:  Breast ca f/u    History of Present Illness:     Patient returns today for a regularly scheduled follow-up visit.  She is doing well with no new issues. No CP, SOB, HA's or N/V. She had been on the femara and has since discontinued that also about 3 weeks ago. She is here by herself.  She is having some aches and pains especially with the wether change and she has fibromyalgia. Dr Umana had wanted to do another surgery but she is hesitant.     ROS:   GEN: normal without any fever, night sweats or weight loss  HEENT: normal with no HA's, sore throat, stiff neck, changes in vision  CV: normal with no CP, SOB, PND, RUGGIERO or orthopnea  PULM: normal with no SOB, cough, hemoptysis, sputum or pleuritic pain  GI: normal with no abdominal pain, nausea, vomiting, constipation, diarrhea, melanotic stools, BRBPR, or hematemesis  : normal with no hematuria, dysuria  BREAST: normal with no mass, discharge, pain  SKIN: normal with no rash, erythema, bruising, or swelling    Allergies:  Review of patient's allergies indicates:   Allergen Reactions    Demerol [meperidine] Anxiety    Pcn [penicillins] Rash       Medications:    Current Outpatient Medications:     ALPRAZolam (XANAX) 0.5 MG tablet, TK 1 T PO QD PRN, Disp: , Rfl: 0    anastrozole (ARIMIDEX ORAL), Arimidex, Disp: , Rfl:     ascorbic acid, vitamin C, (VITAMIN C) 1000 MG tablet, Take 1,000 mg by mouth once daily., Disp: , Rfl:     b complex vitamins tablet, Take 1 tablet by mouth once daily., Disp: , Rfl:     butalbital-acetaminophen-caffeine -40 mg (FIORICET, ESGIC) -40 mg per tablet, , Disp: , Rfl:     CHROMIUM ORAL, Take by mouth., Disp: , Rfl:     levothyroxine (SYNTHROID) 100 MCG tablet, TK 1 T PO QD, Disp: ,  Rfl: 0    oxyCODONE-acetaminophen (PERCOCET) 5-325 mg per tablet, Take 1 tablet by mouth every 6 (six) hours as needed for Pain., Disp: 28 tablet, Rfl: 0    sucralfate (CARAFATE) 1 gram tablet, Carafate 1 gram tablet  Take 1 tablet 4 times a day by oral route for 30 days.  as needed for abd upset, Disp: , Rfl:     PMHx/PSHx Updates:  See patient's last visit with me on 9/16/2019  See H&P on 9/8/2016        Pathology:      See path note on 8/30/2016      Objective:     Vitals:  Blood pressure 120/66, pulse 78, temperature 98.5 °F (36.9 °C), resp. rate 18, weight 60.9 kg (134 lb 3.2 oz), last menstrual period 09/10/2016.    Physical Examination:   GEN: no apparent distress, comfortable; AAOx3  HEAD: atraumatic and normocephalic  EYES: no pallor, no icterus, PERRLA  ENT: OMM, no pharyngeal erythema, external ears WNL; no nasal discharge; no thrush  NECK: no masses, thyroid normal, trachea midline, no LAD/LN's, supple  CV: RRR with no murmur; normal pulse; normal S1 and S2; no pedal edema  CHEST: Normal respiratory effort; CTAB; normal breath sounds; no wheeze or crackles  ABDOM: nontender and nondistended; soft; normal bowel sounds; no rebound/guarding  MUSC/Skeletal: ROM normal; no crepitus; joints normal; no deformities or arthropathy  EXTREM: no clubbing, cyanosis, inflammation or swelling  SKIN: no rashes, lesions, ulcers, petechiae or subcutaneous nodules  : no tyson  NEURO: grossly intact; motor/sensory WNL; AAOx3; no tremors  PSYCH: normal mood, affect and behavior  LYMPH: normal cervical, supraclavicular, axillary and groin LN's  Breast: no changes          Labs:     10/22/2019    Lab Results   Component Value Date    WBC 5.54 10/22/2019    HGB 12.8 10/22/2019    HCT 38.4 10/22/2019    MCV 96 10/22/2019     10/22/2019        8/27/2019  BMP  Lab Results   Component Value Date     10/22/2019    K 3.4 (L) 10/22/2019     10/22/2019    CO2 29 10/22/2019    BUN 13 10/22/2019    CREATININE 0.7  10/22/2019    CALCIUM 9.6 10/22/2019    ANIONGAP 8 10/22/2019    ESTGFRAFRICA >60.0 10/22/2019    EGFRNONAA >60.0 10/22/2019     Lab Results   Component Value Date    ALT 25 10/22/2019    AST 32 10/22/2019    ALKPHOS 99 10/22/2019    BILITOT 0.9 10/22/2019           Radiology/Diagnostic Studies:    PET/CT  5/13/2019:    IMPRESSION:    1. No evidence of FDG avid metastatic disease.  2. Incidental findings as above.          Chest/Abdom CT  8/24/2018:      IMPRESSION:  1. No evidence of metastatic disease in the chest, abdomen, or the pelvis.  2. Additional observations as above        [facility] Unknown Rad Eap    Result Date: 9/21/2017  INTEGRATED PET CT WITH IMAGE FUSION HISTORY:  c50.411 z17.0 subsequent treatment evaluation for right breast cancer diagnosed in August 2016. Bilateral mastectomies with chemotherapy and radiation. TECHNIQUE: Following the injection of 12.2 mCi of F-18 labeled FDG into a left antecubital vein, PET CT was performed from the vertex of the skull through the proximal thighs with an integrated full ring PET CT scanner with image fusion. The patient's serum glucose at the time of the exam was 86 mg/dL. COMPARISON: 09/20/2016 FINDINGS: There is no abnormal FDG activity to suggest recurrent or metastatic disease. CT of the head and neck show no intra-axial lesions or cervical adenopathy. The patient has had a left occipital craniotomy. CT of the chest demonstrates bilateral breast implants. There is no hilar, mediastinal or axillary adenopathy. There are no pulmonary nodules, infiltrates or pleural effusions. CT of the abdomen and pelvis demonstrates physiologic activity within the GI tract and urinary system. There is no adenopathy. The adrenal glands are normal. There are no lytic or blastic lesions.     IMPRESSION: No evidence of metastatic disease Prior bilateral mastectomies Read and electronically signed by: Mireya Carias MD on 9/21/2017 1:01 PM CDT MIREYA CARIAS MD      I have  "reviewed all available lab results and radiology reports.    Assessment/Plan:   (1) 54 y.o. female with diagnosis of right breast cancer  - s/p bilateral mastectomies on 9/26/16 followed by reconstruction  - followed by Dr Bear with GenSur  - she had 2 out of 7 LN's that were positive  - she was a Stage IIA  - ER+/MA+ and her2nu neg  - s/p AC x4 and taxol x4  - she has seen Dr Gonsales with rad/onc for XRT and completed 5 weeks last month  - discussed the pro's and con's of tamoxifen versus arimidex, and in light of the pap issues, arimidex is probably the better choice  - she had been on arimidex but it was discontinued because of her liver  - she has some mood swings and weight gain  - PET done on 9/21/17 with no evidence of mets;   - recent CT chest/abdom on 8/24/2018 showed no evidence on cancer  - s/p prior repeat surgery with right-sided lift with Dr Umana with repeat evaluation in Nov 2018   - she had a slow post-op recovery and has residual aches and pains  - she had PET on 5/13/2019  - she has been on arimidex and now femara which she both discontinued; she is having some generalized aches and pains;   - discussed aromasine and she is willing to give it a try; she does not want to take tamoxifen due to the clot and ovarian cancer risks     (2) Mild leucopenia/anemia secondary to chemotherapy (resolved)  - latest wbc is at 5.54 - latest hgb was at 12.8     (3) Prior abnormal PAP issues - followed by Dr Cline with GYN; latest pap was negative per patient and she was also HPV negative     (4) Hx of acoustic neuroma in past - s/p XRT in 2010; followed by Dr Gonsales     (5) Chronic fibromyalgia - she is not being followed by anyone; she may be having a flare-up      (6) GI issues - seen by Dr Edmonds with GI  - prior mild elevation bili and alk phos - resolved  - she had liver biopsy and EGD with "inflammation" per patient  - s/p esophageal dilation  - Dr Edmonds was concerned it may be from the " arimidex  - she is having an repeat US in Jan 2019  - she has been off of the arimidex and is now on femara     (7) RUE - resolved - possible lymphedema issues - she no longer sees PT  - also seen by Dr Liao    (8) Hypokalemia -on supplements - patient wanted to try OTC and did not want prescription meds        1. Malignant neoplasm of upper-outer quadrant of right breast in female, estrogen receptor positive     2. Estrogen receptor positive     3. S/P bilateral mastectomy             PLAN:  1. Check up to date labs every 3-4 months  2. F/U with PCP, Surg and GYN  3. Switch to aromasin and re-evaluate her symptoms in 4 weeks   4.  F/u with Dr Edmonds as directed by him with planned US in Jan 2020    RTC in 4-6 weeks    Fax note to Chaparro Shepherd III, *, CARLY Bear Mannina, Babycos; Grey     I spent over 25 mins of time with the patient. Over half of this time was spent couseling and coordinating care.    Discussion:     I have explained all of the above in detail and the patient understands all of the current recommendation(s). I have answered all of their questions to the best of my ability and to their complete satisfaction.   The patient is to continue with the current management plan.            Electronically signed by Bronson Carrillo MD

## 2019-10-24 ENCOUNTER — OFFICE VISIT (OUTPATIENT)
Dept: HEMATOLOGY/ONCOLOGY | Facility: CLINIC | Age: 54
End: 2019-10-24
Payer: COMMERCIAL

## 2019-10-24 VITALS
BODY MASS INDEX: 21.02 KG/M2 | WEIGHT: 134.19 LBS | SYSTOLIC BLOOD PRESSURE: 120 MMHG | DIASTOLIC BLOOD PRESSURE: 66 MMHG | TEMPERATURE: 99 F | RESPIRATION RATE: 18 BRPM | HEART RATE: 78 BPM

## 2019-10-24 DIAGNOSIS — C50.411 MALIGNANT NEOPLASM OF UPPER-OUTER QUADRANT OF RIGHT BREAST IN FEMALE, ESTROGEN RECEPTOR POSITIVE: Primary | ICD-10-CM

## 2019-10-24 DIAGNOSIS — Z17.0 ESTROGEN RECEPTOR POSITIVE: ICD-10-CM

## 2019-10-24 DIAGNOSIS — Z90.13 S/P BILATERAL MASTECTOMY: ICD-10-CM

## 2019-10-24 DIAGNOSIS — Z17.0 MALIGNANT NEOPLASM OF UPPER-OUTER QUADRANT OF RIGHT BREAST IN FEMALE, ESTROGEN RECEPTOR POSITIVE: Primary | ICD-10-CM

## 2019-10-24 PROCEDURE — 3008F PR BODY MASS INDEX (BMI) DOCUMENTED: ICD-10-PCS | Mod: S$GLB,,, | Performed by: INTERNAL MEDICINE

## 2019-10-24 PROCEDURE — 99214 PR OFFICE/OUTPT VISIT, EST, LEVL IV, 30-39 MIN: ICD-10-PCS | Mod: S$GLB,,, | Performed by: INTERNAL MEDICINE

## 2019-10-24 PROCEDURE — 99214 OFFICE O/P EST MOD 30 MIN: CPT | Mod: S$GLB,,, | Performed by: INTERNAL MEDICINE

## 2019-10-24 PROCEDURE — 3008F BODY MASS INDEX DOCD: CPT | Mod: S$GLB,,, | Performed by: INTERNAL MEDICINE

## 2019-10-24 RX ORDER — EXEMESTANE 25 MG/1
25 TABLET ORAL DAILY
Qty: 30 TABLET | Refills: 3 | Status: SHIPPED | OUTPATIENT
Start: 2019-10-24 | End: 2020-03-06

## 2019-11-04 DIAGNOSIS — K82.4 CHOLESTEROLOSIS OF GALLBLADDER: Primary | ICD-10-CM

## 2019-11-07 ENCOUNTER — HOSPITAL ENCOUNTER (OUTPATIENT)
Dept: RADIOLOGY | Facility: HOSPITAL | Age: 54
Discharge: HOME | End: 2019-11-07
Attending: INTERNAL MEDICINE
Payer: COMMERCIAL

## 2019-11-07 DIAGNOSIS — K82.4 CHOLESTEROLOSIS OF GALLBLADDER: ICD-10-CM

## 2019-11-07 PROCEDURE — 76705 ECHO EXAM OF ABDOMEN: CPT | Mod: TC,PO

## 2019-12-03 ENCOUNTER — TELEPHONE (OUTPATIENT)
Dept: HEMATOLOGY/ONCOLOGY | Facility: CLINIC | Age: 54
End: 2019-12-03

## 2019-12-03 NOTE — TELEPHONE ENCOUNTER
Called to see if the pt had any labs done prior to f/u appt.    Pt VU labs to be done @ [facility]

## 2019-12-04 ENCOUNTER — LAB VISIT (OUTPATIENT)
Dept: LAB | Facility: HOSPITAL | Age: 54
End: 2019-12-04
Attending: INTERNAL MEDICINE
Payer: COMMERCIAL

## 2019-12-04 DIAGNOSIS — C50.411 MALIGNANT NEOPLASM OF UPPER-OUTER QUADRANT OF RIGHT BREAST IN FEMALE, ESTROGEN RECEPTOR POSITIVE: ICD-10-CM

## 2019-12-04 DIAGNOSIS — Z17.0 MALIGNANT NEOPLASM OF UPPER-OUTER QUADRANT OF RIGHT BREAST IN FEMALE, ESTROGEN RECEPTOR POSITIVE: ICD-10-CM

## 2019-12-04 DIAGNOSIS — Z17.0 ESTROGEN RECEPTOR POSITIVE: ICD-10-CM

## 2019-12-04 LAB
ALBUMIN SERPL BCP-MCNC: 4.2 G/DL (ref 3.5–5.2)
ALP SERPL-CCNC: 223 U/L (ref 55–135)
ALT SERPL W/O P-5'-P-CCNC: 172 U/L (ref 10–44)
ANION GAP SERPL CALC-SCNC: 9 MMOL/L (ref 8–16)
AST SERPL-CCNC: 199 U/L (ref 10–40)
BASOPHILS # BLD AUTO: 0.03 K/UL (ref 0–0.2)
BASOPHILS NFR BLD: 0.8 % (ref 0–1.9)
BILIRUB SERPL-MCNC: 1.5 MG/DL (ref 0.1–1)
BUN SERPL-MCNC: 10 MG/DL (ref 6–20)
CALCIUM SERPL-MCNC: 9.5 MG/DL (ref 8.7–10.5)
CHLORIDE SERPL-SCNC: 102 MMOL/L (ref 95–110)
CO2 SERPL-SCNC: 31 MMOL/L (ref 23–29)
CREAT SERPL-MCNC: 0.9 MG/DL (ref 0.5–1.4)
DIFFERENTIAL METHOD: ABNORMAL
EOSINOPHIL # BLD AUTO: 0.1 K/UL (ref 0–0.5)
EOSINOPHIL NFR BLD: 1.4 % (ref 0–8)
ERYTHROCYTE [DISTWIDTH] IN BLOOD BY AUTOMATED COUNT: 13.8 % (ref 11.5–14.5)
EST. GFR  (AFRICAN AMERICAN): >60 ML/MIN/1.73 M^2
EST. GFR  (NON AFRICAN AMERICAN): >60 ML/MIN/1.73 M^2
GLUCOSE SERPL-MCNC: 86 MG/DL (ref 70–110)
HCT VFR BLD AUTO: 38.1 % (ref 37–48.5)
HGB BLD-MCNC: 12.6 G/DL (ref 12–16)
IMM GRANULOCYTES # BLD AUTO: 0.02 K/UL (ref 0–0.04)
IMM GRANULOCYTES NFR BLD AUTO: 0.5 % (ref 0–0.5)
LYMPHOCYTES # BLD AUTO: 1 K/UL (ref 1–4.8)
LYMPHOCYTES NFR BLD: 26.6 % (ref 18–48)
MCH RBC QN AUTO: 31.4 PG (ref 27–31)
MCHC RBC AUTO-ENTMCNC: 33.1 G/DL (ref 32–36)
MCV RBC AUTO: 95 FL (ref 82–98)
MONOCYTES # BLD AUTO: 0.5 K/UL (ref 0.3–1)
MONOCYTES NFR BLD: 13.9 % (ref 4–15)
NEUTROPHILS # BLD AUTO: 2.1 K/UL (ref 1.8–7.7)
NEUTROPHILS NFR BLD: 56.8 % (ref 38–73)
NRBC BLD-RTO: 0 /100 WBC
PLATELET # BLD AUTO: 258 K/UL (ref 150–350)
PMV BLD AUTO: 10.2 FL (ref 9.2–12.9)
POTASSIUM SERPL-SCNC: 3.6 MMOL/L (ref 3.5–5.1)
PROT SERPL-MCNC: 7.2 G/DL (ref 6–8.4)
RBC # BLD AUTO: 4.01 M/UL (ref 4–5.4)
SODIUM SERPL-SCNC: 142 MMOL/L (ref 136–145)
WBC # BLD AUTO: 3.68 K/UL (ref 3.9–12.7)

## 2019-12-04 PROCEDURE — 85025 COMPLETE CBC W/AUTO DIFF WBC: CPT

## 2019-12-04 PROCEDURE — 36415 COLL VENOUS BLD VENIPUNCTURE: CPT

## 2019-12-04 PROCEDURE — 80053 COMPREHEN METABOLIC PANEL: CPT

## 2019-12-05 ENCOUNTER — OFFICE VISIT (OUTPATIENT)
Dept: HEMATOLOGY/ONCOLOGY | Facility: CLINIC | Age: 54
End: 2019-12-05
Payer: COMMERCIAL

## 2019-12-05 VITALS
SYSTOLIC BLOOD PRESSURE: 125 MMHG | DIASTOLIC BLOOD PRESSURE: 71 MMHG | HEART RATE: 82 BPM | BODY MASS INDEX: 21.28 KG/M2 | RESPIRATION RATE: 19 BRPM | TEMPERATURE: 98 F | WEIGHT: 135.88 LBS

## 2019-12-05 DIAGNOSIS — C50.411 MALIGNANT NEOPLASM OF UPPER-OUTER QUADRANT OF RIGHT BREAST IN FEMALE, ESTROGEN RECEPTOR POSITIVE: Primary | ICD-10-CM

## 2019-12-05 DIAGNOSIS — Z17.0 ESTROGEN RECEPTOR POSITIVE: ICD-10-CM

## 2019-12-05 DIAGNOSIS — Z17.0 MALIGNANT NEOPLASM OF UPPER-OUTER QUADRANT OF RIGHT BREAST IN FEMALE, ESTROGEN RECEPTOR POSITIVE: Primary | ICD-10-CM

## 2019-12-05 DIAGNOSIS — Z90.13 S/P BILATERAL MASTECTOMY: ICD-10-CM

## 2019-12-05 PROCEDURE — 3008F PR BODY MASS INDEX (BMI) DOCUMENTED: ICD-10-PCS | Mod: S$GLB,,, | Performed by: INTERNAL MEDICINE

## 2019-12-05 PROCEDURE — 3008F BODY MASS INDEX DOCD: CPT | Mod: S$GLB,,, | Performed by: INTERNAL MEDICINE

## 2019-12-05 PROCEDURE — 99214 PR OFFICE/OUTPT VISIT, EST, LEVL IV, 30-39 MIN: ICD-10-PCS | Mod: S$GLB,,, | Performed by: INTERNAL MEDICINE

## 2019-12-05 PROCEDURE — 99214 OFFICE O/P EST MOD 30 MIN: CPT | Mod: S$GLB,,, | Performed by: INTERNAL MEDICINE

## 2019-12-17 ENCOUNTER — LAB VISIT (OUTPATIENT)
Dept: LAB | Facility: HOSPITAL | Age: 54
End: 2019-12-17
Attending: INTERNAL MEDICINE
Payer: COMMERCIAL

## 2019-12-17 DIAGNOSIS — Z17.0 ESTROGEN RECEPTOR POSITIVE: ICD-10-CM

## 2019-12-17 DIAGNOSIS — C50.411 MALIGNANT NEOPLASM OF UPPER-OUTER QUADRANT OF RIGHT FEMALE BREAST: Primary | ICD-10-CM

## 2019-12-17 LAB
ALBUMIN SERPL BCP-MCNC: 4.4 G/DL (ref 3.5–5.2)
ALP SERPL-CCNC: 145 U/L (ref 55–135)
ALT SERPL W/O P-5'-P-CCNC: 50 U/L (ref 10–44)
ANION GAP SERPL CALC-SCNC: 7 MMOL/L (ref 8–16)
AST SERPL-CCNC: 49 U/L (ref 10–40)
BASOPHILS # BLD AUTO: 0.02 K/UL (ref 0–0.2)
BASOPHILS NFR BLD: 0.6 % (ref 0–1.9)
BILIRUB SERPL-MCNC: 1.2 MG/DL (ref 0.1–1)
BUN SERPL-MCNC: 14 MG/DL (ref 6–20)
CALCIUM SERPL-MCNC: 9.7 MG/DL (ref 8.7–10.5)
CHLORIDE SERPL-SCNC: 101 MMOL/L (ref 95–110)
CO2 SERPL-SCNC: 31 MMOL/L (ref 23–29)
CREAT SERPL-MCNC: 0.7 MG/DL (ref 0.5–1.4)
DIFFERENTIAL METHOD: ABNORMAL
EOSINOPHIL # BLD AUTO: 0 K/UL (ref 0–0.5)
EOSINOPHIL NFR BLD: 0.6 % (ref 0–8)
ERYTHROCYTE [DISTWIDTH] IN BLOOD BY AUTOMATED COUNT: 13.6 % (ref 11.5–14.5)
EST. GFR  (AFRICAN AMERICAN): >60 ML/MIN/1.73 M^2
EST. GFR  (NON AFRICAN AMERICAN): >60 ML/MIN/1.73 M^2
GLUCOSE SERPL-MCNC: 87 MG/DL (ref 70–110)
HCT VFR BLD AUTO: 38.4 % (ref 37–48.5)
HGB BLD-MCNC: 12.8 G/DL (ref 12–16)
IMM GRANULOCYTES # BLD AUTO: 0.01 K/UL (ref 0–0.04)
IMM GRANULOCYTES NFR BLD AUTO: 0.3 % (ref 0–0.5)
LYMPHOCYTES # BLD AUTO: 0.9 K/UL (ref 1–4.8)
LYMPHOCYTES NFR BLD: 26.4 % (ref 18–48)
MCH RBC QN AUTO: 31.9 PG (ref 27–31)
MCHC RBC AUTO-ENTMCNC: 33.3 G/DL (ref 32–36)
MCV RBC AUTO: 96 FL (ref 82–98)
MONOCYTES # BLD AUTO: 0.3 K/UL (ref 0.3–1)
MONOCYTES NFR BLD: 9.7 % (ref 4–15)
NEUTROPHILS # BLD AUTO: 2.1 K/UL (ref 1.8–7.7)
NEUTROPHILS NFR BLD: 62.4 % (ref 38–73)
NRBC BLD-RTO: 0 /100 WBC
PLATELET # BLD AUTO: 252 K/UL (ref 150–350)
PMV BLD AUTO: 11.1 FL (ref 9.2–12.9)
POTASSIUM SERPL-SCNC: 3.7 MMOL/L (ref 3.5–5.1)
PROT SERPL-MCNC: 7.6 G/DL (ref 6–8.4)
RBC # BLD AUTO: 4.01 M/UL (ref 4–5.4)
SODIUM SERPL-SCNC: 139 MMOL/L (ref 136–145)
WBC # BLD AUTO: 3.41 K/UL (ref 3.9–12.7)

## 2019-12-17 PROCEDURE — 36415 COLL VENOUS BLD VENIPUNCTURE: CPT

## 2019-12-17 PROCEDURE — 85025 COMPLETE CBC W/AUTO DIFF WBC: CPT

## 2019-12-17 PROCEDURE — 80053 COMPREHEN METABOLIC PANEL: CPT

## 2019-12-18 ENCOUNTER — TELEPHONE (OUTPATIENT)
Dept: HEMATOLOGY/ONCOLOGY | Facility: CLINIC | Age: 54
End: 2019-12-18

## 2019-12-18 NOTE — TELEPHONE ENCOUNTER
----- Message from Bronson Carrillo MD sent at 12/18/2019  9:26 AM CST -----  Call her with report, LFT's are much better

## 2020-01-03 ENCOUNTER — LAB VISIT (OUTPATIENT)
Dept: LAB | Facility: HOSPITAL | Age: 55
End: 2020-01-03
Attending: INTERNAL MEDICINE
Payer: COMMERCIAL

## 2020-01-03 DIAGNOSIS — Z17.0 ESTROGEN RECEPTOR POSITIVE: ICD-10-CM

## 2020-01-03 DIAGNOSIS — Z90.13 S/P BILATERAL MASTECTOMY: ICD-10-CM

## 2020-01-03 DIAGNOSIS — C50.411 MALIGNANT NEOPLASM OF UPPER-OUTER QUADRANT OF RIGHT BREAST IN FEMALE, ESTROGEN RECEPTOR POSITIVE: ICD-10-CM

## 2020-01-03 DIAGNOSIS — Z17.0 MALIGNANT NEOPLASM OF UPPER-OUTER QUADRANT OF RIGHT BREAST IN FEMALE, ESTROGEN RECEPTOR POSITIVE: ICD-10-CM

## 2020-01-03 LAB
ALBUMIN SERPL BCP-MCNC: 4.4 G/DL (ref 3.5–5.2)
ALP SERPL-CCNC: 119 U/L (ref 55–135)
ALT SERPL W/O P-5'-P-CCNC: 31 U/L (ref 10–44)
ANION GAP SERPL CALC-SCNC: 10 MMOL/L (ref 8–16)
AST SERPL-CCNC: 36 U/L (ref 10–40)
BASOPHILS # BLD AUTO: 0.02 K/UL (ref 0–0.2)
BASOPHILS NFR BLD: 0.3 % (ref 0–1.9)
BILIRUB SERPL-MCNC: 1.2 MG/DL (ref 0.1–1)
BUN SERPL-MCNC: 13 MG/DL (ref 6–20)
CALCIUM SERPL-MCNC: 9.8 MG/DL (ref 8.7–10.5)
CHLORIDE SERPL-SCNC: 101 MMOL/L (ref 95–110)
CO2 SERPL-SCNC: 29 MMOL/L (ref 23–29)
CREAT SERPL-MCNC: 0.8 MG/DL (ref 0.5–1.4)
DIFFERENTIAL METHOD: ABNORMAL
EOSINOPHIL # BLD AUTO: 0 K/UL (ref 0–0.5)
EOSINOPHIL NFR BLD: 0.6 % (ref 0–8)
ERYTHROCYTE [DISTWIDTH] IN BLOOD BY AUTOMATED COUNT: 13.4 % (ref 11.5–14.5)
EST. GFR  (AFRICAN AMERICAN): >60 ML/MIN/1.73 M^2
EST. GFR  (NON AFRICAN AMERICAN): >60 ML/MIN/1.73 M^2
GLUCOSE SERPL-MCNC: 91 MG/DL (ref 70–110)
HCT VFR BLD AUTO: 40.8 % (ref 37–48.5)
HGB BLD-MCNC: 13.5 G/DL (ref 12–16)
IMM GRANULOCYTES # BLD AUTO: 0.02 K/UL (ref 0–0.04)
IMM GRANULOCYTES NFR BLD AUTO: 0.3 % (ref 0–0.5)
LYMPHOCYTES # BLD AUTO: 1.3 K/UL (ref 1–4.8)
LYMPHOCYTES NFR BLD: 21.4 % (ref 18–48)
MCH RBC QN AUTO: 31.8 PG (ref 27–31)
MCHC RBC AUTO-ENTMCNC: 33.1 G/DL (ref 32–36)
MCV RBC AUTO: 96 FL (ref 82–98)
MONOCYTES # BLD AUTO: 0.6 K/UL (ref 0.3–1)
MONOCYTES NFR BLD: 10.1 % (ref 4–15)
NEUTROPHILS # BLD AUTO: 4.2 K/UL (ref 1.8–7.7)
NEUTROPHILS NFR BLD: 67.3 % (ref 38–73)
NRBC BLD-RTO: 0 /100 WBC
PLATELET # BLD AUTO: 272 K/UL (ref 150–350)
PMV BLD AUTO: 11 FL (ref 9.2–12.9)
POTASSIUM SERPL-SCNC: 3.3 MMOL/L (ref 3.5–5.1)
PROT SERPL-MCNC: 7.8 G/DL (ref 6–8.4)
RBC # BLD AUTO: 4.24 M/UL (ref 4–5.4)
SODIUM SERPL-SCNC: 140 MMOL/L (ref 136–145)
WBC # BLD AUTO: 6.26 K/UL (ref 3.9–12.7)

## 2020-01-03 PROCEDURE — 85025 COMPLETE CBC W/AUTO DIFF WBC: CPT

## 2020-01-03 PROCEDURE — 36415 COLL VENOUS BLD VENIPUNCTURE: CPT

## 2020-01-03 PROCEDURE — 80053 COMPREHEN METABOLIC PANEL: CPT

## 2020-01-06 ENCOUNTER — TELEPHONE (OUTPATIENT)
Dept: HEMATOLOGY/ONCOLOGY | Facility: CLINIC | Age: 55
End: 2020-01-06

## 2020-01-06 NOTE — TELEPHONE ENCOUNTER
Called the patient and instructed her that the potassium level is 3.3.  Instructed the patient that I can call in some potassium for her x 3 days.  Patient stated that she can't take it as it makes her nauseous.  She instructed me that she takes an over the counter potassium.  Instructed her to increase her potassium in her diet such as, bananas, potatoes and green leafy vegetables..

## 2020-01-06 NOTE — TELEPHONE ENCOUNTER
----- Message from Bronson Carrillo MD sent at 1/5/2020 11:19 AM CST -----  Potassium is a little low; LFT's are back to normal

## 2020-01-06 NOTE — PROGRESS NOTES
[facility] Hematology/Oncology  PROGRESS NOTE      Subjective:       Patient ID:   NAME: Blanca Loya : 1965     54 y.o. female    Referring Doc: Cora  Other Physicians: Lyndsay Gonsales, CARLY Barillas    Chief Complaint:  Breast ca f/u    History of Present Illness:     Patient returns today for a regularly scheduled follow-up visit.  She is doing well with no new issues. No CP, SOB, HA's or N/V. She is now off all oral antihormones.  She is here with her .  She is having some residual chronic aches and pains but she has fibromyalgia. She has since seen Dr Edmonds. Her LFT's are now back down to normal.         ROS:   GEN: normal without any fever, night sweats or weight loss  HEENT: normal with no HA's, sore throat, stiff neck, changes in vision  CV: normal with no CP, SOB, PND, RUGGIERO or orthopnea  PULM: normal with no SOB, cough, hemoptysis, sputum or pleuritic pain  GI: normal with no abdominal pain, nausea, vomiting, constipation, diarrhea, melanotic stools, BRBPR, or hematemesis  : normal with no hematuria, dysuria  BREAST: normal with no mass, discharge, pain  SKIN: normal with no rash, erythema, bruising, or swelling    Allergies:  Review of patient's allergies indicates:   Allergen Reactions    Demerol [meperidine] Anxiety    Pcn [penicillins] Rash       Medications:    Current Outpatient Medications:     ALPRAZolam (XANAX) 0.5 MG tablet, TK 1 T PO QD PRN, Disp: , Rfl: 0    ascorbic acid, vitamin C, (VITAMIN C) 1000 MG tablet, Take 1,000 mg by mouth once daily., Disp: , Rfl:     b complex vitamins tablet, Take 1 tablet by mouth once daily., Disp: , Rfl:     butalbital-acetaminophen-caffeine -40 mg (FIORICET, ESGIC) -40 mg per tablet, , Disp: , Rfl:     CHROMIUM ORAL, Take by mouth., Disp: , Rfl:     exemestane (AROMASIN) 25 mg tablet, Take 1 tablet (25 mg total) by mouth once daily., Disp: 30 tablet, Rfl: 3    levothyroxine (SYNTHROID) 100 MCG tablet, TK 1 T PO  QD, Disp: , Rfl: 0    oxyCODONE-acetaminophen (PERCOCET) 5-325 mg per tablet, Take 1 tablet by mouth every 6 (six) hours as needed for Pain., Disp: 28 tablet, Rfl: 0    POTASSIUM ACETATE MISC, by Misc.(Non-Drug; Combo Route) route., Disp: , Rfl:     sucralfate (CARAFATE) 1 gram tablet, Carafate 1 gram tablet  Take 1 tablet 4 times a day by oral route for 30 days.  as needed for abd upset, Disp: , Rfl:     PMHx/PSHx Updates:  See patient's last visit with me on 12/5/2019  See H&P on 9/8/2016        Pathology:      See path note on 8/30/2016      Objective:     Vitals:  Blood pressure 123/77, pulse 101, temperature 98.4 °F (36.9 °C), temperature source Oral, resp. rate 18, weight 61.7 kg (136 lb), last menstrual period 09/10/2016.    Physical Examination:   GEN: no apparent distress, comfortable; AAOx3  HEAD: atraumatic and normocephalic  EYES: no pallor, no icterus, PERRLA  ENT: OMM, no pharyngeal erythema, external ears WNL; no nasal discharge; no thrush  NECK: no masses, thyroid normal, trachea midline, no LAD/LN's, supple  CV: RRR with no murmur; normal pulse; normal S1 and S2; no pedal edema  CHEST: Normal respiratory effort; CTAB; normal breath sounds; no wheeze or crackles  ABDOM: nontender and nondistended; soft; normal bowel sounds; no rebound/guarding  MUSC/Skeletal: ROM normal; no crepitus; joints normal; no deformities or arthropathy  EXTREM: no clubbing, cyanosis, inflammation or swelling  SKIN: no rashes, lesions, ulcers, petechiae or subcutaneous nodules  : no tyson  NEURO: grossly intact; motor/sensory WNL; AAOx3; no tremors  PSYCH: normal mood, affect and behavior  LYMPH: normal cervical, supraclavicular, axillary and groin LN's  Breast: no changes          Labs:     1/3/2020    Lab Results   Component Value Date    WBC 6.26 01/03/2020    HGB 13.5 01/03/2020    HCT 40.8 01/03/2020    MCV 96 01/03/2020     01/03/2020          BMP  Lab Results   Component Value Date     01/03/2020    K  3.3 (L) 01/03/2020     01/03/2020    CO2 29 01/03/2020    BUN 13 01/03/2020    CREATININE 0.8 01/03/2020    CALCIUM 9.8 01/03/2020    ANIONGAP 10 01/03/2020    ESTGFRAFRICA >60.0 01/03/2020    EGFRNONAA >60.0 01/03/2020     Lab Results   Component Value Date    ALT 31 01/03/2020    AST 36 01/03/2020    ALKPHOS 119 01/03/2020    BILITOT 1.2 (H) 01/03/2020           Radiology/Diagnostic Studies:    US  11/7/2019:  Impression       Normal right upper quadrant ultrasound.         PET/CT  5/13/2019:    IMPRESSION:    1. No evidence of FDG avid metastatic disease.  2. Incidental findings as above.          Chest/Abdom CT  8/24/2018:      IMPRESSION:  1. No evidence of metastatic disease in the chest, abdomen, or the pelvis.  2. Additional observations as above        [facility] Unknown Rad Eap    Result Date: 9/21/2017  INTEGRATED PET CT WITH IMAGE FUSION HISTORY:  c50.411 z17.0 subsequent treatment evaluation for right breast cancer diagnosed in August 2016. Bilateral mastectomies with chemotherapy and radiation. TECHNIQUE: Following the injection of 12.2 mCi of F-18 labeled FDG into a left antecubital vein, PET CT was performed from the vertex of the skull through the proximal thighs with an integrated full ring PET CT scanner with image fusion. The patient's serum glucose at the time of the exam was 86 mg/dL. COMPARISON: 09/20/2016 FINDINGS: There is no abnormal FDG activity to suggest recurrent or metastatic disease. CT of the head and neck show no intra-axial lesions or cervical adenopathy. The patient has had a left occipital craniotomy. CT of the chest demonstrates bilateral breast implants. There is no hilar, mediastinal or axillary adenopathy. There are no pulmonary nodules, infiltrates or pleural effusions. CT of the abdomen and pelvis demonstrates physiologic activity within the GI tract and urinary system. There is no adenopathy. The adrenal glands are normal. There are no lytic or blastic lesions.      IMPRESSION: No evidence of metastatic disease Prior bilateral mastectomies Read and electronically signed by: Mireya Rodriguez MD on 9/21/2017 1:01 PM CDT MIREYA RODRIGUEZ MD      I have reviewed all available lab results and radiology reports.    Assessment/Plan:   (1) 54 y.o. female with diagnosis of right breast cancer  - s/p bilateral mastectomies on 9/26/16 followed by reconstruction  - followed by Dr Bear with [facility]  - she had 2 out of 7 LN's that were positive  - she was a Stage IIA  - ER+/VT+ and her2nu neg  - s/p AC x4 and taxol x4  - she has seen Dr Gonsales with rad/onc for XRT and completed 5 weeks last month  - discussed the pro's and con's of tamoxifen versus arimidex, and in light of the pap issues, arimidex is probably the better choice  - she had been on arimidex but it was discontinued because of her liver  - she has some mood swings and weight gain  - PET done on 9/21/17 with no evidence of mets;   - recent CT chest/abdom on 8/24/2018 showed no evidence on cancer  - s/p prior repeat surgery with right-sided lift with Dr Umana with repeat evaluation in Nov 2018   - she had a slow post-op recovery and has residual aches and pains  - she had PET on 5/13/2019  - she has been on arimidex and now femara which she both discontinued; she is having some generalized aches and pains;   - discussed aromasine and she is willing to give it a try; she does not want to take tamoxifen due to the clot and ovarian cancer risks   - she had been on the aromasin for 5 weeks but then developed elevated LFTs and it was discontinued      (2) Mild leucopenia/anemia secondary to chemotherapy (resolved)  - latest wbc is at  6.26 - latest hgb was at 13.5     (3) Prior abnormal PAP issues - followed by Dr Cline with GYN; latest pap was negative per patient and she was also HPV negative     (4) Hx of acoustic neuroma in past - s/p XRT in 2010; followed by Dr Gonsales     (5) Chronic fibromyalgia - she is not being  "followed by anyone; she may be having a flare-up      (6) GI issues - seen by Dr Edmonds with GI  - prior mild elevation bili and alk phos - resolved  - she had liver biopsy and EGD with "inflammation" per patient  - s/p esophageal dilation  - Dr Edmonds was concerned it may be from the arimidex  - she is having an repeat US in Jan 2019  - sudden increase LFT's - ? Due to the recently started aromasin        (7) RUE - resolved - possible lymphedema issues - she no longer sees PT  - also seen by Dr Liao    (8) Hypokalemia -on supplements - patient wanted to try OTC and did not want prescription meds    (9) Occasional palpitations        1. Malignant neoplasm of upper-outer quadrant of right breast in female, estrogen receptor positive     2. Estrogen receptor positive     3. S/P bilateral mastectomy             PLAN:  1. Check up to date labs every month for now  2. F/U with PCP, Surg and GYN  3. Hold on antihormone therapy indefinitely for now  4.  F/u with Dr Edmonds with GI  5. Refer to Dr Gardner/Huma    RTC in  3 months    Fax note to Chaparro Shepherd III, *, CARLY Bear Mannina, Babycos; Grey     I spent over 25 mins of time with the patient. Over half of this time was spent couseling and coordinating care.    Discussion:     I have explained all of the above in detail and the patient understands all of the current recommendation(s). I have answered all of their questions to the best of my ability and to their complete satisfaction.   The patient is to continue with the current management plan.            Electronically signed by Bronson Carrillo MD      "

## 2020-01-07 ENCOUNTER — TELEPHONE (OUTPATIENT)
Dept: RADIATION ONCOLOGY | Facility: CLINIC | Age: 55
End: 2020-01-07

## 2020-01-07 ENCOUNTER — OFFICE VISIT (OUTPATIENT)
Dept: HEMATOLOGY/ONCOLOGY | Facility: CLINIC | Age: 55
End: 2020-01-07
Payer: COMMERCIAL

## 2020-01-07 VITALS
WEIGHT: 136 LBS | RESPIRATION RATE: 18 BRPM | DIASTOLIC BLOOD PRESSURE: 77 MMHG | SYSTOLIC BLOOD PRESSURE: 123 MMHG | HEART RATE: 101 BPM | BODY MASS INDEX: 21.3 KG/M2 | TEMPERATURE: 98 F

## 2020-01-07 DIAGNOSIS — R00.2 PALPITATIONS: ICD-10-CM

## 2020-01-07 DIAGNOSIS — Z17.0 MALIGNANT NEOPLASM OF UPPER-OUTER QUADRANT OF RIGHT BREAST IN FEMALE, ESTROGEN RECEPTOR POSITIVE: Primary | ICD-10-CM

## 2020-01-07 DIAGNOSIS — Z90.13 S/P BILATERAL MASTECTOMY: ICD-10-CM

## 2020-01-07 DIAGNOSIS — D33.3 LEFT ACOUSTIC NEUROMA: Primary | ICD-10-CM

## 2020-01-07 DIAGNOSIS — C50.411 MALIGNANT NEOPLASM OF UPPER-OUTER QUADRANT OF RIGHT BREAST IN FEMALE, ESTROGEN RECEPTOR POSITIVE: Primary | ICD-10-CM

## 2020-01-07 DIAGNOSIS — Z17.0 ESTROGEN RECEPTOR POSITIVE: ICD-10-CM

## 2020-01-07 PROCEDURE — 99214 PR OFFICE/OUTPT VISIT, EST, LEVL IV, 30-39 MIN: ICD-10-PCS | Mod: S$GLB,,, | Performed by: INTERNAL MEDICINE

## 2020-01-07 PROCEDURE — 3008F PR BODY MASS INDEX (BMI) DOCUMENTED: ICD-10-PCS | Mod: S$GLB,,, | Performed by: INTERNAL MEDICINE

## 2020-01-07 PROCEDURE — 99214 OFFICE O/P EST MOD 30 MIN: CPT | Mod: S$GLB,,, | Performed by: INTERNAL MEDICINE

## 2020-01-07 PROCEDURE — 3008F BODY MASS INDEX DOCD: CPT | Mod: S$GLB,,, | Performed by: INTERNAL MEDICINE

## 2020-01-07 NOTE — TELEPHONE ENCOUNTER
----- Message from Lia Santacruz sent at 1/7/2020  2:31 PM CST -----  Contact: Patient stopped by  Patient needs MRI Brain w/wo before her follow up appointment next month.

## 2020-02-04 ENCOUNTER — LAB VISIT (OUTPATIENT)
Dept: LAB | Facility: HOSPITAL | Age: 55
End: 2020-02-04
Attending: INTERNAL MEDICINE
Payer: COMMERCIAL

## 2020-02-04 ENCOUNTER — OFFICE VISIT (OUTPATIENT)
Dept: RADIATION ONCOLOGY | Facility: CLINIC | Age: 55
End: 2020-02-04
Payer: COMMERCIAL

## 2020-02-04 VITALS
TEMPERATURE: 98 F | OXYGEN SATURATION: 97 % | HEART RATE: 88 BPM | DIASTOLIC BLOOD PRESSURE: 71 MMHG | RESPIRATION RATE: 18 BRPM | SYSTOLIC BLOOD PRESSURE: 126 MMHG | WEIGHT: 129.13 LBS | BODY MASS INDEX: 20.22 KG/M2

## 2020-02-04 DIAGNOSIS — C50.411 MALIGNANT NEOPLASM OF UPPER-OUTER QUADRANT OF RIGHT BREAST IN FEMALE, ESTROGEN RECEPTOR POSITIVE: ICD-10-CM

## 2020-02-04 DIAGNOSIS — C50.411 MALIGNANT NEOPLASM OF UPPER-OUTER QUADRANT OF RIGHT BREAST IN FEMALE, ESTROGEN RECEPTOR POSITIVE: Primary | ICD-10-CM

## 2020-02-04 DIAGNOSIS — Z90.13 STATUS POST BILATERAL MASTECTOMY: ICD-10-CM

## 2020-02-04 DIAGNOSIS — Z17.0 ESTROGEN RECEPTOR POSITIVE: ICD-10-CM

## 2020-02-04 DIAGNOSIS — Z17.0 MALIGNANT NEOPLASM OF UPPER-OUTER QUADRANT OF RIGHT BREAST IN FEMALE, ESTROGEN RECEPTOR POSITIVE: ICD-10-CM

## 2020-02-04 DIAGNOSIS — D33.3 ACOUSTIC NEUROMA: ICD-10-CM

## 2020-02-04 DIAGNOSIS — Z17.0 MALIGNANT NEOPLASM OF UPPER-OUTER QUADRANT OF RIGHT BREAST IN FEMALE, ESTROGEN RECEPTOR POSITIVE: Primary | ICD-10-CM

## 2020-02-04 DIAGNOSIS — Z90.13 S/P BILATERAL MASTECTOMY: ICD-10-CM

## 2020-02-04 DIAGNOSIS — C50.411 MALIGNANT NEOPLASM OF UPPER-OUTER QUADRANT OF RIGHT FEMALE BREAST: Primary | ICD-10-CM

## 2020-02-04 LAB
ALBUMIN SERPL BCP-MCNC: 4.4 G/DL (ref 3.5–5.2)
ALP SERPL-CCNC: 97 U/L (ref 55–135)
ALT SERPL W/O P-5'-P-CCNC: 31 U/L (ref 10–44)
ANION GAP SERPL CALC-SCNC: 10 MMOL/L (ref 8–16)
AST SERPL-CCNC: 37 U/L (ref 10–40)
BASOPHILS # BLD AUTO: 0.02 K/UL (ref 0–0.2)
BASOPHILS NFR BLD: 0.4 % (ref 0–1.9)
BILIRUB SERPL-MCNC: 1.2 MG/DL (ref 0.1–1)
BUN SERPL-MCNC: 13 MG/DL (ref 6–20)
CALCIUM SERPL-MCNC: 9.6 MG/DL (ref 8.7–10.5)
CHLORIDE SERPL-SCNC: 103 MMOL/L (ref 95–110)
CO2 SERPL-SCNC: 29 MMOL/L (ref 23–29)
CREAT SERPL-MCNC: 0.8 MG/DL (ref 0.5–1.4)
DIFFERENTIAL METHOD: ABNORMAL
EOSINOPHIL # BLD AUTO: 0 K/UL (ref 0–0.5)
EOSINOPHIL NFR BLD: 0.8 % (ref 0–8)
ERYTHROCYTE [DISTWIDTH] IN BLOOD BY AUTOMATED COUNT: 13.2 % (ref 11.5–14.5)
EST. GFR  (AFRICAN AMERICAN): >60 ML/MIN/1.73 M^2
EST. GFR  (NON AFRICAN AMERICAN): >60 ML/MIN/1.73 M^2
GLUCOSE SERPL-MCNC: 95 MG/DL (ref 70–110)
HCT VFR BLD AUTO: 39.6 % (ref 37–48.5)
HGB BLD-MCNC: 13.3 G/DL (ref 12–16)
IMM GRANULOCYTES # BLD AUTO: 0.01 K/UL (ref 0–0.04)
IMM GRANULOCYTES NFR BLD AUTO: 0.2 % (ref 0–0.5)
LYMPHOCYTES # BLD AUTO: 1.1 K/UL (ref 1–4.8)
LYMPHOCYTES NFR BLD: 20.4 % (ref 18–48)
MCH RBC QN AUTO: 32.1 PG (ref 27–31)
MCHC RBC AUTO-ENTMCNC: 33.6 G/DL (ref 32–36)
MCV RBC AUTO: 96 FL (ref 82–98)
MONOCYTES # BLD AUTO: 0.5 K/UL (ref 0.3–1)
MONOCYTES NFR BLD: 10.3 % (ref 4–15)
NEUTROPHILS # BLD AUTO: 3.6 K/UL (ref 1.8–7.7)
NEUTROPHILS NFR BLD: 67.9 % (ref 38–73)
NRBC BLD-RTO: 0 /100 WBC
PLATELET # BLD AUTO: 273 K/UL (ref 150–350)
PMV BLD AUTO: 11.1 FL (ref 9.2–12.9)
POTASSIUM SERPL-SCNC: 3.6 MMOL/L (ref 3.5–5.1)
PROT SERPL-MCNC: 7.7 G/DL (ref 6–8.4)
RBC # BLD AUTO: 4.14 M/UL (ref 4–5.4)
SODIUM SERPL-SCNC: 142 MMOL/L (ref 136–145)
WBC # BLD AUTO: 5.24 K/UL (ref 3.9–12.7)

## 2020-02-04 PROCEDURE — 99214 PR OFFICE/OUTPT VISIT, EST, LEVL IV, 30-39 MIN: ICD-10-PCS | Mod: S$GLB,,, | Performed by: RADIOLOGY

## 2020-02-04 PROCEDURE — 80053 COMPREHEN METABOLIC PANEL: CPT

## 2020-02-04 PROCEDURE — 3008F PR BODY MASS INDEX (BMI) DOCUMENTED: ICD-10-PCS | Mod: S$GLB,,, | Performed by: RADIOLOGY

## 2020-02-04 PROCEDURE — 3008F BODY MASS INDEX DOCD: CPT | Mod: S$GLB,,, | Performed by: RADIOLOGY

## 2020-02-04 PROCEDURE — 99214 OFFICE O/P EST MOD 30 MIN: CPT | Mod: S$GLB,,, | Performed by: RADIOLOGY

## 2020-02-04 PROCEDURE — 85025 COMPLETE CBC W/AUTO DIFF WBC: CPT

## 2020-02-04 PROCEDURE — 36415 COLL VENOUS BLD VENIPUNCTURE: CPT

## 2020-02-04 NOTE — PROGRESS NOTES
"Blanca Loya  [id number]  1965  2/4/2020  Bronson Carrillo Md  [address]  [address]  [place] [address]    DIAGNOSIS: IIA, [id number] IDC R breast, ER/NH+, her2-  REASON FOR VISIT: Routine scheduled follow-up.    HISTORY OF PRESENT ILLNESS:   51F with mammogram detected, bx proven multifocal IDC of R breast with B mastectomies + expander placement. Pathology revealed a 12mm and 7mm IDC with 2/7 LNs+ and close posterior margin; ER/NH+, her2-. She completed ACT chemotherapy.    Completed 50Gy EBRT to R "breast" and comp jamie groups of ax I-III/SCV/IM LNs on 6/14/17.    D/c'd arimidex.    Patient also has diagnosis of left acoustic neuroma status post 18 gray in 3 fractions in December 2010 with documented radiographic stability.    INTERVAL HISTORY:   Since last visit patient has discontinued antiestrogen therapy.  She was appreciated to have elevated LFTs in since discontinuation reports feeling much better.  Today she denies fever, chills, chest pain, shortness of breath, cough, hemoptysis, bone pain. She denies appetite, energy or weight changes. She has left-sided neck pain which he reports is causing need for daily NSAIDs and difficulty sleeping.  She has increased frequency of headaches, denies tinnitus.    Review of Systems   Constitutional: Negative for appetite change, chills, fever and unexpected weight change.   HENT:   Negative for lump/mass, mouth sores, sore throat, trouble swallowing and voice change.    Eyes: Negative for eye problems and icterus.   Respiratory: Negative for cough, hemoptysis and shortness of breath.    Cardiovascular: Negative for chest pain and leg swelling.   Gastrointestinal: Negative for abdominal pain, constipation, diarrhea, nausea and vomiting.   Genitourinary: Negative for dysuria, frequency, hematuria, nocturia and vaginal bleeding.    Musculoskeletal: Positive for neck pain and neck stiffness. Negative for back pain and gait problem.   Neurological: Negative for " extremity weakness, gait problem, headaches, numbness and seizures.   Hematological: Negative for adenopathy.     Past Medical History:   Diagnosis Date    Acquired deafness     Estrogen receptor positive 5/23/2017    Malignant neoplasm of upper-outer quadrant of right female breast 5/23/2017    S/P bilateral mastectomy 10/23/2019     Past Surgical History:   Procedure Laterality Date    BREAST RECONSTRUCTION      BREAST REVISION SURGERY Left 8/30/2018    Procedure: BREAST REVISION SURGERY LEFT IMF ELEVATION WITH ALLODERM;  Surgeon: Holger Umana MD;  Location: [facility] OR [address] [place] [address];  Service: Plastics;  Laterality: Left;    BREAST SURGERY      NEUROMA SURGERY      [place] ear     Social History     Socioeconomic History    Marital status:      Spouse name: Not on file    Number of children: Not on file    Years of education: Not on file    Highest education level: Not on file   Occupational History    Not on file   Social Needs    Financial resource strain: Not on file    Food insecurity:     Worry: Not on file     Inability: Not on file    Transportation needs:     Medical: Not on file     Non-medical: Not on file   Tobacco Use    Smoking status: Never Smoker    Smokeless tobacco: Never Used   Substance and Sexual Activity    Alcohol use: No    Drug use: No    Sexual activity: Not on file   Lifestyle    Physical activity:     Days per week: Not on file     Minutes per session: Not on file    Stress: Not on file   Relationships    Social connections:     Talks on phone: Not on file     Gets together: Not on file     Attends [employer] service: Not on file     Active member of club or organization: Not on file     Attends meetings of clubs or organizations: Not on file     Relationship status: Not on file   Other Topics Concern    Not on file   Social History Narrative    Not on file     Family History   Problem Relation Age of Onset    No Known Problems Mother     No Known  Problems Father      Medication List with Changes/Refills   Current Medications    ALPRAZOLAM (XANAX) 0.5 MG TABLET    TK 1 T PO QD PRN    ASCORBIC ACID, VITAMIN C, (VITAMIN C) 1000 MG TABLET    Take 1,000 mg by mouth once daily.    B COMPLEX VITAMINS TABLET    Take 1 tablet by mouth once daily.    BUTALBITAL-ACETAMINOPHEN-CAFFEINE -40 MG (FIORICET, ESGIC) -40 MG PER TABLET        CHROMIUM ORAL    Take by mouth.    EXEMESTANE (AROMASIN) 25 MG TABLET    Take 1 tablet (25 mg total) by mouth once daily.    LEVOTHYROXINE (SYNTHROID) 100 MCG TABLET    TK 1 T PO QD    OXYCODONE-ACETAMINOPHEN (PERCOCET) 5-325 MG PER TABLET    Take 1 tablet by mouth every 6 (six) hours as needed for Pain.    POTASSIUM ACETATE MISC    by Misc.(Non-Drug; Combo Route) route.    SUCRALFATE (CARAFATE) 1 GRAM TABLET    Carafate 1 gram tablet   Take 1 tablet 4 times a day by oral route for 30 days.   as needed for abd upset     Review of patient's allergies indicates:   Allergen Reactions    Demerol [meperidine] Anxiety    Pcn [penicillins] Rash       QUALITY OF LIFE: 100%    Vitals:    02/04/20 1348   BP: 126/71   Pulse: 88   Resp: 18   Temp: 97.9 °F (36.6 °C)   SpO2: 97%   Weight: 58.6 kg (129 lb 1.6 oz)   PainSc: 0-No pain       PHYSICAL EXAM:   GENERAL: alert; in no apparent distress.   HEAD: normocephalic, atraumatic.   EYES: pupils are equal, round, reactive to light and accommodation. Sclera anicteric. Conjunctiva not injected.   NOSE/THROAT: no nasal erythema or rhinorrhea. Oropharynx pink, without erythema, ulcerations or thrush.   NECK: no cervical motion rigidity; supple with no masses.  CHEST:  Patient is speaking comfortably on room air with normal work of breathing without using accessory muscles of respiration. Clear bilaterally  MUSCULOSKELETAL: no tenderness to palpation along the spine or scapulae. Normal range of motion.  NEUROLOGIC: cranial nerves II-XII intact bilaterally. Strength 5/5 in bilateral upper and  lower extremities. Normal gait.  LYMPHATIC: no cervical, supraclavicular or axillary adenopathy appreciated bilaterally.   EXTREMITIES: no clubbing, cyanosis, edema.  SKIN: no erythema, rashes, ulcerations noted.   BREAST: deferred today    ANCILLARY DATA  5/19 PET/CT  IMPRESSION:    1. No evidence of FDG avid metastatic disease.  2. Incidental findings as above.    ASSESSMENT: 51F with stage IIA, [id number] IDC R breast, ER/KS+, her2- s/p mastectomy and adjuvant RT ending 6/17, d/c'd arimidex.  PLAN:  Ms. Loya recently discontinued antiestrogen therapy and reports improved quality of life since.  She has developed persistent headaches as well as neck pain and we will investigate further with updated MRI given previous history of schwannoma treatment.  By review of systems and physical exam she has no evidence of recurrent breast cancer and continues to do well.  She is going to continue to follow with Dr. Carrillo and return to clinic in 1 year unless needed sooner.    TIME SPENT WITH PATIENT: I have personally seen and evaluated this patient. Approximately 30 minutes were spent with the patient discussing follow-up careplan.     PHYSICIAN: Rocco Gonsales Jr, MD

## 2020-02-17 ENCOUNTER — HOSPITAL ENCOUNTER (OUTPATIENT)
Dept: RADIOLOGY | Facility: HOSPITAL | Age: 55
Discharge: HOME | End: 2020-02-17
Attending: RADIOLOGY
Payer: COMMERCIAL

## 2020-02-17 PROCEDURE — A9585 GADOBUTROL INJECTION: HCPCS | Mod: PO | Performed by: RADIOLOGY

## 2020-02-17 PROCEDURE — 70553 MRI BRAIN STEM W/O & W/DYE: CPT | Mod: TC,PO

## 2020-02-17 PROCEDURE — 25500020 PHARM REV CODE 255: Mod: PO | Performed by: RADIOLOGY

## 2020-02-17 RX ORDER — GADOBUTROL 604.72 MG/ML
5.5 INJECTION INTRAVENOUS
Status: COMPLETED | OUTPATIENT
Start: 2020-02-17 | End: 2020-02-17

## 2020-02-17 RX ADMIN — GADOBUTROL 5.5 ML: 604.72 INJECTION INTRAVENOUS at 11:02

## 2020-03-09 PROBLEM — E03.9 HYPOTHYROIDISM: Status: ACTIVE | Noted: 2020-03-09

## 2020-03-09 PROBLEM — M81.0 OSTEOPOROSIS: Status: ACTIVE | Noted: 2020-03-09

## 2020-03-10 ENCOUNTER — LAB VISIT (OUTPATIENT)
Dept: LAB | Facility: HOSPITAL | Age: 55
End: 2020-03-10
Attending: INTERNAL MEDICINE
Payer: COMMERCIAL

## 2020-03-10 DIAGNOSIS — Z90.13 STATUS POST BILATERAL MASTECTOMY: ICD-10-CM

## 2020-03-10 DIAGNOSIS — Z17.0 ESTROGEN RECEPTOR POSITIVE: ICD-10-CM

## 2020-03-10 DIAGNOSIS — C50.411 MALIGNANT NEOPLASM OF UPPER-OUTER QUADRANT OF RIGHT FEMALE BREAST: Primary | ICD-10-CM

## 2020-03-10 LAB
ALBUMIN SERPL BCP-MCNC: 4.2 G/DL (ref 3.5–5.2)
ALP SERPL-CCNC: 123 U/L (ref 55–135)
ALT SERPL W/O P-5'-P-CCNC: 27 U/L (ref 10–44)
ANION GAP SERPL CALC-SCNC: 12 MMOL/L (ref 8–16)
AST SERPL-CCNC: 31 U/L (ref 10–40)
BASOPHILS # BLD AUTO: 0.03 K/UL (ref 0–0.2)
BASOPHILS NFR BLD: 0.6 % (ref 0–1.9)
BILIRUB SERPL-MCNC: 0.8 MG/DL (ref 0.1–1)
BUN SERPL-MCNC: 16 MG/DL (ref 6–20)
CALCIUM SERPL-MCNC: 9.8 MG/DL (ref 8.7–10.5)
CHLORIDE SERPL-SCNC: 100 MMOL/L (ref 95–110)
CO2 SERPL-SCNC: 28 MMOL/L (ref 23–29)
CREAT SERPL-MCNC: 0.7 MG/DL (ref 0.5–1.4)
DIFFERENTIAL METHOD: ABNORMAL
EOSINOPHIL # BLD AUTO: 0 K/UL (ref 0–0.5)
EOSINOPHIL NFR BLD: 0.8 % (ref 0–8)
ERYTHROCYTE [DISTWIDTH] IN BLOOD BY AUTOMATED COUNT: 13.4 % (ref 11.5–14.5)
EST. GFR  (AFRICAN AMERICAN): >60 ML/MIN/1.73 M^2
EST. GFR  (NON AFRICAN AMERICAN): >60 ML/MIN/1.73 M^2
GLUCOSE SERPL-MCNC: 89 MG/DL (ref 70–110)
HCT VFR BLD AUTO: 38.7 % (ref 37–48.5)
HGB BLD-MCNC: 13 G/DL (ref 12–16)
IMM GRANULOCYTES # BLD AUTO: 0.01 K/UL (ref 0–0.04)
IMM GRANULOCYTES NFR BLD AUTO: 0.2 % (ref 0–0.5)
LYMPHOCYTES # BLD AUTO: 1.1 K/UL (ref 1–4.8)
LYMPHOCYTES NFR BLD: 22.1 % (ref 18–48)
MCH RBC QN AUTO: 32 PG (ref 27–31)
MCHC RBC AUTO-ENTMCNC: 33.6 G/DL (ref 32–36)
MCV RBC AUTO: 95 FL (ref 82–98)
MONOCYTES # BLD AUTO: 0.4 K/UL (ref 0.3–1)
MONOCYTES NFR BLD: 8.7 % (ref 4–15)
NEUTROPHILS # BLD AUTO: 3.3 K/UL (ref 1.8–7.7)
NEUTROPHILS NFR BLD: 67.6 % (ref 38–73)
NRBC BLD-RTO: 0 /100 WBC
PLATELET # BLD AUTO: 246 K/UL (ref 150–350)
PMV BLD AUTO: 10.9 FL (ref 9.2–12.9)
POTASSIUM SERPL-SCNC: 3.4 MMOL/L (ref 3.5–5.1)
PROT SERPL-MCNC: 7.4 G/DL (ref 6–8.4)
RBC # BLD AUTO: 4.06 M/UL (ref 4–5.4)
SODIUM SERPL-SCNC: 140 MMOL/L (ref 136–145)
WBC # BLD AUTO: 4.84 K/UL (ref 3.9–12.7)

## 2020-03-10 PROCEDURE — 36415 COLL VENOUS BLD VENIPUNCTURE: CPT

## 2020-03-10 PROCEDURE — 80053 COMPREHEN METABOLIC PANEL: CPT

## 2020-03-10 PROCEDURE — 85025 COMPLETE CBC W/AUTO DIFF WBC: CPT

## 2020-03-11 ENCOUNTER — TELEPHONE (OUTPATIENT)
Dept: HEMATOLOGY/ONCOLOGY | Facility: CLINIC | Age: 55
End: 2020-03-11

## 2020-03-11 NOTE — TELEPHONE ENCOUNTER
----- Message from Bronson Carrillo MD sent at 3/10/2020  2:11 PM CDT -----  Potassium is a little low

## 2020-03-11 NOTE — TELEPHONE ENCOUNTER
Called the patient and instructed her that her potassium level is 3.4.  I instructed her to eat foods rich in potassium such as:  Bananas, potatoes and green leafy vegetables.

## 2020-04-03 ENCOUNTER — TELEPHONE (OUTPATIENT)
Dept: HEMATOLOGY/ONCOLOGY | Facility: CLINIC | Age: 55
End: 2020-04-03

## 2020-04-03 NOTE — PROGRESS NOTES
[facility] Hematology/Oncology  PROGRESS NOTE Telemedicine Note      Subjective:       Patient ID:   NAME: Blanca Loya : 1965     54 y.o. female    Referring Doc: Cora  Other Physicians: Lyndsay Gonsales, CARLY Barillas; Mary Gonsales    Chief Complaint:  Breast ca f/u    History of Present Illness:     Patient is being seen today via a telemedicine follow-up visit in lieu of a normal in-person visit due to the recent COVID19 outbreak. The patient is currently located at home. This visit type is a virtual visit with synchronous audio with video.       She is doing well with no new issues. No CP, SOB, HA's or N/V. She is now off all oral antihormones.  She is here on video with her .    She is having some residual chronic aches and pains but she has fibromyalgia. She has been well and no one at home is sick.     She saw Dr Hernandez recently and had echo done which was good per patient. She also did a heart holter. They are planning stress test in near future and she is seeing him again this Thursday        ROS:   GEN: normal without any fever, night sweats or weight loss  HEENT: normal with no HA's, sore throat, stiff neck, changes in vision  CV: normal with no CP, SOB, PND, RUGGIERO or orthopnea  PULM: normal with no SOB, cough, hemoptysis, sputum or pleuritic pain  GI: normal with no abdominal pain, nausea, vomiting, constipation, diarrhea, melanotic stools, BRBPR, or hematemesis  : normal with no hematuria, dysuria  BREAST: normal with no mass, discharge, pain  SKIN: normal with no rash, erythema, bruising, or swelling    Allergies:  Review of patient's allergies indicates:   Allergen Reactions    Demerol [meperidine] Anxiety    Pcn [penicillins] Rash       Medications:    Current Outpatient Medications:     ALPRAZolam (XANAX) 0.5 MG tablet, TK 1 T PO QD PRN, Disp: , Rfl: 0    ascorbic acid, vitamin C, (VITAMIN C) 1000 MG tablet, Take 1,000 mg by mouth once daily., Disp: , Rfl:     b  complex vitamins tablet, Take 1 tablet by mouth once daily., Disp: , Rfl:     butalbital-acetaminophen-caffeine -40 mg (FIORICET, ESGIC) -40 mg per tablet, , Disp: , Rfl:     CHROMIUM ORAL, Take by mouth., Disp: , Rfl:     levothyroxine (SYNTHROID) 100 MCG tablet, TK 1 T PO QD, Disp: 90 tablet, Rfl: 1    oxyCODONE-acetaminophen (PERCOCET) 5-325 mg per tablet, Take 1 tablet by mouth every 6 (six) hours as needed for Pain. (Patient not taking: Reported on 3/6/2020), Disp: 28 tablet, Rfl: 0    POTASSIUM ACETATE MISC, by Misc.(Non-Drug; Combo Route) route., Disp: , Rfl:     risedronate (ACTONEL) 35 MG tablet, Take 1 tablet (35 mg total) by mouth every 7 days., Disp: 13 tablet, Rfl: 3    sucralfate (CARAFATE) 1 gram tablet, Carafate 1 gram tablet  Take 1 tablet 4 times a day by oral route for 30 days.  as needed for abd upset, Disp: , Rfl:     PMHx/PSHx Updates:  See patient's last visit with me on 1/7/2020  See H&P on 9/8/2016        Pathology:      See path note on 8/30/2016      Objective:     Vitals:  afebrile    Physical Examination:   GEN: no apparent distress, comfortable; AAOx3  HEAD: atraumatic and normocephalic  EYES: no conjunctival pallor or muddiness, no icterus; normal pupil reaction to ambient light  ENT: OMM, no pharyngeal erythema, external bilateral ears WNL; no visible thrush or ulcers  NECK: no masses or swelling, trachea midline, no visible LAD/LN's   CV: no palpitations; no pedal edema; no noticeable JVD or neck vein distension  CHEST: Normal respiratory effort; chest wall breath movements symmetrical; no audible wheezing  ABDOM: non-distended; no bloating  MUSC/Skeletal: ROM normal; joints visibly normal; no deformities or arthropathy  EXTREM: no clubbing, cyanosis, inflammation or swelling  SKIN: no rashes, lesions, ulcers, petechiae or subcutaneous nodules  : no tyson  NEURO: moving all 4 extremities; AAOx3; no tremors  PSYCH: normal mood, affect and behavior  LYMPH: no  visible LN's or LAD  Breast: no changes          Labs:     3/10/2020    Lab Results   Component Value Date    WBC 4.84 03/10/2020    HGB 13.0 03/10/2020    HCT 38.7 03/10/2020    MCV 95 03/10/2020     03/10/2020          BMP  Lab Results   Component Value Date     03/10/2020    K 3.4 (L) 03/10/2020     03/10/2020    CO2 28 03/10/2020    BUN 16 03/10/2020    CREATININE 0.7 03/10/2020    CALCIUM 9.8 03/10/2020    ANIONGAP 12 03/10/2020    ESTGFRAFRICA >60.0 03/10/2020    EGFRNONAA >60.0 03/10/2020     Lab Results   Component Value Date    ALT 27 03/10/2020    AST 31 03/10/2020    ALKPHOS 123 03/10/2020    BILITOT 0.8 03/10/2020           Radiology/Diagnostic Studies:    US  11/7/2019:  Impression       Normal right upper quadrant ultrasound.         PET/CT  5/13/2019:    IMPRESSION:    1. No evidence of FDG avid metastatic disease.  2. Incidental findings as above.          Chest/Abdom CT  8/24/2018:      IMPRESSION:  1. No evidence of metastatic disease in the chest, abdomen, or the pelvis.  2. Additional observations as above        [facility] Unknown Rad Eap    Result Date: 9/21/2017  INTEGRATED PET CT WITH IMAGE FUSION HISTORY:  c50.411 z17.0 subsequent treatment evaluation for right breast cancer diagnosed in August 2016. Bilateral mastectomies with chemotherapy and radiation. TECHNIQUE: Following the injection of 12.2 mCi of F-18 labeled FDG into a left antecubital vein, PET CT was performed from the vertex of the skull through the proximal thighs with an integrated full ring PET CT scanner with image fusion. The patient's serum glucose at the time of the exam was 86 mg/dL. COMPARISON: 09/20/2016 FINDINGS: There is no abnormal FDG activity to suggest recurrent or metastatic disease. CT of the head and neck show no intra-axial lesions or cervical adenopathy. The patient has had a left occipital craniotomy. CT of the chest demonstrates bilateral breast implants. There is no hilar, mediastinal or  axillary adenopathy. There are no pulmonary nodules, infiltrates or pleural effusions. CT of the abdomen and pelvis demonstrates physiologic activity within the GI tract and urinary system. There is no adenopathy. The adrenal glands are normal. There are no lytic or blastic lesions.     IMPRESSION: No evidence of metastatic disease Prior bilateral mastectomies Read and electronically signed by: Mireya Rodriguez MD on 9/21/2017 1:01 PM CDT MIREYA RODRIGUEZ MD      I have reviewed all available lab results and radiology reports.    Assessment/Plan:   (1) 54 y.o. female with diagnosis of right breast cancer  - s/p bilateral mastectomies on 9/26/16 followed by reconstruction  - followed by Dr Bear with [facility]  - she had 2 out of 7 LN's that were positive  - she was a Stage IIA  - ER+/MN+ and her2nu neg  - s/p AC x4 and taxol x4  - she has seen Dr Gonsales with rad/onc for XRT and completed 5 weeks last month  - discussed the pro's and con's of tamoxifen versus arimidex, and in light of the pap issues, arimidex is probably the better choice  - she had been on arimidex but it was discontinued because of her liver  - she has some mood swings and weight gain  - PET done on 9/21/17 with no evidence of mets;   - recent CT chest/abdom on 8/24/2018 showed no evidence on cancer  - s/p prior repeat surgery with right-sided lift with Dr Umana with repeat evaluation in Nov 2018   - she had a slow post-op recovery and has residual aches and pains  - she had PET on 5/13/2019  - she has been on arimidex and now femara which she both discontinued; she is having some generalized aches and pains;   - discussed aromasine and she is willing to give it a try; she does not want to take tamoxifen due to the clot and ovarian cancer risks   - she had been on the aromasin for 5 weeks but then developed elevated LFTs and it was discontinued      (2) Mild leucopenia/anemia secondary to chemotherapy (resolved)  - latest wbc is at  6.26 -  "latest hgb was at 13.5     (3) Prior abnormal PAP issues - followed by Dr Cline with GYN; latest pap was negative per patient and she was also HPV negative     (4) Hx of acoustic neuroma in past - s/p XRT in 2010; followed by Dr Gonsales  - recent MRI stable per patient in Feb 2020     (5) Chronic fibromyalgia - she is not being followed by anyone; she may be having a flare-up      (6) GI issues - seen by Dr Edmonds with GI  - prior mild elevation bili and alk phos - resolved  - she had liver biopsy and EGD with "inflammation" per patient  - s/p esophageal dilation  - Dr Edmonds was concerned it may be from the arimidex  - she is having an repeat US in Jan 2019  - sudden increase LFT's - ? Due to the recently started aromasin        (7) RUE - resolved - possible lymphedema issues - she no longer sees PT  - also seen by Dr Liao    (8) Hypokalemia -on supplements - patient wanted to try OTC and did not want prescription meds    (9) Occasional palpitations - seen by Dr Hernandez with cardiology        1. Malignant neoplasm of upper-outer quadrant of right breast in female, estrogen receptor positive     2. Estrogen receptor positive     3. S/P bilateral mastectomy             PLAN:  1. Check up to date labs in 3 months  2. F/U with PCP, Surg and GYN  3. Hold on antihormone therapy indefinitely for now  4.  F/u with Dr Edmonds with GI  5.  F/u with cardiology    RTC in  3 months    Fax note to Chaparro Shepherd III, *, CARLY Bear Mannina, Babycos; Pham Hernandez     Total Time spent on patient:    I spent over 25 mins of time with the patient. Reviewed results of the recently ordered labs, tests, reports and studies; made directives with regards to the results. Over half of this time was spent couseling and coordinating care, making treatment and analytical decisions; ordering necessary labs, tests and studies; and discussing treatment options and setting up treatment plan(s) if " indicated.        Discussion:     COVID-19 Discussion:    I had long discussion with patient and any applicable family about the COVID-19 coronavirus epidemic and the recommended precautions with regard to cancer and/or hematology patients. I have re-iterated the CDC recommendations for adequate hand washing, use of hand -like products, and coughing into elbow, etc. In addition, especially for our patients who are on chemotherapy and/or our otherwise immunocompromised patients, I have recommended avoidance of crowds, including movie theaters, restaurants, churches, etc. I have recommended avoidance of any sick or symptomatic family members and/or friends. I have also recommended avoidance of any raw and unwashed food products, and general avoidance of food items that have not been prepared by themselves. The patient has been asked to call us immediately with any symptom developments, issues, questions or other general concerns.       Telemedicine Statement:    The patient acknowledged and agreed to the audio/video encounter and the patient who is being provided medical services by telemedicine is:  (1) informed of the relationship between the physician and patient and the respective role of any other health care provider with respect to management of the patient; and (2) notified that he or she may decline to receive medical services by telemedicine and may withdraw from such care at any time.    I have explained all of the above in detail and the patient understands all of the current recommendation(s). I have answered all of their questions to the best of my ability and to their complete satisfaction.   The patient is to continue with the current management plan.            Electronically signed by Bronson Carrillo MD      Answers for HPI/ROS submitted by the patient on 4/6/2020   appetite change : No  unexpected weight change: No  visual disturbance: No  cough: No  shortness of breath: No  chest pain:  No  abdominal pain: No  diarrhea: No  frequency: No  back pain: No  rash: No  headaches: Yes  adenopathy: No  nervous/ anxious: No

## 2020-04-03 NOTE — TELEPHONE ENCOUNTER
Left a message for the pt. Letting her know we were changing visits to virtual visits. I also stated she would need an active my chart account and would need to download the blanca to her phone. Asked her to call the office if she needed any help.

## 2020-04-06 ENCOUNTER — OFFICE VISIT (OUTPATIENT)
Dept: HEMATOLOGY/ONCOLOGY | Facility: CLINIC | Age: 55
End: 2020-04-06
Payer: COMMERCIAL

## 2020-04-06 DIAGNOSIS — Z17.0 MALIGNANT NEOPLASM OF UPPER-OUTER QUADRANT OF RIGHT BREAST IN FEMALE, ESTROGEN RECEPTOR POSITIVE: Primary | ICD-10-CM

## 2020-04-06 DIAGNOSIS — Z17.0 ESTROGEN RECEPTOR POSITIVE: ICD-10-CM

## 2020-04-06 DIAGNOSIS — C50.411 MALIGNANT NEOPLASM OF UPPER-OUTER QUADRANT OF RIGHT BREAST IN FEMALE, ESTROGEN RECEPTOR POSITIVE: Primary | ICD-10-CM

## 2020-04-06 DIAGNOSIS — Z90.13 S/P BILATERAL MASTECTOMY: ICD-10-CM

## 2020-04-06 PROCEDURE — 99214 OFFICE O/P EST MOD 30 MIN: CPT | Mod: 95,,, | Performed by: INTERNAL MEDICINE

## 2020-04-06 PROCEDURE — 99214 PR OFFICE/OUTPT VISIT, EST, LEVL IV, 30-39 MIN: ICD-10-PCS | Mod: 95,,, | Performed by: INTERNAL MEDICINE

## 2020-07-05 NOTE — PROGRESS NOTES
[facility] Hematology/Oncology  PROGRESS NOTE - Follow-up Visit      Subjective:       Patient ID:   NAME: Blanca Loya : 1965     54 y.o. female    Referring Doc: Cora  Other Physicians: Lyndsay Gonsales, CARLY Barillas; Mary Gonsales    Chief Complaint:  Breast ca f/u    History of Present Illness:     Patient is being seen today in person. She is doing ok with no new changes. She had some labs done with Dr Hernandez about a month ago. She did not get the stress test done. She has not had any recent labs. She has chronic migraine issues.      She is doing well with no new issues. No CP, SOB, HA's or N/V. She is now off all oral antihormones.       She is having some residual chronic aches and pains but she has fibromyalgia. She has chronic neck issues.    Discussed Covid19 precautions           ROS:   GEN: normal without any fever, night sweats or weight loss  HEENT: normal with no HA's, sore throat, stiff neck, changes in vision  CV: normal with no CP, SOB, PND, RUGGIERO or orthopnea  PULM: normal with no SOB, cough, hemoptysis, sputum or pleuritic pain  GI: normal with no abdominal pain, nausea, vomiting, constipation, diarrhea, melanotic stools, BRBPR, or hematemesis  : normal with no hematuria, dysuria  BREAST: normal with no mass, discharge, pain  SKIN: normal with no rash, erythema, bruising, or swelling    Allergies:  Review of patient's allergies indicates:   Allergen Reactions    Demerol [meperidine] Anxiety    Pcn [penicillins] Rash       Medications:    Current Outpatient Medications:     ALPRAZolam (XANAX) 0.5 MG tablet, TK 1 T PO QD PRN, Disp: , Rfl: 0    ascorbic acid, vitamin C, (VITAMIN C) 1000 MG tablet, Take 1,000 mg by mouth once daily., Disp: , Rfl:     b complex vitamins tablet, Take 1 tablet by mouth once daily., Disp: , Rfl:     butalbital-acetaminophen-caffeine -40 mg (FIORICET, ESGIC) -40 mg per tablet, , Disp: , Rfl:     CHROMIUM ORAL, Take by mouth., Disp: ,  Rfl:     levothyroxine (SYNTHROID) 100 MCG tablet, TK 1 T PO QD, Disp: 90 tablet, Rfl: 1    oxyCODONE-acetaminophen (PERCOCET) 5-325 mg per tablet, Take 1 tablet by mouth every 6 (six) hours as needed for Pain. (Patient not taking: Reported on 3/6/2020), Disp: 28 tablet, Rfl: 0    POTASSIUM ACETATE MISC, by Misc.(Non-Drug; Combo Route) route., Disp: , Rfl:     risedronate (ACTONEL) 35 MG tablet, Take 1 tablet (35 mg total) by mouth every 7 days., Disp: 13 tablet, Rfl: 3    sucralfate (CARAFATE) 1 gram tablet, Carafate 1 gram tablet  Take 1 tablet 4 times a day by oral route for 30 days.  as needed for abd upset, Disp: , Rfl:     PMHx/PSHx Updates:  See patient's last visit with me on 4/6/2020  See H&P on 9/8/2016        Pathology:      See path note on 8/30/2016      Objective:     Vitals:  Blood pressure 129/82, pulse 88, temperature 98.7 °F (37.1 °C), temperature source Oral, resp. rate 16, weight 60.2 kg (132 lb 11.2 oz), last menstrual period 09/10/2016.        Physical Examination:   GEN: no apparent distress, comfortable; AAOx3  HEAD: atraumatic and normocephalic  EYES: no conjunctival pallor or muddiness, no icterus; normal pupil reaction to ambient light  ENT: OMM, no pharyngeal erythema, external bilateral ears WNL; no visible thrush or ulcers  NECK: no masses or swelling, trachea midline, no visible LAD/LN's   CV: no palpitations; no pedal edema; no noticeable JVD or neck vein distension  CHEST: Normal respiratory effort; chest wall breath movements symmetrical; no audible wheezing  ABDOM: non-distended; no bloating  MUSC/Skeletal: ROM normal; joints visibly normal; no deformities or arthropathy  EXTREM: no clubbing, cyanosis, inflammation or swelling  SKIN: no rashes, lesions, ulcers, petechiae or subcutaneous nodules  : no tyson  NEURO: moving all 4 extremities; AAOx3; no tremors  PSYCH: normal mood, affect and behavior  LYMPH: no visible LN's or LAD  Breast: no changes          Labs:          Lab  Results   Component Value Date    WBC 4.84 03/10/2020    HGB 13.0 03/10/2020    HCT 38.7 03/10/2020    MCV 95 03/10/2020     03/10/2020          BMP  Lab Results   Component Value Date     03/10/2020    K 3.4 (L) 03/10/2020     03/10/2020    CO2 28 03/10/2020    BUN 16 03/10/2020    CREATININE 0.7 03/10/2020    CALCIUM 9.8 03/10/2020    ANIONGAP 12 03/10/2020    ESTGFRAFRICA >60.0 03/10/2020    EGFRNONAA >60.0 03/10/2020     Lab Results   Component Value Date    ALT 27 03/10/2020    AST 31 03/10/2020    ALKPHOS 123 03/10/2020    BILITOT 0.8 03/10/2020           Radiology/Diagnostic Studies:    US  11/7/2019:  Impression       Normal right upper quadrant ultrasound.         MRI head  2/17/2020:    Impression:     Stable appearance of previously defined enhancing extra-axial mass which extends from the left internal auditory canal into the left cerebellopontine angle cistern and along the adjacent temporal bone.      PET/CT  5/13/2019:    IMPRESSION:    1. No evidence of FDG avid metastatic disease.  2. Incidental findings as above.          Chest/Abdom CT  8/24/2018:      IMPRESSION:  1. No evidence of metastatic disease in the chest, abdomen, or the pelvis.  2. Additional observations as above        [facility] Unknown Rad Eap    Result Date: 9/21/2017  INTEGRATED PET CT WITH IMAGE FUSION HISTORY:  c50.411 z17.0 subsequent treatment evaluation for right breast cancer diagnosed in August 2016. Bilateral mastectomies with chemotherapy and radiation. TECHNIQUE: Following the injection of 12.2 mCi of F-18 labeled FDG into a left antecubital vein, PET CT was performed from the vertex of the skull through the proximal thighs with an integrated full ring PET CT scanner with image fusion. The patient's serum glucose at the time of the exam was 86 mg/dL. COMPARISON: 09/20/2016 FINDINGS: There is no abnormal FDG activity to suggest recurrent or metastatic disease. CT of the head and neck show no intra-axial  lesions or cervical adenopathy. The patient has had a left occipital craniotomy. CT of the chest demonstrates bilateral breast implants. There is no hilar, mediastinal or axillary adenopathy. There are no pulmonary nodules, infiltrates or pleural effusions. CT of the abdomen and pelvis demonstrates physiologic activity within the GI tract and urinary system. There is no adenopathy. The adrenal glands are normal. There are no lytic or blastic lesions.     IMPRESSION: No evidence of metastatic disease Prior bilateral mastectomies Read and electronically signed by: Mireya Rodriguez MD on 9/21/2017 1:01 PM CDT MIREYA RODRIGUEZ MD      I have reviewed all available lab results and radiology reports.    Assessment/Plan:   (1) 54 y.o. female with diagnosis of right breast cancer  - s/p bilateral mastectomies on 9/26/16 followed by reconstruction  - followed by Dr Bear with [facility]urg  - she had 2 out of 7 LN's that were positive  - she was a Stage IIA  - ER+/NH+ and her2nu neg  - s/p AC x4 and taxol x4  - she has seen Dr Gonsales with rad/onc for XRT and completed 5 weeks last month  - discussed the pro's and con's of tamoxifen versus arimidex, and in light of the pap issues, arimidex is probably the better choice  - she had been on arimidex but it was discontinued because of her liver  - she has some mood swings and weight gain  - PET done on 9/21/17 with no evidence of mets;   - recent CT chest/abdom on 8/24/2018 showed no evidence on cancer  - s/p prior repeat surgery with right-sided lift with Dr Umana with repeat evaluation in Nov 2018   - she had a slow post-op recovery and has residual aches and pains  - she had PET on 5/13/2019  - she has been on arimidex and now femara which she both discontinued; she is having some generalized aches and pains;   - discussed aromasine and she is willing to give it a try; she does not want to take tamoxifen due to the clot and ovarian cancer risks   - she had been on the  "aromasin for 5 weeks but then developed elevated LFTs and it was discontinued      (2) Mild leucopenia/anemia secondary to chemotherapy (resolved)  - latest wbc is at  4.84 - latest hgb was at 13.0     (3) Prior abnormal PAP issues - followed by Dr Cline with GYN; latest pap was negative per patient and she was also HPV negative     (4) Hx of acoustic neuroma in past - s/p XRT in 2010; followed by Dr Gonsales  - recent MRI stable per patient in Feb 2020     (5) Chronic fibromyalgia - she is not being followed by anyone; she may be having a flare-up      (6) GI issues - seen by Dr Edmonds with GI  - prior mild elevation bili and alk phos - resolved  - she had liver biopsy and EGD with "inflammation" per patient  - s/p esophageal dilation  - Dr Edmonds was concerned it may be from the arimidex  - she is having an repeat US in Jan 2019  - sudden increase LFT's - ? Due to the recently started aromasin        (7) RUE - resolved - possible lymphedema issues - she no longer sees PT  - also seen by Dr Liao    (8) Hypokalemia -on supplements - patient wanted to try OTC and did not want prescription meds    (9) Occasional palpitations - seen by Dr Hernandez with cardiology    (10) Thyroid issues - followed by PCP        1. Malignant neoplasm of upper-outer quadrant of right breast in female, estrogen receptor positive     2. Estrogen receptor positive     3. S/P bilateral mastectomy             PLAN:  1. Check up to date labs in 3 months  2. F/U with PCP, Surg and GYN  3. Hold on antihormone therapy indefinitely for now  4.  F/u with Dr Edmonds with GI  5.  F/u with cardiology - need latest labs from Dr Hernandez  6. F/u with PCP with thyroid  7. Schedule f/u PET    RTC in  3-4 months    Fax note to Chaparro Shepherd III, *, CARLY Bear Mannina, Babycos; Pham Hernandez     Total Time spent on patient:    I spent over 25 mins of time with the patient. Reviewed results of the recently ordered labs, tests, " reports and studies; made directives with regards to the results. Over half of this time was spent couseling and coordinating care, making treatment and analytical decisions; ordering necessary labs, tests and studies; and discussing treatment options and setting up treatment plan(s) if indicated.        Discussion:     COVID-19 Discussion:    I had long discussion with patient and any applicable family about the COVID-19 coronavirus epidemic and the recommended precautions with regard to cancer and/or hematology patients. I have re-iterated the CDC recommendations for adequate hand washing, use of hand -like products, and coughing into elbow, etc. In addition, especially for our patients who are on chemotherapy and/or our otherwise immunocompromised patients, I have recommended avoidance of crowds, including movie theaters, restaurants, churches, etc. I have recommended avoidance of any sick or symptomatic family members and/or friends. I have also recommended avoidance of any raw and unwashed food products, and general avoidance of food items that have not been prepared by themselves. The patient has been asked to call us immediately with any symptom developments, issues, questions or other general concerns.            I have explained all of the above in detail and the patient understands all of the current recommendation(s). I have answered all of their questions to the best of my ability and to their complete satisfaction.   The patient is to continue with the current management plan.            Electronically signed by Bronson Carrillo MD             Answers for HPI/ROS submitted by the patient on 7/5/2020   appetite change : No  unexpected weight change: No  visual disturbance: No  cough: No  shortness of breath: No  chest pain: No  abdominal pain: No  diarrhea: No  frequency: No  back pain: Yes  rash: No  headaches: Yes  adenopathy: No  nervous/ anxious: Yes

## 2020-07-06 ENCOUNTER — OFFICE VISIT (OUTPATIENT)
Dept: HEMATOLOGY/ONCOLOGY | Facility: CLINIC | Age: 55
End: 2020-07-06
Payer: COMMERCIAL

## 2020-07-06 VITALS
SYSTOLIC BLOOD PRESSURE: 129 MMHG | RESPIRATION RATE: 16 BRPM | TEMPERATURE: 99 F | WEIGHT: 132.69 LBS | BODY MASS INDEX: 23.14 KG/M2 | DIASTOLIC BLOOD PRESSURE: 82 MMHG | HEART RATE: 88 BPM

## 2020-07-06 DIAGNOSIS — C50.411 MALIGNANT NEOPLASM OF UPPER-OUTER QUADRANT OF RIGHT BREAST IN FEMALE, ESTROGEN RECEPTOR POSITIVE: Primary | ICD-10-CM

## 2020-07-06 DIAGNOSIS — Z17.0 MALIGNANT NEOPLASM OF UPPER-OUTER QUADRANT OF RIGHT BREAST IN FEMALE, ESTROGEN RECEPTOR POSITIVE: Primary | ICD-10-CM

## 2020-07-06 DIAGNOSIS — Z90.13 S/P BILATERAL MASTECTOMY: ICD-10-CM

## 2020-07-06 DIAGNOSIS — Z17.0 ESTROGEN RECEPTOR POSITIVE: ICD-10-CM

## 2020-07-06 PROCEDURE — 99214 PR OFFICE/OUTPT VISIT, EST, LEVL IV, 30-39 MIN: ICD-10-PCS | Mod: S$GLB,,, | Performed by: INTERNAL MEDICINE

## 2020-07-06 PROCEDURE — 3008F PR BODY MASS INDEX (BMI) DOCUMENTED: ICD-10-PCS | Mod: S$GLB,,, | Performed by: INTERNAL MEDICINE

## 2020-07-06 PROCEDURE — 3008F BODY MASS INDEX DOCD: CPT | Mod: S$GLB,,, | Performed by: INTERNAL MEDICINE

## 2020-07-06 PROCEDURE — 99214 OFFICE O/P EST MOD 30 MIN: CPT | Mod: S$GLB,,, | Performed by: INTERNAL MEDICINE

## 2020-07-27 DIAGNOSIS — M54.2 NECK PAIN: Primary | ICD-10-CM

## 2020-07-30 ENCOUNTER — HOSPITAL ENCOUNTER (OUTPATIENT)
Dept: RADIOLOGY | Facility: HOSPITAL | Age: 55
Discharge: HOME | End: 2020-07-30
Attending: FAMILY MEDICINE
Payer: COMMERCIAL

## 2020-07-30 DIAGNOSIS — M54.2 CERVICALGIA: Primary | ICD-10-CM

## 2020-07-30 DIAGNOSIS — M54.2 NECK PAIN: ICD-10-CM

## 2020-07-30 DIAGNOSIS — M54.2 CERVICALGIA: ICD-10-CM

## 2020-07-30 PROBLEM — E05.80 IATROGENIC HYPERTHYROIDISM: Status: ACTIVE | Noted: 2020-07-30

## 2020-07-30 PROCEDURE — 72141 MRI NECK SPINE W/O DYE: CPT | Mod: TC,PO

## 2020-07-30 PROCEDURE — 72050 X-RAY EXAM NECK SPINE 4/5VWS: CPT | Mod: TC,PO

## 2020-07-31 DIAGNOSIS — M54.2 CERVICALGIA: Primary | ICD-10-CM

## 2020-08-03 ENCOUNTER — CLINICAL SUPPORT (OUTPATIENT)
Dept: REHABILITATION | Facility: HOSPITAL | Age: 55
End: 2020-08-03
Attending: FAMILY MEDICINE
Payer: COMMERCIAL

## 2020-08-03 DIAGNOSIS — M54.2 NECK PAIN: ICD-10-CM

## 2020-08-03 DIAGNOSIS — M62.81 MUSCLE WEAKNESS OF UPPER EXTREMITY: ICD-10-CM

## 2020-08-03 DIAGNOSIS — M53.82 DECREASED RANGE OF MOTION OF INTERVERTEBRAL DISCS OF CERVICAL SPINE: ICD-10-CM

## 2020-08-03 DIAGNOSIS — M62.89 MUSCLE TIGHTNESS: ICD-10-CM

## 2020-08-03 PROCEDURE — 97161 PT EVAL LOW COMPLEX 20 MIN: CPT

## 2020-08-03 PROCEDURE — 97530 THERAPEUTIC ACTIVITIES: CPT

## 2020-08-03 NOTE — PLAN OF CARE
Pending sale to [facility]  Physical Therapy Initial Evaluation    Name: Blanca oLya  Clinic Number: [id number]    Therapy Diagnosis: No diagnosis found.  Physician: Chaparro Shepherd III, *    Physician Orders: PT Eval and Treat   Medical Diagnosis from Referral: M54.2 (ICD-10-CM) - Cervicalgia  Evaluation Date: 8/3/2020  Authorization Period Expiration: 12/31/2020  Plan of Care Expiration: 10/16/2020  Visit # / Visits authorized: 1/ 1  Total visit count: 1    Time In: 1530  Time Out: 1415  Total Billable Time: 45 minutes    Precautions: Standard, Fall and cancer    Subjective     Date of onset: 6 months for this episode, however neck pain on and off since the year 2000    History of current condition - Blanca reports: having previous intermittent episodes of cervical pain beginning around the year 2000, but this episode began around 6 months ago with no specific mechanism of injury. She states that she has a history of breast cancer, an acoustic neuroma, and fibromyalgia. She states that her pain is bilateral, but mostly on the L side which is the same side the acoustic neuroma was on. Her pain increases if prolonged sitting at a computer or while cross stitching. With her medical history she experiences a lot of pain.      Medical History:   Past Medical History:   Diagnosis Date    Acquired deafness     Estrogen receptor positive 5/23/2017    Malignant neoplasm of upper-outer quadrant of right female breast 5/23/2017    S/P bilateral mastectomy 10/23/2019       Surgical History:   Blanca Loya  has a past surgical history that includes Breast surgery; Neuroma surgery; Breast reconstruction; and Breast revision surgery (Left, 8/30/2018).    Medications:   Blanca has a current medication list which includes the following prescription(s): alprazolam, ascorbic acid (vitamin c), b complex vitamins, butalbital-acetaminophen-caffeine -40 mg, chromium, levothyroxine, oxycodone-acetaminophen,  potassium acetate, and sucralfate.    Allergies:   Review of patient's allergies indicates:   Allergen Reactions    Demerol [meperidine] Anxiety    Clindamycin Itching and Rash     Per pt on 8/31/18    Ibuprofen Nausea Only    Kenalog [triamcinolone acetonide] Other (See Comments)     Hot flashes, flushing of the skin.  (sensitivity to injection noted that was given on 3/6/18)    Micro-k [potassium chloride] Itching     Headache      Pcn [penicillins] Rash        Imaging, MRI studies, x-ray: which revealed the following:FINDINGS:  C1 through C7 are visualized on the lateral radiograph. There is no  acute fracture seen. Grade 1 anterolisthesis of C3 on C4 (1 mm), and  mild retrolisthesis of C4 on C5 (2 mm, and C5 on C6 (1 mm).  Degenerative disc disease. Lateral masses are symmetric about the  dens. The prevertebral soft tissues are normal and the lung apices are  clear.     IMPRESSION:  1. Degenerative changes in the cervical spine (better appreciated on  the recent MRI).  2. No acute osseous abnormality.    Prior Therapy: Yes, in year of 2000  Social History: lives with their spouse  in a single story with 1 step to enter    Occupation:   Prior Level of Function: Prior to pain exacerbating around 6 months ago, pt was not as limited with activities.   Current Level of Function: Limited ability to perform house work without pain.     Pain:  Current 6/10, worst 9/10, best 3/10   Location: left neck, and upper trap  Description: Aching and Dull, occassional numbness and tingling in 4th 5th digits  Aggravating Factors: Sitting and Laying, looking over shoulder. Have to move around every hour  Easing Factors: nothing    Pts goals: Ease the pain, Figure out a way that when it starts to hurt I can manage my symptoms without drugs. Be able to cross stitch without pain.     Objective     Structural/Postural Inspection: Kyphotic, protracted shoulders, and fwd head posture    Palpation for Condition: Tender to  palpation of bilateral cervical paraspinals,     Active Range of Motion (AROM)/Passive Range of Motion (PROM):     Cervical AROM (Degrees) Comments   Flexion 63 Pain   Extension 38 Pain   Right Side Bend 18 Pain   Left Side Bend 25 Pain   Right Rotation 58 Pain   Left Rotation 38 Pain     Shoulder (Degrees) AROM (Right)   Flexion WNL   Extension WNL   Abduction WNL   Elbow Flexion WNL   Elbow Extension WNL   Wrist Flexion WNL   Wrist Extension WNL     Joint Accessory:     Cervical Mob w/ Endfeel Comments   PA glide Hypomobile Pain      Thoracic Mob w/ Endfeel Comments   Posterior/Anterior Hypomobile Pain      Special Tests:   Vertebral artery clearing tests: Negative  Compression: negative   Spurlings: Positive to both sides   Distraction: Positive   Manual Muscle Testing:       RUE Strength Grade LUE Strength Grade   Shoulder Flexion 4+/5 Shoulder Flexion 4+/5   Shoulder Abduction 4+/5 Shoulder Abduction 4+/5   Shoulder IR  5/5 Shoulder IR  5/5   Shoulder ER  5/5 Shoulder ER  5/5   Elbow Flexion 5/5 Elbow Flexion  5/5   Elbow Extension  5/5 Elbow Extension  5/5   Wrist Extension  5/5 Wrist Extension  5/5     ULTT Comments   Median Nerve I  Positive   Median Nerve II Negative    Radial Nerve  Negative    Ulnar Nerve Positive       Outcome Measure: NDI Score - 29/50 or 58% disability    TREATMENT     Blanca participated in dynamic functional therapeutic activities to improve functional performance for 8  minutes, including:  PT educated pt on condition, prognosis, and plan of care.     PT educated pt on and provided HEP consisting of:     Supine chin tucks 2x10 with 10s hold   Seated L upper trap stretch 3x30s   Seated L levator stretch 3x30s   Assisted cervical rotation to R with towel 2x5 with 5-10s hold     HEP to be performed twice daily     Educational handout provided for office posture and fwd head posture    Home Exercises and Patient Education Provided    Education provided:   - Patient was issued HEP and  educated on mode frequency reps, and sets as well as when to stop TE.  Patient verbalzied understanding, performed return demonstration and with no adverse affects noted nor verbalized.       - Patient was educated on condition, prognosis, and plan of care     Written Home Exercises Provided: yes.  Exercises were reviewed and Blanca was able to demonstrate them prior to the end of the session.  Blanca demonstrated good  understanding of the education provided.     See EMR under Patient Instructions for exercises provided 8/3/2020.          Assessment     Blanca is a 54 y.o. female  presenting to OP Physical Therapy for initial evaluation on 8/3/2020 with a MD Dx of M54.2 (ICD-10-CM) - Cervicalgia.  Patient exhibits cervical pain mostly L sided that occasionally radiates down into her hand, decreased cervical ROM, tightness of B upper traps and levator scapula, poor posture, and Min BUE weakness. Pt's symptoms and deficits are limiting her ability to perform prolonged sitting activities, household chores, looking over her shoulder, and sleep without pain. The following co-morbid conditions/personal factors may affect the care plan: fibromyalgia and history of cancer.     Pt prognosis is Fair.   Pt will benefit from skilled outpatient Physical Therapy to address the deficits stated above and in the chart below, provide pt/family education, and to maximize pt's level of independence.     Plan of care discussed with patient: Yes  Pt's spiritual, cultural and educational needs considered and patient is agreeable to the plan of care and goals as stated below:         Goals:    GOALS: Short Term Goals: 4 weeks  1.Report decreased cervical pain  <   / =  4  /10  to increase tolerance for prolonged sitting activities.   2. Increase cervical ROM by 5-10 degrees in order to perform ADLs with decreased difficulty.  3. Increase strength in B shoulders/scapular stabilizers by 1/3 MMT grade to increase tolerance for ADL and      work activities.   4. Pt to tolerate HEP to improve ROM and independence with ADL's  5. Pt will improve NDI score to 25/50 to demonstrate decreased disability and improved function.     Long Term Goals: 8 weeks  1.Report decreased cervical pain  <   / =  3  /10  to increase tolerance for cross stitching.   2. Increase UE/neck flexibility in order to improve posture.    3. Increase cervical ROM to WNL to be able to look over shoulder while driving.  4. Pt will improve NDI score to </= 20/50 to demonstrate decreased disability and improved function.   5. Pt's goal: Pt will report cross stitching without pain.           Anticipated Barriers for therapy: Chronicity of condition, history of cancer, history of fibromyalgia    Medical Necessity is demonstrated by the following  History  Co-morbidities and personal factors that may impact the plan of care Co-morbidities:   history of cancer and fibromyalgia    Personal Factors:   no deficits     low   Examination  Body Structures and Functions, activity limitations and participation restrictions that may impact the plan of care Body Regions:   neck  upper extremities    Body Systems:    gross symmetry  ROM  strength  gross coordinated movement  motor control  Posture    Participation Restrictions:   Prolonged sitting, looking over shoulder, lifting    Activity limitations:   Learning and applying knowledge  no deficits    General Tasks and Commands  no deficits    Communication  no deficits    Mobility  lifting and carrying objects    Self care  no deficits    Domestic Life  doing house work (cleaning house, washing dishes, laundry)    Interactions/Relationships  no deficits    Life Areas  no deficits    Community and Social Life  recreation and leisure  no deficits         moderate   Clinical Presentation stable and uncomplicated low   Decision Making/ Complexity Score: low         Plan       Certification Period: 8/3/2020 to 10/16/2020.    Outpatient Physical Therapy  2 times weekly for 4 weeks, then 1 time weekly for 4 weeks to include the following interventions: patient education, Cervical/Lumbar Traction, Manual Therapy, Moist Heat/ Ice, Neuromuscular Re-ed, Patient Education, Therapeutic Activites and Therapeutic Exercise.     Merlin Gonzales, PT               I CERTIFY THE NEED FOR THESE SERVICES FURNISHED UNDER THIS PLAN OF TREATMENT AND WHILE UNDER MY CARE     Physician's comments:           Physician's Signature: ___________________________________________________

## 2020-08-04 PROBLEM — M62.81 MUSCLE WEAKNESS OF UPPER EXTREMITY: Status: ACTIVE | Noted: 2020-08-04

## 2020-08-10 ENCOUNTER — CLINICAL SUPPORT (OUTPATIENT)
Dept: REHABILITATION | Facility: HOSPITAL | Age: 55
End: 2020-08-10
Payer: COMMERCIAL

## 2020-08-10 DIAGNOSIS — M62.89 MUSCLE TIGHTNESS: ICD-10-CM

## 2020-08-10 DIAGNOSIS — M53.82 DECREASED RANGE OF MOTION OF INTERVERTEBRAL DISCS OF CERVICAL SPINE: ICD-10-CM

## 2020-08-10 DIAGNOSIS — M54.2 NECK PAIN: ICD-10-CM

## 2020-08-10 DIAGNOSIS — M62.81 MUSCLE WEAKNESS OF UPPER EXTREMITY: ICD-10-CM

## 2020-08-10 PROCEDURE — 97140 MANUAL THERAPY 1/> REGIONS: CPT

## 2020-08-10 PROCEDURE — 97110 THERAPEUTIC EXERCISES: CPT

## 2020-08-10 NOTE — PROGRESS NOTES
[facility]  Outpatient Physical Therapy Daily Treatment Note      Name: Blanca Loya  Clinic Number: [id number]  Visit Date: 8/10/2020    Therapy Diagnosis: No diagnosis found.    Physician: Chaparro Shepherd III, *  Physician Orders: PT Eval and Treat   Medical Diagnosis from Referral: M54.2 (ICD-10-CM) - Cervicalgia  Evaluation Date: 8/3/2020  Authorization Period Expiration: 12/31/2020  Plan of Care Expiration: 10/16/2020  Visit # / Visits authorized: 1/ 1  Total visit count: 1    Time In: 1400  Time Out: 1445  Total Billable Timed: 40      Precautions: Standard, Fall and cancer    Reassessment due on or before 09/03/2020    Subjective     The patient presents to therapy and reports that her pain is about the same as at the initial evaluation. Patient reports she performed HEP once per day and she has difficulty with pulling her ear down to her shoulder on upper trap stretch.     Response to previous treatment: First follow up   Functional change: first follow up     Pain: 5/10  Location: left neck      Objective     Blanca received therapeutic exercises to develop strength, endurance, flexibility, posture and core stabilization for 25 minutes including:    Attempted Supine chin tucks x10 with 10s hold, pt unable to tolerate secondary to c/o increased symptoms and past medical history of radiation and removal of acoustic neuroma.     Seated chin tucks 2x10 with 10s hold pt able to tolerate in sitting. Vc/tc to perform chin tuck without SCM compensation.   Seated L upper trap stretch 3x30s   Seated L levator stretch 3x30s    Seated chin tucks with active cervical rotation to R 2x10     Seated scapular retractions 2x10 with 10s hold    Pt requires consistent cueing to decrease fwd head posture during seated therex    Median nerve glides 2x10   Ulnar nerve glides 2x10  Assisted cervical rotation to R with towel 2x5 with 5-10s hold       Blanca received the  following manual therapy techniques: Manual traction, stretching of L upper trap, cervical extension and rotation were applied to the: cervical spine for 15 minutes, including:    Manual traction x5 minutes     Manual chin tuck with extension to promote full extension ROM    Manual R rotation and R lateral flexion with side glides to C-spine    Trigger point release to B upper traps      Patient Education and HEP     She was compliant with home exercise program.    Education provided:   - Importance of maintaining upright neutral posture throughout the day.     Written Home Exercises Provided: Patient instructed to cont prior HEP.  Exercises were reviewed and Blanca was able to demonstrate them prior to the end of the session.  Blanca demonstrated good  understanding of the education provided.     See EMR under Patient Instructions for exercises provided prior visit.    Assessment     This is a 54 y.o. female referred to outpatient physical therapy and presents with a medical diagnosis of Cervicalgia and demonstrates limitations as described in the problem list. In today's treatment pt demonstrates difficulty with performing chin tucks without SCM compensation. Pt continues to present with poor posture, decreased cervical ROM, and tightness of cervical musculature. Pt prognosis is Fair. Pt will continue to benefit from the specialized knowledge of skilled outpatient physical therapy to address the deficits listed below in the problem list, provide pt/family education and to maximize pt's level of independence in the home and community environment.    Pt will continue to benefit from skilled outpatient physical therapy to address the deficits listed in the problem list box on initial evaluation, provide pt/family education and to maximize pt's level of independence in the home and community environment.     Blanca is progressing well towards her goals.   Pt prognosis is Fair.     Pt's spiritual, cultural and educational  needs considered and pt agreeable to plan of care and goals.    Anticipated barriers to physical therapy: Chronicity of condition, history of cancer, history of fibromyalgia    Will the patient continue to benefit from skilled PT intervention?     Medical necessity is demonstrated by:   - Pain limits function of effected part for all activities  - Unable to participate in daily activities   - Requires skilled supervision to complete and progress HEP      Progress towards goals: Progressing      Goals: GOALS: Short Term Goals: 4 weeks  1.Report decreased cervical pain  <   / =  4  /10  to increase tolerance for prolonged sitting activities.   2. Increase cervical ROM by 5-10 degrees in order to perform ADLs with decreased difficulty.  3. Increase strength in B shoulders/scapular stabilizers by 1/3 MMT grade to increase tolerance for ADL and     work activities.   4. Pt to tolerate HEP to improve ROM and independence with ADL's  5. Pt will improve NDI score to 25/50 to demonstrate decreased disability and improved function.      Long Term Goals: 8 weeks  1.Report decreased cervical pain  <   / =  3  /10  to increase tolerance for cross stitching.   2. Increase UE/neck flexibility in order to improve posture.    3. Increase cervical ROM to WNL to be able to look over shoulder while driving.  4. Pt will improve NDI score to </= 20/50 to demonstrate decreased disability and improved function.   5. Pt's goal: Pt will report cross stitching without pain.        Plan     Continue with the plan of care established per initial evaluation    Merlin Gonzales PT

## 2020-08-14 ENCOUNTER — CLINICAL SUPPORT (OUTPATIENT)
Dept: REHABILITATION | Facility: HOSPITAL | Age: 55
End: 2020-08-14
Payer: COMMERCIAL

## 2020-08-14 DIAGNOSIS — M62.89 MUSCLE TIGHTNESS: ICD-10-CM

## 2020-08-14 DIAGNOSIS — M62.81 MUSCLE WEAKNESS OF UPPER EXTREMITY: ICD-10-CM

## 2020-08-14 DIAGNOSIS — M53.82 DECREASED RANGE OF MOTION OF INTERVERTEBRAL DISCS OF CERVICAL SPINE: ICD-10-CM

## 2020-08-14 DIAGNOSIS — M54.2 NECK PAIN: ICD-10-CM

## 2020-08-14 PROCEDURE — 97140 MANUAL THERAPY 1/> REGIONS: CPT

## 2020-08-14 PROCEDURE — 97110 THERAPEUTIC EXERCISES: CPT

## 2020-08-14 NOTE — PROGRESS NOTES
[facility]  Outpatient Physical Therapy Daily Treatment Note      Name: Blanca Loya  Clinic Number: [id number]  Visit Date: 8/14/2020    Therapy Diagnosis:   Encounter Diagnoses   Name Primary?    Muscle weakness of upper extremity     Neck pain     Decreased range of motion of intervertebral discs of cervical spine     Muscle tightness        Physician: Chaparro Shepherd III, *  Physician Orders: PT Eval and Treat   Medical Diagnosis from Referral: M54.2 (ICD-10-CM) - Cervicalgia  Evaluation Date: 8/3/2020  Authorization Period Expiration: 12/31/2020  Plan of Care Expiration: 10/16/2020  Visit # / Visits authorized: 2/12   Total visit count: 2    Time In: 1427  Time Out: 1515  Total Treatment Time: 48 minutes   Total Billable Timed: 40      Precautions: Standard, Fall and cancer    Reassessment due on or before 09/03/2020    Subjective     The patient presents to therapy and reports that after visit on Monday she was in pain with L sided neck pain and migraine. She states that her pain lasted until Wednesday when it started to go down. She reports her pain on the L side is a little higher up and feels like the L side of her neck is loosening up, but the R side is tight. She thinks she can move her neck better.    Pt reports performing HEP    Response to previous treatment: See above  Functional change: See above    Pain: 3/10  Location: L neck/subocciptial region    Objective     Blanca received therapeutic exercises to develop strength, endurance, flexibility, posture and core stabilization for 25 minutes including:    Attempted Supine chin tucks x10 with 10s hold, pt unable to tolerate secondary to c/o increased symptoms and past medical history of radiation and removal of acoustic neuroma.     Seated chin tucks 2x10 with 10s hold pt able to tolerate in sitting. Vc/tc to perform chin tuck without SCM compensation.   SCM stretch 3x30s on L side and 2x30s on  R side before pt c/o neck pain on L    - Pt with difficulty feeling stretch on R side due to residual numbness from radiation treatment.   Seated B upper trap stretch 3x30s   Seated L levator stretch 3x30s    Seated chin tucks with active cervical rotation to R 2x10   Seated thoracic extension over small soccer ball 2x10 with 5s hold   - Following exercise pt c/o pressure rising into suboccipital region. PT performed suboccipital release x2 min, pt reports relief.     Seated scapular retractions 2x10 with 10s hold    Breuggers 2x10 with orange theraband     Pt requires consistent cueing to decrease fwd head posture during seated therex    Median nerve glides 2x10  (NP)  Ulnar nerve glides 2x10 NP  Assisted cervical rotation to R with towel 2x5 with 5-10s hold       Blanca received the following manual therapy techniques: Manual traction, suboccipital release, and soft tissue mobilization were applied to the: cervical spine for 10 minutes, including:    Manual traction x5 minutes     Suboccipital release    Soft tissue mobilization of B cervical paraspinals    Manual chin tuck with extension to promote full extension ROM (NP)     Manual R rotation and R lateral flexion with side glides to C-spine (NP)     Trigger point release to B upper traps (NP)       Patient Education and HEP     She was compliant with home exercise program.    Education provided:   - Importance of maintaining upright neutral posture throughout the day.   - SCM stretch and scapular retractions added to HEP  Written Home Exercises Provided: Patient instructed to cont prior HEP.  Exercises were reviewed and Blanca was able to demonstrate them prior to the end of the session.  Blanca demonstrated good  understanding of the education provided.     See EMR under Patient Instructions for exercises provided prior visit.    Assessment     This is a 54 y.o. female referred to outpatient physical therapy and presents with a medical diagnosis of Cervicalgia  and demonstrates limitations as described in the problem list. In today's treatment pt continues with over activation of SCM during deep neck flexor exercises. Pt responded well to suboccipital release following c/o pressure in suboccipital region.  Pt continues to present with poor posture, decreased cervical ROM, and tightness of cervical musculature. Pt prognosis is Fair. Pt will continue to benefit from the specialized knowledge of skilled outpatient physical therapy to address the deficits listed below in the problem list, provide pt/family education and to maximize pt's level of independence in the home and community environment.    Pt will continue to benefit from skilled outpatient physical therapy to address the deficits listed in the problem list box on initial evaluation, provide pt/family education and to maximize pt's level of independence in the home and community environment.     Blanca is progressing well towards her goals.   Pt prognosis is Fair.     Pt's spiritual, cultural and educational needs considered and pt agreeable to plan of care and goals.    Anticipated barriers to physical therapy: Chronicity of condition, history of cancer, history of fibromyalgia    Will the patient continue to benefit from skilled PT intervention?     Medical necessity is demonstrated by:   - Pain limits function of effected part for all activities  - Unable to participate in daily activities   - Requires skilled supervision to complete and progress HEP      Progress towards goals: Progressing      Goals: GOALS: Short Term Goals: 4 weeks  1.Report decreased cervical pain  <   / =  4  /10  to increase tolerance for prolonged sitting activities.   2. Increase cervical ROM by 5-10 degrees in order to perform ADLs with decreased difficulty.  3. Increase strength in B shoulders/scapular stabilizers by 1/3 MMT grade to increase tolerance for ADL and     work activities.   4. Pt to tolerate HEP to improve ROM and  independence with ADL's  5. Pt will improve NDI score to 25/50 to demonstrate decreased disability and improved function.      Long Term Goals: 8 weeks  1.Report decreased cervical pain  <   / =  3  /10  to increase tolerance for cross stitching.   2. Increase UE/neck flexibility in order to improve posture.    3. Increase cervical ROM to WNL to be able to look over shoulder while driving.  4. Pt will improve NDI score to </= 20/50 to demonstrate decreased disability and improved function.   5. Pt's goal: Pt will report cross stitching without pain.        Plan     Continue with the plan of care established per initial evaluation with focus on improving cervical ROM, posture, and deep neck flexor strength/endurance.     Merlin Gonzales, PT

## 2020-08-19 ENCOUNTER — DOCUMENTATION ONLY (OUTPATIENT)
Dept: REHABILITATION | Facility: HOSPITAL | Age: 55
End: 2020-08-19

## 2020-08-20 ENCOUNTER — CLINICAL SUPPORT (OUTPATIENT)
Dept: REHABILITATION | Facility: HOSPITAL | Age: 55
End: 2020-08-20
Payer: COMMERCIAL

## 2020-08-20 DIAGNOSIS — M54.2 NECK PAIN: ICD-10-CM

## 2020-08-20 DIAGNOSIS — M62.89 MUSCLE TIGHTNESS: ICD-10-CM

## 2020-08-20 DIAGNOSIS — M53.82 DECREASED RANGE OF MOTION OF INTERVERTEBRAL DISCS OF CERVICAL SPINE: ICD-10-CM

## 2020-08-20 DIAGNOSIS — M62.81 MUSCLE WEAKNESS OF UPPER EXTREMITY: ICD-10-CM

## 2020-08-20 PROCEDURE — 97140 MANUAL THERAPY 1/> REGIONS: CPT | Mod: CQ

## 2020-08-20 PROCEDURE — 97110 THERAPEUTIC EXERCISES: CPT | Mod: CQ

## 2020-08-20 NOTE — PROGRESS NOTES
"[facility]  Outpatient Physical Therapy Daily Treatment Note      Name: Blanca Loya  Clinic Number: [id number]  Visit Date: 8/20/2020    Therapy Diagnosis:   Encounter Diagnoses   Name Primary?    Muscle weakness of upper extremity      Neck pain      Decreased range of motion of intervertebral discs of cervical spine      Muscle tightness        Physician: Chaparro Shepherd III, *  Physician Orders: PT Eval and Treat   Medical Diagnosis from Referral: M54.2 (ICD-10-CM) - Cervicalgia  Evaluation Date: 8/3/2020  Authorization Period Expiration: 12/31/2020  Plan of Care Expiration: 10/16/2020  Visit # / Visits authorized: 3/12   Total visit count: 3    Time In: 1400  Time Out: 1500   Total Treatment Time: 60 minutes   Total Billable Timed: 60      Precautions: Standard, Fall and cancer    Reassessment due on or before 09/03/2020    Subjective     The patient reports pain level 3/10 which she states is her "always" pain.     Pt reports performing HEP    Response to previous treatment: See above  Functional change: See above    Pain: 3/10  Location: L neck/subocciptial region    Objective     Blanca received therapeutic exercises to develop strength, endurance, flexibility, posture and core stabilization for 40 minutes including:    Attempted Supine chin tucks x10 with 10s hold, pt unable to tolerate secondary to c/o increased symptoms and past medical history of radiation and removal of acoustic neuroma.     Seated chin tucks 2x10 with 10s hold pt able to tolerate in sitting. Vc/tc to perform chin tuck without SCM compensation.   SCM stretch 3x30s on L side and 2x30s on R side before pt c/o neck pain on L    - Pt with difficulty feeling stretch on R side due to residual numbness from radiation treatment.   Seated B upper trap stretch 3x30s   Seated L levator stretch 3x30s    Seated chin tucks with active cervical rotation to R 2x10   Seated thoracic extension " over small soccer ball 2x10 with 5s hold   - Following exercise pt c/o pressure rising into suboccipital region. PT performed suboccipital release x2 min, pt reports relief.     Seated scapular retractions 2x10 with 10s hold    Breuggers 2x10 with orange theraband     Pt requires consistent cueing to decrease fwd head posture during seated therex    Median nerve glides 2x10  (NP)  Ulnar nerve glides 2x10 NP  Assisted cervical rotation to R with towel 2x5 with 5-10s hold       Blanca received the following manual therapy techniques: Manual traction, suboccipital release, and soft tissue mobilization were applied to the: cervical spine for 10 minutes, including:    Manual traction x  minutes     Suboccipital release     Soft tissue mobilization of B cervical paraspinals    Manual chin tuck with extension to promote full extension ROM (NP)     Manual R rotation and R lateral flexion with side glides to C-spine (NP)     Trigger point release to B upper traps (NP)       Patient Education and HEP     She was compliant with home exercise program.    Education provided:   - Importance of maintaining upright neutral posture throughout the day.   - SCM stretch and scapular retractions added to HEP  Written Home Exercises Provided: Patient instructed to cont prior HEP.  Exercises were reviewed and Blanca was able to demonstrate them prior to the end of the session.  Blanca demonstrated good  understanding of the education provided.     See EMR under Patient Instructions for exercises provided prior visit.    Assessment     This is a 54 y.o. female referred to outpatient physical therapy and presents with a medical diagnosis of Cervicalgia and demonstrates limitations as described in the problem list. In today's treatment pt continues with over activation of SCM during deep neck flexor exercises. She required moderate cues in order to achieve proper stretch of SCM bilaterally.  Pt responded well to suboccipital release following  c/o pressure in suboccipital region.  Pt continues to present with poor posture, decreased cervical ROM, and tightness of cervical musculature. Pt prognosis is Fair. Pt will continue to benefit from the specialized knowledge of skilled outpatient physical therapy to address the deficits listed below in the problem list, provide pt/family education and to maximize pt's level of independence in the home and community environment.    Pt will continue to benefit from skilled outpatient physical therapy to address the deficits listed in the problem list box on initial evaluation, provide pt/family education and to maximize pt's level of independence in the home and community environment.     Blanca is progressing well towards her goals.   Pt prognosis is Fair.     Pt's spiritual, cultural and educational needs considered and pt agreeable to plan of care and goals.    Anticipated barriers to physical therapy: Chronicity of condition, history of cancer, history of fibromyalgia    Will the patient continue to benefit from skilled PT intervention?     Medical necessity is demonstrated by:   - Pain limits function of effected part for all activities  - Unable to participate in daily activities   - Requires skilled supervision to complete and progress HEP      Progress towards goals: Progressing      Goals: GOALS: Short Term Goals: 4 weeks  1.Report decreased cervical pain  <   / =  4  /10  to increase tolerance for prolonged sitting activities.   2. Increase cervical ROM by 5-10 degrees in order to perform ADLs with decreased difficulty.  3. Increase strength in B shoulders/scapular stabilizers by 1/3 MMT grade to increase tolerance for ADL and     work activities.   4. Pt to tolerate HEP to improve ROM and independence with ADL's  5. Pt will improve NDI score to 25/50 to demonstrate decreased disability and improved function.      Long Term Goals: 8 weeks  1.Report decreased cervical pain  <   / =  3  /10  to increase  tolerance for cross stitching.   2. Increase UE/neck flexibility in order to improve posture.    3. Increase cervical ROM to WNL to be able to look over shoulder while driving.  4. Pt will improve NDI score to </= 20/50 to demonstrate decreased disability and improved function.   5. Pt's goal: Pt will report cross stitching without pain.        Plan     Continue with the plan of care established per initial evaluation with focus on improving cervical ROM, posture, and deep neck flexor strength/endurance.     Tatiana Braswell, PTA

## 2020-08-24 ENCOUNTER — CLINICAL SUPPORT (OUTPATIENT)
Dept: REHABILITATION | Facility: HOSPITAL | Age: 55
End: 2020-08-24
Payer: COMMERCIAL

## 2020-08-24 DIAGNOSIS — M62.81 MUSCLE WEAKNESS OF UPPER EXTREMITY: ICD-10-CM

## 2020-08-24 DIAGNOSIS — M54.2 NECK PAIN: ICD-10-CM

## 2020-08-24 DIAGNOSIS — M62.89 MUSCLE TIGHTNESS: ICD-10-CM

## 2020-08-24 DIAGNOSIS — M53.82 DECREASED RANGE OF MOTION OF INTERVERTEBRAL DISCS OF CERVICAL SPINE: ICD-10-CM

## 2020-08-24 PROCEDURE — 97110 THERAPEUTIC EXERCISES: CPT

## 2020-08-24 PROCEDURE — 97140 MANUAL THERAPY 1/> REGIONS: CPT

## 2020-08-24 NOTE — PROGRESS NOTES
[facility]  Outpatient Physical Therapy Daily Treatment Note      Name: Blanca Loya  Clinic Number: [id number]  Visit Date: 8/24/2020    Therapy Diagnosis:   Encounter Diagnoses   Name Primary?    Muscle weakness of upper extremity      Neck pain      Decreased range of motion of intervertebral discs of cervical spine      Muscle tightness        Physician: Chaparro Shepherd III, *  Physician Orders: PT Eval and Treat   Medical Diagnosis from Referral: M54.2 (ICD-10-CM) - Cervicalgia  Evaluation Date: 8/3/2020  Authorization Period Expiration: 12/31/2020  Plan of Care Expiration: 10/16/2020  Visit # / Visits authorized: 4/12   Total visit count: 4    Time In: 1408  Time Out: 1453   Total Treatment Time: 45 minutes   Total Billable Timed: 40      Precautions: Standard, Fall and cancer    Reassessment due on or before 09/03/2020    Subjective     The patient reports her neck is worse today. She states she is stressed and feels that is contributing to her symptoms. She reports she also spent some time cross stitching and knows when she is in that posture for prolonged periods of time she hurts. She states she does not want to give up cross stitching.     Pt reports performing HEP, but not as often as she should. She states she has tried the suboccipital release with the tennis balls and that is helping.     Response to previous treatment: See above  Functional change: See above    Pain: 6/10  Location: L neck/subocciptial region    Objective     Blanca received therapeutic exercises to develop strength, endurance, flexibility, posture and core stabilization for 30 minutes including:    Cervical isometrics with GTB:   Extension 1x10 with 10s hold    Lateral flexion to R 1x10 with 10s hold   Lateral flexion to L 1x0 with 10s hold     Seated B upper trap stretch 3x30s   Seated L levator stretch 3x30s    Seated thoracic extension over small soccer ball 2x10 with 5s  hold   Seated scapular retractions 2x10 with 10s hold    Breuggers 2x10 with orange theraband cueing to maintain scapular retraction.     Pt requires consistent cueing to decrease fwd head posture during seated therex    Median nerve glides x10 each side   Ulnar nerve glides x10 each side       Following not performed today:      Seated chin tucks 2x10 with 10s hold pt able to tolerate in sitting. Vc/tc to perform chin tuck without SCM compensation. (NP)  SCM stretch 3x30s on L side and 2x30s on R side before pt c/o neck pain on L (NP)   - Pt with difficulty feeling stretch on R side due to residual numbness from radiation treatment.   Assisted cervical rotation to R with towel 2x5 with 5-10s hold   Attempted Supine chin tucks x10 with 10s hold, pt unable to tolerate secondary to c/o increased symptoms and past medical history of radiation and removal of acoustic neuroma. (NP)   Seated chin tucks with active cervical rotation to R 2x10 (NP)       Blanca received the following manual therapy techniques: Manual traction, suboccipital release, and soft tissue mobilization were applied to the: cervical spine for 10 minutes, including:    Manual traction x 5 minutes     Suboccipital release     Soft tissue mobilization of B cervical paraspinals    Manual chin tuck with extension to promote full extension ROM (NP)     Manual R rotation and R lateral flexion with side glides to C-spine (NP)     Trigger point release to B upper traps (NP)       Patient Education and HEP     She was compliant with home exercise program.    Education provided:   - Importance of maintaining upright neutral posture throughout the day.   - PT instructed pt to set a timer for short durations during cross stitching to perform thoracic and cervical extension exercises to reverse flexed posture.   Written Home Exercises Provided: Patient instructed to cont prior HEP.  Exercises were reviewed and Blanca was able to demonstrate them prior to the end of  the session.  Blanca demonstrated good  understanding of the education provided.     See EMR under Patient Instructions for exercises provided prior visit.    Assessment     This is a 54 y.o. female referred to outpatient physical therapy and presents with a medical diagnosis of Cervicalgia and demonstrates limitations as described in the problem list. In today's treatment pt required consistent cueing to correct fwd head posture throughout. Pt with difficulty maintaining scapular retraction with brueggers without cueing. Pt continues to present with poor posture, decreased cervical ROM, and tightness of cervical musculature. Pt prognosis is Fair. Pt will continue to benefit from the specialized knowledge of skilled outpatient physical therapy to address the deficits listed below in the problem list, provide pt/family education and to maximize pt's level of independence in the home and community environment.    Blanca is progressing well towards her goals.   Pt prognosis is Fair.     Pt's spiritual, cultural and educational needs considered and pt agreeable to plan of care and goals.    Anticipated barriers to physical therapy: Chronicity of condition, history of cancer, history of fibromyalgia    Will the patient continue to benefit from skilled PT intervention?     Medical necessity is demonstrated by:   - Pain limits function of effected part for all activities  - Unable to participate in daily activities   - Requires skilled supervision to complete and progress HEP      Progress towards goals: Progressing      Goals: GOALS: Short Term Goals: 4 weeks  1.Report decreased cervical pain  <   / =  4  /10  to increase tolerance for prolonged sitting activities.   2. Increase cervical ROM by 5-10 degrees in order to perform ADLs with decreased difficulty.  3. Increase strength in B shoulders/scapular stabilizers by 1/3 MMT grade to increase tolerance for ADL and     work activities.   4. Pt to tolerate HEP to improve ROM  and independence with ADL's  5. Pt will improve NDI score to 25/50 to demonstrate decreased disability and improved function.      Long Term Goals: 8 weeks  1.Report decreased cervical pain  <   / =  3  /10  to increase tolerance for cross stitching.   2. Increase UE/neck flexibility in order to improve posture.    3. Increase cervical ROM to WNL to be able to look over shoulder while driving.  4. Pt will improve NDI score to </= 20/50 to demonstrate decreased disability and improved function.   5. Pt's goal: Pt will report cross stitching without pain.        Plan     Continue with the plan of care established per initial evaluation with focus on improving cervical ROM, posture, scapular strengthening, and deep neck flexor strength/endurance.     Merlin Gonzales, PT

## 2020-08-25 PROBLEM — M50.30 DDD (DEGENERATIVE DISC DISEASE), CERVICAL: Status: ACTIVE | Noted: 2020-08-25

## 2020-08-27 NOTE — PROGRESS NOTES
"[facility]  Outpatient Physical Therapy Daily Treatment Note      Name: Blanca Loya  Clinic Number: [id number]  Visit Date: 8/28/2020    Therapy Diagnosis:   Encounter Diagnoses   Name Primary?    Muscle weakness of upper extremity      Neck pain      Decreased range of motion of intervertebral discs of cervical spine      Muscle tightness        Physician: Chaparro Shepherd III, *  Physician Orders: PT Eval and Treat   Medical Diagnosis from Referral: M54.2 (ICD-10-CM) - Cervicalgia  Evaluation Date: 8/3/2020  Authorization Period Expiration: 12/31/2020  Plan of Care Expiration: 10/16/2020  Visit # / Visits authorized: 4/12   Total visit count: 4    Time In: 1515  Time Out: 1541  Total Treatment Time: 26 minutes   Total Billable Timed: 25 minutes      Precautions: Standard, Fall and cancer    Reassessment due on or before 09/03/2020    Subjective     The patient reports she is feeling "crappy" today. Pt states that she began taking some medicine for her stomach and that she thinks it is making her nauseas and increasing her headaches.     Pt reports performing HEP, but not as often as she should. She states she has tried the suboccipital release with the tennis balls and that is helping.     Response to previous treatment: See above  Functional change: See above    Pain: 6/10  Location: L neck, upper trap, and subocciptial region    Objective     Blanca received therapeutic exercises to develop strength, endurance, flexibility, posture and core stabilization for 10 minutes including:    Cervical isometrics with resistance of pt's hand:   Extension 1x10 with 10s hold    Lateral flexion to R 1x10 with 10s hold   Lateral flexion to L 1x0 with 10s hold   Seated thoracic extension over small soccer ball 2x10 with 5s hold   Seated scapular retractions 2x10 with 10s hold          Following not performed today due to pt reporting she is feeling nauseas and would " like to discontinue today's treatment and  next time:     Seated B upper trap stretch 3x30s   Seated L levator stretch 3x30s    Breuggers 2x10 with orange theraband cueing to maintain scapular retraction.     Pt requires consistent cueing to decrease fwd head posture during seated therex    Median nerve glides x10 each side   Ulnar nerve glides x10 each side       Seated chin tucks 2x10 with 10s hold pt able to tolerate in sitting. Vc/tc to perform chin tuck without SCM compensation. (NP)  SCM stretch 3x30s on L side and 2x30s on R side before pt c/o neck pain on L (NP)   - Pt with difficulty feeling stretch on R side due to residual numbness from radiation treatment.   Assisted cervical rotation to R with towel 2x5 with 5-10s hold   Attempted Supine chin tucks x10 with 10s hold, pt unable to tolerate secondary to c/o increased symptoms and past medical history of radiation and removal of acoustic neuroma. (NP)   Seated chin tucks with active cervical rotation to R 2x10 (NP)       Blanca received the following manual therapy techniques: Manual traction, suboccipital release, and soft tissue mobilization were applied to the: cervical spine for 15 minutes, including:    Manual traction x 5 minutes     Suboccipital release     Soft tissue mobilization of B cervical paraspinals    Manual chin tuck with extension to promote full extension ROM (NP)     Manual R rotation and R lateral flexion with side glides to C-spine (NP)     Trigger point release to B upper traps (NP)       Patient Education and HEP     She was compliant with home exercise program.    Education provided:   - Continue working on posture throughout day.     Written Home Exercises Provided: Patient instructed to cont prior HEP.  Exercises were reviewed and Blanca was able to demonstrate them prior to the end of the session.  Blanca demonstrated good  understanding of the education provided.     See EMR under Patient Instructions for exercises provided  prior visit.    Assessment     This is a 54 y.o. female referred to outpatient physical therapy and presents with a medical diagnosis of Cervicalgia and demonstrates limitations as described in the problem list. In today's treatment pt only able to tolerate 25 minutes of manual and therex prior to reporting feeling nauseas and requesting to discontinue and  next visit. Pt continues to present with poor posture, decreased cervical ROM, and tightness of cervical musculature. Pt prognosis is Fair. Pt will continue to benefit from the specialized knowledge of skilled outpatient physical therapy to address the deficits listed below in the problem list, provide pt/family education and to maximize pt's level of independence in the home and community environment.    Blanca is progressing well towards her goals.   Pt prognosis is Fair.     Pt's spiritual, cultural and educational needs considered and pt agreeable to plan of care and goals.    Anticipated barriers to physical therapy: Chronicity of condition, history of cancer, history of fibromyalgia    Will the patient continue to benefit from skilled PT intervention?     Medical necessity is demonstrated by:   - Pain limits function of effected part for all activities  - Unable to participate in daily activities   - Requires skilled supervision to complete and progress HEP      Progress towards goals: Progressing      Goals: GOALS: Short Term Goals: 4 weeks  1.Report decreased cervical pain  <   / =  4  /10  to increase tolerance for prolonged sitting activities.   2. Increase cervical ROM by 5-10 degrees in order to perform ADLs with decreased difficulty.  3. Increase strength in B shoulders/scapular stabilizers by 1/3 MMT grade to increase tolerance for ADL and     work activities.   4. Pt to tolerate HEP to improve ROM and independence with ADL's  5. Pt will improve NDI score to 25/50 to demonstrate decreased disability and improved function.      Long Term Goals:  8 weeks  1.Report decreased cervical pain  <   / =  3  /10  to increase tolerance for cross stitching.   2. Increase UE/neck flexibility in order to improve posture.    3. Increase cervical ROM to WNL to be able to look over shoulder while driving.  4. Pt will improve NDI score to </= 20/50 to demonstrate decreased disability and improved function.   5. Pt's goal: Pt will report cross stitching without pain.        Plan     Continue with the plan of care established per initial evaluation with focus on improving cervical ROM, posture, scapular strengthening, and deep neck flexor strength/endurance.     Merlin Gonzales, PT, DPT

## 2020-08-28 ENCOUNTER — CLINICAL SUPPORT (OUTPATIENT)
Dept: REHABILITATION | Facility: HOSPITAL | Age: 55
End: 2020-08-28
Payer: COMMERCIAL

## 2020-08-28 DIAGNOSIS — M62.89 MUSCLE TIGHTNESS: ICD-10-CM

## 2020-08-28 DIAGNOSIS — M62.81 MUSCLE WEAKNESS OF UPPER EXTREMITY: ICD-10-CM

## 2020-08-28 DIAGNOSIS — M54.2 NECK PAIN: ICD-10-CM

## 2020-08-28 DIAGNOSIS — M53.82 DECREASED RANGE OF MOTION OF INTERVERTEBRAL DISCS OF CERVICAL SPINE: ICD-10-CM

## 2020-08-28 PROCEDURE — 97140 MANUAL THERAPY 1/> REGIONS: CPT

## 2020-08-28 PROCEDURE — 97110 THERAPEUTIC EXERCISES: CPT

## 2020-08-31 ENCOUNTER — CLINICAL SUPPORT (OUTPATIENT)
Dept: REHABILITATION | Facility: HOSPITAL | Age: 55
End: 2020-08-31
Payer: COMMERCIAL

## 2020-08-31 DIAGNOSIS — M62.81 MUSCLE WEAKNESS OF UPPER EXTREMITY: ICD-10-CM

## 2020-08-31 DIAGNOSIS — M54.2 NECK PAIN: ICD-10-CM

## 2020-08-31 DIAGNOSIS — M53.82 DECREASED RANGE OF MOTION OF INTERVERTEBRAL DISCS OF CERVICAL SPINE: ICD-10-CM

## 2020-08-31 DIAGNOSIS — M62.89 MUSCLE TIGHTNESS: ICD-10-CM

## 2020-08-31 PROCEDURE — 97110 THERAPEUTIC EXERCISES: CPT | Mod: CQ

## 2020-08-31 NOTE — PROGRESS NOTES
[facility]  Outpatient Physical Therapy Daily Treatment Note      Name: Blanca Loya  Clinic Number: [id number]  Visit Date: 8/31/2020    Therapy Diagnosis:   Encounter Diagnoses   Name Primary?    Muscle weakness of upper extremity      Neck pain      Decreased range of motion of intervertebral discs of cervical spine      Muscle tightness        Physician: Chaparro Shepherd III, *  Physician Orders: PT Eval and Treat   Medical Diagnosis from Referral: M54.2 (ICD-10-CM) - Cervicalgia  Evaluation Date: 8/3/2020  Authorization Period Expiration: 12/31/2020  Plan of Care Expiration: 10/16/2020  Visit # / Visits authorized: 6/12   Total visit count: 7    Time In: 1530  Time Out: 1615  Total Treatment Time: 45 minutes   Total Billable Timed: 45 minutes      Precautions: Standard, Fall and cancer    Reassessment due on or before 09/03/2020    Subjective     The patient reports she is feeling poorly stating that she has lost 10 pounds in the last 2 weeks after having stomach issues. She reports neck pain 6/10.     Pt reports performing HEP, but not as often as she should. She states she has tried the suboccipital release with the tennis balls and that is helping.     Response to previous treatment: decreased pain but temporary.   Functional change: n/a    Pain: 6/10  Location: L neck, upper trap, and subocciptial region    Objective     Blanca received therapeutic exercises to develop strength, endurance, flexibility, posture and core stabilization for 40 minutes including:    Cervical isometrics with resistance of pt's hand:   Extension 1x10 with 10s hold    Lateral flexion to R 1x10 with 10s hold   Lateral flexion to L 1x0 with 10s hold   Seated thoracic extension over small soccer ball 2x10 with 5s hold   Seated scapular retractions 2x10 with 10s hold  Seated B upper trap stretch 3x30s   Seated L levator stretch 3x30s    Breuggers 3 x10 with orange theraband  cueing to maintain scapular retraction.     Pt requires consistent cueing to decrease fwd head posture during seated therex    Median nerve glides x10 each side   Ulnar nerve glides x10 each side       Seated chin tucks 2x10 with 10s hold pt able to tolerate in sitting. Vc/tc to perform chin tuck without SCM compensation. (NP)  SCM stretch 3x30s on L side and 3x30s bilaterally       Assisted cervical rotation to R with towel 2x5 with 5-10s hold   Attempted Supine chin tucks x10 with 10s hold, pt unable to tolerate secondary to c/o increased symptoms and past medical history of radiation and removal of acoustic neuroma. (NP)   Seated chin tucks with active cervical rotation to R 2x10 (NP)       Blanca received the following manual therapy techniques: Manual traction, suboccipital release, and soft tissue mobilization were applied to the: cervical spine for 2 minutes, including:    Manual traction x 5 minutes NP    Suboccipital release 2 minutes    Soft tissue mobilization of B cervical paraspinals    Manual chin tuck with extension to promote full extension ROM (NP)     Manual R rotation and R lateral flexion with side glides to C-spine (NP)     Trigger point release to B upper traps (NP)       Patient Education and HEP     She was compliant with home exercise program.    Education provided:   - Continue working on posture throughout day.     Written Home Exercises Provided: Patient instructed to cont prior HEP.  Exercises were reviewed and Blanca was able to demonstrate them prior to the end of the session.  Blanca demonstrated good  understanding of the education provided.     See EMR under Patient Instructions for exercises provided prior visit.    Assessment     This is a 54 y.o. female referred to outpatient physical therapy and presents with a medical diagnosis of Cervicalgia and demonstrates limitations as described in the problem list. Pt was able to complete more exercises today as compared to previous visit.  She continues to require mod cues in order to properly perform SCM stretches. She continues to present with poor posture, decreased cervical ROM, and tightness of cervical musculature. Pt prognosis is Fair. Pt will continue to benefit from the specialized knowledge of skilled outpatient physical therapy to address the deficits listed below in the problem list, provide pt/family education and to maximize pt's level of independence in the home and community environment.    Blanca is progressing well towards her goals.   Pt prognosis is Fair.     Pt's spiritual, cultural and educational needs considered and pt agreeable to plan of care and goals.    Anticipated barriers to physical therapy: Chronicity of condition, history of cancer, history of fibromyalgia    Will the patient continue to benefit from skilled PT intervention?     Medical necessity is demonstrated by:   - Pain limits function of effected part for all activities  - Unable to participate in daily activities   - Requires skilled supervision to complete and progress HEP      Progress towards goals: Progressing      Goals: GOALS: Short Term Goals: 4 weeks  1.Report decreased cervical pain  <   / =  4  /10  to increase tolerance for prolonged sitting activities.   2. Increase cervical ROM by 5-10 degrees in order to perform ADLs with decreased difficulty.  3. Increase strength in B shoulders/scapular stabilizers by 1/3 MMT grade to increase tolerance for ADL and     work activities.   4. Pt to tolerate HEP to improve ROM and independence with ADL's  5. Pt will improve NDI score to 25/50 to demonstrate decreased disability and improved function.      Long Term Goals: 8 weeks  1.Report decreased cervical pain  <   / =  3  /10  to increase tolerance for cross stitching.   2. Increase UE/neck flexibility in order to improve posture.    3. Increase cervical ROM to WNL to be able to look over shoulder while driving.  4. Pt will improve NDI score to </= 20/50 to  demonstrate decreased disability and improved function.   5. Pt's goal: Pt will report cross stitching without pain.        Plan     Continue with the plan of care established per initial evaluation with focus on improving cervical ROM, posture, scapular strengthening, and deep neck flexor strength/endurance.     Tatiana Braswell, PTA, DPT

## 2020-09-02 DIAGNOSIS — E87.5 HYPERKALEMIA: Primary | ICD-10-CM

## 2020-09-03 ENCOUNTER — CLINICAL SUPPORT (OUTPATIENT)
Dept: REHABILITATION | Facility: HOSPITAL | Age: 55
End: 2020-09-03
Payer: COMMERCIAL

## 2020-09-03 DIAGNOSIS — M62.89 MUSCLE TIGHTNESS: ICD-10-CM

## 2020-09-03 DIAGNOSIS — M54.2 NECK PAIN: ICD-10-CM

## 2020-09-03 DIAGNOSIS — M62.81 MUSCLE WEAKNESS OF UPPER EXTREMITY: ICD-10-CM

## 2020-09-03 DIAGNOSIS — M53.82 DECREASED RANGE OF MOTION OF INTERVERTEBRAL DISCS OF CERVICAL SPINE: ICD-10-CM

## 2020-09-03 PROCEDURE — 97110 THERAPEUTIC EXERCISES: CPT

## 2020-09-03 PROCEDURE — 97750 PHYSICAL PERFORMANCE TEST: CPT

## 2020-09-03 NOTE — PROGRESS NOTES
[facility]  Outpatient Physical Therapy Daily Treatment Note/Reassessment      Name: Blanca Loya  Clinic Number: [id number]  Visit Date: 9/3/2020    Therapy Diagnosis:   Encounter Diagnoses   Name Primary?    Muscle weakness of upper extremity      Neck pain      Decreased range of motion of intervertebral discs of cervical spine      Muscle tightness        Physician: Chaparro Shepherd III, *  Physician Orders: PT Eval and Treat   Medical Diagnosis from Referral: M54.2 (ICD-10-CM) - Cervicalgia  Evaluation Date: 8/3/2020  Authorization Period Expiration: 12/31/2020  Plan of Care Expiration: 10/16/2020  Visit # / Visits authorized: 7/12   Total visit count: 7    Time In: 1700  Time Out: 1745  Total Treatment Time: 45 minutes   Total Billable Timed: 43 minutes      Precautions: Standard, Fall and cancer    Reassessment due on or before 10/03/2020    Subjective     Pt reports feeling her neck is getting better, but her upper back is hurting today. I can tell a difference in    She reports neck pain 6/10.     Pt reports performing HEP, but not as often as she should. She states she has tried the suboccipital release with the tennis balls and that is helping.     Response to previous treatment: decreased pain but temporary.   Functional change: n/a    Pain: 3/10  Location: L neck, upper trap, and subocciptial region    Objective   Measurements taken: for 10 minutes     Cervical AROM (Degrees) Comments   Flexion 71 improved from 63 Pain   Extension 38 Pain   Right Side Bend 27 improved 18 Pain   Left Side Bend 29 improved from 25 Pain   Right Rotation 58 Pain   Left Rotation 56 improved from 38 Pain       Right Left Comment   Shoulder flexion: 4+/5 4+/5    Shoulder Abduction: 5/5 5/5      NDI score 16/50 or 32% disability    Blanca received therapeutic exercises to develop strength, endurance, flexibility, posture and core stabilization for 25 minutes  including:      Seated thoracic extension over 1/2 foam roll with 5s hold x20 with roll placed vertically and x20 with roll placed horizontally   Open book stretch 3x30s  Seated scapular retractions 2x10 with 10s hold  Seated B upper trap stretch 3x30s   Seated L levator stretch 3x30s    Breuggers 3 x10 with GTB cueing to maintain scapular retraction.   Standing shoulder extension with GTB 2x10         Cervical isometrics with resistance of pt's hand: (NP)  Extension 1x10 with 10s hold    Lateral flexion to R 1x10 with 10s hold   Lateral flexion to L 1x0 with 10s hold     Median nerve glides x10 each side (NP)  Ulnar nerve glides x10 each side (NP)      Seated chin tucks 2x10 with 10s hold pt able to tolerate in sitting. Vc/tc to perform chin tuck without SCM compensation. (NP)  SCM stretch 3x30s on L side and 3x30s bilaterally       Assisted cervical rotation to R with towel 2x5 with 5-10s hold   Attempted Supine chin tucks x10 with 10s hold, pt unable to tolerate secondary to c/o increased symptoms and past medical history of radiation and removal of acoustic neuroma. (NP)   Seated chin tucks with active cervical rotation to R 2x10 (NP)       Blanca received the following manual therapy techniques: Manual traction, suboccipital release, and soft tissue mobilization were applied to the: cervical spine for 8 minutes, including:    Manual traction x 5 minutes     Suboccipital release x3 minutes    Manual chin tuck with extension to promote full extension ROM (NP)     Manual R rotation and R lateral flexion with side glides to C-spine (NP)     Trigger point release to B upper traps (NP)       Patient Education and HEP     She was compliant with home exercise program.    Education provided:   - Continue working on posture throughout day.     Written Home Exercises Provided: Patient instructed to cont prior HEP.  Exercises were reviewed and Blanca was able to demonstrate them prior to the end of the session.  Blanca  demonstrated good  understanding of the education provided.     See EMR under Patient Instructions for exercises provided prior visit.    Assessment     This is a 54 y.o. female referred to outpatient physical therapy and presents with a medical diagnosis of Cervicalgia and demonstrates limitations as described in the problem list. Pt tolerated re-assessment/treatment well meeting 4/5 STGs and 1LTG. Pt demonstrates significant progress with cervical ROM and improvement on NDI from 29/50 to 16/50. Pt also demonstrates improved B shoulder strength and reports improved neck pain. She continues to present with poor posture, MIN decreased cervical ROM, and tightness of cervical musculature. Pt is progressing as expected and her prognosis is Fair due to past medical history. Pt will continue to benefit from the specialized knowledge of skilled outpatient physical therapy to address the deficits listed below in the problem list, provide pt/family education and to maximize pt's level of independence in the home and community environment.    Blanca is progressing well towards her goals.   Pt prognosis is Fair.     Pt's spiritual, cultural and educational needs considered and pt agreeable to plan of care and goals.    Anticipated barriers to physical therapy: Chronicity of condition, history of cancer, history of fibromyalgia    Will the patient continue to benefit from skilled PT intervention?     Medical necessity is demonstrated by:   - Pain limits function of effected part for all activities  - Unable to participate in daily activities   - Requires skilled supervision to complete and progress HEP      Progress towards goals: Progressing      Goals: GOALS: Short Term Goals: 4 weeks  1.Report decreased cervical pain  <   / =  4  /10  to increase tolerance for prolonged sitting activities. MET 09/03/2020  2. Increase cervical ROM by 5-10 degrees in order to perform ADLs with decreased difficulty. MET 09/03/2020 except for  extension and R rotation.   3. Increase strength in B shoulders/scapular stabilizers by 1/3 MMT grade to increase tolerance for ADL and     work activities. In progress 09/03/2020  4. Pt to tolerate HEP to improve ROM and independence with ADL's MET 09/03/2020  5. Pt will improve NDI score to 25/50 to demonstrate decreased disability and improved function. MET 09/03/2020     Long Term Goals: 8 weeks  1.Report decreased cervical pain  <  / =  3  /10  to increase tolerance for cross stitching.   2. Increase UE/neck flexibility in order to improve posture.    3. Increase cervical ROM to WNL to be able to look over shoulder while driving.  4. Pt will improve NDI score to </= 20/50 to demonstrate decreased disability and improved function. MET 09/03/2020  5. Pt's goal: Pt will report cross stitching without pain.        Plan     Continue with the plan of care established per initial evaluation with focus on improving cervical ROM, posture, scapular strengthening, and deep neck flexor strength/endurance.     Merlin Gonzales, PT, DPT

## 2020-09-05 LAB — PTH-INTACT SERPL-MCNC: 30 PG/ML (ref 15–65)

## 2020-09-14 DIAGNOSIS — Z01.818 OTHER SPECIFIED PRE-OPERATIVE EXAMINATION: Primary | ICD-10-CM

## 2020-09-17 ENCOUNTER — CLINICAL SUPPORT (OUTPATIENT)
Dept: REHABILITATION | Facility: HOSPITAL | Age: 55
End: 2020-09-17
Payer: COMMERCIAL

## 2020-09-17 DIAGNOSIS — M62.81 MUSCLE WEAKNESS OF UPPER EXTREMITY: ICD-10-CM

## 2020-09-17 DIAGNOSIS — M62.89 MUSCLE TIGHTNESS: ICD-10-CM

## 2020-09-17 DIAGNOSIS — M53.82 DECREASED RANGE OF MOTION OF INTERVERTEBRAL DISCS OF CERVICAL SPINE: ICD-10-CM

## 2020-09-17 DIAGNOSIS — M54.2 NECK PAIN: ICD-10-CM

## 2020-09-17 PROCEDURE — 97530 THERAPEUTIC ACTIVITIES: CPT | Mod: 59

## 2020-09-17 PROCEDURE — 97140 MANUAL THERAPY 1/> REGIONS: CPT

## 2020-09-17 NOTE — PROGRESS NOTES
[facility]  Outpatient Physical Therapy Daily Treatment Note      Name: Blanca Loya  Clinic Number: [id number]  Visit Date: 9/17/2020    Therapy Diagnosis:   Encounter Diagnoses   Name Primary?    Muscle weakness of upper extremity      Neck pain      Decreased range of motion of intervertebral discs of cervical spine      Muscle tightness        Physician: Chaparro Shepherd III, *  Physician Orders: PT Eval and Treat   Medical Diagnosis from Referral: M54.2 (ICD-10-CM) - Cervicalgia  Evaluation Date: 8/3/2020  Authorization Period Expiration: 12/31/2020  Plan of Care Expiration: 10/16/2020  Visit # / Visits authorized: 8/12   Total visit count: 8    Time In: 1330  Time Out: 1415  Total Treatment Time: 45 minutes   Total Billable Timed: 40 minutes      Precautions: Standard, Fall and cancer    Reassessment due on or before 10/03/2020    Subjective     Pt reports after last visit her lower back began hurting and she had pain going down her leg. She states she has had lots of headaches since she has been here. She reports she was doing the stretches that weekend and felt a pop in her neck and had pain that went down her L shoulder. She states she has been having tests for another medical condition which is worrying her and causing increased stress.     She reports neck pain and L shoulder pain 6/10.     Pt reports performing HEP, but not as often as she should. She states she has tried the suboccipital release with the tennis balls and that is helping.     Response to previous treatment: decreased pain but temporary.   Functional change: n/a    Pain: 6/10  Location: L neck, upper trap, and subocciptial region    Objective   Measurements taken: None today   Blanca received therapeutic exercises to develop strength, endurance, flexibility, posture and core stabilization for 8 minutes including:    Seated scapular retractions 2x15 with 5s hold   Breuggers 3x15  with GTB    Following not performed today:     Seated thoracic extension over 1/2 foam roll with 5s hold x20 with roll placed vertically and x20 with roll placed horizontally   Open book stretch 3x30s  Seated scapular retractions 2x10 with 10s hold  Seated B upper trap stretch 3x30s   Seated L levator stretch 3x30s    Breuggers 3 x10 with GTB cueing to maintain scapular retraction.   Standing shoulder extension with GTB 2x10     Cervical isometrics with resistance of pt's hand: (NP)  Extension 1x10 with 10s hold    Lateral flexion to R 1x10 with 10s hold   Lateral flexion to L 1x0 with 10s hold     Median nerve glides x10 each side (NP)  Ulnar nerve glides x10 each side (NP)      Seated chin tucks 2x10 with 10s hold pt able to tolerate in sitting. Vc/tc to perform chin tuck without SCM compensation. (NP)  SCM stretch 3x30s on L side and 3x30s bilaterally       Assisted cervical rotation to R with towel 2x5 with 5-10s hold   Attempted Supine chin tucks x10 with 10s hold, pt unable to tolerate secondary to c/o increased symptoms and past medical history of radiation and removal of acoustic neuroma. (NP)   Seated chin tucks with active cervical rotation to R 2x10 (NP)       Blanca received the following manual therapy techniques: Manual traction, suboccipital release, and soft tissue mobilization were applied to the: cervical spine for 8 minutes, including:    Manual traction x 5 minutes     Suboccipital release x3 minutes    Manual chin tuck with extension to promote full extension ROM (NP)     Manual R rotation and R lateral flexion with side glides to C-spine (NP)     Trigger point release to B upper traps (NP)     Blanca participated in dynamic functional therapeutic activities to improve knowledge on chronic pain and relaxation techniques for 24  minutes, including:    PT educated patient on condition, prognosis, and plan of care. PT discussed chronic pain with patient and provided extensive education on relation of  stress to pain and how CNS can perceive non-noxious stimuli as painful. Discussed techniques to decrease patient's heightened level of stress including meditation and listening to music. PT instructed patient to attempt relaxation mediation prior to performing HEP to see she can decrease pain response to gentle therex.       Patient Education and HEP     She was compliant with home exercise program.    Education provided:   - Continue working on posture throughout day.     Written Home Exercises Provided: Patient instructed to cont prior HEP.  Exercises were reviewed and Blanca was able to demonstrate them prior to the end of the session.  Blanca demonstrated good  understanding of the education provided.     See EMR under Patient Instructions for exercises provided prior visit.    Assessment     This is a 55 y.o. female referred to outpatient physical therapy and presents with a medical diagnosis of Cervicalgia and demonstrates limitations as described in the problem list. 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 She tolerated treatment well with focus on decreasing patient's pain and education on pain. Plan to incorporate meditation Pt is progressing as expected and her prognosis is Fair due to past medical history. Pt will continue to benefit from the specialized knowledge of skilled outpatient physical therapy to address the deficits listed below in the problem list, provide pt/family education and to maximize pt's level of independence in the home and community environment.    Blanca is progressing well towards her goals.   Pt prognosis is Fair.     Pt's spiritual, cultural and educational needs considered and pt agreeable to plan of care and goals.    Anticipated barriers to physical therapy: Chronicity of condition, history of cancer, history of fibromyalgia    Will the patient continue to benefit from skilled PT intervention?      Medical necessity is demonstrated by:   - Pain limits function of effected part for all activities  - Unable to participate in daily activities   - Requires skilled supervision to complete and progress HEP      Progress towards goals: Progressing      Goals: GOALS: Short Term Goals: 4 weeks  1.Report decreased cervical pain  <   / =  4  /10  to increase tolerance for prolonged sitting activities. MET 09/03/2020  2. Increase cervical ROM by 5-10 degrees in order to perform ADLs with decreased difficulty. MET 09/03/2020 except for extension and R rotation.   3. Increase strength in B shoulders/scapular stabilizers by 1/3 MMT grade to increase tolerance for ADL and     work activities. In progress 09/03/2020  4. Pt to tolerate HEP to improve ROM and independence with ADL's MET 09/03/2020  5. Pt will improve NDI score to 25/50 to demonstrate decreased disability and improved function. MET 09/03/2020     Long Term Goals: 8 weeks  1.Report decreased cervical pain  <  / =  3  /10  to increase tolerance for cross stitching.   2. Increase UE/neck flexibility in order to improve posture.    3. Increase cervical ROM to WNL to be able to look over shoulder while driving.  4. Pt will improve NDI score to </= 20/50 to demonstrate decreased disability and improved function. MET 09/03/2020  5. Pt's goal: Pt will report cross stitching without pain.        Plan     Decrease patient's frequency to 1x per week for remaining 4 visits and focus on decreasing heightened state of stress prior to therex. Attempt session in treatment room to provide quiet therapeutic environment.     Continue with the plan of care established per initial evaluation with focus on improving cervical ROM, posture, scapular strengthening, and deep neck flexor strength/endurance.     Merlin Gonzales, PT, DPT

## 2020-09-19 ENCOUNTER — LAB VISIT (OUTPATIENT)
Dept: PRIMARY CARE CLINIC | Facility: CLINIC | Age: 55
End: 2020-09-19
Payer: COMMERCIAL

## 2020-09-19 DIAGNOSIS — Z01.818 OTHER SPECIFIED PRE-OPERATIVE EXAMINATION: ICD-10-CM

## 2020-09-19 PROCEDURE — U0003 INFECTIOUS AGENT DETECTION BY NUCLEIC ACID (DNA OR RNA); SEVERE ACUTE RESPIRATORY SYNDROME CORONAVIRUS 2 (SARS-COV-2) (CORONAVIRUS DISEASE [COVID-19]), AMPLIFIED PROBE TECHNIQUE, MAKING USE OF HIGH THROUGHPUT TECHNOLOGIES AS DESCRIBED BY CMS-2020-01-R: HCPCS

## 2020-09-20 LAB — SARS-COV-2 RNA RESP QL NAA+PROBE: NOT DETECTED

## 2020-09-22 ENCOUNTER — HOSPITAL ENCOUNTER (OUTPATIENT)
Facility: HOSPITAL | Age: 55
Discharge: HOME | End: 2020-09-22
Attending: INTERNAL MEDICINE | Admitting: INTERNAL MEDICINE
Payer: COMMERCIAL

## 2020-09-22 VITALS
OXYGEN SATURATION: 99 % | TEMPERATURE: 98 F | DIASTOLIC BLOOD PRESSURE: 54 MMHG | BODY MASS INDEX: 20.66 KG/M2 | HEART RATE: 68 BPM | WEIGHT: 121 LBS | SYSTOLIC BLOOD PRESSURE: 103 MMHG | HEIGHT: 64 IN | RESPIRATION RATE: 16 BRPM

## 2020-09-22 DIAGNOSIS — R63.4 WEIGHT LOSS: ICD-10-CM

## 2020-09-22 PROCEDURE — 27200043 HC FORCEPS, BIOPSY: Performed by: INTERNAL MEDICINE

## 2020-09-22 PROCEDURE — 99152 MOD SED SAME PHYS/QHP 5/>YRS: CPT | Performed by: INTERNAL MEDICINE

## 2020-09-22 PROCEDURE — 45380 COLONOSCOPY AND BIOPSY: CPT | Performed by: INTERNAL MEDICINE

## 2020-09-22 PROCEDURE — 63600175 PHARM REV CODE 636 W HCPCS: Performed by: INTERNAL MEDICINE

## 2020-09-22 PROCEDURE — 99153 MOD SED SAME PHYS/QHP EA: CPT | Performed by: INTERNAL MEDICINE

## 2020-09-22 RX ORDER — SODIUM CHLORIDE 9 MG/ML
INJECTION, SOLUTION INTRAVENOUS CONTINUOUS
Status: DISCONTINUED | OUTPATIENT
Start: 2020-09-22 | End: 2020-09-22 | Stop reason: HOSPADM

## 2020-09-22 RX ORDER — DIAZEPAM 10 MG/2ML
INJECTION INTRAMUSCULAR
Status: COMPLETED | OUTPATIENT
Start: 2020-09-22 | End: 2020-09-22

## 2020-09-22 RX ORDER — SODIUM CHLORIDE 0.9 % (FLUSH) 0.9 %
2 SYRINGE (ML) INJECTION
Status: DISCONTINUED | OUTPATIENT
Start: 2020-09-22 | End: 2020-09-22 | Stop reason: HOSPADM

## 2020-09-22 RX ORDER — FENTANYL CITRATE 50 UG/ML
INJECTION, SOLUTION INTRAMUSCULAR; INTRAVENOUS
Status: COMPLETED | OUTPATIENT
Start: 2020-09-22 | End: 2020-09-22

## 2020-09-22 RX ORDER — MIDAZOLAM HYDROCHLORIDE 5 MG/ML
INJECTION INTRAMUSCULAR; INTRAVENOUS
Status: COMPLETED | OUTPATIENT
Start: 2020-09-22 | End: 2020-09-22

## 2020-09-22 RX ADMIN — DIAZEPAM 2.5 MG: 5 INJECTION, SOLUTION INTRAMUSCULAR; INTRAVENOUS at 07:09

## 2020-09-22 RX ADMIN — FENTANYL CITRATE 50 MCG: 50 INJECTION INTRAMUSCULAR; INTRAVENOUS at 07:09

## 2020-09-22 RX ADMIN — MIDAZOLAM HYDROCHLORIDE 1 MG: 5 INJECTION, SOLUTION INTRAMUSCULAR; INTRAVENOUS at 07:09

## 2020-09-22 RX ADMIN — DIAZEPAM 2.5 MG: 5 INJECTION, SOLUTION INTRAMUSCULAR; INTRAVENOUS at 08:09

## 2020-09-22 RX ADMIN — FENTANYL CITRATE 25 MCG: 50 INJECTION INTRAMUSCULAR; INTRAVENOUS at 07:09

## 2020-09-22 RX ADMIN — MIDAZOLAM HYDROCHLORIDE 2 MG: 5 INJECTION, SOLUTION INTRAMUSCULAR; INTRAVENOUS at 07:09

## 2020-09-22 NOTE — DISCHARGE INSTRUCTIONS
Colonoscopy     A camera attached to a flexible tube with a viewing lens is used to take video pictures.     Colonoscopy is a test to view the inside of your lower digestive tract (colon and rectum). Sometimes it can show the last part of the small intestine (ileum). During the test, small pieces of tissue may be removed for testing. This is called a biopsy. Small growths, such as polyps, may also be removed.   Why is colonoscopy done?  The test is done to help look for colon cancer. And it can help find the source of abdominal pain, bleeding, and changes in bowel habits. It may be needed once a year, depending on factors such as your:  · Age  · Health history  · Family health history  · Symptoms  · Results from any prior colonoscopy  Risks and possible complications  These include:  · Bleeding               · A puncture or tear in the colon   · Risks of anesthesia  · A cancer lesion not being seen  Getting ready   To prepare for the test:  · Talk with your healthcare provider about the risks of the test (see below). Also ask your healthcare provider about alternatives to the test.  · Tell your healthcare provider about any medicines you take. Also tell him or her about any health conditions you may have.  · Make sure your rectum and colon are empty for the test. Follow the diet and bowel prep instructions exactly. If you dont, the test may need to be rescheduled.  · Plan for a friend or family member to drive you home after the test.     Colonoscopy provides an inside view of the entire colon.     You may discuss the results with your doctor right away or at a future visit.  During the test   The test is usually done in the hospital on an outpatient basis. This means you go home the same day. The procedure takes about 30 minutes. During that time:  · You are given relaxing (sedating) medicine through an IV line. You may be drowsy, or fully asleep.  · The healthcare provider will first give you a physical exam to  check for anal and rectal problems.  · Then the anus is lubricated and the scope inserted.  · If you are awake, you may have a feeling similar to needing to have a bowel movement. You may also feel pressure as air is pumped into the colon. Its OK to pass gas during the procedure.  · Biopsy, polyp removal, or other treatments may be done during the test.  After the test   You may have gas right after the test. It can help to try to pass it to help prevent later bloating. Your healthcare provider may discuss the results with you right away. Or you may need to schedule a follow-up visit to talk about the results. After the test, you can go back to your normal eating and other activities. You may be tired from the sedation and need to rest for a few hours.  Date Last Reviewed: 11/1/2016 © 2000-2017 [employer]. [address] [place] [address]. All rights reserved. This information is not intended as a substitute for professional medical care. Always follow your healthcare professional's instructions.        Recovery After Procedural Sedation (Adult)   You have been given medicine by vein to make you sleep during your surgery. This may have included both a pain medicine and sleeping medicine. Most of the effects have worn off. But you may still have some drowsiness for the next 6 to 8 hours.  Home care  Follow these guidelines when you get home:  · For the next 8 hours, you should be watched by a responsible adult. This person should make sure your condition is not getting worse.  · Don't drink any alcohol for the next 24 hours.  · Don't drive, operate dangerous machinery, or make important business or personal decisions during the next 24 hours.  · To prevent injury or falls, use caution when standing and walking for at least 24 hours after your procedure.  Note: Your healthcare provider may tell you not to take any medicine by mouth for pain or sleep in the next 4 hours. These medicines may  react with the medicines you were given in the hospital. This could cause a much stronger response than usual.  Follow-up care  Follow up with your healthcare provider if you are not alert and back to your usual level of activity within 12 hours.  When to seek medical advice  Call your healthcare provider right away if any of these occur:  · Drowsiness gets worse  · Weakness or dizziness gets worse  · Repeated vomiting  · You can't be awakened  · Fever  · New rash  [employer] last reviewed this educational content on 9/1/2019 © 2000-2020 [employer] [address] [place] [address]. All rights reserved. This information is not intended as a substitute for professional medical care. Always follow your healthcare professional's instructions.

## 2020-09-22 NOTE — [employer] PATIENT INSTRUCTIONS
Discharge Summary/Instructions after an Endoscopic Procedure  Patient Name: Blanca Loya  Patient MRN: [id number]  Patient YOB: 1965  Tuesday, September 22, 2020  Rocco Edmonds III, MD  RESTRICTIONS:  During your procedure today, you received medications for sedation.  These   medications may affect your judgment, balance and coordination.  Therefore,   for 24 hours, you have the following restrictions:   - DO NOT drive a car, operate machinery, make legal/financial decisions,   sign important papers or drink alcohol.    ACTIVITY:  Today: no heavy lifting, straining or running due to procedural   sedation/anesthesia.  The following day: return to full activity including work.  DIET:  Eat and drink normally unless instructed otherwise.     TREATMENT FOR COMMON SIDE EFFECTS:  - Mild abdominal pain, nausea, belching, bloating or excessive gas:  rest,   eat lightly and use a heating pad.  - Sore Throat: treat with throat lozenges and/or gargle with warm salt   water.  - Because air was used during the procedure, expelling large amounts of air   from your rectum or belching is normal.  - If a bowel prep was taken, you may not have a bowel movement for 1-3 days.    This is normal.  SYMPTOMS TO WATCH FOR AND REPORT TO YOUR PHYSICIAN:  1. Abdominal pain or bloating, other than gas cramps.  2. Chest pain.  3. Back pain.  4. Signs of infection such as: chills or fever occurring within 24 hours   after the procedure.  5. Rectal bleeding, which would show as bright red, maroon, or black stools.   (A tablespoon of blood from the rectum is not serious, especially if   hemorrhoids are present.)  6. Vomiting.  7. Weakness or dizziness.  GO DIRECTLY TO THE NEAREST EMERGENCY ROOM IF YOU HAVE ANY OF THE FOLLOWING:      Difficulty breathing              Chills and/or fever over 101 F   Persistent vomiting and/or vomiting blood   Severe abdominal pain   Severe chest pain   Black, tarry stools   Bleeding- more than one  tablespoon   Any other symptom or condition that you feel may need urgent attention  Your doctor recommends these additional instructions:  If any biopsies were taken, your doctors clinic will contact you in 1 to 2   weeks with any results.  - Patient has a contact number available for emergencies.  The signs and   symptoms of potential delayed complications were discussed with the   patient.  Return to normal activities tomorrow.  Written discharge   instructions were provided to the patient.   - Resume previous diet.   - Discharge patient to home (ambulatory).   - Continue present medications.   - Repeat colonoscopy in 10 years for surveillance.   - Return to GI clinic PRN.  For questions, problems or results please call your physician - Rocco Edmonds III, MD at Work:  [phone].  [facility], EMERGENCY ROOM PHONE NUMBER: [phone]  IF A COMPLICATION OR EMERGENCY SITUATION ARISES AND YOU ARE UNABLE TO REACH   YOUR PHYSICIAN - GO DIRECTLY TO THE EMERGENCY ROOM.  Rocco Edmonds III, MD  9/22/2020 8:14:47 AM  This report has been verified and signed electronically.  [employer]

## 2020-09-22 NOTE — H&P
GASTROENTEROLOGY PRE-PROCEDURE H&P NOTE  Patient Name: Blanca Loya  Patient MRN: [id number]  Patient : 1965    Service date: 2020    PCP: Chaparro Shepherd III, MD    No chief complaint on file.      HPI: Patient is a 55 y.o. female with PMHx as below here for evaluation of screening and diarrhea     Past Medical History:  Past Medical History:   Diagnosis Date    Acquired deafness     Estrogen receptor positive 2017    Malignant neoplasm of upper-outer quadrant of right female breast 2017    S/P bilateral mastectomy 10/23/2019        Past Surgical History:  Past Surgical History:   Procedure Laterality Date    BREAST RECONSTRUCTION      BREAST REVISION SURGERY Left 2018    Procedure: BREAST REVISION SURGERY LEFT IMF ELEVATION WITH ALLODERM;  Surgeon: Holger Umana MD;  Location: [facility] OR [address] [place] [address];  Service: Plastics;  Laterality: Left;    BREAST SURGERY      NEUROMA SURGERY      Leaft ear        Home Medications:  Medications Prior to Admission   Medication Sig Dispense Refill Last Dose    ALPRAZolam (XANAX) 0.5 MG tablet TK 1 T PO QD PRN 30 tablet 0 Past Month at Unknown time    ascorbic acid, vitamin C, (VITAMIN C) 1000 MG tablet Take 1,000 mg by mouth once daily.   2020 at Unknown time    b complex vitamins tablet Take 1 tablet by mouth once daily.   2020    butalbital-acetaminophen-caffeine -40 mg (FIORICET, ESGIC) -40 mg per tablet Take 1 tablet by mouth every 6 (six) hours as needed for Pain. 28 tablet 0 Past Week at Unknown time    CHROMIUM ORAL Take by mouth.   2020    levothyroxine (SYNTHROID) 100 MCG tablet TAKE 1 TABLET BY MOUTH EVERY DAY 90 tablet 1 2020    POTASSIUM ACETATE MISC by Misc.(Non-Drug; Combo Route) route.   2020    oxyCODONE-acetaminophen (PERCOCET) 5-325 mg per tablet Take 1 tablet by mouth every 6 (six) hours as needed for Pain. 28 tablet 0 Unknown at Unknown time    sucralfate (CARAFATE) 1 gram tablet  "Carafate 1 gram tablet   Take 1 tablet 4 times a day by oral route for 30 days.   as needed for abd upset   Not Taking       Inpatient Medications:    sodium chloride 0.9%    Review of patient's allergies indicates:   Allergen Reactions    Demerol [meperidine] Anxiety    Clindamycin Itching and Rash     Per pt on 8/31/18    Ibuprofen Nausea Only    Kenalog [triamcinolone acetonide] Other (See Comments)     Hot flashes, flushing of the skin.  (sensitivity to injection noted that was given on 3/6/18)    Micro-k [potassium chloride] Itching     Headache      Pcn [penicillins] Rash       Social History:   Social History     Occupational History    Not on file   Tobacco Use    Smoking status: Never Smoker    Smokeless tobacco: Never Used   Substance and Sexual Activity    Alcohol use: No    Drug use: No    Sexual activity: Not on file       Family History:   Family History   Problem Relation Age of Onset    No Known Problems Mother     No Known Problems Father        Review of Systems:  A 10 point review of systems was performed and was normal, except as mentioned in the HPI, including constitutional, HEENT, heme, lymph, cardiovascular, respiratory, gastrointestinal, genitourinary, neurologic, endocrine, psychiatric and musculoskeletal.      OBJECTIVE:    Physical Exam:  24 Hour Vital Sign Ranges: Temp:  [97.7 °F (36.5 °C)] 97.7 °F (36.5 °C)  Pulse:  [80-81] 80  Resp:  [16-17] 17  SpO2:  [99 %-100 %] 100 %  BP: (106-128)/(68-82) 106/68  Most recent vitals: /68   Pulse 80   Temp 97.7 °F (36.5 °C) (Oral)   Resp 17   Ht 5' 3.5" (1.613 m)   Wt 54.9 kg (121 lb)   LMP 09/10/2016   SpO2 100%   Breastfeeding No   BMI 21.10 kg/m²    GEN: well-developed, well-nourished, awake and alert, non-toxic appearing adult  HEENT: PERRL, sclera anicteric, oral mucosa pink and moist without lesion  NECK: trachea midline; Good ROM  CV: regular rate and rhythm, no murmurs or gallops  RESP: clear to auscultation " bilaterally, no wheezes, rhonci or rales  ABD: soft, non-tender, non-distended, normal bowel sounds  EXT: no swelling or edema, 2+ pulses distally  SKIN: no rashes or jaundice  PSYCH: normal affect    Labs:   No results for input(s): WBC, MCV, PLT in the last 72 hours.    Invalid input(s): HGBAU  No results for input(s): NA, K, CL, CO2, BUN, GLU in the last 72 hours.    Invalid input(s): CREA  No results for input(s): ALB in the last 72 hours.    Invalid input(s): ALKP, SGOT, SGPT, TBIL, DBIL, TPRO  No results for input(s): PT, INR, PTT in the last 72 hours.      IMPRESSION / RECOMMENDATIONS:  Colon w/ conscious sedation.   Risks, benefits, alternative discussed in detail with patient / family regarding anticipated procedure and possible complications.     Rocco MOSES Dauterive III  9/22/2020  7:24 AM

## 2020-09-24 ENCOUNTER — CLINICAL SUPPORT (OUTPATIENT)
Dept: REHABILITATION | Facility: HOSPITAL | Age: 55
End: 2020-09-24
Payer: COMMERCIAL

## 2020-09-24 ENCOUNTER — HOSPITAL ENCOUNTER (OUTPATIENT)
Dept: RADIOLOGY | Facility: HOSPITAL | Age: 55
Discharge: HOME | End: 2020-09-24
Attending: INTERNAL MEDICINE
Payer: COMMERCIAL

## 2020-09-24 ENCOUNTER — TELEPHONE (OUTPATIENT)
Dept: HEMATOLOGY/ONCOLOGY | Facility: CLINIC | Age: 55
End: 2020-09-24

## 2020-09-24 VITALS — BODY MASS INDEX: 21.44 KG/M2 | HEIGHT: 63 IN | WEIGHT: 121 LBS

## 2020-09-24 DIAGNOSIS — C50.411 MALIGNANT NEOPLASM OF UPPER-OUTER QUADRANT OF RIGHT BREAST IN FEMALE, ESTROGEN RECEPTOR POSITIVE: ICD-10-CM

## 2020-09-24 DIAGNOSIS — M53.82 DECREASED RANGE OF MOTION OF INTERVERTEBRAL DISCS OF CERVICAL SPINE: ICD-10-CM

## 2020-09-24 DIAGNOSIS — Z17.0 MALIGNANT NEOPLASM OF UPPER-OUTER QUADRANT OF RIGHT BREAST IN FEMALE, ESTROGEN RECEPTOR POSITIVE: ICD-10-CM

## 2020-09-24 DIAGNOSIS — M62.89 MUSCLE TIGHTNESS: ICD-10-CM

## 2020-09-24 DIAGNOSIS — M54.2 NECK PAIN: ICD-10-CM

## 2020-09-24 DIAGNOSIS — M62.81 MUSCLE WEAKNESS OF UPPER EXTREMITY: ICD-10-CM

## 2020-09-24 LAB — GLUCOSE SERPL-MCNC: 84 MG/DL (ref 70–110)

## 2020-09-24 PROCEDURE — 82962 GLUCOSE BLOOD TEST: CPT | Mod: PO

## 2020-09-24 PROCEDURE — 97140 MANUAL THERAPY 1/> REGIONS: CPT

## 2020-09-24 PROCEDURE — 78815 PET IMAGE W/CT SKULL-THIGH: CPT | Mod: TC,PO

## 2020-09-24 PROCEDURE — 97110 THERAPEUTIC EXERCISES: CPT

## 2020-09-24 NOTE — TELEPHONE ENCOUNTER
no cancer on the scans but is she having or had a recent diarrheal illness.  Pt is having a lot of  Diarrhea and is currently seeing Dr. Edmonds in regards to this.

## 2020-09-24 NOTE — PROGRESS NOTES
[facility]  Outpatient Physical Therapy Daily Treatment Note      Name: Blanca Loya  Clinic Number: [id number]  Visit Date: 9/24/2020    Therapy Diagnosis:   Encounter Diagnoses   Name Primary?    Muscle weakness of upper extremity      Neck pain      Decreased range of motion of intervertebral discs of cervical spine      Muscle tightness        Physician: Chaparro Shepherd III, *  Physician Orders: PT Eval and Treat   Medical Diagnosis from Referral: M54.2 (ICD-10-CM) - Cervicalgia  Evaluation Date: 8/3/2020  Authorization Period Expiration: 12/31/2020  Plan of Care Expiration: 10/16/2020  Visit # / Visits authorized: 9/12   Total visit count: 9    Time In: 1455  Time Out: 1530  Total Treatment Time: 35 minutes   Total Billable Timed: 25 minutes      Precautions: Standard, Fall and cancer    Reassessment due on or before 10/03/2020    Subjective     Pt reports she is not doing good today. She states she had a colonoscopy on Tuesday and was on some medicine that helped her neck pain for the day, but it is hurting again today. She states she hasn't been the best patient, because she hasn't done her exercises. She states a couple of weeks ago when her neck popped she had tingling down into her hand. Pt states tingling has gone away since then.     She reports neck pain and L shoulder pain 6/10.     Response to previous treatment: decreased pain but temporary.   Functional change: n/a    Pain: 6/10  Location: L neck, upper trap, and subocciptial region    Objective   Measurements taken: None today     Blanca received therapeutic exercises to develop strength, endurance, flexibility, posture and core stabilization for 15 minutes including:    Seated scapular retractions 2x15 with 5s hold   Breuggers 3x15 with GTB  Seated chin tucks x20    - Cueing to perform scooping motion with   Seated thoracic extension over 1/2 foam roll x20 horizontally and x20 vertically    Levator stretch on L 3x30s    - Pt reports increase in pain when she moved her head back to neutral from stretch     Following not performed today:     Seated thoracic extension over 1/2 foam roll with 5s hold x20 with roll placed vertically and x20 with roll placed horizontally   Open book stretch 3x30  Seated B upper trap stretch 3x30s   Seated L levator stretch 3x30s    Breuggers 3 x10 with GTB cueing to maintain scapular retraction.   Standing shoulder extension with GTB 2x10     Cervical isometrics with resistance of pt's hand: (NP)  Extension 1x10 with 10s hold    Lateral flexion to R 1x10 with 10s hold   Lateral flexion to L 1x0 with 10s hold     Median nerve glides x10 each side (NP)  Ulnar nerve glides x10 each side (NP)      Seated chin tucks 2x10 with 10s hold pt able to tolerate in sitting. Vc/tc to perform chin tuck without SCM compensation. (NP)  SCM stretch 3x30s on L side and 3x30s bilaterally       Assisted cervical rotation to R with towel 2x5 with 5-10s hold   Attempted Supine chin tucks x10 with 10s hold, pt unable to tolerate secondary to c/o increased symptoms and past medical history of radiation and removal of acoustic neuroma. (NP)   Seated chin tucks with active cervical rotation to R 2x10 (NP)       Blanca received the following manual therapy techniques: Manual traction, suboccipital release, and soft tissue mobilization were applied to the: cervical spine for 10 minutes, including:    Manual traction x 5 minutes     Suboccipital release x3 minutes    Manual chin tuck with extension to promote full extension ROM (NP)     Manual R rotation and R lateral flexion with side glides to C-spine (NP)     Trigger point release to B upper traps (NP)     Blanca participated in dynamic functional therapeutic activities to improve knowledge on chronic pain and relaxation techniques for 10  minutes, including:    Guided relaxing meditation blanca for 10 minutes     Patient Education and HEP     She was  compliant with home exercise program.    Education provided:   - Continue HEP and try guided meditation at home to decrease overall level of stress of nervous system.     Written Home Exercises Provided: Patient instructed to cont prior HEP.  Exercises were reviewed and Blanca was able to demonstrate them prior to the end of the session.  Blanca demonstrated good  understanding of the education provided.     See EMR under Patient Instructions for exercises provided prior visit.    Assessment     This is a 55 y.o. female referred to outpatient physical therapy and presents with a medical diagnosis of Cervicalgia and demonstrates limitations as described in the problem list. Pt tolerated treatment well with initiation of guided relaxing meditation. Pt reports decreased neck pain until levator stretch at end of treatment. Pt will continue to benefit from treatment with focus on improving pt independence with decreasing overall stress to nervous system to decrease chronic pain. Pt's prognosis is Fair due to past medical history. Pt will continue to benefit from the specialized knowledge of skilled outpatient physical therapy to address the deficits listed below in the problem list, provide pt/family education and to maximize pt's level of independence in the home and community environment. PT discussed POC with patient. Plan to DC after 2 visits to self care and HEP.     Blanca is progressing well towards her goals.   Pt prognosis is Fair.     Pt's spiritual, cultural and educational needs considered and pt agreeable to plan of care and goals.    Anticipated barriers to physical therapy: Chronicity of condition, history of cancer, history of fibromyalgia    Will the patient continue to benefit from skilled PT intervention?     Medical necessity is demonstrated by:   - Pain limits function of effected part for all activities  - Unable to participate in daily activities   - Requires skilled supervision to complete and  progress HEP      Progress towards goals: Progressing      Goals: GOALS: Short Term Goals: 4 weeks  1.Report decreased cervical pain  <   / =  4  /10  to increase tolerance for prolonged sitting activities. MET 09/03/2020  2. Increase cervical ROM by 5-10 degrees in order to perform ADLs with decreased difficulty. MET 09/03/2020 except for extension and R rotation.   3. Increase strength in B shoulders/scapular stabilizers by 1/3 MMT grade to increase tolerance for ADL and     work activities. In progress 09/03/2020  4. Pt to tolerate HEP to improve ROM and independence with ADL's MET 09/03/2020  5. Pt will improve NDI score to 25/50 to demonstrate decreased disability and improved function. MET 09/03/2020     Long Term Goals: 8 weeks  1.Report decreased cervical pain  <  / =  3  /10  to increase tolerance for cross stitching.   2. Increase UE/neck flexibility in order to improve posture.    3. Increase cervical ROM to WNL to be able to look over shoulder while driving.  4. Pt will improve NDI score to </= 20/50 to demonstrate decreased disability and improved function. MET 09/03/2020  5. Pt's goal: Pt will report cross stitching without pain.        Plan     Decrease patient's frequency to 1x per week for remaining 2 visits and focus on decreasing heightened state of stress prior to therex. Attempt session in treatment room to provide quiet therapeutic environment.     Continue with the plan of care established per initial evaluation with focus on improving cervical ROM, posture, scapular strengthening, and deep neck flexor strength/endurance.     Merlin Gonzales, PT, DPT

## 2020-09-28 ENCOUNTER — LAB VISIT (OUTPATIENT)
Dept: LAB | Facility: HOSPITAL | Age: 55
End: 2020-09-28
Attending: INTERNAL MEDICINE
Payer: COMMERCIAL

## 2020-09-28 DIAGNOSIS — K58.0 IRRITABLE BOWEL SYNDROME WITH DIARRHEA: Primary | ICD-10-CM

## 2020-09-28 PROCEDURE — 82656 EL-1 FECAL QUAL/SEMIQ: CPT

## 2020-10-01 ENCOUNTER — CLINICAL SUPPORT (OUTPATIENT)
Dept: REHABILITATION | Facility: HOSPITAL | Age: 55
End: 2020-10-01
Payer: COMMERCIAL

## 2020-10-01 DIAGNOSIS — M62.81 MUSCLE WEAKNESS OF UPPER EXTREMITY: ICD-10-CM

## 2020-10-01 DIAGNOSIS — M53.82 DECREASED RANGE OF MOTION OF INTERVERTEBRAL DISCS OF CERVICAL SPINE: ICD-10-CM

## 2020-10-01 DIAGNOSIS — M62.89 MUSCLE TIGHTNESS: ICD-10-CM

## 2020-10-01 DIAGNOSIS — M54.2 NECK PAIN: ICD-10-CM

## 2020-10-01 PROCEDURE — 97140 MANUAL THERAPY 1/> REGIONS: CPT

## 2020-10-01 PROCEDURE — 97110 THERAPEUTIC EXERCISES: CPT

## 2020-10-01 NOTE — PROGRESS NOTES
[facility]  Outpatient Physical Therapy Daily Treatment Note/Discharge Summary    Name: Blanca Loya  Clinic Number: [id number]  Visit Date: 10/1/2020    Therapy Diagnosis:   Encounter Diagnoses   Name Primary?    Muscle weakness of upper extremity      Neck pain      Decreased range of motion of intervertebral discs of cervical spine      Muscle tightness        Physician: Chaparro Shepherd III, *  Physician Orders: PT Eval and Treat   Medical Diagnosis from Referral: M54.2 (ICD-10-CM) - Cervicalgia  Evaluation Date: 8/3/2020  Authorization Period Expiration: 12/31/2020  Plan of Care Expiration: 10/16/2020  Visit # / Visits authorized: 10/12   Total visit count: 10    Time In: 1334  Time Out: 1415  Total Treatment Time: 41 minutes   Total Billable Timed: 30 minutes      Precautions: Standard, Fall and cancer    Reassessment due on or before 10/03/2020    Subjective     Pt reports she is doing ok today. She reports performing some of HEP each day since last visit. She gets intermittent pain into L side of neck. Pt reports she is working on mediation at home, but has hard time finding quiet place.       She reports neck pain and L shoulder pain 4/10.     Response to previous treatment: Decreased pain, but does not last  Functional change: tolerance for HEP     Pain: 4/10  Location: L neck, upper trap, and subocciptial region    Objective   Measurements taken: None today     lBanca received therapeutic exercises to develop strength, endurance, flexibility, posture and core stabilization for 15 minutes including:    Seated scapular retractions 2x15 with 5s hold   Levator stretch 3x30s  Seated thoracic extension over 1/2 foam roll with 5s hold x20 with roll placed vertically and x20 with roll placed horizontally   Standing rows with theraband 3x15 with GTB  Standing shoulder extension 3x15 with GTB       Blanca received the following manual therapy techniques:  Manual traction, suboccipital release, and soft tissue mobilization were applied to the: cervical spine for 10 minutes, including:    Manual traction x 5 minutes     Suboccipital release x3 minutes    Manual upper trap stretching 2x30s each side   Manual chin tuck with extension to promote full extension ROM (NP)     Manual R rotation and R lateral flexion with side glides to C-spine (NP)     Trigger point release to B upper traps (NP)     Blanca participated in dynamic functional therapeutic activities to improve knowledge on chronic pain and relaxation techniques for 15  minutes, including:    Guided relaxing meditation blanca for 10 minutes prior to therex     PT educated patient on importance of continuing HEP to manage symptoms and encouraged pt to continue meditation to decreased heightened stress.     Patient Education and HEP     She was compliant with home exercise program.    Education provided:   - Continue HEP and meditation at home to decrease overall level of stress of nervous system.     Written Home Exercises Provided: Patient instructed to cont prior HEP.  Exercises were reviewed and Blanca was able to demonstrate them prior to the end of the session.  Blanca demonstrated good  understanding of the education provided.     See EMR under Patient Instructions for exercises provided prior visit.    Assessment     This is a 55 y.o. female referred to outpatient physical therapy and presents with a medical diagnosis of Cervicalgia and demonstrates limitations as described in the problem list. Pt tolerated treatment well with continued guided relaxing meditation and focus on decreasing overall stress to nervous system to improve tolerance for exercise.  Pt and PT discussed POC and pt requests to be discharged to [facility] following today's visit. Pt continues with intermittent L sided neck pain, is independent with HEP, and is appropriate for discharge from skilled outpatient PT at this time.       Blanca is  progressing well towards her goals.   Pt prognosis is Fair.     Pt's spiritual, cultural and educational needs considered and pt agreeable to plan of care and goals.    Anticipated barriers to physical therapy: Chronicity of condition, history of cancer, history of fibromyalgia    Will the patient continue to benefit from skilled PT intervention?     Medical necessity is demonstrated by:   - Pain limits function of effected part for all activities  - Unable to participate in daily activities   - Requires skilled supervision to complete and progress HEP      Progress towards goals: Progressing      Goals: GOALS: Short Term Goals: 4 weeks  1.Report decreased cervical pain  <   / =  4  /10  to increase tolerance for prolonged sitting activities. MET 09/03/2020  2. Increase cervical ROM by 5-10 degrees in order to perform ADLs with decreased difficulty. MET 09/03/2020 except for extension and R rotation.   3. Increase strength in B shoulders/scapular stabilizers by 1/3 MMT grade to increase tolerance for ADL and     work activities. In progress 09/03/2020  4. Pt to tolerate HEP to improve ROM and independence with ADL's MET 09/03/2020  5. Pt will improve NDI score to 25/50 to demonstrate decreased disability and improved function. MET 09/03/2020     Long Term Goals: 8 weeks  1.Report decreased cervical pain  <  / =  3  /10  to increase tolerance for cross stitching. NOT MET   2. Increase UE/neck flexibility in order to improve posture.  NOT MET  3. Increase cervical ROM to WNL to be able to look over shoulder while driving. NOT MET  4. Pt will improve NDI score to </= 20/50 to demonstrate decreased disability and improved function. MET 09/03/2020  5. Pt's goal: Pt will report cross stitching without pain. NOT MET        Discharge Summary       Date of Last visit: 10/01/2020  Total Visits Received: 10  Cancelled Visits: 6  No Show Visits: 0      Assessment    Goals: Short Term Goals: 4 weeks  1.Report  decreased cervical pain  <   / =  4  /10  to increase tolerance for prolonged sitting activities. MET 09/03/2020  2. Increase cervical ROM by 5-10 degrees in order to perform ADLs with decreased difficulty. MET 09/03/2020 except for extension and R rotation.   3. Increase strength in B shoulders/scapular stabilizers by 1/3 MMT grade to increase tolerance for ADL and     work activities. In progress 09/03/2020  4. Pt to tolerate HEP to improve ROM and independence with ADL's MET 09/03/2020  5. Pt will improve NDI score to 25/50 to demonstrate decreased disability and improved function. MET 09/03/2020     Long Term Goals: 8 weeks  1.Report decreased cervical pain  <  / =  3  /10  to increase tolerance for cross stitching. NOT MET  2. Increase UE/neck flexibility in order to improve posture.  NOT MET  3. Increase cervical ROM to WNL to be able to look over shoulder while driving.NOT MET  4. Pt will improve NDI score to </= 20/50 to demonstrate decreased disability and improved function. MET 09/03/2020  5. Pt's goal: Pt will report cross stitching without pain. NOT MET       Discharge reason: Patient requested discharge and Other: Pt has reached max benefit from PT at this time, she is IND with HEP, and is appropriate for discharge from PT at this time.     Plan   This patient is discharged from Physical Therapy and is to cont with their HEP and MD follow up PRN.      Merlin Gonzales, PT, DPT

## 2020-10-04 NOTE — PROGRESS NOTES
[facility] Hematology/Oncology  PROGRESS NOTE - Follow-up Visit      Subjective:       Patient ID:   NAME: Blanca Loya : 1965     55 y.o. female    Referring Doc: Cora  Other Physicians: Lyndsay Gonsales, CARLY Barillas; Mary Gonsales    Chief Complaint:  Breast ca f/u    History of Present Illness:     Patient is being seen today in person.  She had PEt scan 2020.      She is doing well with no new issues. No CP, SOB, HA's or N/V. She is now off all oral antihormones.       She is feeling better overall since last visit. She had a diarrheal illness. She had recent colonoscopy with Dr Edmonds and has colitis, which has gotten better.     Discussed Covid19 precautions           ROS:   GEN: normal without any fever, night sweats or weight loss  HEENT: normal with no HA's, sore throat, stiff neck, changes in vision  CV: normal with no CP, SOB, PND, RUGGIERO or orthopnea  PULM: normal with no SOB, cough, hemoptysis, sputum or pleuritic pain  GI: normal with no abdominal pain, nausea, vomiting, constipation, diarrhea, melanotic stools, BRBPR, or hematemesis  : normal with no hematuria, dysuria  BREAST: normal with no mass, discharge, pain  SKIN: normal with no rash, erythema, bruising, or swelling    Allergies:  Review of patient's allergies indicates:   Allergen Reactions    Demerol [meperidine] Anxiety    Pcn [penicillins] Rash       Medications:    Current Outpatient Medications:     ALPRAZolam (XANAX) 0.5 MG tablet, TK 1 T PO QD PRN, Disp: 30 tablet, Rfl: 0    ascorbic acid, vitamin C, (VITAMIN C) 1000 MG tablet, Take 1,000 mg by mouth once daily., Disp: , Rfl:     b complex vitamins tablet, Take 1 tablet by mouth once daily., Disp: , Rfl:     butalbital-acetaminophen-caffeine -40 mg (FIORICET, ESGIC) -40 mg per tablet, Take 1 tablet by mouth every 6 (six) hours as needed for Pain., Disp: 28 tablet, Rfl: 0    CHROMIUM ORAL, Take by mouth., Disp: , Rfl:     levothyroxine  (SYNTHROID) 100 MCG tablet, TAKE 1 TABLET BY MOUTH EVERY DAY, Disp: 90 tablet, Rfl: 1    oxyCODONE-acetaminophen (PERCOCET) 5-325 mg per tablet, Take 1 tablet by mouth every 6 (six) hours as needed for Pain., Disp: 28 tablet, Rfl: 0    POTASSIUM ACETATE MISC, by Misc.(Non-Drug; Combo Route) route., Disp: , Rfl:     sucralfate (CARAFATE) 1 gram tablet, Carafate 1 gram tablet  Take 1 tablet 4 times a day by oral route for 30 days.  as needed for abd upset, Disp: , Rfl:     PMHx/PSHx Updates:  See patient's last visit with me on 7/6/2020  See H&P on 9/8/2016        Pathology:      See path note on 8/30/2016      Objective:     Vitals:  Blood pressure 131/82, pulse 87, temperature 98 °F (36.7 °C), weight 54.8 kg (120 lb 12.8 oz), last menstrual period 09/10/2016.        Physical Examination:   GEN: no apparent distress, comfortable; AAOx3  HEAD: atraumatic and normocephalic  EYES: no conjunctival pallor or muddiness, no icterus; normal pupil reaction to ambient light  ENT: OMM, no pharyngeal erythema, external bilateral ears WNL; no visible thrush or ulcers  NECK: no masses or swelling, trachea midline, no visible LAD/LN's   CV: no palpitations; no pedal edema; no noticeable JVD or neck vein distension  CHEST: Normal respiratory effort; chest wall breath movements symmetrical; no audible wheezing  ABDOM: non-distended; no bloating  MUSC/Skeletal: ROM normal; joints visibly normal; no deformities or arthropathy  EXTREM: no clubbing, cyanosis, inflammation or swelling  SKIN: no rashes, lesions, ulcers, petechiae or subcutaneous nodules  : no tyson  NEURO: moving all 4 extremities; AAOx3; no tremors  PSYCH: normal mood, affect and behavior  LYMPH: no visible LN's or LAD  Breast: no changes          Labs:          Lab Results   Component Value Date    WBC 4.2 08/25/2020    HGB 15.3 08/25/2020    HCT 45.0 08/25/2020    MCV 94 08/25/2020     08/25/2020          BMP  Lab Results   Component Value Date      08/25/2020    K 3.9 08/25/2020    CL 98 08/25/2020    CO2 24 08/25/2020    BUN 8 08/25/2020    CREATININE 0.85 08/25/2020    CALCIUM 10.3 (H) 08/25/2020    ANIONGAP 12 03/10/2020    ESTGFRAFRICA >60.0 03/10/2020    EGFRNONAA 78 08/25/2020     Lab Results   Component Value Date    ALT 18 08/25/2020    AST 28 08/25/2020    ALKPHOS 123 03/10/2020    BILITOT 0.3 08/25/2020           Radiology/Diagnostic Studies:    PET 9/24/2020:    Impression:     1. No evidence of recurrence of disease or metastatic disease.  2. Several colon air-fluid levels, suggesting nonspecific diarrheal illness.         US  11/7/2019:  Impression       Normal right upper quadrant ultrasound.         MRI head  2/17/2020:    Impression:     Stable appearance of previously defined enhancing extra-axial mass which extends from the left internal auditory canal into the left cerebellopontine angle cistern and along the adjacent temporal bone.      PET/CT  5/13/2019:    IMPRESSION:    1. No evidence of FDG avid metastatic disease.  2. Incidental findings as above.          Chest/Abdom CT  8/24/2018:      IMPRESSION:  1. No evidence of metastatic disease in the chest, abdomen, or the pelvis.  2. Additional observations as above           I have reviewed all available lab results and radiology reports.    Assessment/Plan:   (1) 55 y.o. female with diagnosis of right breast cancer  - s/p bilateral mastectomies on 9/26/16 followed by reconstruction  - followed by Dr Bear with GenSurg  - she had 2 out of 7 LN's that were positive  - she was a Stage IIA  - ER+/DC+ and her2nu neg  - s/p AC x4 and taxol x4  - she has seen Dr Gonsales with rad/onc for XRT and completed 5 weeks last month  - discussed the pro's and con's of tamoxifen versus arimidex, and in light of the pap issues, arimidex is probably the better choice  - she had been on arimidex but it was discontinued because of her liver  - she has some mood swings and weight gain  - PET done on 9/21/17  "with no evidence of mets;   - recent CT chest/abdom on 8/24/2018 showed no evidence on cancer  - s/p prior repeat surgery with right-sided lift with Dr Umana with repeat evaluation in Nov 2018   - she had a slow post-op recovery and has residual aches and pains  - she had PET on 5/13/2019  - she has been on arimidex and now femara which she both discontinued; she is having some generalized aches and pains;   - discussed aromasine and she is willing to give it a try; she does not want to take tamoxifen due to the clot and ovarian cancer risks   - she had been on the aromasin for 5 weeks but then developed elevated LFTs and it was discontinued    10/5/2020:  - latest PET on 9/24/2020 with no evidence of cancer  - s/p bilat mastectomies with reconstructions so she does not get mammograms      (2) Mild leucopenia/anemia secondary to chemotherapy (resolved)  - latest wbc is at  4.2 - latest hgb was at 15.3     (3) Prior abnormal PAP issues - followed by Dr Cline with GYN; latest pap was negative per patient and she was also HPV negative     (4) Hx of acoustic neuroma in past - s/p XRT in 2010; followed by Dr Gonsales  - recent MRI stable per patient in Feb 2020     (5) Chronic fibromyalgia - she is not being followed by anyone; she may be having a flare-up      (6) GI issues with recent bout of colitis - seen by Dr Edmonds with GI and she had recent colonoscopy  - prior mild elevation bili and alk phos - resolved  - she had liver biopsy and EGD with "inflammation" per patient  - s/p esophageal dilation  - Dr Edmonds was concerned it may be from the arimidex         (7) RUE - resolved - possible lymphedema issues - she no longer sees PT  - also seen by Dr Liao    (8) Hypokalemia -on supplements - patient wanted to try OTC and did not want prescription meds    (9) Occasional palpitations - seen by Dr Hernandez with cardiology    (10) Thyroid issues - followed by PCP        1. Malignant neoplasm of upper-outer " quadrant of right breast in female, estrogen receptor positive     2. Estrogen receptor positive     3. S/P bilateral mastectomy             PLAN:  1. Check up to date labs in 6 months  2. F/U with PCP, Surg and GYN  3. Hold on antihormone therapy indefinitely for now  4.  F/u with Dr Edmonds with GI  5.  F/u with cardiology    6. F/u with PCP with thyroid       RTC in  3-4 months    Fax note to Chaparro Shepehrd III, *, CARLY Bear Mannina, Babycos; Pham Hernandez     Total Time spent on patient:    I spent over 25 mins of time with the patient. Reviewed results of the recently ordered labs, tests, reports and studies; made directives with regards to the results. Over half of this time was spent couseling and coordinating care, making treatment and analytical decisions; ordering necessary labs, tests and studies; and discussing treatment options and setting up treatment plan(s) if indicated.        Discussion:     COVID-19 Discussion:    I had long discussion with patient and any applicable family about the COVID-19 coronavirus epidemic and the recommended precautions with regard to cancer and/or hematology patients. I have re-iterated the CDC recommendations for adequate hand washing, use of hand -like products, and coughing into elbow, etc. In addition, especially for our patients who are on chemotherapy and/or our otherwise immunocompromised patients, I have recommended avoidance of crowds, including movie theaters, restaurants, churches, etc. I have recommended avoidance of any sick or symptomatic family members and/or friends. I have also recommended avoidance of any raw and unwashed food products, and general avoidance of food items that have not been prepared by themselves. The patient has been asked to call us immediately with any symptom developments, issues, questions or other general concerns.            I have explained all of the above in detail and the patient understands all  of the current recommendation(s). I have answered all of their questions to the best of my ability and to their complete satisfaction.   The patient is to continue with the current management plan.            Electronically signed by Bronson Carrillo MD

## 2020-10-05 ENCOUNTER — OFFICE VISIT (OUTPATIENT)
Dept: HEMATOLOGY/ONCOLOGY | Facility: CLINIC | Age: 55
End: 2020-10-05
Payer: COMMERCIAL

## 2020-10-05 VITALS
DIASTOLIC BLOOD PRESSURE: 82 MMHG | BODY MASS INDEX: 21.4 KG/M2 | WEIGHT: 120.81 LBS | HEART RATE: 87 BPM | SYSTOLIC BLOOD PRESSURE: 131 MMHG | TEMPERATURE: 98 F

## 2020-10-05 DIAGNOSIS — C50.411 MALIGNANT NEOPLASM OF UPPER-OUTER QUADRANT OF RIGHT BREAST IN FEMALE, ESTROGEN RECEPTOR POSITIVE: Primary | ICD-10-CM

## 2020-10-05 DIAGNOSIS — Z17.0 ESTROGEN RECEPTOR POSITIVE: ICD-10-CM

## 2020-10-05 DIAGNOSIS — Z90.13 S/P BILATERAL MASTECTOMY: ICD-10-CM

## 2020-10-05 DIAGNOSIS — Z17.0 MALIGNANT NEOPLASM OF UPPER-OUTER QUADRANT OF RIGHT BREAST IN FEMALE, ESTROGEN RECEPTOR POSITIVE: Primary | ICD-10-CM

## 2020-10-05 PROCEDURE — 99214 OFFICE O/P EST MOD 30 MIN: CPT | Mod: S$GLB,,, | Performed by: INTERNAL MEDICINE

## 2020-10-05 PROCEDURE — 99214 PR OFFICE/OUTPT VISIT, EST, LEVL IV, 30-39 MIN: ICD-10-PCS | Mod: S$GLB,,, | Performed by: INTERNAL MEDICINE

## 2020-10-05 PROCEDURE — 3008F PR BODY MASS INDEX (BMI) DOCUMENTED: ICD-10-PCS | Mod: S$GLB,,, | Performed by: INTERNAL MEDICINE

## 2020-10-05 PROCEDURE — 3008F BODY MASS INDEX DOCD: CPT | Mod: S$GLB,,, | Performed by: INTERNAL MEDICINE

## 2020-10-07 LAB — ELASTASE PANC STL-MCNT: NORMAL UG ELAST./G

## 2020-11-08 ENCOUNTER — PATIENT OUTREACH (OUTPATIENT)
Dept: ADMINISTRATIVE | Facility: HOSPITAL | Age: 55
End: 2020-11-08

## 2020-11-08 NOTE — PROGRESS NOTES
Immunizations reviewed. Legacy reviewed. Care Everywhere reviewed.  [facility], [facility], and [facility] reviewed.   PAP from 10/2020 hyperlinked to HM per [facility].   HM modifier adjusted. HM updated. Gap closed.   Chart review completed.               VANESSA

## 2021-02-02 ENCOUNTER — TELEPHONE (OUTPATIENT)
Dept: HEMATOLOGY/ONCOLOGY | Facility: CLINIC | Age: 56
End: 2021-02-02

## 2021-02-02 DIAGNOSIS — Z90.13 S/P BILATERAL MASTECTOMY: ICD-10-CM

## 2021-02-02 DIAGNOSIS — Z17.0 MALIGNANT NEOPLASM OF UPPER-OUTER QUADRANT OF RIGHT BREAST IN FEMALE, ESTROGEN RECEPTOR POSITIVE: Primary | ICD-10-CM

## 2021-02-02 DIAGNOSIS — Z17.0 ESTROGEN RECEPTOR POSITIVE: ICD-10-CM

## 2021-02-02 DIAGNOSIS — C50.411 MALIGNANT NEOPLASM OF UPPER-OUTER QUADRANT OF RIGHT BREAST IN FEMALE, ESTROGEN RECEPTOR POSITIVE: Primary | ICD-10-CM

## 2021-02-03 ENCOUNTER — LAB VISIT (OUTPATIENT)
Dept: LAB | Facility: HOSPITAL | Age: 56
End: 2021-02-03
Attending: INTERNAL MEDICINE
Payer: COMMERCIAL

## 2021-02-03 ENCOUNTER — TELEPHONE (OUTPATIENT)
Dept: HEMATOLOGY/ONCOLOGY | Facility: CLINIC | Age: 56
End: 2021-02-03

## 2021-02-03 DIAGNOSIS — Z17.0 ESTROGEN RECEPTOR POSITIVE: ICD-10-CM

## 2021-02-03 DIAGNOSIS — Z17.0 MALIGNANT NEOPLASM OF UPPER-OUTER QUADRANT OF RIGHT BREAST IN FEMALE, ESTROGEN RECEPTOR POSITIVE: ICD-10-CM

## 2021-02-03 DIAGNOSIS — Z90.13 S/P BILATERAL MASTECTOMY: ICD-10-CM

## 2021-02-03 DIAGNOSIS — C50.411 MALIGNANT NEOPLASM OF UPPER-OUTER QUADRANT OF RIGHT BREAST IN FEMALE, ESTROGEN RECEPTOR POSITIVE: ICD-10-CM

## 2021-02-03 LAB
ALBUMIN SERPL BCP-MCNC: 4.4 G/DL (ref 3.5–5.2)
ALP SERPL-CCNC: 102 U/L (ref 55–135)
ALT SERPL W/O P-5'-P-CCNC: 31 U/L (ref 10–44)
ANION GAP SERPL CALC-SCNC: 13 MMOL/L (ref 8–16)
AST SERPL-CCNC: 33 U/L (ref 10–40)
BASOPHILS # BLD AUTO: 0.05 K/UL (ref 0–0.2)
BASOPHILS NFR BLD: 1 % (ref 0–1.9)
BILIRUB SERPL-MCNC: 1.2 MG/DL (ref 0.1–1)
BUN SERPL-MCNC: 9 MG/DL (ref 6–20)
CALCIUM SERPL-MCNC: 9.9 MG/DL (ref 8.7–10.5)
CHLORIDE SERPL-SCNC: 103 MMOL/L (ref 95–110)
CO2 SERPL-SCNC: 26 MMOL/L (ref 23–29)
CREAT SERPL-MCNC: 0.8 MG/DL (ref 0.5–1.4)
DIFFERENTIAL METHOD: ABNORMAL
EOSINOPHIL # BLD AUTO: 0.1 K/UL (ref 0–0.5)
EOSINOPHIL NFR BLD: 1.6 % (ref 0–8)
ERYTHROCYTE [DISTWIDTH] IN BLOOD BY AUTOMATED COUNT: 13.4 % (ref 11.5–14.5)
EST. GFR  (AFRICAN AMERICAN): >60 ML/MIN/1.73 M^2
EST. GFR  (NON AFRICAN AMERICAN): >60 ML/MIN/1.73 M^2
GLUCOSE SERPL-MCNC: 115 MG/DL (ref 70–110)
HCT VFR BLD AUTO: 39.9 % (ref 37–48.5)
HGB BLD-MCNC: 13.1 G/DL (ref 12–16)
IMM GRANULOCYTES # BLD AUTO: 0.01 K/UL (ref 0–0.04)
IMM GRANULOCYTES NFR BLD AUTO: 0.2 % (ref 0–0.5)
LYMPHOCYTES # BLD AUTO: 1.2 K/UL (ref 1–4.8)
LYMPHOCYTES NFR BLD: 24.7 % (ref 18–48)
MCH RBC QN AUTO: 32.1 PG (ref 27–31)
MCHC RBC AUTO-ENTMCNC: 32.8 G/DL (ref 32–36)
MCV RBC AUTO: 98 FL (ref 82–98)
MONOCYTES # BLD AUTO: 0.4 K/UL (ref 0.3–1)
MONOCYTES NFR BLD: 8.2 % (ref 4–15)
NEUTROPHILS # BLD AUTO: 3.2 K/UL (ref 1.8–7.7)
NEUTROPHILS NFR BLD: 64.3 % (ref 38–73)
NRBC BLD-RTO: 0 /100 WBC
PLATELET # BLD AUTO: 267 K/UL (ref 150–350)
PMV BLD AUTO: 11.2 FL (ref 9.2–12.9)
POTASSIUM SERPL-SCNC: 4.1 MMOL/L (ref 3.5–5.1)
PROT SERPL-MCNC: 7.6 G/DL (ref 6–8.4)
RBC # BLD AUTO: 4.08 M/UL (ref 4–5.4)
SODIUM SERPL-SCNC: 142 MMOL/L (ref 136–145)
WBC # BLD AUTO: 4.98 K/UL (ref 3.9–12.7)

## 2021-02-03 PROCEDURE — 85025 COMPLETE CBC W/AUTO DIFF WBC: CPT

## 2021-02-03 PROCEDURE — 36415 COLL VENOUS BLD VENIPUNCTURE: CPT

## 2021-02-03 PROCEDURE — 80053 COMPREHEN METABOLIC PANEL: CPT

## 2021-02-04 ENCOUNTER — OFFICE VISIT (OUTPATIENT)
Dept: HEMATOLOGY/ONCOLOGY | Facility: CLINIC | Age: 56
End: 2021-02-04
Payer: COMMERCIAL

## 2021-02-04 VITALS
HEART RATE: 92 BPM | SYSTOLIC BLOOD PRESSURE: 128 MMHG | DIASTOLIC BLOOD PRESSURE: 79 MMHG | BODY MASS INDEX: 20.65 KG/M2 | TEMPERATURE: 98 F | RESPIRATION RATE: 17 BRPM | WEIGHT: 116.63 LBS

## 2021-02-04 DIAGNOSIS — Z90.13 S/P BILATERAL MASTECTOMY: ICD-10-CM

## 2021-02-04 DIAGNOSIS — C50.411 MALIGNANT NEOPLASM OF UPPER-OUTER QUADRANT OF RIGHT BREAST IN FEMALE, ESTROGEN RECEPTOR POSITIVE: Primary | ICD-10-CM

## 2021-02-04 DIAGNOSIS — Z17.0 MALIGNANT NEOPLASM OF UPPER-OUTER QUADRANT OF RIGHT BREAST IN FEMALE, ESTROGEN RECEPTOR POSITIVE: Primary | ICD-10-CM

## 2021-02-04 DIAGNOSIS — Z17.0 ESTROGEN RECEPTOR POSITIVE: ICD-10-CM

## 2021-02-04 PROCEDURE — 1126F PR PAIN SEVERITY QUANTIFIED, NO PAIN PRESENT: ICD-10-PCS | Mod: S$GLB,,, | Performed by: INTERNAL MEDICINE

## 2021-02-04 PROCEDURE — 1126F AMNT PAIN NOTED NONE PRSNT: CPT | Mod: S$GLB,,, | Performed by: INTERNAL MEDICINE

## 2021-02-04 PROCEDURE — 3008F PR BODY MASS INDEX (BMI) DOCUMENTED: ICD-10-PCS | Mod: S$GLB,,, | Performed by: INTERNAL MEDICINE

## 2021-02-04 PROCEDURE — 99214 OFFICE O/P EST MOD 30 MIN: CPT | Mod: S$GLB,,, | Performed by: INTERNAL MEDICINE

## 2021-02-04 PROCEDURE — 99214 PR OFFICE/OUTPT VISIT, EST, LEVL IV, 30-39 MIN: ICD-10-PCS | Mod: S$GLB,,, | Performed by: INTERNAL MEDICINE

## 2021-02-04 PROCEDURE — 3008F BODY MASS INDEX DOCD: CPT | Mod: S$GLB,,, | Performed by: INTERNAL MEDICINE

## 2021-02-09 ENCOUNTER — OFFICE VISIT (OUTPATIENT)
Dept: RADIATION ONCOLOGY | Facility: CLINIC | Age: 56
End: 2021-02-09
Payer: COMMERCIAL

## 2021-02-09 VITALS
BODY MASS INDEX: 20.87 KG/M2 | SYSTOLIC BLOOD PRESSURE: 118 MMHG | WEIGHT: 117.81 LBS | HEART RATE: 82 BPM | OXYGEN SATURATION: 100 % | DIASTOLIC BLOOD PRESSURE: 80 MMHG | TEMPERATURE: 99 F

## 2021-02-09 DIAGNOSIS — C50.411 MALIGNANT NEOPLASM OF UPPER-OUTER QUADRANT OF RIGHT BREAST IN FEMALE, ESTROGEN RECEPTOR POSITIVE: Primary | ICD-10-CM

## 2021-02-09 DIAGNOSIS — Z17.0 MALIGNANT NEOPLASM OF UPPER-OUTER QUADRANT OF RIGHT BREAST IN FEMALE, ESTROGEN RECEPTOR POSITIVE: Primary | ICD-10-CM

## 2021-02-09 PROCEDURE — 99214 OFFICE O/P EST MOD 30 MIN: CPT | Mod: S$GLB,,, | Performed by: RADIOLOGY

## 2021-02-09 PROCEDURE — 3008F BODY MASS INDEX DOCD: CPT | Mod: S$GLB,,, | Performed by: RADIOLOGY

## 2021-02-09 PROCEDURE — 99214 PR OFFICE/OUTPT VISIT, EST, LEVL IV, 30-39 MIN: ICD-10-PCS | Mod: S$GLB,,, | Performed by: RADIOLOGY

## 2021-02-09 PROCEDURE — 1125F PR PAIN SEVERITY QUANTIFIED, PAIN PRESENT: ICD-10-PCS | Mod: S$GLB,,, | Performed by: RADIOLOGY

## 2021-02-09 PROCEDURE — 1125F AMNT PAIN NOTED PAIN PRSNT: CPT | Mod: S$GLB,,, | Performed by: RADIOLOGY

## 2021-02-09 PROCEDURE — 3008F PR BODY MASS INDEX (BMI) DOCUMENTED: ICD-10-PCS | Mod: S$GLB,,, | Performed by: RADIOLOGY

## 2021-03-17 ENCOUNTER — TELEPHONE (OUTPATIENT)
Dept: FAMILY MEDICINE | Facility: CLINIC | Age: 56
End: 2021-03-17

## 2021-03-17 DIAGNOSIS — E03.9 HYPOTHYROIDISM, UNSPECIFIED TYPE: Primary | ICD-10-CM

## 2021-03-24 ENCOUNTER — TELEPHONE (OUTPATIENT)
Dept: FAMILY MEDICINE | Facility: CLINIC | Age: 56
End: 2021-03-24

## 2021-03-24 ENCOUNTER — PATIENT MESSAGE (OUTPATIENT)
Dept: FAMILY MEDICINE | Facility: CLINIC | Age: 56
End: 2021-03-24

## 2021-03-26 LAB
HCV AB S/CO SERPL IA: <0.1 S/CO RATIO (ref 0–0.9)
HIV 1+2 AB+HIV1 P24 AG SERPL QL IA: NON REACTIVE
T4 FREE SERPL-MCNC: 1.2 NG/DL (ref 0.82–1.77)
TSH SERPL DL<=0.005 MIU/L-ACNC: 0.95 UIU/ML (ref 0.45–4.5)

## 2021-03-29 ENCOUNTER — OFFICE VISIT (OUTPATIENT)
Dept: FAMILY MEDICINE | Facility: CLINIC | Age: 56
End: 2021-03-29
Payer: COMMERCIAL

## 2021-03-29 VITALS
WEIGHT: 115 LBS | OXYGEN SATURATION: 100 % | TEMPERATURE: 98 F | DIASTOLIC BLOOD PRESSURE: 65 MMHG | BODY MASS INDEX: 20.37 KG/M2 | SYSTOLIC BLOOD PRESSURE: 119 MMHG | HEART RATE: 88 BPM

## 2021-03-29 DIAGNOSIS — E03.9 HYPOTHYROIDISM, UNSPECIFIED TYPE: Primary | ICD-10-CM

## 2021-03-29 DIAGNOSIS — D33.3 LEFT ACOUSTIC NEUROMA: ICD-10-CM

## 2021-03-29 DIAGNOSIS — Z90.13 S/P BILATERAL MASTECTOMY: ICD-10-CM

## 2021-03-29 DIAGNOSIS — M81.0 OSTEOPOROSIS, UNSPECIFIED OSTEOPOROSIS TYPE, UNSPECIFIED PATHOLOGICAL FRACTURE PRESENCE: ICD-10-CM

## 2021-03-29 PROCEDURE — 1126F PR PAIN SEVERITY QUANTIFIED, NO PAIN PRESENT: ICD-10-PCS | Mod: S$GLB,,, | Performed by: FAMILY MEDICINE

## 2021-03-29 PROCEDURE — 99214 PR OFFICE/OUTPT VISIT, EST, LEVL IV, 30-39 MIN: ICD-10-PCS | Mod: S$GLB,,, | Performed by: FAMILY MEDICINE

## 2021-03-29 PROCEDURE — 1126F AMNT PAIN NOTED NONE PRSNT: CPT | Mod: S$GLB,,, | Performed by: FAMILY MEDICINE

## 2021-03-29 PROCEDURE — 3008F BODY MASS INDEX DOCD: CPT | Mod: CPTII,S$GLB,, | Performed by: FAMILY MEDICINE

## 2021-03-29 PROCEDURE — 99214 OFFICE O/P EST MOD 30 MIN: CPT | Mod: S$GLB,,, | Performed by: FAMILY MEDICINE

## 2021-03-29 PROCEDURE — 3008F PR BODY MASS INDEX (BMI) DOCUMENTED: ICD-10-PCS | Mod: CPTII,S$GLB,, | Performed by: FAMILY MEDICINE

## 2021-03-29 RX ORDER — LEVOTHYROXINE SODIUM 100 UG/1
TABLET ORAL
Qty: 90 TABLET | Refills: 1 | Status: SHIPPED | OUTPATIENT
Start: 2021-03-29 | End: 2021-09-29

## 2021-04-29 ENCOUNTER — PATIENT MESSAGE (OUTPATIENT)
Dept: RESEARCH | Facility: HOSPITAL | Age: 56
End: 2021-04-29

## 2021-05-06 NOTE — PROGRESS NOTES
"Blanca Loya  [id number]  1965  2/8/2018  Bronson Carrillo Md  [address]  [address]  [place] [address]    DIAGNOSIS: IIA, [id number] IDC R breast, ER/AL+, her2-  REASON FOR VISIT: Routine scheduled follow-up.    HISTORY OF PRESENT ILLNESS:   51F with mammogram detected, bx proven multifocal IDC of R breast with B mastectomies + expander placement. Pathology revealed a 12mm and 7mm IDC with 2/7 LNs+ and close posterior margin; ER/AL+, her2-. She completed ACT chemotherapy.    Completed 50Gy EBRT to R "breast" and comp jamie groups of ax I-III/SCV/IM LNs on 6/14/17.    INTERVAL HISTORY:   Patient had a PET CT scan in September 2017 that revealed no evidence of disease. She has recently completed her reconstruction to the bilateral chest lee with Dr. Umana and presents today for her  radiation follow-up. She is reporting some mild arthralgias since beginning arimidex but denies fatigue or mood swings though she has occasional hot flashes. She reports recently having some difficulties with her right shoulder but denies any swelling of the right upper extremity, new nodules, pain within the chest wall. She denies headaches, vision, hearing, cognition changes, balance changes, focal numbness or weakness.    Review of Systems   Constitutional: Positive for fatigue. Negative for appetite change, chills, fever and unexpected weight change.   HENT:   Negative for lump/mass, mouth sores, sore throat, trouble swallowing and voice change.    Eyes: Negative for eye problems and icterus.   Respiratory: Negative for cough, hemoptysis and shortness of breath.    Cardiovascular: Negative for chest pain and leg swelling.   Gastrointestinal: Negative for abdominal pain, constipation, diarrhea, nausea and vomiting.   Genitourinary: Negative for dysuria, frequency, hematuria, nocturia and vaginal bleeding.    Musculoskeletal: Positive for arthralgias. Negative for back pain, gait problem, neck pain and neck stiffness. " As per Dr Marin, left voicemail for pt to schedule CT abdomen pelvis with oral contrast, iron infusion and Vitamin-B12 in office  I will try to reach patient again this afternoon    Neurological: Negative for extremity weakness, gait problem, headaches, numbness and seizures.   Hematological: Negative for adenopathy.     Past Medical History:   Diagnosis Date    Acquired deafness     Estrogen receptor positive 5/23/2017    Malignant neoplasm of upper-outer quadrant of right female breast 5/23/2017     Past Surgical History:   Procedure Laterality Date    BREAST RECONSTRUCTION      BREAST SURGERY      NEUROMA SURGERY      [place] ear     Social History     Social History    Marital status:      Spouse name: N/A    Number of children: N/A    Years of education: N/A     Social History Main Topics    Smoking status: Never Smoker    Smokeless tobacco: Never Used    Alcohol use No    Drug use: No    Sexual activity: Not Asked     Other Topics Concern    None     Social History Narrative    None     Family History   Problem Relation Age of Onset    No Known Problems Mother     No Known Problems Father      Medication List with Changes/Refills   Current Medications    ANASTROZOLE (ARIMIDEX) 1 MG TAB    Take 1 tablet (1 mg total) by mouth once daily.    B COMPLEX VITAMINS TABLET    Take 1 tablet by mouth once daily.    BUTALBITAL-ACETAMINOPHEN-CAFFEINE -40 MG (FIORICET, ESGIC) -40 MG PER TABLET        CHROMIUM ORAL    Take by mouth.    DULOXETINE (CYMBALTA) 20 MG CAPSULE    TK 1 C PO QD    LEVOTHYROXINE (SYNTHROID) 100 MCG TABLET    TK 1 T PO QD    OXYCODONE-ACETAMINOPHEN (PERCOCET)  MG PER TABLET    Take 1 tablet by mouth every 4 (four) hours as needed for Pain.    POTASSIUM CHLORIDE (MICRO-K) 10 MEQ CPSR         Review of patient's allergies indicates:   Allergen Reactions    Demerol [meperidine] Anxiety    Pcn [penicillins] Rash       QUALITY OF LIFE: 90%- Able to Carry on Normal Activity: Minor Symptoms of Disease    Vitals:    02/08/18 1306   BP: 124/62   Pulse: 64   Weight: 63.5 kg (140 lb)   PainSc: 0-No pain       PHYSICAL EXAM:   GENERAL: alert; in  no apparent distress.   HEAD: normocephalic, atraumatic. Hair growing back  EYES: pupils are equal, round, reactive to light and accommodation. Sclera anicteric. Conjunctiva not injected.   NOSE/THROAT: no nasal erythema or rhinorrhea. Oropharynx pink, without erythema, ulcerations or thrush.   NECK: no cervical motion rigidity; supple with no masses.  CHEST:  Patient is speaking comfortably on room air with normal work of breathing without using accessory muscles of respiration.  MUSCULOSKELETAL: no tenderness to palpation along the spine or scapulae. Normal range of motion.  NEUROLOGIC: cranial nerves II-XII intact bilaterally. Strength 5/5 in bilateral upper and lower extremities. Normal gait.  LYMPHATIC: no cervical, supraclavicular or axillary adenopathy appreciated bilaterally.   EXTREMITIES: no clubbing, cyanosis, edema.  SKIN: no erythema, rashes, ulcerations noted.   BREAST: B reconstructions with minimal bruising incisions are clean dry and intact, well-healed without evidence of nodularity, seroma, infection. Exam is benign and well tolerated    ANCILLARY DATA  9/17 PET/CT: alexander    ASSESSMENT: 51F with stage IIA, [id number] IDC R breast, ER/TN+, her2- s/p mastectomy and adjuvant RT ending 6/17, now on arimidex and reconsrtucted.  PLAN:  Ms. Loya has finally completed her reconstruction is still healing from her most recent procedure 3 weeks ago. There is minimal bruising but no signs of residual disease or infection. She tolerated her radiotherapy very well and at today's visit is on complaining of mild arthralgias since beginning Arimidex. She is denying any symptoms labeled to her schwannoma but she is due for her two-year MRI of the brain and so we'll order that for her. We'll continue to follow her every 6 months and have recommended she follow with her surgeon as well as her medical oncologist. By review of systems as well as physical examination today she is without evidence of disease.    All  questions answered and contact information provided. Patient understands free to call us anytime with any questions or concerns regarding radiation therapy.    TIME SPENT WITH PATIENT: I have personally seen and evaluated this patient. Approximately 30 minutes were spent with the patient discussing follow-up careplan.     PHYSICIAN: Rocco Gonsales Jr, MD

## 2021-06-01 ENCOUNTER — LAB VISIT (OUTPATIENT)
Dept: LAB | Facility: HOSPITAL | Age: 56
End: 2021-06-01
Attending: INTERNAL MEDICINE
Payer: COMMERCIAL

## 2021-06-01 ENCOUNTER — TELEPHONE (OUTPATIENT)
Dept: HEMATOLOGY/ONCOLOGY | Facility: CLINIC | Age: 56
End: 2021-06-01

## 2021-06-01 DIAGNOSIS — C50.411 MALIGNANT NEOPLASM OF UPPER-OUTER QUADRANT OF RIGHT BREAST IN FEMALE, ESTROGEN RECEPTOR POSITIVE: Primary | ICD-10-CM

## 2021-06-01 DIAGNOSIS — Z90.13 S/P BILATERAL MASTECTOMY: ICD-10-CM

## 2021-06-01 DIAGNOSIS — Z17.0 ESTROGEN RECEPTOR POSITIVE: ICD-10-CM

## 2021-06-01 DIAGNOSIS — Z17.0 MALIGNANT NEOPLASM OF UPPER-OUTER QUADRANT OF RIGHT BREAST IN FEMALE, ESTROGEN RECEPTOR POSITIVE: ICD-10-CM

## 2021-06-01 DIAGNOSIS — Z17.0 MALIGNANT NEOPLASM OF UPPER-OUTER QUADRANT OF RIGHT BREAST IN FEMALE, ESTROGEN RECEPTOR POSITIVE: Primary | ICD-10-CM

## 2021-06-01 DIAGNOSIS — C50.411 MALIGNANT NEOPLASM OF UPPER-OUTER QUADRANT OF RIGHT BREAST IN FEMALE, ESTROGEN RECEPTOR POSITIVE: ICD-10-CM

## 2021-06-01 LAB
ALBUMIN SERPL BCP-MCNC: 4.2 G/DL (ref 3.5–5.2)
ALP SERPL-CCNC: 100 U/L (ref 55–135)
ALT SERPL W/O P-5'-P-CCNC: 31 U/L (ref 10–44)
ANION GAP SERPL CALC-SCNC: 11 MMOL/L (ref 8–16)
AST SERPL-CCNC: 37 U/L (ref 10–40)
BASOPHILS # BLD AUTO: 0.04 K/UL (ref 0–0.2)
BASOPHILS NFR BLD: 1 % (ref 0–1.9)
BILIRUB SERPL-MCNC: 0.9 MG/DL (ref 0.1–1)
BUN SERPL-MCNC: 11 MG/DL (ref 6–20)
CALCIUM SERPL-MCNC: 9.6 MG/DL (ref 8.7–10.5)
CHLORIDE SERPL-SCNC: 104 MMOL/L (ref 95–110)
CO2 SERPL-SCNC: 26 MMOL/L (ref 23–29)
CREAT SERPL-MCNC: 0.6 MG/DL (ref 0.5–1.4)
DIFFERENTIAL METHOD: ABNORMAL
EOSINOPHIL # BLD AUTO: 0 K/UL (ref 0–0.5)
EOSINOPHIL NFR BLD: 1 % (ref 0–8)
ERYTHROCYTE [DISTWIDTH] IN BLOOD BY AUTOMATED COUNT: 13.6 % (ref 11.5–14.5)
EST. GFR  (AFRICAN AMERICAN): >60 ML/MIN/1.73 M^2
EST. GFR  (NON AFRICAN AMERICAN): >60 ML/MIN/1.73 M^2
GLUCOSE SERPL-MCNC: 84 MG/DL (ref 70–110)
HCT VFR BLD AUTO: 37 % (ref 37–48.5)
HGB BLD-MCNC: 12.4 G/DL (ref 12–16)
IMM GRANULOCYTES # BLD AUTO: 0.01 K/UL (ref 0–0.04)
IMM GRANULOCYTES NFR BLD AUTO: 0.2 % (ref 0–0.5)
LYMPHOCYTES # BLD AUTO: 0.9 K/UL (ref 1–4.8)
LYMPHOCYTES NFR BLD: 21 % (ref 18–48)
MCH RBC QN AUTO: 32 PG (ref 27–31)
MCHC RBC AUTO-ENTMCNC: 33.5 G/DL (ref 32–36)
MCV RBC AUTO: 95 FL (ref 82–98)
MONOCYTES # BLD AUTO: 0.4 K/UL (ref 0.3–1)
MONOCYTES NFR BLD: 9.2 % (ref 4–15)
NEUTROPHILS # BLD AUTO: 2.8 K/UL (ref 1.8–7.7)
NEUTROPHILS NFR BLD: 67.6 % (ref 38–73)
NRBC BLD-RTO: 0 /100 WBC
PLATELET # BLD AUTO: 250 K/UL (ref 150–450)
PMV BLD AUTO: 11.5 FL (ref 9.2–12.9)
POTASSIUM SERPL-SCNC: 3.9 MMOL/L (ref 3.5–5.1)
PROT SERPL-MCNC: 7.3 G/DL (ref 6–8.4)
RBC # BLD AUTO: 3.88 M/UL (ref 4–5.4)
SODIUM SERPL-SCNC: 141 MMOL/L (ref 136–145)
WBC # BLD AUTO: 4.14 K/UL (ref 3.9–12.7)

## 2021-06-01 PROCEDURE — 85025 COMPLETE CBC W/AUTO DIFF WBC: CPT | Performed by: INTERNAL MEDICINE

## 2021-06-01 PROCEDURE — 80053 COMPREHEN METABOLIC PANEL: CPT | Performed by: INTERNAL MEDICINE

## 2021-06-01 PROCEDURE — 36415 COLL VENOUS BLD VENIPUNCTURE: CPT | Performed by: INTERNAL MEDICINE

## 2021-06-14 ENCOUNTER — OFFICE VISIT (OUTPATIENT)
Dept: HEMATOLOGY/ONCOLOGY | Facility: CLINIC | Age: 56
End: 2021-06-14
Payer: COMMERCIAL

## 2021-06-14 VITALS
WEIGHT: 122.31 LBS | RESPIRATION RATE: 17 BRPM | TEMPERATURE: 98 F | HEART RATE: 96 BPM | DIASTOLIC BLOOD PRESSURE: 80 MMHG | SYSTOLIC BLOOD PRESSURE: 125 MMHG | BODY MASS INDEX: 21.66 KG/M2

## 2021-06-14 DIAGNOSIS — Z17.0 ESTROGEN RECEPTOR POSITIVE: ICD-10-CM

## 2021-06-14 DIAGNOSIS — C50.411 MALIGNANT NEOPLASM OF UPPER-OUTER QUADRANT OF RIGHT BREAST IN FEMALE, ESTROGEN RECEPTOR POSITIVE: Primary | ICD-10-CM

## 2021-06-14 DIAGNOSIS — Z17.0 MALIGNANT NEOPLASM OF UPPER-OUTER QUADRANT OF RIGHT BREAST IN FEMALE, ESTROGEN RECEPTOR POSITIVE: Primary | ICD-10-CM

## 2021-06-14 DIAGNOSIS — Z90.13 S/P BILATERAL MASTECTOMY: ICD-10-CM

## 2021-06-14 PROCEDURE — 1126F PR PAIN SEVERITY QUANTIFIED, NO PAIN PRESENT: ICD-10-PCS | Mod: S$GLB,,, | Performed by: INTERNAL MEDICINE

## 2021-06-14 PROCEDURE — 3008F PR BODY MASS INDEX (BMI) DOCUMENTED: ICD-10-PCS | Mod: S$GLB,,, | Performed by: INTERNAL MEDICINE

## 2021-06-14 PROCEDURE — 1126F AMNT PAIN NOTED NONE PRSNT: CPT | Mod: S$GLB,,, | Performed by: INTERNAL MEDICINE

## 2021-06-14 PROCEDURE — 3008F BODY MASS INDEX DOCD: CPT | Mod: S$GLB,,, | Performed by: INTERNAL MEDICINE

## 2021-06-14 PROCEDURE — 99214 OFFICE O/P EST MOD 30 MIN: CPT | Mod: S$GLB,,, | Performed by: INTERNAL MEDICINE

## 2021-06-14 PROCEDURE — 99214 PR OFFICE/OUTPT VISIT, EST, LEVL IV, 30-39 MIN: ICD-10-PCS | Mod: S$GLB,,, | Performed by: INTERNAL MEDICINE

## 2021-09-24 ENCOUNTER — PATIENT MESSAGE (OUTPATIENT)
Dept: FAMILY MEDICINE | Facility: CLINIC | Age: 56
End: 2021-09-24

## 2021-09-24 DIAGNOSIS — E03.9 HYPOTHYROIDISM, UNSPECIFIED TYPE: Primary | ICD-10-CM

## 2021-09-24 DIAGNOSIS — Z00.00 ANNUAL PHYSICAL EXAM: ICD-10-CM

## 2021-10-09 LAB
ALBUMIN SERPL-MCNC: 4.4 G/DL (ref 3.8–4.9)
ALBUMIN/GLOB SERPL: 1.8 {RATIO} (ref 1.2–2.2)
ALP SERPL-CCNC: 115 IU/L (ref 44–121)
ALT SERPL-CCNC: 20 IU/L (ref 0–32)
AST SERPL-CCNC: 31 IU/L (ref 0–40)
BASOPHILS # BLD AUTO: 0 X10E3/UL (ref 0–0.2)
BASOPHILS NFR BLD AUTO: 1 %
BILIRUB SERPL-MCNC: 0.5 MG/DL (ref 0–1.2)
BUN SERPL-MCNC: 11 MG/DL (ref 6–24)
BUN/CREAT SERPL: 15 (ref 9–23)
CALCIUM SERPL-MCNC: 9.8 MG/DL (ref 8.7–10.2)
CHLORIDE SERPL-SCNC: 104 MMOL/L (ref 96–106)
CHOLEST SERPL-MCNC: 198 MG/DL (ref 100–199)
CO2 SERPL-SCNC: 28 MMOL/L (ref 20–29)
CREAT SERPL-MCNC: 0.73 MG/DL (ref 0.57–1)
EOSINOPHIL # BLD AUTO: 0 X10E3/UL (ref 0–0.4)
EOSINOPHIL NFR BLD AUTO: 1 %
ERYTHROCYTE [DISTWIDTH] IN BLOOD BY AUTOMATED COUNT: 14.4 % (ref 11.7–15.4)
GLOBULIN SER CALC-MCNC: 2.5 G/DL (ref 1.5–4.5)
GLUCOSE SERPL-MCNC: 83 MG/DL (ref 65–99)
HCT VFR BLD AUTO: 38.7 % (ref 34–46.6)
HDLC SERPL-MCNC: 75 MG/DL
HGB BLD-MCNC: 12.6 G/DL (ref 11.1–15.9)
IMM GRANULOCYTES # BLD AUTO: 0 X10E3/UL (ref 0–0.1)
IMM GRANULOCYTES NFR BLD AUTO: 0 %
LDLC SERPL CALC-MCNC: 111 MG/DL (ref 0–99)
LYMPHOCYTES # BLD AUTO: 0.8 X10E3/UL (ref 0.7–3.1)
LYMPHOCYTES NFR BLD AUTO: 26 %
MCH RBC QN AUTO: 30.8 PG (ref 26.6–33)
MCHC RBC AUTO-ENTMCNC: 32.6 G/DL (ref 31.5–35.7)
MCV RBC AUTO: 95 FL (ref 79–97)
MONOCYTES # BLD AUTO: 0.4 X10E3/UL (ref 0.1–0.9)
MONOCYTES NFR BLD AUTO: 13 %
NEUTROPHILS # BLD AUTO: 1.8 X10E3/UL (ref 1.4–7)
NEUTROPHILS NFR BLD AUTO: 59 %
PLATELET # BLD AUTO: 255 X10E3/UL (ref 150–450)
POTASSIUM SERPL-SCNC: 3.9 MMOL/L (ref 3.5–5.2)
PROT SERPL-MCNC: 6.9 G/DL (ref 6–8.5)
RBC # BLD AUTO: 4.09 X10E6/UL (ref 3.77–5.28)
SODIUM SERPL-SCNC: 142 MMOL/L (ref 134–144)
T4 FREE SERPL-MCNC: 1.12 NG/DL (ref 0.82–1.77)
TRIGL SERPL-MCNC: 63 MG/DL (ref 0–149)
TSH SERPL DL<=0.005 MIU/L-ACNC: 2.16 UIU/ML (ref 0.45–4.5)
VLDLC SERPL CALC-MCNC: 12 MG/DL (ref 5–40)
WBC # BLD AUTO: 3 X10E3/UL (ref 3.4–10.8)

## 2021-10-21 ENCOUNTER — OFFICE VISIT (OUTPATIENT)
Dept: FAMILY MEDICINE | Facility: CLINIC | Age: 56
End: 2021-10-21
Payer: COMMERCIAL

## 2021-10-21 VITALS
TEMPERATURE: 97 F | BODY MASS INDEX: 21.62 KG/M2 | OXYGEN SATURATION: 98 % | DIASTOLIC BLOOD PRESSURE: 80 MMHG | HEIGHT: 63 IN | WEIGHT: 122 LBS | HEART RATE: 86 BPM | SYSTOLIC BLOOD PRESSURE: 124 MMHG

## 2021-10-21 DIAGNOSIS — Z17.0 MALIGNANT NEOPLASM OF UPPER-OUTER QUADRANT OF RIGHT BREAST IN FEMALE, ESTROGEN RECEPTOR POSITIVE: ICD-10-CM

## 2021-10-21 DIAGNOSIS — K52.831 COLLAGENOUS COLITIS: ICD-10-CM

## 2021-10-21 DIAGNOSIS — G47.00 INSOMNIA, UNSPECIFIED TYPE: ICD-10-CM

## 2021-10-21 DIAGNOSIS — M81.0 OSTEOPOROSIS, UNSPECIFIED OSTEOPOROSIS TYPE, UNSPECIFIED PATHOLOGICAL FRACTURE PRESENCE: ICD-10-CM

## 2021-10-21 DIAGNOSIS — C50.411 MALIGNANT NEOPLASM OF UPPER-OUTER QUADRANT OF RIGHT BREAST IN FEMALE, ESTROGEN RECEPTOR POSITIVE: ICD-10-CM

## 2021-10-21 DIAGNOSIS — Z90.13 S/P BILATERAL MASTECTOMY: ICD-10-CM

## 2021-10-21 DIAGNOSIS — G43.909 MIGRAINE WITHOUT STATUS MIGRAINOSUS, NOT INTRACTABLE, UNSPECIFIED MIGRAINE TYPE: ICD-10-CM

## 2021-10-21 DIAGNOSIS — D33.3 LEFT ACOUSTIC NEUROMA: Primary | ICD-10-CM

## 2021-10-21 DIAGNOSIS — E03.9 HYPOTHYROIDISM, UNSPECIFIED TYPE: ICD-10-CM

## 2021-10-21 DIAGNOSIS — F41.9 ANXIETY: ICD-10-CM

## 2021-10-21 PROCEDURE — 1160F PR REVIEW ALL MEDS BY PRESCRIBER/CLIN PHARMACIST DOCUMENTED: ICD-10-PCS | Mod: CPTII,S$GLB,, | Performed by: FAMILY MEDICINE

## 2021-10-21 PROCEDURE — 3008F PR BODY MASS INDEX (BMI) DOCUMENTED: ICD-10-PCS | Mod: CPTII,S$GLB,, | Performed by: FAMILY MEDICINE

## 2021-10-21 PROCEDURE — 99214 OFFICE O/P EST MOD 30 MIN: CPT | Mod: S$GLB,,, | Performed by: FAMILY MEDICINE

## 2021-10-21 PROCEDURE — 3074F PR MOST RECENT SYSTOLIC BLOOD PRESSURE < 130 MM HG: ICD-10-PCS | Mod: CPTII,S$GLB,, | Performed by: FAMILY MEDICINE

## 2021-10-21 PROCEDURE — 3074F SYST BP LT 130 MM HG: CPT | Mod: CPTII,S$GLB,, | Performed by: FAMILY MEDICINE

## 2021-10-21 PROCEDURE — 3079F DIAST BP 80-89 MM HG: CPT | Mod: CPTII,S$GLB,, | Performed by: FAMILY MEDICINE

## 2021-10-21 PROCEDURE — 1160F RVW MEDS BY RX/DR IN RCRD: CPT | Mod: CPTII,S$GLB,, | Performed by: FAMILY MEDICINE

## 2021-10-21 PROCEDURE — 3079F PR MOST RECENT DIASTOLIC BLOOD PRESSURE 80-89 MM HG: ICD-10-PCS | Mod: CPTII,S$GLB,, | Performed by: FAMILY MEDICINE

## 2021-10-21 PROCEDURE — 1159F MED LIST DOCD IN RCRD: CPT | Mod: CPTII,S$GLB,, | Performed by: FAMILY MEDICINE

## 2021-10-21 PROCEDURE — 3008F BODY MASS INDEX DOCD: CPT | Mod: CPTII,S$GLB,, | Performed by: FAMILY MEDICINE

## 2021-10-21 PROCEDURE — 99214 PR OFFICE/OUTPT VISIT, EST, LEVL IV, 30-39 MIN: ICD-10-PCS | Mod: S$GLB,,, | Performed by: FAMILY MEDICINE

## 2021-10-21 PROCEDURE — 1159F PR MEDICATION LIST DOCUMENTED IN MEDICAL RECORD: ICD-10-PCS | Mod: CPTII,S$GLB,, | Performed by: FAMILY MEDICINE

## 2021-10-21 RX ORDER — ALPRAZOLAM 0.5 MG/1
TABLET ORAL
Qty: 30 TABLET | Refills: 0 | Status: SHIPPED | OUTPATIENT
Start: 2021-10-21 | End: 2022-04-29 | Stop reason: SDUPTHER

## 2021-10-21 RX ORDER — BUTALBITAL, ACETAMINOPHEN AND CAFFEINE 50; 325; 40 MG/1; MG/1; MG/1
1 TABLET ORAL EVERY 6 HOURS PRN
Qty: 28 TABLET | Refills: 2 | Status: SHIPPED | OUTPATIENT
Start: 2021-10-21 | End: 2022-04-29 | Stop reason: SDUPTHER

## 2021-10-21 RX ORDER — BUDESONIDE 3 MG/1
9 CAPSULE, COATED PELLETS ORAL
COMMUNITY
Start: 2021-10-13

## 2021-10-21 RX ORDER — TRAZODONE HYDROCHLORIDE 50 MG/1
50 TABLET ORAL NIGHTLY
Qty: 30 TABLET | Refills: 2 | Status: SHIPPED | OUTPATIENT
Start: 2021-10-21 | End: 2022-01-19

## 2022-01-06 ENCOUNTER — HOSPITAL ENCOUNTER (OUTPATIENT)
Dept: RADIOLOGY | Facility: HOSPITAL | Age: 57
Discharge: HOME | End: 2022-01-06
Attending: INTERNAL MEDICINE
Payer: COMMERCIAL

## 2022-01-06 DIAGNOSIS — Z17.0 MALIGNANT NEOPLASM OF UPPER-OUTER QUADRANT OF RIGHT BREAST IN FEMALE, ESTROGEN RECEPTOR POSITIVE: ICD-10-CM

## 2022-01-06 DIAGNOSIS — C50.411 MALIGNANT NEOPLASM OF UPPER-OUTER QUADRANT OF RIGHT BREAST IN FEMALE, ESTROGEN RECEPTOR POSITIVE: ICD-10-CM

## 2022-01-06 PROCEDURE — 71046 X-RAY EXAM CHEST 2 VIEWS: CPT | Mod: TC,PO

## 2022-01-12 NOTE — PROGRESS NOTES
[facility] Hematology/Oncology  PROGRESS NOTE - Follow-up Visit      Subjective:       Patient ID:   NAME: Blanca Loya : 1965     56 y.o. female    Referring Doc: Cora  Other Physicians: Lyndsay Gonsales, CARLY Barillas; Mary Gonsales    Chief Complaint:  Breast ca f/u    History of Present Illness:     Patient is being seen today in person for a regularly scheduled follow-up appointment. She is here by herself.      She is doing well with no new issues. No CP, SOB, HA's or N/V. She is now off all oral antihormones.  Bowels have been stable.     She is feeling ok overall.     She saw Dr Shepherd in Oct 2021       Discussed Covid19 precautions - she did not get her vaccinations; she had covid in Aug 2021           ROS:   GEN: normal without any fever, night sweats or weight loss  HEENT: normal with no HA's, sore throat, stiff neck, changes in vision  CV: normal with no CP, SOB, PND, RUGGIERO or orthopnea  PULM: normal with no SOB, cough, hemoptysis, sputum or pleuritic pain  GI: normal with no abdominal pain, nausea, vomiting, constipation, diarrhea, melanotic stools, BRBPR, or hematemesis  : normal with no hematuria, dysuria  BREAST: normal with no mass, discharge, pain  SKIN: normal with no rash, erythema, bruising, or swelling    Allergies:  Review of patient's allergies indicates:   Allergen Reactions    Demerol [meperidine] Anxiety    Pcn [penicillins] Rash       Medications:    Current Outpatient Medications:     ALPRAZolam (XANAX) 0.5 MG tablet, TK 1 T PO QD PRN, Disp: 30 tablet, Rfl: 0    ascorbic acid, vitamin C, (VITAMIN C) 1000 MG tablet, Take 1,000 mg by mouth once daily., Disp: , Rfl:     b complex vitamins tablet, Take 1 tablet by mouth once daily., Disp: , Rfl:     budesonide (ENTOCORT EC) 3 mg capsule, Take 9 mg by mouth once daily., Disp: , Rfl:     butalbital-acetaminophen-caffeine -40 mg (FIORICET, ESGIC) -40 mg per tablet, Take 1 tablet by mouth every 6 (six) hours as  "needed for Pain or Headaches., Disp: 28 tablet, Rfl: 2    CHROMIUM ORAL, Take by mouth., Disp: , Rfl:     levothyroxine (SYNTHROID) 100 MCG tablet, TAKE 1 TABLET BY MOUTH EVERY DAY, Disp: 90 tablet, Rfl: 1    oxyCODONE-acetaminophen (PERCOCET) 5-325 mg per tablet, Take 1 tablet by mouth every 6 (six) hours as needed for Pain., Disp: 28 tablet, Rfl: 0    POTASSIUM ACETATE MISC, by Misc.(Non-Drug; Combo Route) route., Disp: , Rfl:     sucralfate (CARAFATE) 1 gram tablet, Carafate 1 gram tablet  Take 1 tablet 4 times a day by oral route for 30 days.  as needed for abd upset, Disp: , Rfl:     traZODone (DESYREL) 50 MG tablet, Take 1 tablet (50 mg total) by mouth every evening., Disp: 30 tablet, Rfl: 2    PMHx/PSHx Updates:  See patient's last visit with me on 6/14/2021  See H&P on 9/8/2016        Pathology:    Colon biopsy  9/22/2020:    COLON, RANDOM, BIOPSY:   - COLLAGENOUS COLITIS.       See path note on 8/30/2016      Objective:     Vitals:  Blood pressure 119/69, pulse 94, temperature 97.5 °F (36.4 °C), resp. rate 18, height 5' 4" (1.626 m), weight 53.3 kg (117 lb 8 oz), last menstrual period 09/10/2016.        Physical Examination:   GEN: no apparent distress, comfortable; AAOx3  HEAD: atraumatic and normocephalic  EYES: no conjunctival pallor or muddiness, no icterus; normal pupil reaction to ambient light  ENT: OMM, no pharyngeal erythema, external bilateral ears WNL; no visible thrush or ulcers  NECK: no masses or swelling, trachea midline, no visible LAD/LN's   CV: no palpitations; no pedal edema; no noticeable JVD or neck vein distension  CHEST: Normal respiratory effort; chest wall breath movements symmetrical; no audible wheezing  ABDOM: non-distended; no bloating  MUSC/Skeletal: ROM normal; joints visibly normal; no deformities or arthropathy  EXTREM: no clubbing, cyanosis, inflammation or swelling  SKIN: no rashes, lesions, ulcers, petechiae or subcutaneous nodules  : no tyson  NEURO: moving all 4 " extremities; AAOx3; no tremors  PSYCH: normal mood, affect and behavior  LYMPH: no visible LN's or LAD  Breast: no changes          Labs:     Lab Results   Component Value Date    WBC 3.0 (L) 10/08/2021    HGB 12.6 10/08/2021    HCT 38.7 10/08/2021    MCV 95 10/08/2021     10/08/2021       CMP  Sodium   Date Value Ref Range Status   10/08/2021 142 134 - 144 mmol/L Final   04/23/2019 143 134 - 144 mmol/L      Potassium   Date Value Ref Range Status   10/08/2021 3.9 3.5 - 5.2 mmol/L Final     Chloride   Date Value Ref Range Status   10/08/2021 104 96 - 106 mmol/L Final   04/23/2019 103 98 - 110 mmol/L      CO2   Date Value Ref Range Status   10/08/2021 28 20 - 29 mmol/L Final     Glucose   Date Value Ref Range Status   10/08/2021 83 65 - 99 mg/dL Final   04/23/2019 88 70 - 99 mg/dL      BUN   Date Value Ref Range Status   10/08/2021 11 6 - 24 mg/dL Final     Creatinine   Date Value Ref Range Status   10/08/2021 0.73 0.57 - 1.00 mg/dL Final   04/23/2019 0.60 0.60 - 1.40 mg/dL      Calcium   Date Value Ref Range Status   10/08/2021 9.8 8.7 - 10.2 mg/dL Final     Total Protein   Date Value Ref Range Status   06/01/2021 7.3 6.0 - 8.4 g/dL Final     Albumin   Date Value Ref Range Status   10/08/2021 4.4 3.8 - 4.9 g/dL Final   06/01/2021 4.2 3.5 - 5.2 g/dL Final   04/23/2019 4.0 3.1 - 4.7 g/dL      Total Bilirubin   Date Value Ref Range Status   10/08/2021 0.5 0.0 - 1.2 mg/dL Final     Alkaline Phosphatase   Date Value Ref Range Status   06/01/2021 100 55 - 135 U/L Final     AST   Date Value Ref Range Status   10/08/2021 31 0 - 40 IU/L Final     ALT   Date Value Ref Range Status   10/08/2021 20 0 - 32 IU/L Final     Anion Gap   Date Value Ref Range Status   06/01/2021 11 8 - 16 mmol/L Final     eGFR if    Date Value Ref Range Status   06/01/2021 >60.0 >60 mL/min/1.73 m^2 Final     eGFR if non    Date Value Ref Range Status   10/08/2021 92 >59 mL/min/1.73 Final              Radiology/Diagnostic Studies:    CXR  1/6/2022:    IMPRESSION:     No acute cardiopulmonary abnormality.         PET 9/24/2020:    Impression:     1. No evidence of recurrence of disease or metastatic disease.  2. Several colon air-fluid levels, suggesting nonspecific diarrheal illness.         US  11/7/2019:  Impression       Normal right upper quadrant ultrasound.         MRI head  2/17/2020:    Impression:     Stable appearance of previously defined enhancing extra-axial mass which extends from the left internal auditory canal into the left cerebellopontine angle cistern and along the adjacent temporal bone.      PET/CT  5/13/2019:    IMPRESSION:    1. No evidence of FDG avid metastatic disease.  2. Incidental findings as above.          Chest/Abdom CT  8/24/2018:      IMPRESSION:  1. No evidence of metastatic disease in the chest, abdomen, or the pelvis.  2. Additional observations as above           I have reviewed all available lab results and radiology reports.    Assessment/Plan:   (1) 56 y.o. female with diagnosis of right breast cancer  - s/p bilateral mastectomies on 9/26/16 followed by reconstruction  - followed by Dr Bear with GenSurg  - she had 2 out of 7 LN's that were positive  - she was a Stage IIA  - ER+/CO+ and her2nu neg  - s/p AC x4 and taxol x4  - she has seen Dr Gonsales with rad/onc for XRT and completed 5 weeks last month  - discussed the pro's and con's of tamoxifen versus arimidex, and in light of the pap issues, arimidex is probably the better choice  - she had been on arimidex but it was discontinued because of her liver  - she has some mood swings and weight gain  - PET done on 9/21/17 with no evidence of mets;   - recent CT chest/abdom on 8/24/2018 showed no evidence on cancer  - s/p prior repeat surgery with right-sided lift with Dr Umana with repeat evaluation in Nov 2018   - she had a slow post-op recovery and has residual aches and pains  - she had PET on  "5/13/2019  - she has been on arimidex and now femara which she both discontinued; she is having some generalized aches and pains;   - discussed aromasine and she is willing to give it a try; she does not want to take tamoxifen due to the clot and ovarian cancer risks   - she had been on the aromasin for 5 weeks but then developed elevated LFTs and it was discontinued    10/5/2020:  - latest PET on 9/24/2020 with no evidence of cancer  - s/p bilat mastectomies with reconstructions so she does not get mammograms    2/4/2021:  - she has been off all oral antihormone  - She had prior colonoscopy with Dr Edmonds and was diagnsoed with "collagenous" colitis.     6/14/2021:  - she seems to be doing ok overall    1/13/2022:  - no new issues  - recent CXR negative    (2) Mild leucopenia/anemia secondary to chemotherapy (resolved)    6/14/2021:  - latest wbc is at  4.14 - latest hgb was at 12.4    1/13/2021:  - repeat labs pending  - labs from oct 2021 with wbc at 3.0     (3) Prior abnormal PAP issues - followed by Dr Cline with GYN; latest pap was negative per patient and she was also HPV negative     (4) Hx of acoustic neuroma in past - s/p XRT in 2010; followed by Dr Gonsales  - recent MRI stable per patient in Feb 2020     (5) Chronic fibromyalgia - she is not being followed by anyone; she may be having a flare-up      (6) GI issues with recent bout of colitis - seen by Dr Edmonds with GI and she had recent colonoscopy  - prior mild elevation bili and alk phos - resolved  - she had liver biopsy and EGD with "inflammation" per patient  - s/p esophageal dilation  - Dr Edmonds was concerned it may be from the arimidex    2/4/2021:  - She had prior colonoscopy in Nov 2020 with Dr Edmonds and was diagnsoed with "collagenous" colitis.     6/14/2021:  - bowels are stable         (7) RUE - resolved - possible lymphedema issues - she no longer sees PT  - also seen by Dr Liao    (8) Hypokalemia -on supplements - " patient wanted to try OTC and did not want prescription meds    (9) Occasional palpitations - seen by Dr Hernandez with cardiology    (10) Thyroid issues - followed by PCP        1. Malignant neoplasm of upper-outer quadrant of right breast in female, estrogen receptor positive     2. S/P bilateral mastectomy     3. Estrogen receptor positive             PLAN:  1. Check up to date labs in 6 months  2. F/U with PCP, Surg and GYN  3. Hold on antihormone therapy indefinitely for now  4.  F/u with Dr Edmonds with GI  5.  F/u with cardiology    6. F/u with PCP        RTC in  6 months    Fax note to Chaparro Shepherd III, *, CARLY Bear Mannina, Babycos; Pham Hernandez     Total Time spent on patient:    I spent over 25 mins of time with the patient. Reviewed results of the recently ordered labs, tests, reports and studies; made directives with regards to the results. Over half of this time was spent couseling and coordinating care, making treatment and analytical decisions; ordering necessary labs, tests and studies; and discussing treatment options and setting up treatment plan(s) if indicated.        Discussion:     COVID-19 Discussion:    I had long discussion with patient and any applicable family about the COVID-19 coronavirus epidemic and the recommended precautions with regard to cancer and/or hematology patients. I have re-iterated the CDC recommendations for adequate hand washing, use of hand -like products, and coughing into elbow, etc. In addition, especially for our patients who are on chemotherapy and/or our otherwise immunocompromised patients, I have recommended avoidance of crowds, including movie theaters, restaurants, churches, etc. I have recommended avoidance of any sick or symptomatic family members and/or friends. I have also recommended avoidance of any raw and unwashed food products, and general avoidance of food items that have not been prepared by themselves. The patient  has been asked to call us immediately with any symptom developments, issues, questions or other general concerns.            I have explained all of the above in detail and the patient understands all of the current recommendation(s). I have answered all of their questions to the best of my ability and to their complete satisfaction.   The patient is to continue with the current management plan.            Electronically signed by Bronson Carrillo MD

## 2022-01-13 ENCOUNTER — LAB VISIT (OUTPATIENT)
Dept: LAB | Facility: HOSPITAL | Age: 57
End: 2022-01-13
Attending: INTERNAL MEDICINE
Payer: COMMERCIAL

## 2022-01-13 ENCOUNTER — OFFICE VISIT (OUTPATIENT)
Dept: HEMATOLOGY/ONCOLOGY | Facility: CLINIC | Age: 57
End: 2022-01-13
Payer: COMMERCIAL

## 2022-01-13 VITALS
BODY MASS INDEX: 20.06 KG/M2 | SYSTOLIC BLOOD PRESSURE: 119 MMHG | TEMPERATURE: 98 F | HEART RATE: 94 BPM | RESPIRATION RATE: 18 BRPM | WEIGHT: 117.5 LBS | HEIGHT: 64 IN | DIASTOLIC BLOOD PRESSURE: 69 MMHG

## 2022-01-13 DIAGNOSIS — Z90.13 S/P BILATERAL MASTECTOMY: ICD-10-CM

## 2022-01-13 DIAGNOSIS — C50.411 MALIGNANT NEOPLASM OF UPPER-OUTER QUADRANT OF RIGHT BREAST IN FEMALE, ESTROGEN RECEPTOR POSITIVE: Primary | ICD-10-CM

## 2022-01-13 DIAGNOSIS — C50.411 MALIGNANT NEOPLASM OF UPPER-OUTER QUADRANT OF RIGHT BREAST IN FEMALE, ESTROGEN RECEPTOR POSITIVE: ICD-10-CM

## 2022-01-13 DIAGNOSIS — Z17.0 MALIGNANT NEOPLASM OF UPPER-OUTER QUADRANT OF RIGHT BREAST IN FEMALE, ESTROGEN RECEPTOR POSITIVE: ICD-10-CM

## 2022-01-13 DIAGNOSIS — Z17.0 MALIGNANT NEOPLASM OF UPPER-OUTER QUADRANT OF RIGHT BREAST IN FEMALE, ESTROGEN RECEPTOR POSITIVE: Primary | ICD-10-CM

## 2022-01-13 DIAGNOSIS — Z17.0 ESTROGEN RECEPTOR POSITIVE: ICD-10-CM

## 2022-01-13 LAB
ALBUMIN SERPL BCP-MCNC: 4.5 G/DL (ref 3.5–5.2)
ALP SERPL-CCNC: 72 U/L (ref 55–135)
ALT SERPL W/O P-5'-P-CCNC: 23 U/L (ref 10–44)
ANION GAP SERPL CALC-SCNC: 8 MMOL/L (ref 8–16)
AST SERPL-CCNC: 30 U/L (ref 10–40)
BASOPHILS # BLD AUTO: 0.03 K/UL (ref 0–0.2)
BASOPHILS NFR BLD: 0.8 % (ref 0–1.9)
BILIRUB SERPL-MCNC: 1.2 MG/DL (ref 0.1–1)
BUN SERPL-MCNC: 14 MG/DL (ref 6–20)
CALCIUM SERPL-MCNC: 9.7 MG/DL (ref 8.7–10.5)
CHLORIDE SERPL-SCNC: 103 MMOL/L (ref 95–110)
CO2 SERPL-SCNC: 27 MMOL/L (ref 23–29)
CREAT SERPL-MCNC: 0.6 MG/DL (ref 0.5–1.4)
DIFFERENTIAL METHOD: ABNORMAL
EOSINOPHIL # BLD AUTO: 0 K/UL (ref 0–0.5)
EOSINOPHIL NFR BLD: 0.8 % (ref 0–8)
ERYTHROCYTE [DISTWIDTH] IN BLOOD BY AUTOMATED COUNT: 13.2 % (ref 11.5–14.5)
EST. GFR  (AFRICAN AMERICAN): >60 ML/MIN/1.73 M^2
EST. GFR  (NON AFRICAN AMERICAN): >60 ML/MIN/1.73 M^2
GLUCOSE SERPL-MCNC: 96 MG/DL (ref 70–110)
HCT VFR BLD AUTO: 38.7 % (ref 37–48.5)
HGB BLD-MCNC: 13 G/DL (ref 12–16)
IMM GRANULOCYTES # BLD AUTO: 0.02 K/UL (ref 0–0.04)
IMM GRANULOCYTES NFR BLD AUTO: 0.5 % (ref 0–0.5)
LYMPHOCYTES # BLD AUTO: 1 K/UL (ref 1–4.8)
LYMPHOCYTES NFR BLD: 25.7 % (ref 18–48)
MCH RBC QN AUTO: 31.6 PG (ref 27–31)
MCHC RBC AUTO-ENTMCNC: 33.6 G/DL (ref 32–36)
MCV RBC AUTO: 94 FL (ref 82–98)
MONOCYTES # BLD AUTO: 0.4 K/UL (ref 0.3–1)
MONOCYTES NFR BLD: 11.7 % (ref 4–15)
NEUTROPHILS # BLD AUTO: 2.2 K/UL (ref 1.8–7.7)
NEUTROPHILS NFR BLD: 60.5 % (ref 38–73)
NRBC BLD-RTO: 0 /100 WBC
PLATELET # BLD AUTO: 243 K/UL (ref 150–450)
PMV BLD AUTO: 9.6 FL (ref 9.2–12.9)
POTASSIUM SERPL-SCNC: 3.7 MMOL/L (ref 3.5–5.1)
PROT SERPL-MCNC: 7.7 G/DL (ref 6–8.4)
RBC # BLD AUTO: 4.12 M/UL (ref 4–5.4)
SODIUM SERPL-SCNC: 138 MMOL/L (ref 136–145)
WBC # BLD AUTO: 3.69 K/UL (ref 3.9–12.7)

## 2022-01-13 PROCEDURE — 3074F PR MOST RECENT SYSTOLIC BLOOD PRESSURE < 130 MM HG: ICD-10-PCS | Mod: S$GLB,,, | Performed by: INTERNAL MEDICINE

## 2022-01-13 PROCEDURE — 3078F PR MOST RECENT DIASTOLIC BLOOD PRESSURE < 80 MM HG: ICD-10-PCS | Mod: S$GLB,,, | Performed by: INTERNAL MEDICINE

## 2022-01-13 PROCEDURE — 99214 PR OFFICE/OUTPT VISIT, EST, LEVL IV, 30-39 MIN: ICD-10-PCS | Mod: S$GLB,,, | Performed by: INTERNAL MEDICINE

## 2022-01-13 PROCEDURE — 3008F BODY MASS INDEX DOCD: CPT | Mod: S$GLB,,, | Performed by: INTERNAL MEDICINE

## 2022-01-13 PROCEDURE — 36415 COLL VENOUS BLD VENIPUNCTURE: CPT | Performed by: INTERNAL MEDICINE

## 2022-01-13 PROCEDURE — 3074F SYST BP LT 130 MM HG: CPT | Mod: S$GLB,,, | Performed by: INTERNAL MEDICINE

## 2022-01-13 PROCEDURE — 1160F RVW MEDS BY RX/DR IN RCRD: CPT | Mod: S$GLB,,, | Performed by: INTERNAL MEDICINE

## 2022-01-13 PROCEDURE — 99214 OFFICE O/P EST MOD 30 MIN: CPT | Mod: S$GLB,,, | Performed by: INTERNAL MEDICINE

## 2022-01-13 PROCEDURE — 3078F DIAST BP <80 MM HG: CPT | Mod: S$GLB,,, | Performed by: INTERNAL MEDICINE

## 2022-01-13 PROCEDURE — 1160F PR REVIEW ALL MEDS BY PRESCRIBER/CLIN PHARMACIST DOCUMENTED: ICD-10-PCS | Mod: S$GLB,,, | Performed by: INTERNAL MEDICINE

## 2022-01-13 PROCEDURE — 85025 COMPLETE CBC W/AUTO DIFF WBC: CPT | Performed by: INTERNAL MEDICINE

## 2022-01-13 PROCEDURE — 80053 COMPREHEN METABOLIC PANEL: CPT | Performed by: INTERNAL MEDICINE

## 2022-01-13 PROCEDURE — 3008F PR BODY MASS INDEX (BMI) DOCUMENTED: ICD-10-PCS | Mod: S$GLB,,, | Performed by: INTERNAL MEDICINE

## 2022-01-18 NOTE — TELEPHONE ENCOUNTER
No new care gaps identified.  Powered by [employer]. Reference number: [id number].   1/18/2022 3:55:11 AM CST

## 2022-01-19 RX ORDER — TRAZODONE HYDROCHLORIDE 50 MG/1
TABLET ORAL
Qty: 30 TABLET | Refills: 2 | Status: SHIPPED | OUTPATIENT
Start: 2022-01-19 | End: 2022-07-14

## 2022-02-07 ENCOUNTER — OFFICE VISIT (OUTPATIENT)
Dept: RADIATION ONCOLOGY | Facility: CLINIC | Age: 57
End: 2022-02-07
Payer: COMMERCIAL

## 2022-02-07 VITALS
OXYGEN SATURATION: 99 % | HEIGHT: 64 IN | BODY MASS INDEX: 20.34 KG/M2 | SYSTOLIC BLOOD PRESSURE: 106 MMHG | DIASTOLIC BLOOD PRESSURE: 74 MMHG | HEART RATE: 86 BPM | TEMPERATURE: 98 F | RESPIRATION RATE: 18 BRPM | WEIGHT: 119.13 LBS

## 2022-02-07 DIAGNOSIS — Z17.0 MALIGNANT NEOPLASM OF UPPER-OUTER QUADRANT OF RIGHT BREAST IN FEMALE, ESTROGEN RECEPTOR POSITIVE: Primary | ICD-10-CM

## 2022-02-07 DIAGNOSIS — C50.411 MALIGNANT NEOPLASM OF UPPER-OUTER QUADRANT OF RIGHT BREAST IN FEMALE, ESTROGEN RECEPTOR POSITIVE: Primary | ICD-10-CM

## 2022-02-07 PROCEDURE — 1160F RVW MEDS BY RX/DR IN RCRD: CPT | Mod: S$GLB,,, | Performed by: RADIOLOGY

## 2022-02-07 PROCEDURE — 3078F PR MOST RECENT DIASTOLIC BLOOD PRESSURE < 80 MM HG: ICD-10-PCS | Mod: S$GLB,,, | Performed by: RADIOLOGY

## 2022-02-07 PROCEDURE — 99214 PR OFFICE/OUTPT VISIT, EST, LEVL IV, 30-39 MIN: ICD-10-PCS | Mod: S$GLB,,, | Performed by: RADIOLOGY

## 2022-02-07 PROCEDURE — 3074F SYST BP LT 130 MM HG: CPT | Mod: S$GLB,,, | Performed by: RADIOLOGY

## 2022-02-07 PROCEDURE — 3008F PR BODY MASS INDEX (BMI) DOCUMENTED: ICD-10-PCS | Mod: S$GLB,,, | Performed by: RADIOLOGY

## 2022-02-07 PROCEDURE — 99214 OFFICE O/P EST MOD 30 MIN: CPT | Mod: S$GLB,,, | Performed by: RADIOLOGY

## 2022-02-07 PROCEDURE — 3008F BODY MASS INDEX DOCD: CPT | Mod: S$GLB,,, | Performed by: RADIOLOGY

## 2022-02-07 PROCEDURE — 3074F PR MOST RECENT SYSTOLIC BLOOD PRESSURE < 130 MM HG: ICD-10-PCS | Mod: S$GLB,,, | Performed by: RADIOLOGY

## 2022-02-07 PROCEDURE — 3078F DIAST BP <80 MM HG: CPT | Mod: S$GLB,,, | Performed by: RADIOLOGY

## 2022-02-07 PROCEDURE — 1160F PR REVIEW ALL MEDS BY PRESCRIBER/CLIN PHARMACIST DOCUMENTED: ICD-10-PCS | Mod: S$GLB,,, | Performed by: RADIOLOGY

## 2022-02-07 NOTE — PROGRESS NOTES
"Blanca Loya  [id number]  1965  2/7/2022  Bronson Carrillo Md  [address]  [address]  [place]  [place] [address]    DIAGNOSIS: IIA, [id number] IDC R breast, ER/OR+, her2-  REASON FOR VISIT: Routine scheduled follow-up.    HISTORY OF PRESENT ILLNESS:   51F with mammogram detected, bx proven multifocal IDC of R breast with B mastectomies + expander placement. Pathology revealed a 12mm and 7mm IDC with 2/7 LNs+ and close posterior margin; ER/OR+, her2-. She completed ACT chemotherapy.    Completed 50Gy EBRT to R "breast" and comp jamie groups of ax I-III/SCV/IM LNs on 6/14/17.    D/c'd arimidex.    Patient also has diagnosis of left acoustic neuroma status post 18 gray in 3 fractions in December 2010 with documented radiographic stability.    D/c'd AI.    INTERVAL HISTORY:   Today she denies fever, chills, chest pain, shortness of breath, cough, hemoptysis, bone pain.  She follows with Dr. Edmonds for colitis.  She reports sensitivity of left ear and hearing loss.  Denies pain or discomfort of recon chest, right upper extremity swelling or range of motion difficulty.    Review of Systems   Constitutional: Negative for appetite change, chills, fever and unexpected weight change.   HENT:   Negative for lump/mass, mouth sores, sore throat, trouble swallowing and voice change.    Eyes: Negative for eye problems and icterus.   Respiratory: Negative for cough, hemoptysis and shortness of breath.    Cardiovascular: Negative for chest pain and leg swelling.   Gastrointestinal: Negative for abdominal pain, constipation, diarrhea, nausea and vomiting.   Genitourinary: Negative for dysuria, frequency, hematuria, nocturia and vaginal bleeding.    Musculoskeletal: Positive for neck pain and neck stiffness. Negative for back pain and gait problem.   Neurological: Negative for extremity weakness, gait problem, headaches, numbness and seizures.   Hematological: Negative for adenopathy.     Past Medical History:   Diagnosis Date "    Acquired deafness     Estrogen receptor positive 5/23/2017    Malignant neoplasm of upper-outer quadrant of right female breast 5/23/2017    S/P bilateral mastectomy 10/23/2019     Past Surgical History:   Procedure Laterality Date    BREAST RECONSTRUCTION      BREAST REVISION SURGERY Left 8/30/2018    Procedure: BREAST REVISION SURGERY LEFT IMF ELEVATION WITH ALLODERM;  Surgeon: Holger Umana MD;  Location: [address];  Service: Plastics;  Laterality: Left;    BREAST SURGERY      COLONOSCOPY N/A 9/22/2020    Procedure: COLONOSCOPY;  Surgeon: Rocco Edmonds III, MD;  Location: [facility];  Service: Endoscopy;  Laterality: N/A;    NEUROMA SURGERY      [place] ear     Social History     Socioeconomic History    Marital status:    Tobacco Use    Smoking status: Never Smoker    Smokeless tobacco: Never Used   Substance and Sexual Activity    Alcohol use: No    Drug use: No     Family History   Problem Relation Age of Onset    No Known Problems Mother     No Known Problems Father      Medication List with Changes/Refills   Current Medications    ALPRAZOLAM (XANAX) 0.5 MG TABLET    TK 1 T PO QD PRN    ASCORBIC ACID, VITAMIN C, (VITAMIN C) 1000 MG TABLET    Take 1,000 mg by mouth once daily.    B COMPLEX VITAMINS TABLET    Take 1 tablet by mouth once daily.    BUDESONIDE (ENTOCORT EC) 3 MG CAPSULE    Take 9 mg by mouth once daily.    BUTALBITAL-ACETAMINOPHEN-CAFFEINE -40 MG (FIORICET, ESGIC) -40 MG PER TABLET    Take 1 tablet by mouth every 6 (six) hours as needed for Pain or Headaches.    CHROMIUM ORAL    Take by mouth.    LEVOTHYROXINE (SYNTHROID) 100 MCG TABLET    TAKE 1 TABLET BY MOUTH EVERY DAY    OXYCODONE-ACETAMINOPHEN (PERCOCET) 5-325 MG PER TABLET    Take 1 tablet by mouth every 6 (six) hours as needed for Pain.    POTASSIUM ACETATE MISC    by Misc.(Non-Drug; Combo Route) route.    SUCRALFATE (CARAFATE) 1 GRAM TABLET    Carafate 1 gram tablet   Take 1 tablet 4  "times a day by oral route for 30 days.   as needed for abd upset    TRAZODONE (DESYREL) 50 MG TABLET    TAKE 1 TABLET(50 MG) BY MOUTH EVERY EVENING     Review of patient's allergies indicates:   Allergen Reactions    Demerol [meperidine] Anxiety    Pcn [penicillins] Rash       QUALITY OF LIFE: 100%    Vitals:    02/07/22 1312   BP: 106/74   Pulse: 86   Resp: 18   Temp: 98 °F (36.7 °C)   SpO2: 99%   Weight: 54 kg (119 lb 1.6 oz)   Height: 5' 4" (1.626 m)   PainSc:   2   PainLoc: Head       PHYSICAL EXAM:   GENERAL: alert; in no apparent distress.   HEAD: normocephalic, atraumatic.   EYES: pupils are equal, round, reactive to light and accommodation. Sclera anicteric. Conjunctiva not injected.   NECK: no cervical motion rigidity; supple with no masses.  CHEST:  Patient is speaking comfortably on room air with normal work of breathing without using accessory muscles of respiration.   MUSCULOSKELETAL: no tenderness to palpation along the spine or scapulae. Normal range of motion.  NEUROLOGIC: cranial nerves II-XII intact bilaterally. Strength 5/5 in bilateral upper and lower extremities. Normal gait.  LYMPHATIC: no cervical, supraclavicular or axillary adenopathy appreciated bilaterally.   EXTREMITIES: no clubbing, cyanosis, edema.  SKIN: no erythema, rashes, ulcerations noted.   BREAST:  No palpable nodularity, cellulitis, or fluctuance.  Skin is smooth without hyperpigmentation or fibrosis.    ANCILLARY DATA  2/17/20 MRI B  Impression:  Stable appearance of previously defined enhancing extra-axial mass which extends from the left internal auditory canal into the left cerebellopontine angle cistern and along the adjacent temporal bone.    9/24/20 PET/CT   Impression:  1. No evidence of recurrence of disease or metastatic disease.  2. Several colon air-fluid levels, suggesting nonspecific diarrheal illness.    ASSESSMENT: 51F with stage IIA, [id number] IDC R breast, ER/FL+, her2- s/p mastectomy and adjuvant RT ending " 6/17, d/c'd arimidex.  PLAN:  Ms. Loya presents 10 years out from treatment of acoustic neuroma with stable left ear sensitivity and hearing loss.  She has chronic headaches that are unchanged.  No new CNS findings.  I recommend hold further MRI of the brain unless symptoms arise.  By review of systems, physical exam, laboratory eval and PET-CT from September 2020 there is no evidence of recurrent breast cancer.  She is nearly 5 years out from treatment with benign exam.  I have advised her to transfer her yearly physical examinations to gynecology.  She also continue to follow with Dr. Carrillo.    Return to clinic as needed.    All questions answered and contact information provided. Patient understands free to call us anytime with any questions or concerns regarding radiation therapy.    I have personally seen and evaluated this patient with a moderate to high complexity diagnosis.     Greater than 30 minutes were dedicated to reviewing/interpreting pertinent laboratory/imaging/pathology as well as follow-up with concurrent consultants; reviewing and performing history and physical; counseling patient on continuing oncologic recommendations; documentation in the electronic medical record including ordering of additional tests and/or radiation treatment protocol; and coordination of care with physicians with referrals placed as appropriate.    PHYSICIAN: Rocco Gonsales Jr, MD

## 2022-04-10 NOTE — TELEPHONE ENCOUNTER
No new care gaps identified.  Powered by [employer]. Reference number: [id number].   4/10/2022 12:14:59 PM CDT

## 2022-04-11 RX ORDER — LEVOTHYROXINE SODIUM 100 UG/1
TABLET ORAL
Qty: 90 TABLET | Refills: 1 | Status: SHIPPED | OUTPATIENT
Start: 2022-04-11 | End: 2022-10-14

## 2022-04-11 NOTE — TELEPHONE ENCOUNTER
Refill Authorization Note   Blanca Loya  is requesting a refill authorization.  Brief Assessment and Rationale for Refill:  Approve     Medication Therapy Plan:       Medication Reconciliation Completed: No   Comments:     No Care Gaps recommended.     Note composed:5:06 PM 04/11/2022

## 2022-04-20 ENCOUNTER — PATIENT MESSAGE (OUTPATIENT)
Dept: FAMILY MEDICINE | Facility: CLINIC | Age: 57
End: 2022-04-20
Payer: COMMERCIAL

## 2022-04-20 DIAGNOSIS — E03.9 HYPOTHYROIDISM, UNSPECIFIED TYPE: Primary | ICD-10-CM

## 2022-04-28 LAB — TSH SERPL DL<=0.005 MIU/L-ACNC: 0.9 UIU/ML (ref 0.45–4.5)

## 2022-04-29 ENCOUNTER — OFFICE VISIT (OUTPATIENT)
Dept: FAMILY MEDICINE | Facility: CLINIC | Age: 57
End: 2022-04-29
Payer: COMMERCIAL

## 2022-04-29 VITALS
TEMPERATURE: 97 F | HEART RATE: 92 BPM | HEIGHT: 64 IN | OXYGEN SATURATION: 95 % | WEIGHT: 118 LBS | BODY MASS INDEX: 20.14 KG/M2 | SYSTOLIC BLOOD PRESSURE: 125 MMHG | DIASTOLIC BLOOD PRESSURE: 60 MMHG

## 2022-04-29 DIAGNOSIS — K52.831 COLLAGENOUS COLITIS: ICD-10-CM

## 2022-04-29 DIAGNOSIS — Z90.13 S/P BILATERAL MASTECTOMY: ICD-10-CM

## 2022-04-29 DIAGNOSIS — D33.3 LEFT ACOUSTIC NEUROMA: ICD-10-CM

## 2022-04-29 DIAGNOSIS — R20.2 PARESTHESIA: ICD-10-CM

## 2022-04-29 DIAGNOSIS — F41.9 ANXIETY: ICD-10-CM

## 2022-04-29 DIAGNOSIS — C50.411 MALIGNANT NEOPLASM OF UPPER-OUTER QUADRANT OF RIGHT BREAST IN FEMALE, ESTROGEN RECEPTOR POSITIVE: ICD-10-CM

## 2022-04-29 DIAGNOSIS — E03.9 HYPOTHYROIDISM, UNSPECIFIED TYPE: Primary | ICD-10-CM

## 2022-04-29 DIAGNOSIS — M79.662 PAIN IN LEFT LOWER LEG: ICD-10-CM

## 2022-04-29 DIAGNOSIS — M81.0 OSTEOPOROSIS, UNSPECIFIED OSTEOPOROSIS TYPE, UNSPECIFIED PATHOLOGICAL FRACTURE PRESENCE: ICD-10-CM

## 2022-04-29 DIAGNOSIS — Z17.0 MALIGNANT NEOPLASM OF UPPER-OUTER QUADRANT OF RIGHT BREAST IN FEMALE, ESTROGEN RECEPTOR POSITIVE: ICD-10-CM

## 2022-04-29 DIAGNOSIS — E53.8 VITAMIN B12 DEFICIENCY: ICD-10-CM

## 2022-04-29 PROCEDURE — 1159F PR MEDICATION LIST DOCUMENTED IN MEDICAL RECORD: ICD-10-PCS | Mod: CPTII,S$GLB,, | Performed by: FAMILY MEDICINE

## 2022-04-29 PROCEDURE — 3074F PR MOST RECENT SYSTOLIC BLOOD PRESSURE < 130 MM HG: ICD-10-PCS | Mod: CPTII,S$GLB,, | Performed by: FAMILY MEDICINE

## 2022-04-29 PROCEDURE — 3078F PR MOST RECENT DIASTOLIC BLOOD PRESSURE < 80 MM HG: ICD-10-PCS | Mod: CPTII,S$GLB,, | Performed by: FAMILY MEDICINE

## 2022-04-29 PROCEDURE — 1160F PR REVIEW ALL MEDS BY PRESCRIBER/CLIN PHARMACIST DOCUMENTED: ICD-10-PCS | Mod: CPTII,S$GLB,, | Performed by: FAMILY MEDICINE

## 2022-04-29 PROCEDURE — 3078F DIAST BP <80 MM HG: CPT | Mod: CPTII,S$GLB,, | Performed by: FAMILY MEDICINE

## 2022-04-29 PROCEDURE — 1159F MED LIST DOCD IN RCRD: CPT | Mod: CPTII,S$GLB,, | Performed by: FAMILY MEDICINE

## 2022-04-29 PROCEDURE — 99214 OFFICE O/P EST MOD 30 MIN: CPT | Mod: S$GLB,,, | Performed by: FAMILY MEDICINE

## 2022-04-29 PROCEDURE — 3008F BODY MASS INDEX DOCD: CPT | Mod: CPTII,S$GLB,, | Performed by: FAMILY MEDICINE

## 2022-04-29 PROCEDURE — 3008F PR BODY MASS INDEX (BMI) DOCUMENTED: ICD-10-PCS | Mod: CPTII,S$GLB,, | Performed by: FAMILY MEDICINE

## 2022-04-29 PROCEDURE — 3074F SYST BP LT 130 MM HG: CPT | Mod: CPTII,S$GLB,, | Performed by: FAMILY MEDICINE

## 2022-04-29 PROCEDURE — 1160F RVW MEDS BY RX/DR IN RCRD: CPT | Mod: CPTII,S$GLB,, | Performed by: FAMILY MEDICINE

## 2022-04-29 PROCEDURE — 99214 PR OFFICE/OUTPT VISIT, EST, LEVL IV, 30-39 MIN: ICD-10-PCS | Mod: S$GLB,,, | Performed by: FAMILY MEDICINE

## 2022-04-29 RX ORDER — BUSPIRONE HYDROCHLORIDE 5 MG/1
5 TABLET ORAL 2 TIMES DAILY
Qty: 60 TABLET | Refills: 1 | Status: SHIPPED | OUTPATIENT
Start: 2022-04-29 | End: 2022-06-10 | Stop reason: SDUPTHER

## 2022-04-29 RX ORDER — ALPRAZOLAM 0.5 MG/1
TABLET ORAL
Qty: 30 TABLET | Refills: 2 | Status: SHIPPED | OUTPATIENT
Start: 2022-04-29 | End: 2022-10-14 | Stop reason: SDUPTHER

## 2022-04-29 RX ORDER — BUSPIRONE HYDROCHLORIDE 5 MG/1
5 TABLET ORAL 2 TIMES DAILY
COMMUNITY
End: 2022-04-29 | Stop reason: SDUPTHER

## 2022-04-29 RX ORDER — BUTALBITAL, ACETAMINOPHEN AND CAFFEINE 50; 325; 40 MG/1; MG/1; MG/1
1 TABLET ORAL EVERY 6 HOURS PRN
Qty: 28 TABLET | Refills: 2 | Status: SHIPPED | OUTPATIENT
Start: 2022-04-29

## 2022-05-01 PROBLEM — R20.2 PARESTHESIA: Status: ACTIVE | Noted: 2022-05-01

## 2022-05-01 PROBLEM — F41.9 ANXIETY: Status: ACTIVE | Noted: 2022-05-01

## 2022-05-01 PROBLEM — K52.831 COLLAGENOUS COLITIS: Status: ACTIVE | Noted: 2022-05-01

## 2022-05-02 NOTE — PROGRESS NOTES
Subjective:       Patient ID: Blanca Loya is a 56 y.o. female.    Chief Complaint: Medication Refill and Leg Pain    Follow-up of anxiety.  P.r.n. Xanax.  Some trouble driving.  Very stressful.  Nausea with SSRIs and SNRIs.  History of breast cancer.  Follow-up is current.  Status post bilateral mastectomy.  Going to 6 months follow-up and then yearly.  Almost 5 years now.  History of left acoustic neuroma.  Deaf on that side.  Hypothyroidism TSH 0.898.  BMI is at 20 stable.  Collagenous colitis.  On Entocort 1 per day in this is helping.  Osteoporosis over-the-counter medication only.  Prefers not to take anything else.  Left lower leg pain at night.  Calf tight painful.  Some foot cramps.  At night.  Paresthesias most likely due to her chemotherapy.    Physical examination vital signs are noted.  No acute distress.  Pupils are equal round regular.  Mucous membranes moist.  Neck without bruit no adenopathy.  Chest clear.  Heart regular rate rhythm.  Abdomen bowel sounds are positive soft nontender no hepatosplenomegaly.  Extremities without edema positive pedal  lses.  Review of Systems    Objective:      Physical Exam    Assessment:       1. Hypothyroidism, unspecified type    2. S/P bilateral mastectomy    3. Anxiety    4. Left acoustic neuroma    5. Malignant neoplasm of upper-outer quadrant of right breast in female, estrogen receptor positive    6. BMI 20.0-20.9, adult    7. Osteoporosis, unspecified osteoporosis type, unspecified pathological fracture presence    8. Collagenous colitis    9. Paresthesia    10. Pain in left lower leg    11. Vitamin B12 deficiency        Plan:       Hypothyroidism, unspecified type    S/P bilateral mastectomy    Anxiety    Left acoustic neuroma    Malignant neoplasm of upper-outer quadrant of right breast in female, estrogen receptor positive    BMI 20.0-20.9, adult    Osteoporosis, unspecified osteoporosis type, unspecified pathological fracture presence    Collagenous  colitis  -     Basic Metabolic Panel; Future; Expected date: 04/29/2022    Paresthesia    Pain in left lower leg  -     Magnesium; Future; Expected date: 04/29/2022    Vitamin B12 deficiency  -     Vitamin B12; Future; Expected date: 04/29/2022    Other orders  -     ALPRAZolam (XANAX) 0.5 MG tablet; TK 1 T PO QD PRN  Dispense: 30 tablet; Refill: 2  -     butalbital-acetaminophen-caffeine -40 mg (FIORICET, ESGIC) -40 mg per tablet; Take 1 tablet by mouth every 6 (six) hours as needed for Pain or Headaches.  Dispense: 28 tablet; Refill: 2  -     busPIRone (BUSPAR) 5 MG Tab; Take 1 tablet (5 mg total) by mouth 2 (two) times daily.  Dispense: 60 tablet; Refill: 1    BMP magnesium B12 level.  Refill her Xanax and her Fioricet both.  May try gabapentin.  Try BuSpar 5 mg b.i.d. for the anxiety.  Sixty with a refill.  Follow-up in 6 weeks.

## 2022-05-06 LAB
BUN SERPL-MCNC: 8 MG/DL (ref 6–24)
BUN/CREAT SERPL: 12 (ref 9–23)
CALCIUM SERPL-MCNC: 9.5 MG/DL (ref 8.7–10.2)
CHLORIDE SERPL-SCNC: 102 MMOL/L (ref 96–106)
CO2 SERPL-SCNC: 25 MMOL/L (ref 20–29)
CREAT SERPL-MCNC: 0.68 MG/DL (ref 0.57–1)
EST. GFR  (NO RACE VARIABLE): 102 ML/MIN/1.73
GLUCOSE SERPL-MCNC: 83 MG/DL (ref 65–99)
MAGNESIUM SERPL-MCNC: 1.7 MG/DL (ref 1.6–2.3)
POTASSIUM SERPL-SCNC: 3.7 MMOL/L (ref 3.5–5.2)
SODIUM SERPL-SCNC: 140 MMOL/L (ref 134–144)
VIT B12 SERPL-MCNC: 849 PG/ML (ref 232–1245)

## 2022-06-10 ENCOUNTER — OFFICE VISIT (OUTPATIENT)
Dept: FAMILY MEDICINE | Facility: CLINIC | Age: 57
End: 2022-06-10
Payer: COMMERCIAL

## 2022-06-10 VITALS
OXYGEN SATURATION: 99 % | TEMPERATURE: 98 F | BODY MASS INDEX: 20.83 KG/M2 | HEIGHT: 64 IN | WEIGHT: 122 LBS | SYSTOLIC BLOOD PRESSURE: 107 MMHG | DIASTOLIC BLOOD PRESSURE: 69 MMHG | HEART RATE: 75 BPM

## 2022-06-10 DIAGNOSIS — E03.9 HYPOTHYROIDISM, UNSPECIFIED TYPE: ICD-10-CM

## 2022-06-10 DIAGNOSIS — F41.9 ANXIETY: Primary | ICD-10-CM

## 2022-06-10 DIAGNOSIS — Z85.3 HISTORY OF BREAST CANCER: ICD-10-CM

## 2022-06-10 PROCEDURE — 1160F PR REVIEW ALL MEDS BY PRESCRIBER/CLIN PHARMACIST DOCUMENTED: ICD-10-PCS | Mod: CPTII,S$GLB,, | Performed by: FAMILY MEDICINE

## 2022-06-10 PROCEDURE — 3008F BODY MASS INDEX DOCD: CPT | Mod: CPTII,S$GLB,, | Performed by: FAMILY MEDICINE

## 2022-06-10 PROCEDURE — 1160F RVW MEDS BY RX/DR IN RCRD: CPT | Mod: CPTII,S$GLB,, | Performed by: FAMILY MEDICINE

## 2022-06-10 PROCEDURE — 1159F PR MEDICATION LIST DOCUMENTED IN MEDICAL RECORD: ICD-10-PCS | Mod: CPTII,S$GLB,, | Performed by: FAMILY MEDICINE

## 2022-06-10 PROCEDURE — 99213 OFFICE O/P EST LOW 20 MIN: CPT | Mod: S$GLB,,, | Performed by: FAMILY MEDICINE

## 2022-06-10 PROCEDURE — 3078F PR MOST RECENT DIASTOLIC BLOOD PRESSURE < 80 MM HG: ICD-10-PCS | Mod: CPTII,S$GLB,, | Performed by: FAMILY MEDICINE

## 2022-06-10 PROCEDURE — 3008F PR BODY MASS INDEX (BMI) DOCUMENTED: ICD-10-PCS | Mod: CPTII,S$GLB,, | Performed by: FAMILY MEDICINE

## 2022-06-10 PROCEDURE — 3074F PR MOST RECENT SYSTOLIC BLOOD PRESSURE < 130 MM HG: ICD-10-PCS | Mod: CPTII,S$GLB,, | Performed by: FAMILY MEDICINE

## 2022-06-10 PROCEDURE — 3078F DIAST BP <80 MM HG: CPT | Mod: CPTII,S$GLB,, | Performed by: FAMILY MEDICINE

## 2022-06-10 PROCEDURE — 1159F MED LIST DOCD IN RCRD: CPT | Mod: CPTII,S$GLB,, | Performed by: FAMILY MEDICINE

## 2022-06-10 PROCEDURE — 99213 PR OFFICE/OUTPT VISIT, EST, LEVL III, 20-29 MIN: ICD-10-PCS | Mod: S$GLB,,, | Performed by: FAMILY MEDICINE

## 2022-06-10 PROCEDURE — 3074F SYST BP LT 130 MM HG: CPT | Mod: CPTII,S$GLB,, | Performed by: FAMILY MEDICINE

## 2022-06-10 RX ORDER — BUSPIRONE HYDROCHLORIDE 10 MG/1
10 TABLET ORAL 2 TIMES DAILY
Qty: 60 TABLET | Refills: 0 | Status: SHIPPED | OUTPATIENT
Start: 2022-06-10 | End: 2022-07-14

## 2022-06-10 NOTE — PROGRESS NOTES
Subjective:       Patient ID: Blanca Loya is a 56 y.o. female.    Chief Complaint: Follow-up (6 week f/u buspar)    HPI   Patient presents in clinic for a follow up on Buspar. Currently on 5mg BID. Increasing to 10 mg BID. Still has some feelings on anxiety but doing much better despite stressors. Had to put down older dog this week. Taking Xanax as needed. Cuts in quarters. If she takes a whole pill it gives her headaches. Has noticed some tremors in hands when eating or drinking. Benign or familiar tremors does run in her family. Has slight trouble remembering things. Xanax may be the cause. B12 levels normal. Is also complaining of cold intolerance. Thyroid levels normal. Will monitor this.    Follow up in 6 weeks  Review of Systems   Constitutional: Negative.    HENT: Negative.    Eyes: Negative.    Respiratory: Negative.    Cardiovascular: Negative.    Gastrointestinal: Negative.    Endocrine: Positive for cold intolerance.   Genitourinary: Negative.    Musculoskeletal: Negative.    Integumentary:  Negative.   Allergic/Immunologic: Negative.    Neurological: Positive for tremors and memory loss.   Hematological: Negative.    Psychiatric/Behavioral: Negative.          Objective:      Physical Exam  Constitutional:       Appearance: Normal appearance.   Neck:      Vascular: No carotid bruit.   Cardiovascular:      Rate and Rhythm: Normal rate and regular rhythm.      Pulses: Normal pulses.      Heart sounds: Normal heart sounds.   Pulmonary:      Effort: Pulmonary effort is normal.      Breath sounds: Normal breath sounds.   Abdominal:      General: Abdomen is flat. Bowel sounds are normal.      Palpations: Abdomen is soft.   Musculoskeletal:         General: Normal range of motion.      Cervical back: Normal range of motion.   Lymphadenopathy:      Cervical: No cervical adenopathy.   Skin:     General: Skin is warm and dry.   Neurological:      Mental Status: She is alert.   Psychiatric:         Mood and  Affect: Mood normal.         Behavior: Behavior normal.         Assessment/Plan:     Anxiety    Hypothyroidism, unspecified type    History of breast cancer    Other orders  -     busPIRone (BUSPAR) 10 MG tablet; Take 1 tablet (10 mg total) by mouth 2 (two) times daily.  Dispense: 60 tablet; Refill: 0     Anxiety is slightly better with the BuSpar.  So will increase the dose.  Has improved some witness the ability to drive on their trip.  Does have a mild tremor today.  But apparently a benign tremor.  Will observe this.  Follow-up in 6 weeks on the higher dose of BuSpar.

## 2022-06-12 PROBLEM — Z85.3 HISTORY OF BREAST CANCER: Status: ACTIVE | Noted: 2022-06-12

## 2022-07-12 ENCOUNTER — TELEPHONE (OUTPATIENT)
Dept: HEMATOLOGY/ONCOLOGY | Facility: CLINIC | Age: 57
End: 2022-07-12

## 2022-07-12 DIAGNOSIS — Z17.0 MALIGNANT NEOPLASM OF UPPER-OUTER QUADRANT OF RIGHT BREAST IN FEMALE, ESTROGEN RECEPTOR POSITIVE: Primary | ICD-10-CM

## 2022-07-12 DIAGNOSIS — C50.411 MALIGNANT NEOPLASM OF UPPER-OUTER QUADRANT OF RIGHT BREAST IN FEMALE, ESTROGEN RECEPTOR POSITIVE: Primary | ICD-10-CM

## 2022-07-12 NOTE — TELEPHONE ENCOUNTER
Spoke to patient and made aware that lab orders placed to [facility] per patient request. Verbalized understanding.

## 2022-07-13 NOTE — PROGRESS NOTES
[facility] Hematology/Oncology  PROGRESS NOTE - Follow-up Visit      Subjective:       Patient ID:   NAME: Blanca Loya : 1965     56 y.o. female    Referring Doc: Cora  Other Physicians: Lyndsay Gonsales, CARLY Barillas; Mary Gonsales    Chief Complaint:  Breast ca f/u    History of Present Illness:     Patient is being seen today in person for a regularly scheduled follow-up appointment. She is here by herself.      She has some anxiety issues and jitteriness. No CP, SOB, HA's or N/V. She has some gum issues. She has been off all oral antihormones.      She has been on steroids per Dr Edmonds and plans to see him again this summer some time    She saw Dr Shepherd in 2022       Discussed Covid19 precautions - she did not get her vaccinations; she had covid in Aug 2021           ROS:   GEN: normal without any fever, night sweats or weight loss; anxiety issues and jitteriness; gum issues.  HEENT: normal with no HA's, sore throat, stiff neck, changes in vision  CV: normal with no CP, SOB, PND, RUGGIERO or orthopnea  PULM: normal with no SOB, cough, hemoptysis, sputum or pleuritic pain  GI: normal with no abdominal pain, nausea, vomiting, constipation, diarrhea, melanotic stools, BRBPR, or hematemesis; chronic GI issues  : normal with no hematuria, dysuria  BREAST: normal with no mass, discharge, pain  SKIN: normal with no rash, erythema, bruising, or swelling    Allergies:  Review of patient's allergies indicates:   Allergen Reactions    Demerol [meperidine] Anxiety    Pcn [penicillins] Rash       Medications:    Current Outpatient Medications:     ALPRAZolam (XANAX) 0.5 MG tablet, TK 1 T PO QD PRN, Disp: 30 tablet, Rfl: 2    ascorbic acid, vitamin C, (VITAMIN C) 1000 MG tablet, Take 1,000 mg by mouth once daily., Disp: , Rfl:     b complex vitamins tablet, Take 1 tablet by mouth once daily., Disp: , Rfl:     budesonide (ENTOCORT EC) 3 mg capsule, Take 9 mg by mouth once daily., Disp: , Rfl:     " butalbital-acetaminophen-caffeine -40 mg (FIORICET, ESGIC) -40 mg per tablet, Take 1 tablet by mouth every 6 (six) hours as needed for Pain or Headaches., Disp: 28 tablet, Rfl: 2    CHROMIUM ORAL, Take by mouth., Disp: , Rfl:     levothyroxine (SYNTHROID) 100 MCG tablet, TAKE 1 TABLET BY MOUTH EVERY DAY, Disp: 90 tablet, Rfl: 1    POTASSIUM ACETATE MISC, by Misc.(Non-Drug; Combo Route) route., Disp: , Rfl:     PMHx/PSHx Updates:  See patient's last visit with me on 1/13/2022  See H&P on 9/8/2016        Pathology:    Colon biopsy  9/22/2020:    COLON, RANDOM, BIOPSY:   - COLLAGENOUS COLITIS.       See path note on 8/30/2016      Objective:     Vitals:  Blood pressure 126/80, pulse 86, temperature 99.1 °F (37.3 °C), resp. rate 16, height 5' 4" (1.626 m), weight 54.4 kg (120 lb), last menstrual period 09/10/2016.        Physical Examination:   GEN: no apparent distress, comfortable; AAOx3  HEAD: atraumatic and normocephalic  EYES: no conjunctival pallor or muddiness, no icterus; normal pupil reaction to ambient light  ENT: OMM, no pharyngeal erythema, external bilateral ears WNL; no visible thrush or ulcers  NECK: no masses or swelling, trachea midline, no visible LAD/LN's   CV: no palpitations; no pedal edema; no noticeable JVD or neck vein distension  CHEST: Normal respiratory effort; chest wall breath movements symmetrical; no audible wheezing  ABDOM: non-distended; no bloating  MUSC/Skeletal: ROM normal; joints visibly normal; no deformities or arthropathy  EXTREM: no clubbing, cyanosis, inflammation or swelling  SKIN: no rashes, lesions, ulcers, petechiae or subcutaneous nodules  : no tyson  NEURO: moving all 4 extremities; AAOx3; no tremors  PSYCH: normal mood, affect and behavior  LYMPH: no visible LN's or LAD  Breast: no changes          Labs:     Lab Results   Component Value Date    WBC 3.5 07/13/2022    HGB 12.9 07/13/2022    HCT 39.6 07/13/2022    MCV 96 07/13/2022     07/13/2022 "       CMP  Sodium   Date Value Ref Range Status   07/13/2022 141 134 - 144 mmol/L Final   04/23/2019 143 134 - 144 mmol/L      Potassium   Date Value Ref Range Status   07/13/2022 3.6 3.5 - 5.2 mmol/L Final     Chloride   Date Value Ref Range Status   07/13/2022 102 96 - 106 mmol/L Final   04/23/2019 103 98 - 110 mmol/L      CO2   Date Value Ref Range Status   07/13/2022 24 20 - 29 mmol/L Final     Glucose   Date Value Ref Range Status   07/13/2022 130 (H) 65 - 99 mg/dL Final   04/23/2019 88 70 - 99 mg/dL      BUN   Date Value Ref Range Status   07/13/2022 17 6 - 24 mg/dL Final     Creatinine   Date Value Ref Range Status   07/13/2022 0.80 0.57 - 1.00 mg/dL Final   04/23/2019 0.60 0.60 - 1.40 mg/dL      Calcium   Date Value Ref Range Status   07/13/2022 9.9 8.7 - 10.2 mg/dL Final     Total Protein   Date Value Ref Range Status   01/13/2022 7.7 6.0 - 8.4 g/dL Final     Albumin   Date Value Ref Range Status   07/13/2022 4.7 3.8 - 4.9 g/dL Final   01/13/2022 4.5 3.5 - 5.2 g/dL Final   04/23/2019 4.0 3.1 - 4.7 g/dL      Total Bilirubin   Date Value Ref Range Status   07/13/2022 0.4 0.0 - 1.2 mg/dL Final     Alkaline Phosphatase   Date Value Ref Range Status   01/13/2022 72 55 - 135 U/L Final     AST   Date Value Ref Range Status   07/13/2022 23 0 - 40 IU/L Final     ALT   Date Value Ref Range Status   07/13/2022 21 0 - 32 IU/L Final     Anion Gap   Date Value Ref Range Status   01/13/2022 8 8 - 16 mmol/L Final     eGFR if    Date Value Ref Range Status   01/13/2022 >60.0 >60 mL/min/1.73 m^2 Final     eGFR if non    Date Value Ref Range Status   01/13/2022 >60.0 >60 mL/min/1.73 m^2 Final     Comment:     Calculation used to obtain the estimated glomerular filtration  rate (eGFR) is the CKD-EPI equation.                Radiology/Diagnostic Studies:    CXR  1/6/2022:    IMPRESSION:     No acute cardiopulmonary abnormality.         PET 9/24/2020:    Impression:     1. No evidence of  recurrence of disease or metastatic disease.  2. Several colon air-fluid levels, suggesting nonspecific diarrheal illness.         US  11/7/2019:  Impression       Normal right upper quadrant ultrasound.         MRI head  2/17/2020:    Impression:     Stable appearance of previously defined enhancing extra-axial mass which extends from the left internal auditory canal into the left cerebellopontine angle cistern and along the adjacent temporal bone.      PET/CT  5/13/2019:    IMPRESSION:    1. No evidence of FDG avid metastatic disease.  2. Incidental findings as above.          Chest/Abdom CT  8/24/2018:      IMPRESSION:  1. No evidence of metastatic disease in the chest, abdomen, or the pelvis.  2. Additional observations as above           I have reviewed all available lab results and radiology reports.    Assessment/Plan:   (1) 56 y.o. female with diagnosis of right breast cancer  - s/p bilateral mastectomies on 9/26/16 followed by reconstruction  - followed by Dr Bear with [facility]  - she had 2 out of 7 LN's that were positive  - she was a Stage IIA  - ER+/MO+ and her2nu neg  - s/p AC x4 and taxol x4  - she has seen Dr Gonsales with rad/onc for XRT and completed 5 weeks last month  - discussed the pro's and con's of tamoxifen versus arimidex, and in light of the pap issues, arimidex is probably the better choice  - she had been on arimidex but it was discontinued because of her liver  - she has some mood swings and weight gain  - PET done on 9/21/17 with no evidence of mets;   - recent CT chest/abdom on 8/24/2018 showed no evidence on cancer  - s/p prior repeat surgery with right-sided lift with Dr Umana with repeat evaluation in Nov 2018   - she had a slow post-op recovery and has residual aches and pains  - she had PET on 5/13/2019  - she has been on arimidex and now femara which she both discontinued; she is having some generalized aches and pains;   - discussed aromasine and she is willing to give it a  "try; she does not want to take tamoxifen due to the clot and ovarian cancer risks   - she had been on the aromasin for 5 weeks but then developed elevated LFTs and it was discontinued    10/5/2020:  - latest PET on 9/24/2020 with no evidence of cancer  - s/p bilat mastectomies with reconstructions so she does not get mammograms    2/4/2021:  - she has been off all oral antihormone  - She had prior colonoscopy with Dr Edmonds and was diagnsoed with "collagenous" colitis.     6/14/2021:  - she seems to be doing ok overall    1/13/2022:  - no new issues  - recent CXR negative    7/13/2022:  -  She has some anxiety issues and jitteriness.  - she has some gum issues.   - She has been off all oral antihormones.    - She has been on steroids per Dr Edmonds and plans to see him again this summer some time  - She saw Dr Shepherd in June 2022         (2) Mild leucopenia/anemia secondary to chemotherapy (resolved)    6/14/2021:  - latest wbc is at  4.14 - latest hgb was at 12.4    1/13/2021:  - repeat labs pending  - labs from oct 2021 with wbc at 3.0    7/14/2022:  - latest CBC adequate     (3) Prior abnormal PAP issues - followed by Dr Cline with GYN; latest pap was negative per patient and she was also HPV negative     (4) Hx of acoustic neuroma in past - s/p XRT in 2010; followed by Dr Gonsales  - recent MRI stable per patient in Feb 2020     (5) Chronic fibromyalgia - she is not being followed by anyone; she may be having a flare-up      (6) Collagenous colitis -    - GI issues with prior bout of colitis - seen by Dr Edmonds with GI    - prior mild elevation bili and alk phos - resolved  - she had liver biopsy and EGD with "inflammation" per patient  - s/p esophageal dilation       2/4/2021:  - She had prior colonoscopy in Nov 2020 with Dr Edmonds and was diagnsoed with "collagenous" colitis.     6/14/2021:  - bowels are stable    7/14/2022:  - patient to f/u with Dr Edmonds this summer         (7) RUE - " resolved - possible lymphedema issues - she no longer sees PT  - also seen by Dr Liao    (8) Hypokalemia -on supplements - patient wanted to try OTC and did not want prescription meds    (9) Occasional palpitations - seen by Dr Hernandez with cardiology    (10) Thyroid issues - followed by PCP        1. Malignant neoplasm of upper-outer quadrant of right breast in female, estrogen receptor positive     2. S/P bilateral mastectomy     3. History of breast cancer     4. Estrogen receptor positive             PLAN:  1. Check up to date labs in 6 months  2. F/U with PCP, Surg and GYN  3. Hold on any antihormone therapy indefinitely for now  4.  F/u with Dr Edmonds with GI encouraged  5.  F/u with cardiology    6. F/u with PCP        RTC in  6 months    Fax note to Chaparro Shepherd III, *, CARLY Bear, Ilda, Lyndsay; Pham Hernandez     Total Time spent on patient:    I spent over 25 mins of time with the patient. Reviewed results of the recently ordered labs, tests, reports and studies; made directives with regards to the results. Over half of this time was spent couseling and coordinating care, making treatment and analytical decisions; ordering necessary labs, tests and studies; and discussing treatment options and setting up treatment plan(s) if indicated.        Discussion:     COVID-19 Discussion:    I had long discussion with patient and any applicable family about the COVID-19 coronavirus epidemic and the recommended precautions with regard to cancer and/or hematology patients. I have re-iterated the CDC recommendations for adequate hand washing, use of hand -like products, and coughing into elbow, etc. In addition, especially for our patients who are on chemotherapy and/or our otherwise immunocompromised patients, I have recommended avoidance of crowds, including movie theaters, restaurants, churches, etc. I have recommended avoidance of any sick or symptomatic family members and/or  friends. I have also recommended avoidance of any raw and unwashed food products, and general avoidance of food items that have not been prepared by themselves. The patient has been asked to call us immediately with any symptom developments, issues, questions or other general concerns.            I have explained all of the above in detail and the patient understands all of the current recommendation(s). I have answered all of their questions to the best of my ability and to their complete satisfaction.   The patient is to continue with the current management plan.            Electronically signed by Bronson Carrillo MD                          Answers for HPI/ROS submitted by the patient on 7/12/2022  appetite change : No  unexpected weight change: No  mouth sores: No  visual disturbance: No  cough: No  shortness of breath: No  chest pain: No  abdominal pain: No  diarrhea: No  frequency: No  back pain: No  rash: No  headaches: No  adenopathy: No  nervous/ anxious: No

## 2022-07-14 ENCOUNTER — OFFICE VISIT (OUTPATIENT)
Dept: HEMATOLOGY/ONCOLOGY | Facility: CLINIC | Age: 57
End: 2022-07-14
Payer: COMMERCIAL

## 2022-07-14 VITALS
RESPIRATION RATE: 16 BRPM | DIASTOLIC BLOOD PRESSURE: 80 MMHG | WEIGHT: 120 LBS | TEMPERATURE: 99 F | BODY MASS INDEX: 20.49 KG/M2 | HEIGHT: 64 IN | SYSTOLIC BLOOD PRESSURE: 126 MMHG | HEART RATE: 86 BPM

## 2022-07-14 DIAGNOSIS — C50.411 MALIGNANT NEOPLASM OF UPPER-OUTER QUADRANT OF RIGHT BREAST IN FEMALE, ESTROGEN RECEPTOR POSITIVE: Primary | ICD-10-CM

## 2022-07-14 DIAGNOSIS — Z17.0 ESTROGEN RECEPTOR POSITIVE: ICD-10-CM

## 2022-07-14 DIAGNOSIS — Z17.0 MALIGNANT NEOPLASM OF UPPER-OUTER QUADRANT OF RIGHT BREAST IN FEMALE, ESTROGEN RECEPTOR POSITIVE: Primary | ICD-10-CM

## 2022-07-14 DIAGNOSIS — Z90.13 S/P BILATERAL MASTECTOMY: ICD-10-CM

## 2022-07-14 DIAGNOSIS — Z85.3 HISTORY OF BREAST CANCER: ICD-10-CM

## 2022-07-14 LAB
ALBUMIN SERPL-MCNC: 4.7 G/DL (ref 3.8–4.9)
ALBUMIN/GLOB SERPL: 1.9 {RATIO} (ref 1.2–2.2)
ALP SERPL-CCNC: 97 IU/L (ref 44–121)
ALT SERPL-CCNC: 21 IU/L (ref 0–32)
AST SERPL-CCNC: 23 IU/L (ref 0–40)
BASOPHILS # BLD AUTO: 0 X10E3/UL (ref 0–0.2)
BASOPHILS NFR BLD AUTO: 1 %
BILIRUB SERPL-MCNC: 0.4 MG/DL (ref 0–1.2)
BUN SERPL-MCNC: 17 MG/DL (ref 6–24)
BUN/CREAT SERPL: 21 (ref 9–23)
CALCIUM SERPL-MCNC: 9.9 MG/DL (ref 8.7–10.2)
CHLORIDE SERPL-SCNC: 102 MMOL/L (ref 96–106)
CO2 SERPL-SCNC: 24 MMOL/L (ref 20–29)
CREAT SERPL-MCNC: 0.8 MG/DL (ref 0.57–1)
EOSINOPHIL # BLD AUTO: 0.1 X10E3/UL (ref 0–0.4)
EOSINOPHIL NFR BLD AUTO: 1 %
ERYTHROCYTE [DISTWIDTH] IN BLOOD BY AUTOMATED COUNT: 13.5 % (ref 11.7–15.4)
EST. GFR  (NO RACE VARIABLE): 86 ML/MIN/1.73
GLOBULIN SER CALC-MCNC: 2.5 G/DL (ref 1.5–4.5)
GLUCOSE SERPL-MCNC: 130 MG/DL (ref 65–99)
HCT VFR BLD AUTO: 39.6 % (ref 34–46.6)
HGB BLD-MCNC: 12.9 G/DL (ref 11.1–15.9)
IMM GRANULOCYTES # BLD AUTO: 0 X10E3/UL (ref 0–0.1)
IMM GRANULOCYTES NFR BLD AUTO: 0 %
LYMPHOCYTES # BLD AUTO: 0.8 X10E3/UL (ref 0.7–3.1)
LYMPHOCYTES NFR BLD AUTO: 22 %
MCH RBC QN AUTO: 31.3 PG (ref 26.6–33)
MCHC RBC AUTO-ENTMCNC: 32.6 G/DL (ref 31.5–35.7)
MCV RBC AUTO: 96 FL (ref 79–97)
MONOCYTES # BLD AUTO: 0.3 X10E3/UL (ref 0.1–0.9)
MONOCYTES NFR BLD AUTO: 7 %
NEUTROPHILS # BLD AUTO: 2.4 X10E3/UL (ref 1.4–7)
NEUTROPHILS NFR BLD AUTO: 69 %
PLATELET # BLD AUTO: 244 X10E3/UL (ref 150–450)
POTASSIUM SERPL-SCNC: 3.6 MMOL/L (ref 3.5–5.2)
PROT SERPL-MCNC: 7.2 G/DL (ref 6–8.5)
RBC # BLD AUTO: 4.12 X10E6/UL (ref 3.77–5.28)
SODIUM SERPL-SCNC: 141 MMOL/L (ref 134–144)
WBC # BLD AUTO: 3.5 X10E3/UL (ref 3.4–10.8)

## 2022-07-14 PROCEDURE — 3008F PR BODY MASS INDEX (BMI) DOCUMENTED: ICD-10-PCS | Mod: CPTII,S$GLB,, | Performed by: INTERNAL MEDICINE

## 2022-07-14 PROCEDURE — 3074F SYST BP LT 130 MM HG: CPT | Mod: CPTII,S$GLB,, | Performed by: INTERNAL MEDICINE

## 2022-07-14 PROCEDURE — 3008F BODY MASS INDEX DOCD: CPT | Mod: CPTII,S$GLB,, | Performed by: INTERNAL MEDICINE

## 2022-07-14 PROCEDURE — 1159F PR MEDICATION LIST DOCUMENTED IN MEDICAL RECORD: ICD-10-PCS | Mod: CPTII,S$GLB,, | Performed by: INTERNAL MEDICINE

## 2022-07-14 PROCEDURE — 99214 OFFICE O/P EST MOD 30 MIN: CPT | Mod: S$GLB,,, | Performed by: INTERNAL MEDICINE

## 2022-07-14 PROCEDURE — 3079F DIAST BP 80-89 MM HG: CPT | Mod: CPTII,S$GLB,, | Performed by: INTERNAL MEDICINE

## 2022-07-14 PROCEDURE — 1160F RVW MEDS BY RX/DR IN RCRD: CPT | Mod: CPTII,S$GLB,, | Performed by: INTERNAL MEDICINE

## 2022-07-14 PROCEDURE — 3079F PR MOST RECENT DIASTOLIC BLOOD PRESSURE 80-89 MM HG: ICD-10-PCS | Mod: CPTII,S$GLB,, | Performed by: INTERNAL MEDICINE

## 2022-07-14 PROCEDURE — 1159F MED LIST DOCD IN RCRD: CPT | Mod: CPTII,S$GLB,, | Performed by: INTERNAL MEDICINE

## 2022-07-14 PROCEDURE — 3074F PR MOST RECENT SYSTOLIC BLOOD PRESSURE < 130 MM HG: ICD-10-PCS | Mod: CPTII,S$GLB,, | Performed by: INTERNAL MEDICINE

## 2022-07-14 PROCEDURE — 99214 PR OFFICE/OUTPT VISIT, EST, LEVL IV, 30-39 MIN: ICD-10-PCS | Mod: S$GLB,,, | Performed by: INTERNAL MEDICINE

## 2022-07-14 PROCEDURE — 1160F PR REVIEW ALL MEDS BY PRESCRIBER/CLIN PHARMACIST DOCUMENTED: ICD-10-PCS | Mod: CPTII,S$GLB,, | Performed by: INTERNAL MEDICINE

## 2022-07-15 ENCOUNTER — OFFICE VISIT (OUTPATIENT)
Dept: FAMILY MEDICINE | Facility: CLINIC | Age: 57
End: 2022-07-15
Payer: COMMERCIAL

## 2022-07-15 VITALS
HEIGHT: 64 IN | WEIGHT: 119 LBS | DIASTOLIC BLOOD PRESSURE: 79 MMHG | OXYGEN SATURATION: 99 % | BODY MASS INDEX: 20.32 KG/M2 | HEART RATE: 85 BPM | SYSTOLIC BLOOD PRESSURE: 128 MMHG | TEMPERATURE: 98 F

## 2022-07-15 DIAGNOSIS — F41.9 ANXIETY: ICD-10-CM

## 2022-07-15 DIAGNOSIS — R25.1 TREMOR: ICD-10-CM

## 2022-07-15 DIAGNOSIS — E03.9 HYPOTHYROIDISM, UNSPECIFIED TYPE: ICD-10-CM

## 2022-07-15 DIAGNOSIS — Z85.3 HISTORY OF BREAST CANCER: Primary | ICD-10-CM

## 2022-07-15 PROCEDURE — 99213 OFFICE O/P EST LOW 20 MIN: CPT | Mod: S$GLB,,, | Performed by: FAMILY MEDICINE

## 2022-07-15 PROCEDURE — 3074F PR MOST RECENT SYSTOLIC BLOOD PRESSURE < 130 MM HG: ICD-10-PCS | Mod: CPTII,S$GLB,, | Performed by: FAMILY MEDICINE

## 2022-07-15 PROCEDURE — 3074F SYST BP LT 130 MM HG: CPT | Mod: CPTII,S$GLB,, | Performed by: FAMILY MEDICINE

## 2022-07-15 PROCEDURE — 3008F PR BODY MASS INDEX (BMI) DOCUMENTED: ICD-10-PCS | Mod: CPTII,S$GLB,, | Performed by: FAMILY MEDICINE

## 2022-07-15 PROCEDURE — 1160F PR REVIEW ALL MEDS BY PRESCRIBER/CLIN PHARMACIST DOCUMENTED: ICD-10-PCS | Mod: CPTII,S$GLB,, | Performed by: FAMILY MEDICINE

## 2022-07-15 PROCEDURE — 1159F PR MEDICATION LIST DOCUMENTED IN MEDICAL RECORD: ICD-10-PCS | Mod: CPTII,S$GLB,, | Performed by: FAMILY MEDICINE

## 2022-07-15 PROCEDURE — 1159F MED LIST DOCD IN RCRD: CPT | Mod: CPTII,S$GLB,, | Performed by: FAMILY MEDICINE

## 2022-07-15 PROCEDURE — 3078F PR MOST RECENT DIASTOLIC BLOOD PRESSURE < 80 MM HG: ICD-10-PCS | Mod: CPTII,S$GLB,, | Performed by: FAMILY MEDICINE

## 2022-07-15 PROCEDURE — 3008F BODY MASS INDEX DOCD: CPT | Mod: CPTII,S$GLB,, | Performed by: FAMILY MEDICINE

## 2022-07-15 PROCEDURE — 1160F RVW MEDS BY RX/DR IN RCRD: CPT | Mod: CPTII,S$GLB,, | Performed by: FAMILY MEDICINE

## 2022-07-15 PROCEDURE — 99213 PR OFFICE/OUTPT VISIT, EST, LEVL III, 20-29 MIN: ICD-10-PCS | Mod: S$GLB,,, | Performed by: FAMILY MEDICINE

## 2022-07-15 PROCEDURE — 3078F DIAST BP <80 MM HG: CPT | Mod: CPTII,S$GLB,, | Performed by: FAMILY MEDICINE

## 2022-07-15 RX ORDER — BUSPIRONE HYDROCHLORIDE 5 MG/1
5 TABLET ORAL DAILY
Qty: 30 TABLET | Refills: 0 | Status: SHIPPED | OUTPATIENT
Start: 2022-07-15 | End: 2022-08-05

## 2022-07-15 RX ORDER — BUSPIRONE HYDROCHLORIDE 5 MG/1
5 TABLET ORAL 2 TIMES DAILY
COMMUNITY
End: 2022-07-15 | Stop reason: SDUPTHER

## 2022-07-16 PROBLEM — R25.1 TREMOR: Status: ACTIVE | Noted: 2022-07-16

## 2022-07-16 NOTE — PROGRESS NOTES
BuSpar at increased dose to cause nausea seat decrease the dose still stayed nauseous so discontinued it.  Off it for about 1/2 weeks.  Some Xanax use.  Still only uses a quarter of a pill at a time.  Had been on the Entocort for some time.  Working from home.  Some work stress.  Still has some nausea diarrhea is back also.  Did okay previously on the 5 mg of BuSpar twice a day.  Mother 91 is ill also.  In addition to the work stresses.  Glucose was 130 with this was nonfasting.  Some tremor.  Sister has it also.  TSH was a little borderline low last check.  Might be contributing to some of her symptoms.    Physical examination vital signs are noted.  No acute distress.  Neck is without adenopathy.  Chest clear.  Heart regular rate rhythm.  Abdomen bowel sounds positive soft and nontender.  Extremities no edema positive pulses.  No tremor noted today.  Subjective:       Patient ID: Blanca Loya is a 56 y.o. female.    Chief Complaint: Follow-up    HPI    Objective:        Assessment:       1. History of breast cancer    2. Anxiety    3. Tremor    4. Hypothyroidism, unspecified type        Plan:       History of breast cancer    Anxiety  -     TSH; Future; Expected date: 07/15/2022    Tremor  -     TSH; Future; Expected date: 07/15/2022    Hypothyroidism, unspecified type    Other orders  -     busPIRone (BUSPAR) 5 MG Tab; Take 1 tablet (5 mg total) by mouth once daily.  Dispense: 30 tablet; Refill: 0    Check a TSH due to the tremor and the anxiety.  Try BuSpar 5 once a day again.  Continue p.r.n. Xanax.  Follow-up depending on TSH results.

## 2022-07-19 LAB — TSH SERPL DL<=0.005 MIU/L-ACNC: 2.2 UIU/ML (ref 0.45–4.5)

## 2022-10-14 ENCOUNTER — OFFICE VISIT (OUTPATIENT)
Dept: FAMILY MEDICINE | Facility: CLINIC | Age: 57
End: 2022-10-14
Payer: COMMERCIAL

## 2022-10-14 VITALS
TEMPERATURE: 97 F | SYSTOLIC BLOOD PRESSURE: 111 MMHG | HEIGHT: 64 IN | WEIGHT: 117 LBS | OXYGEN SATURATION: 100 % | RESPIRATION RATE: 18 BRPM | BODY MASS INDEX: 19.97 KG/M2 | HEART RATE: 85 BPM | DIASTOLIC BLOOD PRESSURE: 65 MMHG

## 2022-10-14 DIAGNOSIS — F41.9 ANXIETY: ICD-10-CM

## 2022-10-14 DIAGNOSIS — E03.9 HYPOTHYROIDISM, UNSPECIFIED TYPE: ICD-10-CM

## 2022-10-14 DIAGNOSIS — D33.3 LEFT ACOUSTIC NEUROMA: ICD-10-CM

## 2022-10-14 DIAGNOSIS — Z90.13 S/P BILATERAL MASTECTOMY: ICD-10-CM

## 2022-10-14 DIAGNOSIS — K52.831 COLLAGENOUS COLITIS: ICD-10-CM

## 2022-10-14 DIAGNOSIS — Z85.3 HISTORY OF BREAST CANCER: ICD-10-CM

## 2022-10-14 DIAGNOSIS — Z13.220 SCREENING CHOLESTEROL LEVEL: ICD-10-CM

## 2022-10-14 PROCEDURE — 99214 PR OFFICE/OUTPT VISIT, EST, LEVL IV, 30-39 MIN: ICD-10-PCS | Mod: S$GLB,,, | Performed by: FAMILY MEDICINE

## 2022-10-14 PROCEDURE — 1159F PR MEDICATION LIST DOCUMENTED IN MEDICAL RECORD: ICD-10-PCS | Mod: CPTII,S$GLB,, | Performed by: FAMILY MEDICINE

## 2022-10-14 PROCEDURE — 3078F PR MOST RECENT DIASTOLIC BLOOD PRESSURE < 80 MM HG: ICD-10-PCS | Mod: CPTII,S$GLB,, | Performed by: FAMILY MEDICINE

## 2022-10-14 PROCEDURE — 3078F DIAST BP <80 MM HG: CPT | Mod: CPTII,S$GLB,, | Performed by: FAMILY MEDICINE

## 2022-10-14 PROCEDURE — 1160F RVW MEDS BY RX/DR IN RCRD: CPT | Mod: CPTII,S$GLB,, | Performed by: FAMILY MEDICINE

## 2022-10-14 PROCEDURE — 99214 OFFICE O/P EST MOD 30 MIN: CPT | Mod: S$GLB,,, | Performed by: FAMILY MEDICINE

## 2022-10-14 PROCEDURE — 1159F MED LIST DOCD IN RCRD: CPT | Mod: CPTII,S$GLB,, | Performed by: FAMILY MEDICINE

## 2022-10-14 PROCEDURE — 1160F PR REVIEW ALL MEDS BY PRESCRIBER/CLIN PHARMACIST DOCUMENTED: ICD-10-PCS | Mod: CPTII,S$GLB,, | Performed by: FAMILY MEDICINE

## 2022-10-14 PROCEDURE — 3074F SYST BP LT 130 MM HG: CPT | Mod: CPTII,S$GLB,, | Performed by: FAMILY MEDICINE

## 2022-10-14 PROCEDURE — 3074F PR MOST RECENT SYSTOLIC BLOOD PRESSURE < 130 MM HG: ICD-10-PCS | Mod: CPTII,S$GLB,, | Performed by: FAMILY MEDICINE

## 2022-10-14 RX ORDER — BUSPIRONE HYDROCHLORIDE 5 MG/1
5 TABLET ORAL 2 TIMES DAILY
Qty: 180 TABLET | Refills: 1 | Status: SHIPPED | OUTPATIENT
Start: 2022-10-14 | End: 2023-02-02 | Stop reason: SDUPTHER

## 2022-10-14 RX ORDER — ALPRAZOLAM 0.5 MG/1
TABLET ORAL
Qty: 30 TABLET | Refills: 2 | Status: SHIPPED | OUTPATIENT
Start: 2022-10-14 | End: 2023-02-02 | Stop reason: SDUPTHER

## 2022-10-14 RX ORDER — LEVOTHYROXINE SODIUM 100 UG/1
TABLET ORAL
Qty: 90 TABLET | Refills: 2 | Status: SHIPPED | OUTPATIENT
Start: 2022-10-14 | End: 2023-02-02 | Stop reason: SDUPTHER

## 2022-10-14 NOTE — PROGRESS NOTES
Subjective:       Patient ID: Blanca Loya is a 57 y.o. female.    Chief Complaint: Follow-up    Follow-up  Patient presents to clinic for follow up. Collagenous colitis. On Entocort. Recent flair up. Weight down 2 lbs. BMI 20. History of breast cancer >5 years ago. Bilateral mastectomy. Current follow up with Dr. Carrillo. Anxiety. Would like to take Buspar 5 BID. 10 mg makes her nauseous. Xanax as needed. Refill this. Left acoustic neuroma. Decreased hearing in right ear. Previous ENT retired. Will give her referral. Hypothyroidism. On synthroid. No Covid vaccinations. Due for pneumonia, shingles, tetanus, and influenza vaccination. Declining these.   Review of Systems   HENT:  Positive for hearing loss.    Respiratory: Negative.     Cardiovascular: Negative.    Gastrointestinal: Negative.    Psychiatric/Behavioral: Negative.         Objective:      Physical Exam  Constitutional:       Appearance: Normal appearance.   Neck:      Vascular: No carotid bruit.   Cardiovascular:      Rate and Rhythm: Normal rate and regular rhythm.      Pulses: Normal pulses.      Heart sounds: Normal heart sounds.   Pulmonary:      Effort: Pulmonary effort is normal.      Breath sounds: Normal breath sounds.   Lymphadenopathy:      Cervical: No cervical adenopathy.   Skin:     General: Skin is warm and dry.   Neurological:      Mental Status: She is alert.   Psychiatric:         Mood and Affect: Mood normal.         Behavior: Behavior normal.       Assessment/Plan:     BMI 20.0-20.9, adult    Hypothyroidism, unspecified type  -     TSH; Future; Expected date: 01/14/2023    Anxiety  -     Comprehensive Metabolic Panel; Future; Expected date: 01/14/2023  -     CBC Auto Differential; Future; Expected date: 01/14/2023    Left acoustic neuroma    Collagenous colitis  -     Comprehensive Metabolic Panel; Future; Expected date: 01/14/2023    S/P bilateral mastectomy    Screening cholesterol level  -     Lipid Panel; Future; Expected date:  01/14/2023    History of breast cancer    Other orders  -     busPIRone (BUSPAR) 5 MG Tab; Take 1 tablet (5 mg total) by mouth 2 (two) times daily.  Dispense: 180 tablet; Refill: 1  -     ALPRAZolam (XANAX) 0.5 MG tablet; TK 1 T PO QD PRN  Dispense: 30 tablet; Refill: 2     Increase her BuSpar to 5 mg b.i.d..  Follow-up with ENT.  Dr. Ministerio taveras.  Refill Xanax 0.5 daily maximum p.r.n. 30 with 2 refills.  Follow-up in 3 months with CBC CMP lipids TSH.  Declines all vaccines.

## 2022-10-14 NOTE — TELEPHONE ENCOUNTER
No new care gaps identified.  [facility] Embedded Care Gaps. Reference number: [id number]. 10/14/2022   3:45:45 AM CDT

## 2022-10-14 NOTE — TELEPHONE ENCOUNTER
Refill Decision Note   Blanca Loya  is requesting a refill authorization.  Brief Assessment and Rationale for Refill:  Approve     Medication Therapy Plan:       Medication Reconciliation Completed: No   Comments:     No Care Gaps recommended.     Note composed:2:18 PM 10/14/2022

## 2022-11-04 LAB
PAP RECOMMENDATION EXT: NORMAL
PAP SMEAR: NORMAL

## 2023-01-12 LAB
ALBUMIN SERPL-MCNC: 4.5 G/DL (ref 3.8–4.9)
ALBUMIN/GLOB SERPL: 1.9 {RATIO} (ref 1.2–2.2)
ALP SERPL-CCNC: 103 IU/L (ref 44–121)
ALT SERPL-CCNC: 20 IU/L (ref 0–32)
AST SERPL-CCNC: 23 IU/L (ref 0–40)
BASOPHILS # BLD AUTO: 0 X10E3/UL (ref 0–0.2)
BASOPHILS NFR BLD AUTO: 1 %
BILIRUB SERPL-MCNC: 0.5 MG/DL (ref 0–1.2)
BUN SERPL-MCNC: 14 MG/DL (ref 6–24)
BUN/CREAT SERPL: 18 (ref 9–23)
CALCIUM SERPL-MCNC: 9.7 MG/DL (ref 8.7–10.2)
CHLORIDE SERPL-SCNC: 102 MMOL/L (ref 96–106)
CHOLEST SERPL-MCNC: 224 MG/DL (ref 100–199)
CO2 SERPL-SCNC: 24 MMOL/L (ref 20–29)
CREAT SERPL-MCNC: 0.8 MG/DL (ref 0.57–1)
EOSINOPHIL # BLD AUTO: 0 X10E3/UL (ref 0–0.4)
EOSINOPHIL NFR BLD AUTO: 1 %
ERYTHROCYTE [DISTWIDTH] IN BLOOD BY AUTOMATED COUNT: 13.1 % (ref 11.7–15.4)
EST. GFR  (NO RACE VARIABLE): 86 ML/MIN/1.73
GLOBULIN SER CALC-MCNC: 2.4 G/DL (ref 1.5–4.5)
GLUCOSE SERPL-MCNC: 90 MG/DL (ref 70–99)
HCT VFR BLD AUTO: 39.2 % (ref 34–46.6)
HDLC SERPL-MCNC: 80 MG/DL
HGB BLD-MCNC: 12.8 G/DL (ref 11.1–15.9)
IMM GRANULOCYTES # BLD AUTO: 0 X10E3/UL (ref 0–0.1)
IMM GRANULOCYTES NFR BLD AUTO: 0 %
LDLC SERPL CALC-MCNC: 126 MG/DL (ref 0–99)
LYMPHOCYTES # BLD AUTO: 0.9 X10E3/UL (ref 0.7–3.1)
LYMPHOCYTES NFR BLD AUTO: 28 %
MCH RBC QN AUTO: 31.8 PG (ref 26.6–33)
MCHC RBC AUTO-ENTMCNC: 32.7 G/DL (ref 31.5–35.7)
MCV RBC AUTO: 97 FL (ref 79–97)
MONOCYTES # BLD AUTO: 0.4 X10E3/UL (ref 0.1–0.9)
MONOCYTES NFR BLD AUTO: 11 %
NEUTROPHILS # BLD AUTO: 1.8 X10E3/UL (ref 1.4–7)
NEUTROPHILS NFR BLD AUTO: 59 %
PLATELET # BLD AUTO: 272 X10E3/UL (ref 150–450)
POTASSIUM SERPL-SCNC: 4 MMOL/L (ref 3.5–5.2)
PROT SERPL-MCNC: 6.9 G/DL (ref 6–8.5)
RBC # BLD AUTO: 4.03 X10E6/UL (ref 3.77–5.28)
SODIUM SERPL-SCNC: 141 MMOL/L (ref 134–144)
TRIGL SERPL-MCNC: 104 MG/DL (ref 0–149)
TSH SERPL DL<=0.005 MIU/L-ACNC: 6.53 UIU/ML (ref 0.45–4.5)
VLDLC SERPL CALC-MCNC: 18 MG/DL (ref 5–40)
WBC # BLD AUTO: 3.1 X10E3/UL (ref 3.4–10.8)

## 2023-01-18 NOTE — PROGRESS NOTES
[facility] Hematology/Oncology  PROGRESS NOTE - Follow-up Visit      Subjective:       Patient ID:   NAME: Blanca Loya : 1965     57 y.o. female    Referring Doc: Cora  Other Physicians: Lyndsay Gonsales, CARLY Barillas; Mary Gonsales    Chief Complaint:  Breast ca f/u    History of Present Illness:     Patient is being seen today in person for a regularly scheduled follow-up appointment. She is here by herself.    She has some fullness and tenderness on the right axilla since this past 2022      No CP, SOB, HA's or N/V. She has some gum issues. She has been off all oral antihormones.      She has still been on steroids periodically per direction Dr Edmonds     She saw Dr Shepherd on 10/16/2022       Discussed Covid19 precautions - she did not get her vaccinations; she had covid in Aug 2021           ROS:   GEN: normal without any fever, night sweats or weight loss; anxiety issues and jitteriness; some fullness and tenderness in right axilla  HEENT: normal with no HA's, sore throat, stiff neck, changes in vision  CV: normal with no CP, SOB, PND, RUGGIERO or orthopnea  PULM: normal with no SOB, cough, hemoptysis, sputum or pleuritic pain  GI: normal with no abdominal pain, nausea, vomiting, constipation, diarrhea, melanotic stools, BRBPR, or hematemesis; chronic GI issues  : normal with no hematuria, dysuria  BREAST: normal with no mass, discharge, pain  SKIN: normal with no rash, erythema, bruising, or swelling    Allergies:  Review of patient's allergies indicates:   Allergen Reactions    Demerol [meperidine] Anxiety    Pcn [penicillins] Rash       Medications:    Current Outpatient Medications:     ALPRAZolam (XANAX) 0.5 MG tablet, TK 1 T PO QD PRN, Disp: 30 tablet, Rfl: 2    ascorbic acid, vitamin C, (VITAMIN C) 1000 MG tablet, Take 1,000 mg by mouth once daily., Disp: , Rfl:     b complex vitamins tablet, Take 1 tablet by mouth once daily., Disp: , Rfl:     budesonide (ENTOCORT EC) 3 mg  "capsule, Take 9 mg by mouth once daily., Disp: , Rfl:     busPIRone (BUSPAR) 5 MG Tab, Take 1 tablet (5 mg total) by mouth 2 (two) times daily., Disp: 180 tablet, Rfl: 1    butalbital-acetaminophen-caffeine -40 mg (FIORICET, ESGIC) -40 mg per tablet, Take 1 tablet by mouth every 6 (six) hours as needed for Pain or Headaches., Disp: 28 tablet, Rfl: 2    CHROMIUM ORAL, Take by mouth., Disp: , Rfl:     levothyroxine (SYNTHROID) 100 MCG tablet, TAKE 1 TABLET BY MOUTH EVERY DAY, Disp: 90 tablet, Rfl: 2    POTASSIUM ACETATE MISC, by Misc.(Non-Drug; Combo Route) route., Disp: , Rfl:     PMHx/PSHx Updates:  See patient's last visit with me on 7/14/2022  See H&P on 9/8/2016        Pathology:    Colon biopsy  9/22/2020:    COLON, RANDOM, BIOPSY:   - COLLAGENOUS COLITIS.       See path note on 8/30/2016      Objective:     Vitals:  Blood pressure 126/62, pulse 83, temperature 97.7 °F (36.5 °C), resp. rate 16, height 5' 4" (1.626 m), weight 55 kg (121 lb 4.8 oz), last menstrual period 09/10/2016.        Physical Examination:   GEN: no apparent distress, comfortable; AAOx3  HEAD: atraumatic and normocephalic  EYES: no conjunctival pallor or muddiness, no icterus; normal pupil reaction to ambient light  ENT: OMM, no pharyngeal erythema, external bilateral ears WNL; no visible thrush or ulcers  NECK: no masses or swelling, trachea midline, no visible LAD/LN's   CV: no palpitations; no pedal edema; no noticeable JVD or neck vein distension  CHEST: Normal respiratory effort; chest wall breath movements symmetrical; no audible wheezing  ABDOM: non-distended; no bloating  MUSC/Skeletal: ROM normal; joints visibly normal; no deformities or arthropathy  EXTREM: no clubbing, cyanosis, inflammation or swelling  SKIN: no rashes, lesions, ulcers, petechiae or subcutaneous nodules  : no tyson  NEURO: moving all 4 extremities; AAOx3; no tremors  PSYCH: normal mood, affect and behavior  LYMPH: no visible LN's or LAD  Breast: no " changes; no appreciable mass or LAD in axilla (Jenn present)          Labs:     Lab Results   Component Value Date    WBC 3.1 (L) 01/11/2023    HGB 12.8 01/11/2023    HCT 39.2 01/11/2023    MCV 97 01/11/2023     01/11/2023       CMP  Sodium   Date Value Ref Range Status   01/11/2023 141 134 - 144 mmol/L Final   04/23/2019 143 134 - 144 mmol/L      Potassium   Date Value Ref Range Status   01/11/2023 4.0 3.5 - 5.2 mmol/L Final     Chloride   Date Value Ref Range Status   01/11/2023 102 96 - 106 mmol/L Final   04/23/2019 103 98 - 110 mmol/L      CO2   Date Value Ref Range Status   01/11/2023 24 20 - 29 mmol/L Final     Glucose   Date Value Ref Range Status   01/11/2023 90 70 - 99 mg/dL Final   04/23/2019 88 70 - 99 mg/dL      BUN   Date Value Ref Range Status   01/11/2023 14 6 - 24 mg/dL Final     Creatinine   Date Value Ref Range Status   01/11/2023 0.80 0.57 - 1.00 mg/dL Final   04/23/2019 0.60 0.60 - 1.40 mg/dL      Calcium   Date Value Ref Range Status   01/11/2023 9.7 8.7 - 10.2 mg/dL Final     Total Protein   Date Value Ref Range Status   01/13/2022 7.7 6.0 - 8.4 g/dL Final     Albumin   Date Value Ref Range Status   01/11/2023 4.5 3.8 - 4.9 g/dL Final   01/13/2022 4.5 3.5 - 5.2 g/dL Final   04/23/2019 4.0 3.1 - 4.7 g/dL      Total Bilirubin   Date Value Ref Range Status   01/11/2023 0.5 0.0 - 1.2 mg/dL Final     Alkaline Phosphatase   Date Value Ref Range Status   01/13/2022 72 55 - 135 U/L Final     AST   Date Value Ref Range Status   01/11/2023 23 0 - 40 IU/L Final     ALT   Date Value Ref Range Status   01/11/2023 20 0 - 32 IU/L Final     Anion Gap   Date Value Ref Range Status   01/13/2022 8 8 - 16 mmol/L Final     eGFR if    Date Value Ref Range Status   01/13/2022 >60.0 >60 mL/min/1.73 m^2 Final     eGFR if non    Date Value Ref Range Status   01/13/2022 >60.0 >60 mL/min/1.73 m^2 Final     Comment:     Calculation used to obtain the estimated glomerular  filtration  rate (eGFR) is the CKD-EPI equation.                Radiology/Diagnostic Studies:    CXR  1/6/2022:    IMPRESSION:     No acute cardiopulmonary abnormality.         PET 9/24/2020:    Impression:     1. No evidence of recurrence of disease or metastatic disease.  2. Several colon air-fluid levels, suggesting nonspecific diarrheal illness.         US  11/7/2019:  Impression       Normal right upper quadrant ultrasound.         MRI head  2/17/2020:    Impression:     Stable appearance of previously defined enhancing extra-axial mass which extends from the left internal auditory canal into the left cerebellopontine angle cistern and along the adjacent temporal bone.      PET/CT  5/13/2019:    IMPRESSION:    1. No evidence of FDG avid metastatic disease.  2. Incidental findings as above.          Chest/Abdom CT  8/24/2018:      IMPRESSION:  1. No evidence of metastatic disease in the chest, abdomen, or the pelvis.  2. Additional observations as above           I have reviewed all available lab results and radiology reports.    Assessment/Plan:   (1) 57 y.o. female with diagnosis of right breast cancer  - s/p bilateral mastectomies on 9/26/16 followed by reconstruction  - followed by Dr Bear with [facility]  - she had 2 out of 7 LN's that were positive  - she was a Stage IIA  - ER+/MN+ and her2nu neg  - s/p AC x4 and taxol x4  - she has seen Dr Gonsales with rad/onc for XRT and completed 5 weeks last month  - discussed the pro's and con's of tamoxifen versus arimidex, and in light of the pap issues, arimidex is probably the better choice  - she had been on arimidex but it was discontinued because of her liver  - she has some mood swings and weight gain  - PET done on 9/21/17 with no evidence of mets;   - recent CT chest/abdom on 8/24/2018 showed no evidence on cancer  - s/p prior repeat surgery with right-sided lift with Dr Umana with repeat evaluation in Nov 2018   - she had a slow post-op recovery and has  "residual aches and pains  - she had PET on 5/13/2019  - she has been on arimidex and now femara which she both discontinued; she is having some generalized aches and pains;   - discussed aromasine and she is willing to give it a try; she does not want to take tamoxifen due to the clot and ovarian cancer risks   - she had been on the aromasin for 5 weeks but then developed elevated LFTs and it was discontinued    10/5/2020:  - latest PET on 9/24/2020 with no evidence of cancer  - s/p bilat mastectomies with reconstructions so she does not get mammograms    2/4/2021:  - she has been off all oral antihormone  - She had prior colonoscopy with Dr Edmonds and was diagnsoed with "collagenous" colitis.     6/14/2021:  - she seems to be doing ok overall    1/13/2022:  - no new issues  - recent CXR negative    7/13/2022:  -  She has some anxiety issues and jitteriness.  - she has some gum issues.   - She has been off all oral antihormones.    - She has been on steroids per Dr Edmonds and plans to see him again this summer some time  - She saw Dr Shepherd in June 2022 1/19/2023:  - no appreciable mass or LAD  - will get PEt as precaution         (2) Mild leucopenia/anemia secondary to chemotherapy (resolved)    6/14/2021:  - latest wbc is at  4.14 - latest hgb was at 12.4    1/13/2021:  - repeat labs pending  - labs from oct 2021 with wbc at 3.0    7/14/2022:  - latest CBC adequate    1/19/2023:  - latest WBC at 3.1     (3) Prior abnormal PAP issues - followed by Dr Cline with GYN; latest pap was negative per patient and she was also HPV negative     (4) Hx of acoustic neuroma in past - s/p XRT in 2010; followed by Dr Gonsales  - recent MRI stable per patient in Feb 2020     (5) Chronic fibromyalgia - she is not being followed by anyone; she may be having a flare-up      (6) Collagenous colitis -    - GI issues with prior bout of colitis - seen by Dr Edmonds with GI    - prior mild elevation bili and alk phos - " "resolved  - she had liver biopsy and EGD with "inflammation" per patient  - s/p esophageal dilation       2/4/2021:  - She had prior colonoscopy in Nov 2020 with Dr Edmonds and was diagnsoed with "collagenous" colitis.     6/14/2021:  - bowels are stable    7/14/2022:  - patient to f/u with Dr Edmonds this summer         (7) RUE - resolved - possible lymphedema issues - she no longer sees PT  - also seen by Dr Liao    (8) Hypokalemia -on supplements - patient wanted to try OTC and did not want prescription meds    (9) Occasional palpitations - seen by Dr Hernandez with cardiology    (10) Thyroid issues - followed by PCP        1. Malignant neoplasm of upper-outer quadrant of right breast in female, estrogen receptor positive        2. S/P bilateral mastectomy        3. History of breast cancer        4. Estrogen receptor positive                PLAN:  1. Check up to date labs in 6 months  2. F/U with PCP, Surg and GYN  3. Hold on any antihormone therapy indefinitely for now  4.  F/u with Dr Edmonds with GI encouraged  5.  F/u with cardiology    6. F/u with PCP   7. Set up PEt as precaution       RTC in  4 week can cancel; 3-4 month keep    Fax note to Chaparro Shepherd III, *, CARLY Bear Mannina, Lyndsay; Pham Hernandez     Total Time spent on patient:    I spent over 25 mins of time with the patient. Reviewed results of the recently ordered labs, tests, reports and studies; made directives with regards to the results. Over half of this time was spent couseling and coordinating care, making treatment and analytical decisions; ordering necessary labs, tests and studies; and discussing treatment options and setting up treatment plan(s) if indicated.        Discussion:     COVID-19 Discussion:    I had long discussion with patient and any applicable family about the COVID-19 coronavirus epidemic and the recommended precautions with regard to cancer and/or hematology patients. I have re-iterated the " CDC recommendations for adequate hand washing, use of hand -like products, and coughing into elbow, etc. In addition, especially for our patients who are on chemotherapy and/or our otherwise immunocompromised patients, I have recommended avoidance of crowds, including movie theaters, restaurants, churches, etc. I have recommended avoidance of any sick or symptomatic family members and/or friends. I have also recommended avoidance of any raw and unwashed food products, and general avoidance of food items that have not been prepared by themselves. The patient has been asked to call us immediately with any symptom developments, issues, questions or other general concerns.            I have explained all of the above in detail and the patient understands all of the current recommendation(s). I have answered all of their questions to the best of my ability and to their complete satisfaction.   The patient is to continue with the current management plan.            Electronically signed by Bronson Carrillo MD

## 2023-01-19 ENCOUNTER — OFFICE VISIT (OUTPATIENT)
Dept: HEMATOLOGY/ONCOLOGY | Facility: CLINIC | Age: 58
End: 2023-01-19
Payer: COMMERCIAL

## 2023-01-19 VITALS
SYSTOLIC BLOOD PRESSURE: 126 MMHG | HEIGHT: 64 IN | WEIGHT: 121.31 LBS | BODY MASS INDEX: 20.71 KG/M2 | HEART RATE: 83 BPM | DIASTOLIC BLOOD PRESSURE: 62 MMHG | TEMPERATURE: 98 F | RESPIRATION RATE: 16 BRPM

## 2023-01-19 DIAGNOSIS — Z85.3 HISTORY OF BREAST CANCER: ICD-10-CM

## 2023-01-19 DIAGNOSIS — Z17.0 MALIGNANT NEOPLASM OF UPPER-OUTER QUADRANT OF RIGHT BREAST IN FEMALE, ESTROGEN RECEPTOR POSITIVE: Primary | ICD-10-CM

## 2023-01-19 DIAGNOSIS — N64.4 PAIN OF RIGHT BREAST: ICD-10-CM

## 2023-01-19 DIAGNOSIS — C50.411 MALIGNANT NEOPLASM OF UPPER-OUTER QUADRANT OF RIGHT BREAST IN FEMALE, ESTROGEN RECEPTOR POSITIVE: Primary | ICD-10-CM

## 2023-01-19 DIAGNOSIS — R07.89 RIGHT-SIDED CHEST WALL PAIN: ICD-10-CM

## 2023-01-19 DIAGNOSIS — Z17.0 ESTROGEN RECEPTOR POSITIVE: ICD-10-CM

## 2023-01-19 DIAGNOSIS — Z90.13 S/P BILATERAL MASTECTOMY: ICD-10-CM

## 2023-01-19 PROCEDURE — 1159F PR MEDICATION LIST DOCUMENTED IN MEDICAL RECORD: ICD-10-PCS | Mod: CPTII,S$GLB,, | Performed by: INTERNAL MEDICINE

## 2023-01-19 PROCEDURE — 3074F PR MOST RECENT SYSTOLIC BLOOD PRESSURE < 130 MM HG: ICD-10-PCS | Mod: CPTII,S$GLB,, | Performed by: INTERNAL MEDICINE

## 2023-01-19 PROCEDURE — 3074F SYST BP LT 130 MM HG: CPT | Mod: CPTII,S$GLB,, | Performed by: INTERNAL MEDICINE

## 2023-01-19 PROCEDURE — 1159F MED LIST DOCD IN RCRD: CPT | Mod: CPTII,S$GLB,, | Performed by: INTERNAL MEDICINE

## 2023-01-19 PROCEDURE — 3008F PR BODY MASS INDEX (BMI) DOCUMENTED: ICD-10-PCS | Mod: CPTII,S$GLB,, | Performed by: INTERNAL MEDICINE

## 2023-01-19 PROCEDURE — 3078F PR MOST RECENT DIASTOLIC BLOOD PRESSURE < 80 MM HG: ICD-10-PCS | Mod: CPTII,S$GLB,, | Performed by: INTERNAL MEDICINE

## 2023-01-19 PROCEDURE — 99214 OFFICE O/P EST MOD 30 MIN: CPT | Mod: S$GLB,,, | Performed by: INTERNAL MEDICINE

## 2023-01-19 PROCEDURE — 99214 PR OFFICE/OUTPT VISIT, EST, LEVL IV, 30-39 MIN: ICD-10-PCS | Mod: S$GLB,,, | Performed by: INTERNAL MEDICINE

## 2023-01-19 PROCEDURE — 3008F BODY MASS INDEX DOCD: CPT | Mod: CPTII,S$GLB,, | Performed by: INTERNAL MEDICINE

## 2023-01-19 PROCEDURE — 1160F RVW MEDS BY RX/DR IN RCRD: CPT | Mod: CPTII,S$GLB,, | Performed by: INTERNAL MEDICINE

## 2023-01-19 PROCEDURE — 3078F DIAST BP <80 MM HG: CPT | Mod: CPTII,S$GLB,, | Performed by: INTERNAL MEDICINE

## 2023-01-19 PROCEDURE — 1160F PR REVIEW ALL MEDS BY PRESCRIBER/CLIN PHARMACIST DOCUMENTED: ICD-10-PCS | Mod: CPTII,S$GLB,, | Performed by: INTERNAL MEDICINE

## 2023-02-02 ENCOUNTER — OFFICE VISIT (OUTPATIENT)
Dept: FAMILY MEDICINE | Facility: CLINIC | Age: 58
End: 2023-02-02
Payer: COMMERCIAL

## 2023-02-02 VITALS
BODY MASS INDEX: 20.85 KG/M2 | HEIGHT: 64 IN | WEIGHT: 122.13 LBS | DIASTOLIC BLOOD PRESSURE: 68 MMHG | HEART RATE: 80 BPM | OXYGEN SATURATION: 99 % | TEMPERATURE: 97 F | SYSTOLIC BLOOD PRESSURE: 118 MMHG

## 2023-02-02 DIAGNOSIS — E03.9 HYPOTHYROIDISM, UNSPECIFIED TYPE: ICD-10-CM

## 2023-02-02 DIAGNOSIS — M81.0 OSTEOPOROSIS, UNSPECIFIED OSTEOPOROSIS TYPE, UNSPECIFIED PATHOLOGICAL FRACTURE PRESENCE: ICD-10-CM

## 2023-02-02 DIAGNOSIS — C50.411 MALIGNANT NEOPLASM OF UPPER-OUTER QUADRANT OF RIGHT BREAST IN FEMALE, ESTROGEN RECEPTOR POSITIVE: Primary | ICD-10-CM

## 2023-02-02 DIAGNOSIS — K52.831 COLLAGENOUS COLITIS: ICD-10-CM

## 2023-02-02 DIAGNOSIS — Z90.13 S/P BILATERAL MASTECTOMY: ICD-10-CM

## 2023-02-02 DIAGNOSIS — D33.3 LEFT ACOUSTIC NEUROMA: ICD-10-CM

## 2023-02-02 DIAGNOSIS — Z17.0 MALIGNANT NEOPLASM OF UPPER-OUTER QUADRANT OF RIGHT BREAST IN FEMALE, ESTROGEN RECEPTOR POSITIVE: Primary | ICD-10-CM

## 2023-02-02 PROCEDURE — 3008F PR BODY MASS INDEX (BMI) DOCUMENTED: ICD-10-PCS | Mod: CPTII,S$GLB,, | Performed by: FAMILY MEDICINE

## 2023-02-02 PROCEDURE — 3078F DIAST BP <80 MM HG: CPT | Mod: CPTII,S$GLB,, | Performed by: FAMILY MEDICINE

## 2023-02-02 PROCEDURE — 99214 OFFICE O/P EST MOD 30 MIN: CPT | Mod: S$GLB,,, | Performed by: FAMILY MEDICINE

## 2023-02-02 PROCEDURE — 1160F PR REVIEW ALL MEDS BY PRESCRIBER/CLIN PHARMACIST DOCUMENTED: ICD-10-PCS | Mod: CPTII,S$GLB,, | Performed by: FAMILY MEDICINE

## 2023-02-02 PROCEDURE — 3074F SYST BP LT 130 MM HG: CPT | Mod: CPTII,S$GLB,, | Performed by: FAMILY MEDICINE

## 2023-02-02 PROCEDURE — 99214 PR OFFICE/OUTPT VISIT, EST, LEVL IV, 30-39 MIN: ICD-10-PCS | Mod: S$GLB,,, | Performed by: FAMILY MEDICINE

## 2023-02-02 PROCEDURE — 3074F PR MOST RECENT SYSTOLIC BLOOD PRESSURE < 130 MM HG: ICD-10-PCS | Mod: CPTII,S$GLB,, | Performed by: FAMILY MEDICINE

## 2023-02-02 PROCEDURE — 3008F BODY MASS INDEX DOCD: CPT | Mod: CPTII,S$GLB,, | Performed by: FAMILY MEDICINE

## 2023-02-02 PROCEDURE — 3078F PR MOST RECENT DIASTOLIC BLOOD PRESSURE < 80 MM HG: ICD-10-PCS | Mod: CPTII,S$GLB,, | Performed by: FAMILY MEDICINE

## 2023-02-02 PROCEDURE — 1160F RVW MEDS BY RX/DR IN RCRD: CPT | Mod: CPTII,S$GLB,, | Performed by: FAMILY MEDICINE

## 2023-02-02 PROCEDURE — 1159F MED LIST DOCD IN RCRD: CPT | Mod: CPTII,S$GLB,, | Performed by: FAMILY MEDICINE

## 2023-02-02 PROCEDURE — 1159F PR MEDICATION LIST DOCUMENTED IN MEDICAL RECORD: ICD-10-PCS | Mod: CPTII,S$GLB,, | Performed by: FAMILY MEDICINE

## 2023-02-02 RX ORDER — BUSPIRONE HYDROCHLORIDE 5 MG/1
5 TABLET ORAL 2 TIMES DAILY
Qty: 180 TABLET | Refills: 1 | Status: SHIPPED | OUTPATIENT
Start: 2023-02-02 | End: 2023-04-26 | Stop reason: SDUPTHER

## 2023-02-02 RX ORDER — ALPRAZOLAM 0.5 MG/1
TABLET ORAL
Qty: 30 TABLET | Refills: 2 | Status: CANCELLED | OUTPATIENT
Start: 2023-02-02

## 2023-02-05 RX ORDER — LEVOTHYROXINE SODIUM 112 UG/1
112 TABLET ORAL DAILY
Qty: 90 TABLET | Refills: 1 | Status: SHIPPED | OUTPATIENT
Start: 2023-02-05 | End: 2023-08-16 | Stop reason: SDUPTHER

## 2023-02-05 RX ORDER — ALPRAZOLAM 0.5 MG/1
TABLET ORAL
Qty: 30 TABLET | Refills: 2 | Status: SHIPPED | OUTPATIENT
Start: 2023-02-05 | End: 2023-08-11 | Stop reason: SDUPTHER

## 2023-02-05 NOTE — PROGRESS NOTES
Subjective:       Patient ID: Blanca Loya is a 57 y.o. female.    Chief Complaint: Follow-up    Breast cancer diagnosed in 2016.  Tender area right lateral chest.  Saw Oncology.  PET scan ordered was to be done I believe today.  But had to be put off for week due to staffing issues at the hospital.  Worried about this.  History of left acoustic neuroma.  Doing okay.  She thin feel some tenderness and possibly nodule in the right lateral chest.  No trauma.  Respiration is normal.  She does not want to do a chest x-ray and rib x-rays at this point.  Wants to wait on the PET scan.  Hypothyroidism TSH is 6.53.  She really has only missed a few pills.  Status post bilateral mastectomy.  Has collagenous colitis.  In osteoporosis.  Uses calcium.  White count is 3100 cholesterol 224 with HDL of 80 LDL of 126 CMP is normal.      Physical examination.  Vital signs noted.  No acute distress.  Neck without adenopathy no bruit.  Chest clear.  Tender right lateral chest mid axillary line.  Heart regular rate rhythm.  Abdomen bowel sounds positive soft nontender.  Extremities without edema positive pulses.      Objective:        Assessment:       1. Malignant neoplasm of upper-outer quadrant of right breast in female, estrogen receptor positive    2. Hypothyroidism, unspecified type    3. S/P bilateral mastectomy    4. Collagenous colitis    5. Osteoporosis, unspecified osteoporosis type, unspecified pathological fracture presence    6. Left acoustic neuroma          Plan:       Malignant neoplasm of upper-outer quadrant of right breast in female, estrogen receptor positive    Hypothyroidism, unspecified type  -     TSH; Future; Expected date: 05/02/2023    S/P bilateral mastectomy    Collagenous colitis    Osteoporosis, unspecified osteoporosis type, unspecified pathological fracture presence    Left acoustic neuroma    Other orders  -     busPIRone (BUSPAR) 5 MG Tab; Take 1 tablet (5 mg total) by mouth 2 (two) times daily.   Dispense: 180 tablet; Refill: 1  -     ALPRAZolam (XANAX) 0.5 MG tablet; TK 1 T PO QD PRN  Dispense: 30 tablet; Refill: 2  -     levothyroxine (SYNTHROID) 112 MCG tablet; Take 1 tablet (112 mcg total) by mouth once daily.  Dispense: 90 tablet; Refill: 1      Refill all of her medications including the BuSpar.  Increase her levothyroxine to 112.  Check this in 3 months.  PET scan as planned.  Follow-up in 3 months.  If she changes her mind will get chest x-ray and rib x-rays prior to the PET scan.

## 2023-02-10 ENCOUNTER — HOSPITAL ENCOUNTER (OUTPATIENT)
Dept: RADIOLOGY | Facility: HOSPITAL | Age: 58
Discharge: HOME | End: 2023-02-10
Attending: INTERNAL MEDICINE
Payer: COMMERCIAL

## 2023-02-10 DIAGNOSIS — Z17.0 MALIGNANT NEOPLASM OF UPPER-OUTER QUADRANT OF RIGHT BREAST IN FEMALE, ESTROGEN RECEPTOR POSITIVE: ICD-10-CM

## 2023-02-10 DIAGNOSIS — C50.411 MALIGNANT NEOPLASM OF UPPER-OUTER QUADRANT OF RIGHT BREAST IN FEMALE, ESTROGEN RECEPTOR POSITIVE: ICD-10-CM

## 2023-02-10 DIAGNOSIS — R07.89 RIGHT-SIDED CHEST WALL PAIN: ICD-10-CM

## 2023-02-10 DIAGNOSIS — N64.4 PAIN OF RIGHT BREAST: ICD-10-CM

## 2023-02-10 LAB — GLUCOSE SERPL-MCNC: 98 MG/DL (ref 70–110)

## 2023-02-10 PROCEDURE — A9552 F18 FDG: HCPCS | Mod: PO

## 2023-02-14 ENCOUNTER — TELEPHONE (OUTPATIENT)
Dept: HEMATOLOGY/ONCOLOGY | Facility: CLINIC | Age: 58
End: 2023-02-14

## 2023-02-14 NOTE — TELEPHONE ENCOUNTER
Talked to patient about PET scan.   She does have a chronic gallbladder condition.   She is currently seeing Dr. Edmonds.

## 2023-04-25 ENCOUNTER — PATIENT MESSAGE (OUTPATIENT)
Dept: FAMILY MEDICINE | Facility: CLINIC | Age: 58
End: 2023-04-25
Payer: COMMERCIAL

## 2023-04-26 RX ORDER — BUSPIRONE HYDROCHLORIDE 5 MG/1
5 TABLET ORAL 2 TIMES DAILY
Qty: 180 TABLET | Refills: 1 | Status: SHIPPED | OUTPATIENT
Start: 2023-04-26

## 2023-05-05 ENCOUNTER — OFFICE VISIT (OUTPATIENT)
Dept: FAMILY MEDICINE | Facility: CLINIC | Age: 58
End: 2023-05-05
Payer: COMMERCIAL

## 2023-05-05 VITALS
BODY MASS INDEX: 21.11 KG/M2 | SYSTOLIC BLOOD PRESSURE: 118 MMHG | TEMPERATURE: 98 F | DIASTOLIC BLOOD PRESSURE: 70 MMHG | HEART RATE: 68 BPM | OXYGEN SATURATION: 99 % | WEIGHT: 123 LBS

## 2023-05-05 DIAGNOSIS — R10.13 EPIGASTRIC PAIN: ICD-10-CM

## 2023-05-05 DIAGNOSIS — Z17.0 MALIGNANT NEOPLASM OF UPPER-OUTER QUADRANT OF RIGHT BREAST IN FEMALE, ESTROGEN RECEPTOR POSITIVE: ICD-10-CM

## 2023-05-05 DIAGNOSIS — C50.411 MALIGNANT NEOPLASM OF UPPER-OUTER QUADRANT OF RIGHT BREAST IN FEMALE, ESTROGEN RECEPTOR POSITIVE: ICD-10-CM

## 2023-05-05 DIAGNOSIS — F41.9 ANXIETY: ICD-10-CM

## 2023-05-05 DIAGNOSIS — E03.9 HYPOTHYROIDISM, UNSPECIFIED TYPE: Primary | ICD-10-CM

## 2023-05-05 LAB — TSH SERPL DL<=0.005 MIU/L-ACNC: 0.54 UIU/ML (ref 0.45–4.5)

## 2023-05-05 PROCEDURE — 3074F SYST BP LT 130 MM HG: CPT | Mod: CPTII,S$GLB,, | Performed by: FAMILY MEDICINE

## 2023-05-05 PROCEDURE — 1160F PR REVIEW ALL MEDS BY PRESCRIBER/CLIN PHARMACIST DOCUMENTED: ICD-10-PCS | Mod: CPTII,S$GLB,, | Performed by: FAMILY MEDICINE

## 2023-05-05 PROCEDURE — 99214 OFFICE O/P EST MOD 30 MIN: CPT | Mod: S$GLB,,, | Performed by: FAMILY MEDICINE

## 2023-05-05 PROCEDURE — 1160F RVW MEDS BY RX/DR IN RCRD: CPT | Mod: CPTII,S$GLB,, | Performed by: FAMILY MEDICINE

## 2023-05-05 PROCEDURE — 3008F BODY MASS INDEX DOCD: CPT | Mod: CPTII,S$GLB,, | Performed by: FAMILY MEDICINE

## 2023-05-05 PROCEDURE — 1159F MED LIST DOCD IN RCRD: CPT | Mod: CPTII,S$GLB,, | Performed by: FAMILY MEDICINE

## 2023-05-05 PROCEDURE — 3078F PR MOST RECENT DIASTOLIC BLOOD PRESSURE < 80 MM HG: ICD-10-PCS | Mod: CPTII,S$GLB,, | Performed by: FAMILY MEDICINE

## 2023-05-05 PROCEDURE — 99214 PR OFFICE/OUTPT VISIT, EST, LEVL IV, 30-39 MIN: ICD-10-PCS | Mod: S$GLB,,, | Performed by: FAMILY MEDICINE

## 2023-05-05 PROCEDURE — 1159F PR MEDICATION LIST DOCUMENTED IN MEDICAL RECORD: ICD-10-PCS | Mod: CPTII,S$GLB,, | Performed by: FAMILY MEDICINE

## 2023-05-05 PROCEDURE — 3074F PR MOST RECENT SYSTOLIC BLOOD PRESSURE < 130 MM HG: ICD-10-PCS | Mod: CPTII,S$GLB,, | Performed by: FAMILY MEDICINE

## 2023-05-05 PROCEDURE — 3078F DIAST BP <80 MM HG: CPT | Mod: CPTII,S$GLB,, | Performed by: FAMILY MEDICINE

## 2023-05-05 PROCEDURE — 3008F PR BODY MASS INDEX (BMI) DOCUMENTED: ICD-10-PCS | Mod: CPTII,S$GLB,, | Performed by: FAMILY MEDICINE

## 2023-05-10 NOTE — PROGRESS NOTES
Subjective:       Patient ID: Blanca Loya is a 57 y.o. female.    Chief Complaint: Follow-up (Thyroid bw)    Follow-up of hypothyroidism.  Needs refill of levothyroxine.  TSH 0.53.  Down from 6.53.  Anxiety BuSpar and rare Xanax.  Just using 5 mg b.i.d. of BuSpar working well for her.  Cannot tolerate more.  Breast cancer.  PET scan okay.  Some inflammatory changes in the area of the gallbladder.  Liver functions normal in January.    Physical examination.  Vital signs noted.  Chest clear.  Heart regular rate and rhythm.  Abdomen bowel sounds are positive soft slightly tender in the epigastric area.  More medial from the gallbladder.      Objective:        Assessment:       1. Hypothyroidism, unspecified type    2. Anxiety    3. Malignant neoplasm of upper-outer quadrant of right breast in female, estrogen receptor positive    4. Epigastric pain        Plan:       Hypothyroidism, unspecified type    Anxiety    Malignant neoplasm of upper-outer quadrant of right breast in female, estrogen receptor positive    Epigastric pain  -     CBC Auto Differential; Future; Expected date: 05/05/2023  -     Comprehensive Metabolic Panel; Future; Expected date: 05/05/2023  -     US Abdomen Limited; Future; Expected date: 05/05/2023    CBC CMP ordered.  Ultrasound of the gallbladder.  Continue levothyroxine at 0.112 daily.  Follow-up 3-6 months.  Same antianxiety agents.  Anxiety is fairly well controlled.

## 2023-05-18 ENCOUNTER — HOSPITAL ENCOUNTER (OUTPATIENT)
Dept: RADIOLOGY | Facility: HOSPITAL | Age: 58
Discharge: HOME | End: 2023-05-18
Attending: FAMILY MEDICINE
Payer: COMMERCIAL

## 2023-05-18 DIAGNOSIS — R10.13 EPIGASTRIC PAIN: ICD-10-CM

## 2023-05-18 PROCEDURE — 76705 ECHO EXAM OF ABDOMEN: CPT | Mod: TC,PO

## 2023-05-19 LAB
ALBUMIN SERPL-MCNC: 4.4 G/DL (ref 3.8–4.9)
ALBUMIN/GLOB SERPL: 1.9 {RATIO} (ref 1.2–2.2)
ALP SERPL-CCNC: 98 IU/L (ref 44–121)
ALT SERPL-CCNC: 24 IU/L (ref 0–32)
AST SERPL-CCNC: 28 IU/L (ref 0–40)
BASOPHILS # BLD AUTO: 0 X10E3/UL (ref 0–0.2)
BASOPHILS NFR BLD AUTO: 1 %
BILIRUB SERPL-MCNC: 0.6 MG/DL (ref 0–1.2)
BUN SERPL-MCNC: 11 MG/DL (ref 6–24)
BUN/CREAT SERPL: 15 (ref 9–23)
CALCIUM SERPL-MCNC: 9.9 MG/DL (ref 8.7–10.2)
CHLORIDE SERPL-SCNC: 105 MMOL/L (ref 96–106)
CO2 SERPL-SCNC: 24 MMOL/L (ref 20–29)
CREAT SERPL-MCNC: 0.75 MG/DL (ref 0.57–1)
EOSINOPHIL # BLD AUTO: 0 X10E3/UL (ref 0–0.4)
EOSINOPHIL NFR BLD AUTO: 1 %
ERYTHROCYTE [DISTWIDTH] IN BLOOD BY AUTOMATED COUNT: 12.6 % (ref 11.7–15.4)
EST. GFR  (NO RACE VARIABLE): 93 ML/MIN/1.73
GLOBULIN SER CALC-MCNC: 2.3 G/DL (ref 1.5–4.5)
GLUCOSE SERPL-MCNC: 88 MG/DL (ref 70–99)
HCT VFR BLD AUTO: 40 % (ref 34–46.6)
HGB BLD-MCNC: 13.3 G/DL (ref 11.1–15.9)
IMM GRANULOCYTES # BLD AUTO: 0 X10E3/UL (ref 0–0.1)
IMM GRANULOCYTES NFR BLD AUTO: 0 %
LYMPHOCYTES # BLD AUTO: 0.8 X10E3/UL (ref 0.7–3.1)
LYMPHOCYTES NFR BLD AUTO: 24 %
MCH RBC QN AUTO: 31.8 PG (ref 26.6–33)
MCHC RBC AUTO-ENTMCNC: 33.3 G/DL (ref 31.5–35.7)
MCV RBC AUTO: 96 FL (ref 79–97)
MONOCYTES # BLD AUTO: 0.4 X10E3/UL (ref 0.1–0.9)
MONOCYTES NFR BLD AUTO: 13 %
NEUTROPHILS # BLD AUTO: 1.9 X10E3/UL (ref 1.4–7)
NEUTROPHILS NFR BLD AUTO: 61 %
PLATELET # BLD AUTO: 267 X10E3/UL (ref 150–450)
POTASSIUM SERPL-SCNC: 3.8 MMOL/L (ref 3.5–5.2)
PROT SERPL-MCNC: 6.7 G/DL (ref 6–8.5)
RBC # BLD AUTO: 4.18 X10E6/UL (ref 3.77–5.28)
SODIUM SERPL-SCNC: 143 MMOL/L (ref 134–144)
WBC # BLD AUTO: 3.2 X10E3/UL (ref 3.4–10.8)

## 2023-05-23 NOTE — PROGRESS NOTES
[facility] Hematology/Oncology  PROGRESS NOTE - Follow-up Visit      Subjective:       Patient ID:   NAME: Blanca Loya : 1965     57 y.o. female    Referring Doc: Cora  Other Physicians: Lyndsay Gonsales, CARLY Barillas; Mary Gonsales    Chief Complaint:  Breast ca f/u    History of Present Illness:     Patient is being seen today in person for a regularly scheduled follow-up appointment. She is here by herself.    She saw Dr Shepherd on  and had US of the       No CP, SOB, HA's or N/V.  Stress with work and home; generalized aches and pains    She has been off all oral antihormones.      She is off steroids at this time            Discussed Covid19 precautions - she did not get her vaccinations; she had covid in Aug 2021           ROS:   GEN: normal without any fever, night sweats or weight loss; anxiety issues generalized aches and pains; stress  HEENT: normal with no HA's, sore throat, stiff neck, changes in vision  CV: normal with no CP, SOB, PND, RUGGIERO or orthopnea  PULM: normal with no SOB, cough, hemoptysis, sputum or pleuritic pain  GI: normal with no abdominal pain, nausea, vomiting, constipation, diarrhea, melanotic stools, BRBPR, or hematemesis; chronic GI issues  : normal with no hematuria, dysuria  BREAST: normal with no mass, discharge, pain  SKIN: normal with no rash, erythema, bruising, or swelling    Allergies:  Review of patient's allergies indicates:   Allergen Reactions    Demerol [meperidine] Anxiety    Pcn [penicillins] Rash       Medications:    Current Outpatient Medications:     ALPRAZolam (XANAX) 0.5 MG tablet, TK 1 T PO QD PRN, Disp: 30 tablet, Rfl: 2    ascorbic acid, vitamin C, (VITAMIN C) 1000 MG tablet, Take 1,000 mg by mouth once daily., Disp: , Rfl:     b complex vitamins tablet, Take 1 tablet by mouth once daily., Disp: , Rfl:     budesonide (ENTOCORT EC) 3 mg capsule, Take 9 mg by mouth once daily., Disp: , Rfl:     busPIRone (BUSPAR) 5 MG Tab, Take 1 tablet (5  "mg total) by mouth 2 (two) times daily., Disp: 180 tablet, Rfl: 1    butalbital-acetaminophen-caffeine -40 mg (FIORICET, ESGIC) -40 mg per tablet, Take 1 tablet by mouth every 6 (six) hours as needed for Pain or Headaches., Disp: 28 tablet, Rfl: 2    CHROMIUM ORAL, Take by mouth., Disp: , Rfl:     levothyroxine (SYNTHROID) 112 MCG tablet, Take 1 tablet (112 mcg total) by mouth once daily., Disp: 90 tablet, Rfl: 1    POTASSIUM ACETATE MISC, by Misc.(Non-Drug; Combo Route) route., Disp: , Rfl:     PMHx/PSHx Updates:  See patient's last visit with me on 1/19/2023  See H&P on 9/8/2016        Pathology:    Colon biopsy  9/22/2020:    COLON, RANDOM, BIOPSY:   - COLLAGENOUS COLITIS.       See path note on 8/30/2016      Objective:     Vitals:  Blood pressure 127/76, pulse 106, temperature 97.6 °F (36.4 °C), resp. rate 16, height 5' 4" (1.626 m), weight 55.9 kg (123 lb 3.2 oz), last menstrual period 09/10/2016.        Physical Examination:   GEN: no apparent distress, comfortable; AAOx3  HEAD: atraumatic and normocephalic  EYES: no conjunctival pallor or muddiness, no icterus; normal pupil reaction to ambient light  ENT: OMM, no pharyngeal erythema, external bilateral ears WNL; no visible thrush or ulcers  NECK: no masses or swelling, trachea midline, no visible LAD/LN's   CV: no palpitations; no pedal edema; no noticeable JVD or neck vein distension  CHEST: Normal respiratory effort; chest wall breath movements symmetrical; no audible wheezing  ABDOM: non-distended; no bloating  MUSC/Skeletal: ROM normal; joints visibly normal; no deformities or arthropathy  EXTREM: no clubbing, cyanosis, inflammation or swelling  SKIN: no rashes, lesions, ulcers, petechiae or subcutaneous nodules  : no tyson  NEURO: moving all 4 extremities; AAOx3; no tremors  PSYCH: normal mood, affect and behavior  LYMPH: no visible LN's or LAD  Breast: no changes; no appreciable mass or LAD in axilla (Jenn present)          Labs: "     Lab Results   Component Value Date    WBC 3.2 (L) 05/18/2023    HGB 13.3 05/18/2023    HCT 40.0 05/18/2023    MCV 96 05/18/2023     05/18/2023       CMP  Sodium   Date Value Ref Range Status   05/18/2023 143 134 - 144 mmol/L Final   04/23/2019 143 134 - 144 mmol/L      Potassium   Date Value Ref Range Status   05/18/2023 3.8 3.5 - 5.2 mmol/L Final     Chloride   Date Value Ref Range Status   05/18/2023 105 96 - 106 mmol/L Final   04/23/2019 103 98 - 110 mmol/L      CO2   Date Value Ref Range Status   05/18/2023 24 20 - 29 mmol/L Final     Glucose   Date Value Ref Range Status   05/18/2023 88 70 - 99 mg/dL Final   04/23/2019 88 70 - 99 mg/dL      BUN   Date Value Ref Range Status   05/18/2023 11 6 - 24 mg/dL Final     Creatinine   Date Value Ref Range Status   05/18/2023 0.75 0.57 - 1.00 mg/dL Final   04/23/2019 0.60 0.60 - 1.40 mg/dL      Calcium   Date Value Ref Range Status   05/18/2023 9.9 8.7 - 10.2 mg/dL Final     Total Protein   Date Value Ref Range Status   01/13/2022 7.7 6.0 - 8.4 g/dL Final     Albumin   Date Value Ref Range Status   05/18/2023 4.4 3.8 - 4.9 g/dL Final   01/13/2022 4.5 3.5 - 5.2 g/dL Final   04/23/2019 4.0 3.1 - 4.7 g/dL      Total Bilirubin   Date Value Ref Range Status   05/18/2023 0.6 0.0 - 1.2 mg/dL Final     Alkaline Phosphatase   Date Value Ref Range Status   01/13/2022 72 55 - 135 U/L Final     AST   Date Value Ref Range Status   05/18/2023 28 0 - 40 IU/L Final     ALT   Date Value Ref Range Status   05/18/2023 24 0 - 32 IU/L Final     Anion Gap   Date Value Ref Range Status   01/13/2022 8 8 - 16 mmol/L Final     eGFR if    Date Value Ref Range Status   01/13/2022 >60.0 >60 mL/min/1.73 m^2 Final     eGFR if non    Date Value Ref Range Status   01/13/2022 >60.0 >60 mL/min/1.73 m^2 Final     Comment:     Calculation used to obtain the estimated glomerular filtration  rate (eGFR) is the CKD-EPI equation.                Radiology/Diagnostic  Studies:    CXR  1/6/2022:    IMPRESSION:     No acute cardiopulmonary abnormality.         PET 9/24/2020:    Impression:     1. No evidence of recurrence of disease or metastatic disease.  2. Several colon air-fluid levels, suggesting nonspecific diarrheal illness.         US  11/7/2019:  Impression       Normal right upper quadrant ultrasound.         MRI head  2/17/2020:    Impression:     Stable appearance of previously defined enhancing extra-axial mass which extends from the left internal auditory canal into the left cerebellopontine angle cistern and along the adjacent temporal bone.      PET/CT  5/13/2019:    IMPRESSION:    1. No evidence of FDG avid metastatic disease.  2. Incidental findings as above.          Chest/Abdom CT  8/24/2018:      IMPRESSION:  1. No evidence of metastatic disease in the chest, abdomen, or the pelvis.  2. Additional observations as above           I have reviewed all available lab results and radiology reports.    Assessment/Plan:   (1) 57 y.o. female with diagnosis of right breast cancer  - s/p bilateral mastectomies on 9/26/16 followed by reconstruction  - followed by Dr Bear with [facility]  - she had 2 out of 7 LN's that were positive  - she was a Stage IIA  - ER+/TN+ and her2nu neg  - s/p AC x4 and taxol x4  - she has seen Dr Gonsales with rad/onc for XRT and completed 5 weeks last month  - discussed the pro's and con's of tamoxifen versus arimidex, and in light of the pap issues, arimidex is probably the better choice  - she had been on arimidex but it was discontinued because of her liver  - she has some mood swings and weight gain  - PET done on 9/21/17 with no evidence of mets;   - recent CT chest/abdom on 8/24/2018 showed no evidence on cancer  - s/p prior repeat surgery with right-sided lift with Dr Umana with repeat evaluation in Nov 2018   - she had a slow post-op recovery and has residual aches and pains  - she had PET on 5/13/2019  - she has been on arimidex and  "now femara which she both discontinued; she is having some generalized aches and pains;   - discussed aromasine and she is willing to give it a try; she does not want to take tamoxifen due to the clot and ovarian cancer risks   - she had been on the aromasin for 5 weeks but then developed elevated LFTs and it was discontinued    10/5/2020:  - latest PET on 9/24/2020 with no evidence of cancer  - s/p bilat mastectomies with reconstructions so she does not get mammograms    2/4/2021:  - she has been off all oral antihormone  - She had prior colonoscopy with Dr Edmonds and was diagnsoed with "collagenous" colitis.     6/14/2021:  - she seems to be doing ok overall    1/13/2022:  - no new issues  - recent CXR negative    7/13/2022:  -  She has some anxiety issues and jitteriness.  - she has some gum issues.   - She has been off all oral antihormones.    - She has been on steroids per Dr Edmonds and plans to see him again this summer some time  - She saw Dr Shepherd in June 2022 1/19/2023:  - no appreciable mass or LAD  - will get PEt as precaution    5/24/2023:  - she had PET scan back in Feb 2023  - she is considering having elective breast surgery - will refer to Dr Winslow for evaluation         (2) Mild leucopenia/anemia secondary to chemotherapy (resolved)    6/14/2021:  - latest wbc is at  4.14 - latest hgb was at 12.4    1/13/2021:  - repeat labs pending  - labs from oct 2021 with wbc at 3.0    7/14/2022:  - latest CBC adequate    1/19/2023:  - latest WBC at 3.1    5/24/2023:  - latest wbc at 3.2 and relatively stable     (3) Prior abnormal PAP issues - followed by Dr Cline with GYN; latest pap was negative per patient and she was also HPV negative     (4) Hx of acoustic neuroma in past - s/p XRT in 2010; followed by Dr Gonsales  - recent MRI stable per patient in Feb 2020     (5) Chronic fibromyalgia - she is not being followed by anyone; she may be having a flare-up      (6) Collagenous colitis -    - GI " "issues with prior bout of colitis - seen by Dr Edmonds with GI    - prior mild elevation bili and alk phos - resolved  - she had liver biopsy and EGD with "inflammation" per patient  - s/p esophageal dilation       2/4/2021:  - She had prior colonoscopy in Nov 2020 with Dr Edmonds and was diagnsoed with "collagenous" colitis.     6/14/2021:  - bowels are stable    7/14/2022:  - patient to f/u with Dr Edmonds this summer         (7) RUE - resolved - possible lymphedema issues - she no longer sees PT  - also seen by Dr Liao    (8) Hypokalemia -on supplements - patient wanted to try OTC and did not want prescription meds    (9) Occasional palpitations - seen by Dr Hernandez with cardiology    (10) Thyroid issues - followed by PCP        1. Malignant neoplasm of upper-outer quadrant of right breast in female, estrogen receptor positive        2. S/P bilateral mastectomy        3. Estrogen receptor positive                PLAN:  1. Check up to date labs in 6 months  2. F/U with PCP, Surg and GYN  3. Hold on any antihormone therapy indefinitely for now  4.  F/u with Dr Edmonds with GI encouraged  5.  F/u with cardiology    6. F/u with PCP   7. Refer to Dr Winslow       RTC in   3-4 month keep    Fax note to Chaparro Shepherd III, *, CARLY Bear, Ilda, Lyndsay; Pham Hernandez     Total Time spent on patient:    I spent over 25 mins of time with the patient. Reviewed results of the recently ordered labs, tests, reports and studies; made directives with regards to the results. Over half of this time was spent couseling and coordinating care, making treatment and analytical decisions; ordering necessary labs, tests and studies; and discussing treatment options and setting up treatment plan(s) if indicated.        Discussion:     COVID-19 Discussion:    I had long discussion with patient and any applicable family about the COVID-19 coronavirus epidemic and the recommended precautions with regard to " cancer and/or hematology patients. I have re-iterated the CDC recommendations for adequate hand washing, use of hand -like products, and coughing into elbow, etc. In addition, especially for our patients who are on chemotherapy and/or our otherwise immunocompromised patients, I have recommended avoidance of crowds, including movie theaters, restaurants, churches, etc. I have recommended avoidance of any sick or symptomatic family members and/or friends. I have also recommended avoidance of any raw and unwashed food products, and general avoidance of food items that have not been prepared by themselves. The patient has been asked to call us immediately with any symptom developments, issues, questions or other general concerns.            I have explained all of the above in detail and the patient understands all of the current recommendation(s). I have answered all of their questions to the best of my ability and to their complete satisfaction.   The patient is to continue with the current management plan.            Electronically signed by Bronson Carrillo MD

## 2023-05-24 ENCOUNTER — OFFICE VISIT (OUTPATIENT)
Dept: HEMATOLOGY/ONCOLOGY | Facility: CLINIC | Age: 58
End: 2023-05-24
Payer: COMMERCIAL

## 2023-05-24 VITALS
BODY MASS INDEX: 21.03 KG/M2 | HEART RATE: 106 BPM | WEIGHT: 123.19 LBS | RESPIRATION RATE: 16 BRPM | HEIGHT: 64 IN | SYSTOLIC BLOOD PRESSURE: 127 MMHG | TEMPERATURE: 98 F | DIASTOLIC BLOOD PRESSURE: 76 MMHG

## 2023-05-24 DIAGNOSIS — Z17.0 MALIGNANT NEOPLASM OF UPPER-OUTER QUADRANT OF RIGHT BREAST IN FEMALE, ESTROGEN RECEPTOR POSITIVE: Primary | ICD-10-CM

## 2023-05-24 DIAGNOSIS — C50.411 MALIGNANT NEOPLASM OF UPPER-OUTER QUADRANT OF RIGHT BREAST IN FEMALE, ESTROGEN RECEPTOR POSITIVE: Primary | ICD-10-CM

## 2023-05-24 DIAGNOSIS — Z17.0 ESTROGEN RECEPTOR POSITIVE: ICD-10-CM

## 2023-05-24 DIAGNOSIS — Z90.13 S/P BILATERAL MASTECTOMY: ICD-10-CM

## 2023-05-24 PROCEDURE — 3078F PR MOST RECENT DIASTOLIC BLOOD PRESSURE < 80 MM HG: ICD-10-PCS | Mod: CPTII,S$GLB,, | Performed by: INTERNAL MEDICINE

## 2023-05-24 PROCEDURE — 1159F PR MEDICATION LIST DOCUMENTED IN MEDICAL RECORD: ICD-10-PCS | Mod: CPTII,S$GLB,, | Performed by: INTERNAL MEDICINE

## 2023-05-24 PROCEDURE — 3008F BODY MASS INDEX DOCD: CPT | Mod: CPTII,S$GLB,, | Performed by: INTERNAL MEDICINE

## 2023-05-24 PROCEDURE — 1160F RVW MEDS BY RX/DR IN RCRD: CPT | Mod: CPTII,S$GLB,, | Performed by: INTERNAL MEDICINE

## 2023-05-24 PROCEDURE — 99214 OFFICE O/P EST MOD 30 MIN: CPT | Mod: S$GLB,,, | Performed by: INTERNAL MEDICINE

## 2023-05-24 PROCEDURE — 1160F PR REVIEW ALL MEDS BY PRESCRIBER/CLIN PHARMACIST DOCUMENTED: ICD-10-PCS | Mod: CPTII,S$GLB,, | Performed by: INTERNAL MEDICINE

## 2023-05-24 PROCEDURE — 3074F PR MOST RECENT SYSTOLIC BLOOD PRESSURE < 130 MM HG: ICD-10-PCS | Mod: CPTII,S$GLB,, | Performed by: INTERNAL MEDICINE

## 2023-05-24 PROCEDURE — 99214 PR OFFICE/OUTPT VISIT, EST, LEVL IV, 30-39 MIN: ICD-10-PCS | Mod: S$GLB,,, | Performed by: INTERNAL MEDICINE

## 2023-05-24 PROCEDURE — 1159F MED LIST DOCD IN RCRD: CPT | Mod: CPTII,S$GLB,, | Performed by: INTERNAL MEDICINE

## 2023-05-24 PROCEDURE — 3078F DIAST BP <80 MM HG: CPT | Mod: CPTII,S$GLB,, | Performed by: INTERNAL MEDICINE

## 2023-05-24 PROCEDURE — 3074F SYST BP LT 130 MM HG: CPT | Mod: CPTII,S$GLB,, | Performed by: INTERNAL MEDICINE

## 2023-05-24 PROCEDURE — 3008F PR BODY MASS INDEX (BMI) DOCUMENTED: ICD-10-PCS | Mod: CPTII,S$GLB,, | Performed by: INTERNAL MEDICINE

## 2023-06-26 DIAGNOSIS — Z85.3 PERSONAL HISTORY OF MALIGNANT NEOPLASM OF BREAST: Primary | ICD-10-CM

## 2023-07-20 ENCOUNTER — HOSPITAL ENCOUNTER (OUTPATIENT)
Dept: RADIOLOGY | Facility: HOSPITAL | Age: 58
Discharge: HOME | End: 2023-07-20
Attending: PLASTIC SURGERY
Payer: COMMERCIAL

## 2023-07-20 DIAGNOSIS — Z85.3 PERSONAL HISTORY OF MALIGNANT NEOPLASM OF BREAST: ICD-10-CM

## 2023-07-20 PROCEDURE — 25500020 PHARM REV CODE 255: Mod: PO | Performed by: PLASTIC SURGERY

## 2023-07-20 PROCEDURE — 75635 CT ANGIO ABDOMINAL ARTERIES: CPT | Mod: TC,PO

## 2023-07-20 RX ADMIN — IOHEXOL 120 ML: 350 INJECTION, SOLUTION INTRAVENOUS at 10:07

## 2023-08-11 ENCOUNTER — OFFICE VISIT (OUTPATIENT)
Dept: FAMILY MEDICINE | Facility: CLINIC | Age: 58
End: 2023-08-11
Payer: COMMERCIAL

## 2023-08-11 VITALS
HEART RATE: 94 BPM | OXYGEN SATURATION: 98 % | TEMPERATURE: 98 F | WEIGHT: 124.69 LBS | DIASTOLIC BLOOD PRESSURE: 64 MMHG | HEIGHT: 64 IN | BODY MASS INDEX: 21.29 KG/M2 | SYSTOLIC BLOOD PRESSURE: 124 MMHG

## 2023-08-11 DIAGNOSIS — D33.3 LEFT ACOUSTIC NEUROMA: ICD-10-CM

## 2023-08-11 DIAGNOSIS — E03.9 HYPOTHYROIDISM, UNSPECIFIED TYPE: Primary | ICD-10-CM

## 2023-08-11 DIAGNOSIS — Z17.0 MALIGNANT NEOPLASM OF UPPER-OUTER QUADRANT OF RIGHT BREAST IN FEMALE, ESTROGEN RECEPTOR POSITIVE: ICD-10-CM

## 2023-08-11 DIAGNOSIS — F41.9 ANXIETY: ICD-10-CM

## 2023-08-11 DIAGNOSIS — C50.411 MALIGNANT NEOPLASM OF UPPER-OUTER QUADRANT OF RIGHT BREAST IN FEMALE, ESTROGEN RECEPTOR POSITIVE: ICD-10-CM

## 2023-08-11 PROCEDURE — 1159F PR MEDICATION LIST DOCUMENTED IN MEDICAL RECORD: ICD-10-PCS | Mod: CPTII,S$GLB,, | Performed by: FAMILY MEDICINE

## 2023-08-11 PROCEDURE — 99213 OFFICE O/P EST LOW 20 MIN: CPT | Mod: S$GLB,,, | Performed by: FAMILY MEDICINE

## 2023-08-11 PROCEDURE — 1160F RVW MEDS BY RX/DR IN RCRD: CPT | Mod: CPTII,S$GLB,, | Performed by: FAMILY MEDICINE

## 2023-08-11 PROCEDURE — 3074F SYST BP LT 130 MM HG: CPT | Mod: CPTII,S$GLB,, | Performed by: FAMILY MEDICINE

## 2023-08-11 PROCEDURE — 3008F BODY MASS INDEX DOCD: CPT | Mod: CPTII,S$GLB,, | Performed by: FAMILY MEDICINE

## 2023-08-11 PROCEDURE — 3078F PR MOST RECENT DIASTOLIC BLOOD PRESSURE < 80 MM HG: ICD-10-PCS | Mod: CPTII,S$GLB,, | Performed by: FAMILY MEDICINE

## 2023-08-11 PROCEDURE — 3078F DIAST BP <80 MM HG: CPT | Mod: CPTII,S$GLB,, | Performed by: FAMILY MEDICINE

## 2023-08-11 PROCEDURE — 3008F PR BODY MASS INDEX (BMI) DOCUMENTED: ICD-10-PCS | Mod: CPTII,S$GLB,, | Performed by: FAMILY MEDICINE

## 2023-08-11 PROCEDURE — 3074F PR MOST RECENT SYSTOLIC BLOOD PRESSURE < 130 MM HG: ICD-10-PCS | Mod: CPTII,S$GLB,, | Performed by: FAMILY MEDICINE

## 2023-08-11 PROCEDURE — 1160F PR REVIEW ALL MEDS BY PRESCRIBER/CLIN PHARMACIST DOCUMENTED: ICD-10-PCS | Mod: CPTII,S$GLB,, | Performed by: FAMILY MEDICINE

## 2023-08-11 PROCEDURE — 99213 PR OFFICE/OUTPT VISIT, EST, LEVL III, 20-29 MIN: ICD-10-PCS | Mod: S$GLB,,, | Performed by: FAMILY MEDICINE

## 2023-08-11 PROCEDURE — 1159F MED LIST DOCD IN RCRD: CPT | Mod: CPTII,S$GLB,, | Performed by: FAMILY MEDICINE

## 2023-08-11 RX ORDER — ALPRAZOLAM 0.5 MG/1
TABLET ORAL
Qty: 30 TABLET | Refills: 5 | Status: SHIPPED | OUTPATIENT
Start: 2023-08-11

## 2023-08-16 RX ORDER — LEVOTHYROXINE SODIUM 112 UG/1
112 TABLET ORAL DAILY
Qty: 90 TABLET | Refills: 1 | Status: SHIPPED | OUTPATIENT
Start: 2023-08-16 | End: 2024-02-16 | Stop reason: SDUPTHER

## 2023-08-16 NOTE — TELEPHONE ENCOUNTER
No care due was identified.  [facility] Embedded Care Due Messages. Reference number: [id number].   8/16/2023 3:11:15 PM CDT

## 2023-08-16 NOTE — PROGRESS NOTES
Subjective:       Patient ID: Blanca Loya is a 57 y.o. female.    Chief Complaint: Follow-up    History of breast cancer.  Status post bilateral mastectomy.  Having her implants removed soon.  Anxiety issues p.r.n. Xanax.  Takes a piece here in there.  Needs refill.  History of left acoustic neuroma.  No recurrence.  Hypothyroidism TSH 0.535 in May.  Colonoscopy September of 2020 due again in 10 years.    Physical examination.  Vital signs noted.  Neck without bruit.  Chest clear.  Heart regular rate rhythm.  Abdomen bowel sounds are positive soft nontender.  Extremities without edema positive pulses.        Objective:        Assessment:       1. Hypothyroidism, unspecified type    2. Anxiety    3. Malignant neoplasm of upper-outer quadrant of right breast in female, estrogen receptor positive    4. Left acoustic neuroma        Plan:       Hypothyroidism, unspecified type    Anxiety    Malignant neoplasm of upper-outer quadrant of right breast in female, estrogen receptor positive    Left acoustic neuroma    Other orders  -     ALPRAZolam (XANAX) 0.5 MG tablet; TK 1 T PO QD PRN  Dispense: 30 tablet; Refill: 5      Refill her Xanax 0.5 30 with 5 refills.  Has had problems with all preventive medications.  Declines all vaccines.  Follow-up here in 3 months.

## 2023-09-22 ENCOUNTER — TELEPHONE (OUTPATIENT)
Dept: HEMATOLOGY/ONCOLOGY | Facility: CLINIC | Age: 58
End: 2023-09-22

## 2023-09-22 DIAGNOSIS — Z17.0 MALIGNANT NEOPLASM OF UPPER-OUTER QUADRANT OF RIGHT BREAST IN FEMALE, ESTROGEN RECEPTOR POSITIVE: Primary | ICD-10-CM

## 2023-09-22 DIAGNOSIS — C50.411 MALIGNANT NEOPLASM OF UPPER-OUTER QUADRANT OF RIGHT BREAST IN FEMALE, ESTROGEN RECEPTOR POSITIVE: Primary | ICD-10-CM

## 2023-09-26 PROBLEM — Z85.3 HISTORY OF BREAST CANCER: Status: RESOLVED | Noted: 2022-06-12 | Resolved: 2023-09-26

## 2023-09-26 NOTE — PROGRESS NOTES
[facility] Hematology/Oncology  PROGRESS NOTE - Follow-up Visit      Subjective:       Patient ID:   NAME: Blanca Loya : 1965     58 y.o. female    Referring Doc: Cora  Other Physicians: Lyndsay Gonsales, CARLY Barillas; Mary Gonsales    Chief Complaint:  Breast ca f/u    History of Present Illness:     Patient is being seen today in person for a regularly scheduled follow-up appointment. She is here by herself.    She saw Dr Shepherd on on 2023      No CP, SOB, HA's or N/V.  She is planning reconstruction surgery with Dr Winslow in 2023    She has been off all oral antihormones.                   Discussed Covid19 precautions - she did not get her vaccinations; she had covid in Aug 2021           ROS:   GEN: normal without any fever, night sweats or weight loss; anxiety issues generalized aches and pains;    HEENT: normal with no HA's, sore throat, stiff neck, changes in vision  CV: normal with no CP, SOB, PND, RUGGIERO or orthopnea  PULM: normal with no SOB, cough, hemoptysis, sputum or pleuritic pain  GI: normal with no abdominal pain, nausea, vomiting, constipation, diarrhea, melanotic stools, BRBPR, or hematemesis; chronic GI issues  : normal with no hematuria, dysuria  BREAST: normal with no mass, discharge, pain  SKIN: normal with no rash, erythema, bruising, or swelling    Allergies:  Review of patient's allergies indicates:   Allergen Reactions    Demerol [meperidine] Anxiety    Pcn [penicillins] Rash       Medications:    Current Outpatient Medications:     ALPRAZolam (XANAX) 0.5 MG tablet, TK 1 T PO QD PRN, Disp: 30 tablet, Rfl: 5    ascorbic acid, vitamin C, (VITAMIN C) 1000 MG tablet, Take 1,000 mg by mouth once daily., Disp: , Rfl:     b complex vitamins tablet, Take 1 tablet by mouth once daily., Disp: , Rfl:     budesonide (ENTOCORT EC) 3 mg capsule, Take 9 mg by mouth once daily., Disp: , Rfl:     busPIRone (BUSPAR) 5 MG Tab, Take 1 tablet (5 mg total) by mouth 2 (two) times  "daily., Disp: 180 tablet, Rfl: 1    butalbital-acetaminophen-caffeine -40 mg (FIORICET, ESGIC) -40 mg per tablet, Take 1 tablet by mouth every 6 (six) hours as needed for Pain or Headaches., Disp: 28 tablet, Rfl: 2    CHROMIUM ORAL, Take by mouth., Disp: , Rfl:     levothyroxine (SYNTHROID) 112 MCG tablet, Take 1 tablet (112 mcg total) by mouth once daily., Disp: 90 tablet, Rfl: 1    POTASSIUM ACETATE MISC, by Misc.(Non-Drug; Combo Route) route., Disp: , Rfl:     PMHx/PSHx Updates:  See patient's last visit with me on 5/24/2023  See H&P on 9/8/2016        Pathology:    Colon biopsy  9/22/2020:    COLON, RANDOM, BIOPSY:   - COLLAGENOUS COLITIS.       See path note on 8/30/2016      Objective:     Vitals:  Blood pressure 137/64, pulse 83, temperature 97.6 °F (36.4 °C), resp. rate 18, height 5' 4" (1.626 m), weight 56.2 kg (123 lb 14.4 oz), last menstrual period 09/10/2016.        Physical Examination:   GEN: no apparent distress, comfortable; AAOx3  HEAD: atraumatic and normocephalic  EYES: no conjunctival pallor or muddiness, no icterus; normal pupil reaction to ambient light  ENT: OMM, no pharyngeal erythema, external bilateral ears WNL; no visible thrush or ulcers  NECK: no masses or swelling, trachea midline, no visible LAD/LN's   CV: no palpitations; no pedal edema; no noticeable JVD or neck vein distension  CHEST: Normal respiratory effort; chest wall breath movements symmetrical; no audible wheezing  ABDOM: non-distended; no bloating  MUSC/Skeletal: ROM normal; joints visibly normal; no deformities or arthropathy  EXTREM: no clubbing, cyanosis, inflammation or swelling  SKIN: no rashes, lesions, ulcers, petechiae or subcutaneous nodules  : no tyson  NEURO: moving all 4 extremities; AAOx3; no tremors  PSYCH: normal mood, affect and behavior  LYMPH: no visible LN's or LAD  Breast: no changes           Labs:     Lab Results   Component Value Date    WBC 5.3 09/26/2023    HGB 13.5 09/26/2023    HCT " 41.4 09/26/2023    MCV 95 09/26/2023     09/26/2023       CMP  Sodium   Date Value Ref Range Status   09/26/2023 139 134 - 144 mmol/L Final   04/23/2019 143 134 - 144 mmol/L      Potassium   Date Value Ref Range Status   09/26/2023 3.8 3.5 - 5.2 mmol/L Final     Chloride   Date Value Ref Range Status   09/26/2023 101 96 - 106 mmol/L Final   04/23/2019 103 98 - 110 mmol/L      CO2   Date Value Ref Range Status   09/26/2023 25 20 - 29 mmol/L Final     Glucose   Date Value Ref Range Status   09/26/2023 89 70 - 99 mg/dL Final   04/23/2019 88 70 - 99 mg/dL      BUN   Date Value Ref Range Status   09/26/2023 16 6 - 24 mg/dL Final     Creatinine   Date Value Ref Range Status   09/26/2023 0.77 0.57 - 1.00 mg/dL Final   04/23/2019 0.60 0.60 - 1.40 mg/dL      Calcium   Date Value Ref Range Status   09/26/2023 10.1 8.7 - 10.2 mg/dL Final     Total Protein   Date Value Ref Range Status   01/13/2022 7.7 6.0 - 8.4 g/dL Final     Albumin   Date Value Ref Range Status   09/26/2023 4.5 3.8 - 4.9 g/dL Final   01/13/2022 4.5 3.5 - 5.2 g/dL Final   04/23/2019 4.0 3.1 - 4.7 g/dL      Total Bilirubin   Date Value Ref Range Status   09/26/2023 1.0 0.0 - 1.2 mg/dL Final     Alkaline Phosphatase   Date Value Ref Range Status   01/13/2022 72 55 - 135 U/L Final     AST   Date Value Ref Range Status   09/26/2023 35 0 - 40 IU/L Final     ALT   Date Value Ref Range Status   09/26/2023 34 (H) 0 - 32 IU/L Final     Anion Gap   Date Value Ref Range Status   01/13/2022 8 8 - 16 mmol/L Final     eGFR if    Date Value Ref Range Status   01/13/2022 >60.0 >60 mL/min/1.73 m^2 Final     eGFR if non    Date Value Ref Range Status   01/13/2022 >60.0 >60 mL/min/1.73 m^2 Final     Comment:     Calculation used to obtain the estimated glomerular filtration  rate (eGFR) is the CKD-EPI equation.                Radiology/Diagnostic Studies:    PET 2/10/2023:    IMPRESSION:     Status post bilateral mastectomies.  No evidence  of FDG avid residual, recurrent, or thoracic metastatic disease.     Nonspecific focus of increased hepatic FDG activity near the gallbladder fossa, new compared to 2020. This could represent misregistration artifact from bowel activity, gallbladder inflammation, with metastatic hepatic lesion felt less likely - however not entirely excluded. Consider dedicated hepatic protocol MRI imaging for further evaluation.           CXR  1/6/2022:    IMPRESSION:     No acute cardiopulmonary abnormality.         PET 9/24/2020:    Impression:     1. No evidence of recurrence of disease or metastatic disease.  2. Several colon air-fluid levels, suggesting nonspecific diarrheal illness.         US  11/7/2019:  Impression       Normal right upper quadrant ultrasound.         MRI head  2/17/2020:    Impression:     Stable appearance of previously defined enhancing extra-axial mass which extends from the left internal auditory canal into the left cerebellopontine angle cistern and along the adjacent temporal bone.      PET/CT  5/13/2019:    IMPRESSION:    1. No evidence of FDG avid metastatic disease.  2. Incidental findings as above.          Chest/Abdom CT  8/24/2018:      IMPRESSION:  1. No evidence of metastatic disease in the chest, abdomen, or the pelvis.  2. Additional observations as above           I have reviewed all available lab results and radiology reports.    Assessment/Plan:   (1) 58 y.o. female with diagnosis of right breast cancer  - s/p bilateral mastectomies on 9/26/16 followed by reconstruction  - followed by Dr Bear with [facility]urg  - she had 2 out of 7 LN's that were positive  - she was a Stage IIA  - ER+/OR+ and her2nu neg  - s/p AC x4 and taxol x4  - she has seen Dr Gonsales with rad/onc for XRT and completed 5 weeks last month  - discussed the pro's and con's of tamoxifen versus arimidex, and in light of the pap issues, arimidex is probably the better choice  - she had been on arimidex but it was discontinued  "because of her liver  - she has some mood swings and weight gain  - PET done on 9/21/17 with no evidence of mets;   - recent CT chest/abdom on 8/24/2018 showed no evidence on cancer  - s/p prior repeat surgery with right-sided lift with Dr Umana with repeat evaluation in Nov 2018   - she had a slow post-op recovery and has residual aches and pains  - she had PET on 5/13/2019  - she has been on arimidex and now femara which she both discontinued; she is having some generalized aches and pains;   - discussed aromasine and she is willing to give it a try; she does not want to take tamoxifen due to the clot and ovarian cancer risks   - she had been on the aromasin for 5 weeks but then developed elevated LFTs and it was discontinued    10/5/2020:  - latest PET on 9/24/2020 with no evidence of cancer  - s/p bilat mastectomies with reconstructions so she does not get mammograms    2/4/2021:  - she has been off all oral antihormone  - She had prior colonoscopy with Dr Edmonds and was diagnsoed with "collagenous" colitis.     6/14/2021:  - she seems to be doing ok overall    1/13/2022:  - no new issues  - recent CXR negative    7/13/2022:  -  She has some anxiety issues and jitteriness.  - she has some gum issues.   - She has been off all oral antihormones.    - She has been on steroids per Dr Edmonds and plans to see him again this summer some time  - She saw Dr Shepherd in June 2022 1/19/2023:  - no appreciable mass or LAD  - will get PEt as precaution    5/24/2023:  - she had PET scan back in Feb 2023  - she is considering having elective breast surgery - will refer to Dr Winslow for evaluation    9/27/2023:  - seeing Dr Winslow in Nov 2023 about reconstruction surgery  - repeat PET in Feb 2024 if insurance permits         (2) Mild leucopenia/anemia secondary to chemotherapy (resolved)    6/14/2021:  - latest wbc is at  4.14 - latest hgb was at 12.4    1/13/2021:  - repeat labs pending  - labs from oct 2021 with wbc " "at 3.0    7/14/2022:  - latest CBC adequate    1/19/2023:  - latest WBC at 3.1    5/24/2023:  - latest wbc at 3.2 and relatively stable    9/27/2023:  - CBC is WNL currently     (3) Prior abnormal PAP issues - followed by Dr Cline with GYN; latest pap was negative per patient and she was also HPV negative     (4) Hx of acoustic neuroma in past - s/p XRT in 2010; followed by Dr Gonsales  - recent MRI stable per patient in Feb 2020     (5) Chronic fibromyalgia - she is not being followed by anyone; she may be having a flare-up      (6) Collagenous colitis -    - GI issues with prior bout of colitis - seen by Dr Edmonds with GI    - prior mild elevation bili and alk phos - resolved  - she had liver biopsy and EGD with "inflammation" per patient  - s/p esophageal dilation       2/4/2021:  - She had prior colonoscopy in Nov 2020 with Dr Edmonds and was diagnsoed with "collagenous" colitis.     6/14/2021:  - bowels are stable    7/14/2022:  - patient to f/u with Dr Edmonds this summer         (7) RUE - resolved - possible lymphedema issues - she no longer sees PT  - also seen by Dr Liao    (8) Hypokalemia -on supplements - patient wanted to try OTC and did not want prescription meds    (9) Occasional palpitations - seen by Dr Hernandez with cardiology    (10) Thyroid issues - followed by PCP        1. Malignant neoplasm of upper-outer quadrant of right breast in female, estrogen receptor positive        2. S/P bilateral mastectomy        3. Estrogen receptor positive                PLAN:  1. Check up to date labs in 6 months  2. F/U with PCP, Surg and GYN  3. Hold on any antihormone therapy indefinitely for now  4.  F/u with Dr Edmonds with GI as directed  5.  F/u with cardiology    6. F/u with PCP   7. Proceed with surgery plans with Dr Winslow in Nov 2023       RTC in  Feb 2024 after PET    Fax note to Chaparro Shepherd III, *, CARLY Bear Mannina, Lyndsay; Pham Hernandez     Total Time spent on " patient:    I spent over 25 mins of time with the patient. Reviewed results of the recently ordered labs, tests, reports and studies; made directives with regards to the results. Over half of this time was spent couseling and coordinating care, making treatment and analytical decisions; ordering necessary labs, tests and studies; and discussing treatment options and setting up treatment plan(s) if indicated.        Discussion:     COVID-19 Discussion:    I had long discussion with patient and any applicable family about the COVID-19 coronavirus epidemic and the recommended precautions with regard to cancer and/or hematology patients. I have re-iterated the CDC recommendations for adequate hand washing, use of hand -like products, and coughing into elbow, etc. In addition, especially for our patients who are on chemotherapy and/or our otherwise immunocompromised patients, I have recommended avoidance of crowds, including movie theaters, restaurants, churches, etc. I have recommended avoidance of any sick or symptomatic family members and/or friends. I have also recommended avoidance of any raw and unwashed food products, and general avoidance of food items that have not been prepared by themselves. The patient has been asked to call us immediately with any symptom developments, issues, questions or other general concerns.            I have explained all of the above in detail and the patient understands all of the current recommendation(s). I have answered all of their questions to the best of my ability and to their complete satisfaction.   The patient is to continue with the current management plan.            Electronically signed by Bronson Carrillo MD

## 2023-09-27 ENCOUNTER — OFFICE VISIT (OUTPATIENT)
Dept: HEMATOLOGY/ONCOLOGY | Facility: CLINIC | Age: 58
End: 2023-09-27
Payer: COMMERCIAL

## 2023-09-27 VITALS
TEMPERATURE: 98 F | HEART RATE: 83 BPM | DIASTOLIC BLOOD PRESSURE: 64 MMHG | HEIGHT: 64 IN | BODY MASS INDEX: 21.15 KG/M2 | WEIGHT: 123.88 LBS | RESPIRATION RATE: 18 BRPM | SYSTOLIC BLOOD PRESSURE: 137 MMHG

## 2023-09-27 DIAGNOSIS — Z17.0 MALIGNANT NEOPLASM OF UPPER-OUTER QUADRANT OF RIGHT BREAST IN FEMALE, ESTROGEN RECEPTOR POSITIVE: Primary | ICD-10-CM

## 2023-09-27 DIAGNOSIS — C50.411 MALIGNANT NEOPLASM OF UPPER-OUTER QUADRANT OF RIGHT BREAST IN FEMALE, ESTROGEN RECEPTOR POSITIVE: Primary | ICD-10-CM

## 2023-09-27 DIAGNOSIS — Z90.13 S/P BILATERAL MASTECTOMY: ICD-10-CM

## 2023-09-27 DIAGNOSIS — Z17.0 ESTROGEN RECEPTOR POSITIVE: ICD-10-CM

## 2023-09-27 LAB
ALBUMIN SERPL-MCNC: 4.5 G/DL (ref 3.8–4.9)
ALBUMIN/GLOB SERPL: 1.6 {RATIO} (ref 1.2–2.2)
ALP SERPL-CCNC: 107 IU/L (ref 44–121)
ALT SERPL-CCNC: 34 IU/L (ref 0–32)
AST SERPL-CCNC: 35 IU/L (ref 0–40)
BASOPHILS # BLD AUTO: 0 X10E3/UL (ref 0–0.2)
BASOPHILS NFR BLD AUTO: 1 %
BILIRUB SERPL-MCNC: 1 MG/DL (ref 0–1.2)
BUN SERPL-MCNC: 16 MG/DL (ref 6–24)
BUN/CREAT SERPL: 21 (ref 9–23)
CALCIUM SERPL-MCNC: 10.1 MG/DL (ref 8.7–10.2)
CHLORIDE SERPL-SCNC: 101 MMOL/L (ref 96–106)
CO2 SERPL-SCNC: 25 MMOL/L (ref 20–29)
CREAT SERPL-MCNC: 0.77 MG/DL (ref 0.57–1)
EOSINOPHIL # BLD AUTO: 0 X10E3/UL (ref 0–0.4)
EOSINOPHIL NFR BLD AUTO: 1 %
ERYTHROCYTE [DISTWIDTH] IN BLOOD BY AUTOMATED COUNT: 13.2 % (ref 11.7–15.4)
EST. GFR  (NO RACE VARIABLE): 89 ML/MIN/1.73
GLOBULIN SER CALC-MCNC: 2.8 G/DL (ref 1.5–4.5)
GLUCOSE SERPL-MCNC: 89 MG/DL (ref 70–99)
HCT VFR BLD AUTO: 41.4 % (ref 34–46.6)
HGB BLD-MCNC: 13.5 G/DL (ref 11.1–15.9)
IMM GRANULOCYTES # BLD AUTO: 0 X10E3/UL (ref 0–0.1)
IMM GRANULOCYTES NFR BLD AUTO: 0 %
LYMPHOCYTES # BLD AUTO: 0.8 X10E3/UL (ref 0.7–3.1)
LYMPHOCYTES NFR BLD AUTO: 15 %
MCH RBC QN AUTO: 30.9 PG (ref 26.6–33)
MCHC RBC AUTO-ENTMCNC: 32.6 G/DL (ref 31.5–35.7)
MCV RBC AUTO: 95 FL (ref 79–97)
MONOCYTES # BLD AUTO: 0.4 X10E3/UL (ref 0.1–0.9)
MONOCYTES NFR BLD AUTO: 8 %
NEUTROPHILS # BLD AUTO: 4 X10E3/UL (ref 1.4–7)
NEUTROPHILS NFR BLD AUTO: 75 %
PLATELET # BLD AUTO: 323 X10E3/UL (ref 150–450)
POTASSIUM SERPL-SCNC: 3.8 MMOL/L (ref 3.5–5.2)
PROT SERPL-MCNC: 7.3 G/DL (ref 6–8.5)
RBC # BLD AUTO: 4.37 X10E6/UL (ref 3.77–5.28)
SODIUM SERPL-SCNC: 139 MMOL/L (ref 134–144)
WBC # BLD AUTO: 5.3 X10E3/UL (ref 3.4–10.8)

## 2023-09-27 PROCEDURE — 3075F SYST BP GE 130 - 139MM HG: CPT | Mod: CPTII,S$GLB,, | Performed by: INTERNAL MEDICINE

## 2023-09-27 PROCEDURE — 3078F PR MOST RECENT DIASTOLIC BLOOD PRESSURE < 80 MM HG: ICD-10-PCS | Mod: CPTII,S$GLB,, | Performed by: INTERNAL MEDICINE

## 2023-09-27 PROCEDURE — 1160F PR REVIEW ALL MEDS BY PRESCRIBER/CLIN PHARMACIST DOCUMENTED: ICD-10-PCS | Mod: CPTII,S$GLB,, | Performed by: INTERNAL MEDICINE

## 2023-09-27 PROCEDURE — 3078F DIAST BP <80 MM HG: CPT | Mod: CPTII,S$GLB,, | Performed by: INTERNAL MEDICINE

## 2023-09-27 PROCEDURE — 1159F PR MEDICATION LIST DOCUMENTED IN MEDICAL RECORD: ICD-10-PCS | Mod: CPTII,S$GLB,, | Performed by: INTERNAL MEDICINE

## 2023-09-27 PROCEDURE — 1159F MED LIST DOCD IN RCRD: CPT | Mod: CPTII,S$GLB,, | Performed by: INTERNAL MEDICINE

## 2023-09-27 PROCEDURE — 3008F BODY MASS INDEX DOCD: CPT | Mod: CPTII,S$GLB,, | Performed by: INTERNAL MEDICINE

## 2023-09-27 PROCEDURE — 99214 OFFICE O/P EST MOD 30 MIN: CPT | Mod: S$GLB,,, | Performed by: INTERNAL MEDICINE

## 2023-09-27 PROCEDURE — 3075F PR MOST RECENT SYSTOLIC BLOOD PRESS GE 130-139MM HG: ICD-10-PCS | Mod: CPTII,S$GLB,, | Performed by: INTERNAL MEDICINE

## 2023-09-27 PROCEDURE — 1160F RVW MEDS BY RX/DR IN RCRD: CPT | Mod: CPTII,S$GLB,, | Performed by: INTERNAL MEDICINE

## 2023-09-27 PROCEDURE — 99214 PR OFFICE/OUTPT VISIT, EST, LEVL IV, 30-39 MIN: ICD-10-PCS | Mod: S$GLB,,, | Performed by: INTERNAL MEDICINE

## 2023-09-27 PROCEDURE — 3008F PR BODY MASS INDEX (BMI) DOCUMENTED: ICD-10-PCS | Mod: CPTII,S$GLB,, | Performed by: INTERNAL MEDICINE

## 2023-11-10 ENCOUNTER — OFFICE VISIT (OUTPATIENT)
Dept: FAMILY MEDICINE | Facility: CLINIC | Age: 58
End: 2023-11-10
Payer: COMMERCIAL

## 2023-11-10 VITALS
BODY MASS INDEX: 21.67 KG/M2 | OXYGEN SATURATION: 96 % | HEART RATE: 74 BPM | WEIGHT: 126.94 LBS | HEIGHT: 64 IN | DIASTOLIC BLOOD PRESSURE: 72 MMHG | TEMPERATURE: 97 F | SYSTOLIC BLOOD PRESSURE: 124 MMHG

## 2023-11-10 DIAGNOSIS — Z17.0 MALIGNANT NEOPLASM OF UPPER-OUTER QUADRANT OF RIGHT BREAST IN FEMALE, ESTROGEN RECEPTOR POSITIVE: ICD-10-CM

## 2023-11-10 DIAGNOSIS — M81.0 OSTEOPOROSIS, UNSPECIFIED OSTEOPOROSIS TYPE, UNSPECIFIED PATHOLOGICAL FRACTURE PRESENCE: ICD-10-CM

## 2023-11-10 DIAGNOSIS — E03.9 HYPOTHYROIDISM, UNSPECIFIED TYPE: Primary | ICD-10-CM

## 2023-11-10 DIAGNOSIS — C50.411 MALIGNANT NEOPLASM OF UPPER-OUTER QUADRANT OF RIGHT BREAST IN FEMALE, ESTROGEN RECEPTOR POSITIVE: ICD-10-CM

## 2023-11-10 DIAGNOSIS — D33.3 LEFT ACOUSTIC NEUROMA: ICD-10-CM

## 2023-11-10 DIAGNOSIS — F41.9 ANXIETY: ICD-10-CM

## 2023-11-10 DIAGNOSIS — F33.9 DEPRESSION, RECURRENT: ICD-10-CM

## 2023-11-10 DIAGNOSIS — Z90.13 S/P BILATERAL MASTECTOMY: ICD-10-CM

## 2023-11-10 PROCEDURE — 3008F PR BODY MASS INDEX (BMI) DOCUMENTED: ICD-10-PCS | Mod: CPTII,S$GLB,, | Performed by: FAMILY MEDICINE

## 2023-11-10 PROCEDURE — 1160F RVW MEDS BY RX/DR IN RCRD: CPT | Mod: CPTII,S$GLB,, | Performed by: FAMILY MEDICINE

## 2023-11-10 PROCEDURE — 1160F PR REVIEW ALL MEDS BY PRESCRIBER/CLIN PHARMACIST DOCUMENTED: ICD-10-PCS | Mod: CPTII,S$GLB,, | Performed by: FAMILY MEDICINE

## 2023-11-10 PROCEDURE — 3008F BODY MASS INDEX DOCD: CPT | Mod: CPTII,S$GLB,, | Performed by: FAMILY MEDICINE

## 2023-11-10 PROCEDURE — 3078F DIAST BP <80 MM HG: CPT | Mod: CPTII,S$GLB,, | Performed by: FAMILY MEDICINE

## 2023-11-10 PROCEDURE — 99214 PR OFFICE/OUTPT VISIT, EST, LEVL IV, 30-39 MIN: ICD-10-PCS | Mod: S$GLB,,, | Performed by: FAMILY MEDICINE

## 2023-11-10 PROCEDURE — 3078F PR MOST RECENT DIASTOLIC BLOOD PRESSURE < 80 MM HG: ICD-10-PCS | Mod: CPTII,S$GLB,, | Performed by: FAMILY MEDICINE

## 2023-11-10 PROCEDURE — 3074F PR MOST RECENT SYSTOLIC BLOOD PRESSURE < 130 MM HG: ICD-10-PCS | Mod: CPTII,S$GLB,, | Performed by: FAMILY MEDICINE

## 2023-11-10 PROCEDURE — 1159F PR MEDICATION LIST DOCUMENTED IN MEDICAL RECORD: ICD-10-PCS | Mod: CPTII,S$GLB,, | Performed by: FAMILY MEDICINE

## 2023-11-10 PROCEDURE — 99214 OFFICE O/P EST MOD 30 MIN: CPT | Mod: S$GLB,,, | Performed by: FAMILY MEDICINE

## 2023-11-10 PROCEDURE — 1159F MED LIST DOCD IN RCRD: CPT | Mod: CPTII,S$GLB,, | Performed by: FAMILY MEDICINE

## 2023-11-10 PROCEDURE — 3074F SYST BP LT 130 MM HG: CPT | Mod: CPTII,S$GLB,, | Performed by: FAMILY MEDICINE

## 2023-11-12 PROBLEM — F33.9 DEPRESSION, RECURRENT: Status: ACTIVE | Noted: 2023-11-12

## 2023-11-13 ENCOUNTER — HOSPITAL ENCOUNTER (OUTPATIENT)
Dept: PREADMISSION TESTING | Facility: HOSPITAL | Age: 58
Discharge: HOME | DRG: 572 | End: 2023-11-13
Attending: PLASTIC SURGERY
Payer: COMMERCIAL

## 2023-11-13 ENCOUNTER — HOSPITAL ENCOUNTER (OUTPATIENT)
Dept: RADIOLOGY | Facility: HOSPITAL | Age: 58
Discharge: HOME | DRG: 572 | End: 2023-11-13
Payer: COMMERCIAL

## 2023-11-13 ENCOUNTER — ANESTHESIA EVENT (OUTPATIENT)
Dept: SURGERY | Facility: HOSPITAL | Age: 58
DRG: 572 | End: 2023-11-13
Payer: COMMERCIAL

## 2023-11-13 VITALS — WEIGHT: 122 LBS | HEIGHT: 64 IN | BODY MASS INDEX: 20.83 KG/M2

## 2023-11-13 DIAGNOSIS — C50.411 MALIGNANT NEOPLASM OF UPPER-OUTER QUADRANT OF RIGHT BREAST IN FEMALE, ESTROGEN RECEPTOR POSITIVE: Primary | ICD-10-CM

## 2023-11-13 DIAGNOSIS — Z01.818 PREOP TESTING: ICD-10-CM

## 2023-11-13 DIAGNOSIS — Z17.0 MALIGNANT NEOPLASM OF UPPER-OUTER QUADRANT OF RIGHT BREAST IN FEMALE, ESTROGEN RECEPTOR POSITIVE: Primary | ICD-10-CM

## 2023-11-13 DIAGNOSIS — C50.411 MALIGNANT NEOPLASM OF UPPER-OUTER QUADRANT OF RIGHT BREAST IN FEMALE, ESTROGEN RECEPTOR POSITIVE: ICD-10-CM

## 2023-11-13 DIAGNOSIS — Z17.0 MALIGNANT NEOPLASM OF UPPER-OUTER QUADRANT OF RIGHT BREAST IN FEMALE, ESTROGEN RECEPTOR POSITIVE: ICD-10-CM

## 2023-11-13 PROCEDURE — 93010 EKG 12-LEAD: ICD-10-PCS | Mod: ,,, | Performed by: INTERNAL MEDICINE

## 2023-11-13 PROCEDURE — 71046 X-RAY EXAM CHEST 2 VIEWS: CPT | Mod: TC

## 2023-11-13 PROCEDURE — 93010 ELECTROCARDIOGRAM REPORT: CPT | Mod: ,,, | Performed by: INTERNAL MEDICINE

## 2023-11-13 PROCEDURE — 93005 ELECTROCARDIOGRAM TRACING: CPT | Performed by: INTERNAL MEDICINE

## 2023-11-13 RX ORDER — VANCOMYCIN HCL IN 5 % DEXTROSE 1G/250ML
1000 PLASTIC BAG, INJECTION (ML) INTRAVENOUS
Status: CANCELLED | OUTPATIENT
Start: 2023-11-14

## 2023-11-13 NOTE — PROGRESS NOTES
Subjective:       Patient ID: Blanca Loya is a 58 y.o. female.    Chief Complaint: Follow-up    History of left acoustic neuroma.  Anxiety would like to discontinue BuSpar.  Only on 5 b.i.d..  Status post bilateral mastectomies.  Having implants removed next week.  Issue with scarring.  History of breast cancer follow-up current.  Has osteoporosis.  Hypothyroidism.  BMI is 21.  No cough or sputum.  Cardiovascular palpitations no exercise.  No chest pain.  Hematologic no bleeding bruising or clotting.  CBC and CMP were normal in September.    Physical examination.  Vital signs noted.  No acute distress.  Neck without bruit.  Chest clear.  Heart regular rate rhythm.  Abdomen bowel sounds positive soft nontender.  Extremities without edema positive pedal pulses.          Objective:        Assessment:       1. Hypothyroidism, unspecified type    2. Anxiety    3. Left acoustic neuroma    4. S/P bilateral mastectomy    5. Malignant neoplasm of upper-outer quadrant of right breast in female, estrogen receptor positive    6. Osteoporosis, unspecified osteoporosis type, unspecified pathological fracture presence    7. BMI 21.0-21.9, adult    8. Depression, recurrent        Plan:       Hypothyroidism, unspecified type  -     TSH; Future; Expected date: 11/10/2023    Anxiety    Left acoustic neuroma    S/P bilateral mastectomy    Malignant neoplasm of upper-outer quadrant of right breast in female, estrogen receptor positive    Osteoporosis, unspecified osteoporosis type, unspecified pathological fracture presence    BMI 21.0-21.9, adult    Depression, recurrent      Declines all vaccines.  She is due for Pap smear recommend she have this done if it is not current.  TSH with her preop labs.  Decrease the BuSpar to 1 per day for 2 weeks and then discontinue it.  Follow-up in 3 months.  Adjust thyroid after labs

## 2023-11-13 NOTE — DISCHARGE INSTRUCTIONS
INSTRUCTIONS  To confirm your doctor has scheduled your surgery for: 11/14      Morning of surgery please check in with registration near Parking Garage Entrance then proceed to Outpatient Surgery Department.    Preop nurses will call the afternoon prior to surgery between 4:00 and 6:00 PM with your final arrival time.  PLEASE NOTE:  The surgery schedule has many variables which may affect the time of your surgery case. Family members should be available if your surgery time changes. Plan to be here the day of your procedure between 4-6 hours.    TAKE ONLY THESE MEDICATIONS WITH A SMALL SIP OF WATER THE MORNING OF SURGERY: see list    DO NOT TAKE THESE MEDICATIONS 5-7 DAYS PRIOR to your procedure per your surgeon's request: ASPIRIN, ALEVE, BC powder, GORDON SELTZER, IBUPROFEN, FISH OIL, VITAMIN E, OR HERBALS   (May take Tylenol)    If you are prescribed any types of blood thinners (Aspirin, Coumadin, Plavix, Pradaxa, Xarelto, Aggrenox, Effient, Eliquis, Savasya, Brilinta or any other), please ask your surgeon how many days before scheduled procedure should you stop taking them. You may also need to verify with prescribing physician if it is OK to stop your blood thinners.      INSTRUCTIONS IMPORTANT!!  Do not eat or drink anything after midnight.  ONLY if you are diabetic, check your sugar in the morning before your procedure.  Do not smoke, vape or drink alcoholic beverages 24 hours prior to your procedure.  Shower the night before AND the morning of your procedure with a Chlorhexidine wash such as hibiclens or Dial antibacterial soap from neck down. You may use your own shampoo and face wash. This helps your skin to be as bacteria free as possible.  If you wear contact lenses, dentures, hearing aids or glasses, bring a container to put them in and give to a family member.    Please leave all jewelry, piercings and valuables at home.  DO NOT remove hair from the surgery site.   If your condition changes such as  fever, cough, etc, please notify your surgeon.   Make arrangements in advance for transportation home by a responsible adult.  You must make arrangements for transportation, TAXI'S, UBER'S OR LYFTS ARE NOT ALLOWED.        If you have any questions about these instructions, call Pre-Op Admit  Nursing at [phone] or the Pre-Op Day Surgery Unit at [phone].

## 2023-11-14 ENCOUNTER — ANESTHESIA (OUTPATIENT)
Dept: SURGERY | Facility: HOSPITAL | Age: 58
DRG: 572 | End: 2023-11-14
Payer: COMMERCIAL

## 2023-11-14 ENCOUNTER — HOSPITAL ENCOUNTER (INPATIENT)
Facility: HOSPITAL | Age: 58
LOS: 3 days | Discharge: HOME | DRG: 572 | End: 2023-11-17
Attending: PLASTIC SURGERY | Admitting: PLASTIC SURGERY
Payer: COMMERCIAL

## 2023-11-14 DIAGNOSIS — Z85.3 HX OF BREAST CANCER: Primary | ICD-10-CM

## 2023-11-14 DIAGNOSIS — C50.411 MALIGNANT NEOPLASM OF UPPER-OUTER QUADRANT OF RIGHT BREAST IN FEMALE, ESTROGEN RECEPTOR POSITIVE: ICD-10-CM

## 2023-11-14 DIAGNOSIS — Z17.0 MALIGNANT NEOPLASM OF UPPER-OUTER QUADRANT OF RIGHT BREAST IN FEMALE, ESTROGEN RECEPTOR POSITIVE: ICD-10-CM

## 2023-11-14 DIAGNOSIS — Z01.818 PREOP TESTING: ICD-10-CM

## 2023-11-14 PROCEDURE — 63600175 PHARM REV CODE 636 W HCPCS: Performed by: PLASTIC SURGERY

## 2023-11-14 PROCEDURE — 25000003 PHARM REV CODE 250: Performed by: PLASTIC SURGERY

## 2023-11-14 PROCEDURE — D9220A PRA ANESTHESIA: Mod: ANES,,, | Performed by: ANESTHESIOLOGY

## 2023-11-14 PROCEDURE — 63600175 PHARM REV CODE 636 W HCPCS: Performed by: NURSE ANESTHETIST, CERTIFIED REGISTERED

## 2023-11-14 PROCEDURE — 71000039 HC RECOVERY, EACH ADD'L HOUR: Performed by: PLASTIC SURGERY

## 2023-11-14 PROCEDURE — 36000709 HC OR TIME LEV III EA ADD 15 MIN: Performed by: PLASTIC SURGERY

## 2023-11-14 PROCEDURE — 25000003 PHARM REV CODE 250: Performed by: ANESTHESIOLOGY

## 2023-11-14 PROCEDURE — 94761 N-INVAS EAR/PLS OXIMETRY MLT: CPT

## 2023-11-14 PROCEDURE — 37000009 HC ANESTHESIA EA ADD 15 MINS: Performed by: PLASTIC SURGERY

## 2023-11-14 PROCEDURE — 99900031 HC PATIENT EDUCATION (STAT)

## 2023-11-14 PROCEDURE — 94799 UNLISTED PULMONARY SVC/PX: CPT

## 2023-11-14 PROCEDURE — 20000000 HC ICU ROOM

## 2023-11-14 PROCEDURE — D9220A PRA ANESTHESIA: Mod: CRNA,,, | Performed by: NURSE ANESTHETIST, CERTIFIED REGISTERED

## 2023-11-14 PROCEDURE — 63600175 PHARM REV CODE 636 W HCPCS: Performed by: ANESTHESIOLOGY

## 2023-11-14 PROCEDURE — 25000003 PHARM REV CODE 250: Performed by: PHYSICIAN ASSISTANT

## 2023-11-14 PROCEDURE — 99900035 HC TECH TIME PER 15 MIN (STAT)

## 2023-11-14 PROCEDURE — D9220A PRA ANESTHESIA: ICD-10-PCS | Mod: CRNA,,, | Performed by: NURSE ANESTHETIST, CERTIFIED REGISTERED

## 2023-11-14 PROCEDURE — C9290 INJ, BUPIVACAINE LIPOSOME: HCPCS | Performed by: PLASTIC SURGERY

## 2023-11-14 PROCEDURE — 25000003 PHARM REV CODE 250: Performed by: NURSE ANESTHETIST, CERTIFIED REGISTERED

## 2023-11-14 PROCEDURE — 37000008 HC ANESTHESIA 1ST 15 MINUTES: Performed by: PLASTIC SURGERY

## 2023-11-14 PROCEDURE — D9220A PRA ANESTHESIA: ICD-10-PCS | Mod: ANES,,, | Performed by: ANESTHESIOLOGY

## 2023-11-14 PROCEDURE — 63600175 PHARM REV CODE 636 W HCPCS: Performed by: PHYSICIAN ASSISTANT

## 2023-11-14 PROCEDURE — 36000708 HC OR TIME LEV III 1ST 15 MIN: Performed by: PLASTIC SURGERY

## 2023-11-14 PROCEDURE — 27000080 OPTIME MED/SURG SUP & DEVICES GENERAL CLASSIFICATION: Performed by: PLASTIC SURGERY

## 2023-11-14 PROCEDURE — 27201423 OPTIME MED/SURG SUP & DEVICES STERILE SUPPLY: Performed by: PLASTIC SURGERY

## 2023-11-14 PROCEDURE — 71000033 HC RECOVERY, INTIAL HOUR: Performed by: PLASTIC SURGERY

## 2023-11-14 PROCEDURE — 27800903 OPTIME MED/SURG SUP & DEVICES OTHER IMPLANTS: Performed by: PLASTIC SURGERY

## 2023-11-14 DEVICE — COUPLER 2.0MM: Type: IMPLANTABLE DEVICE | Site: BREAST | Status: FUNCTIONAL

## 2023-11-14 RX ORDER — VANCOMYCIN HCL IN 5 % DEXTROSE 1G/250ML
1000 PLASTIC BAG, INJECTION (ML) INTRAVENOUS
Status: DISCONTINUED | OUTPATIENT
Start: 2023-11-14 | End: 2023-11-14 | Stop reason: HOSPADM

## 2023-11-14 RX ORDER — SODIUM CHLORIDE 0.9 % (FLUSH) 0.9 %
10 SYRINGE (ML) INJECTION
Status: DISCONTINUED | OUTPATIENT
Start: 2023-11-14 | End: 2023-11-17 | Stop reason: HOSPADM

## 2023-11-14 RX ORDER — MIDAZOLAM HYDROCHLORIDE 1 MG/ML
1 INJECTION INTRAMUSCULAR; INTRAVENOUS ONCE
Status: COMPLETED | OUTPATIENT
Start: 2023-11-14 | End: 2023-11-14

## 2023-11-14 RX ORDER — HEPARIN SODIUM 5000 [USP'U]/ML
INJECTION, SOLUTION INTRAVENOUS; SUBCUTANEOUS
Status: DISCONTINUED | OUTPATIENT
Start: 2023-11-14 | End: 2023-11-14 | Stop reason: HOSPADM

## 2023-11-14 RX ORDER — ACETAMINOPHEN 10 MG/ML
INJECTION, SOLUTION INTRAVENOUS
Status: DISCONTINUED | OUTPATIENT
Start: 2023-11-14 | End: 2023-11-14

## 2023-11-14 RX ORDER — MIDAZOLAM HYDROCHLORIDE 1 MG/ML
INJECTION INTRAMUSCULAR; INTRAVENOUS
Status: DISPENSED
Start: 2023-11-14 | End: 2023-11-15

## 2023-11-14 RX ORDER — PROPOFOL 10 MG/ML
VIAL (ML) INTRAVENOUS
Status: DISCONTINUED | OUTPATIENT
Start: 2023-11-14 | End: 2023-11-14

## 2023-11-14 RX ORDER — OXYCODONE HYDROCHLORIDE 5 MG/1
5 TABLET ORAL
Status: DISCONTINUED | OUTPATIENT
Start: 2023-11-14 | End: 2023-11-14 | Stop reason: HOSPADM

## 2023-11-14 RX ORDER — DIPHENHYDRAMINE HYDROCHLORIDE 50 MG/ML
INJECTION INTRAMUSCULAR; INTRAVENOUS
Status: DISCONTINUED | OUTPATIENT
Start: 2023-11-14 | End: 2023-11-14

## 2023-11-14 RX ORDER — FENTANYL CITRATE 50 UG/ML
INJECTION, SOLUTION INTRAMUSCULAR; INTRAVENOUS
Status: DISCONTINUED | OUTPATIENT
Start: 2023-11-14 | End: 2023-11-14

## 2023-11-14 RX ORDER — KETAMINE HYDROCHLORIDE 50 MG/ML
INJECTION, SOLUTION INTRAMUSCULAR; INTRAVENOUS
Status: DISCONTINUED | OUTPATIENT
Start: 2023-11-14 | End: 2023-11-14

## 2023-11-14 RX ORDER — SODIUM CHLORIDE, SODIUM LACTATE, POTASSIUM CHLORIDE, CALCIUM CHLORIDE 600; 310; 30; 20 MG/100ML; MG/100ML; MG/100ML; MG/100ML
INJECTION, SOLUTION INTRAVENOUS CONTINUOUS PRN
Status: DISCONTINUED | OUTPATIENT
Start: 2023-11-14 | End: 2023-11-14

## 2023-11-14 RX ORDER — HYDROMORPHONE HYDROCHLORIDE 1 MG/ML
0.2 INJECTION, SOLUTION INTRAMUSCULAR; INTRAVENOUS; SUBCUTANEOUS EVERY 5 MIN PRN
Status: DISCONTINUED | OUTPATIENT
Start: 2023-11-14 | End: 2023-11-14 | Stop reason: HOSPADM

## 2023-11-14 RX ORDER — MIDAZOLAM HYDROCHLORIDE 1 MG/ML
INJECTION INTRAMUSCULAR; INTRAVENOUS
Status: DISCONTINUED | OUTPATIENT
Start: 2023-11-14 | End: 2023-11-14

## 2023-11-14 RX ORDER — DEXAMETHASONE SODIUM PHOSPHATE 4 MG/ML
INJECTION, SOLUTION INTRA-ARTICULAR; INTRALESIONAL; INTRAMUSCULAR; INTRAVENOUS; SOFT TISSUE
Status: DISCONTINUED | OUTPATIENT
Start: 2023-11-14 | End: 2023-11-14

## 2023-11-14 RX ORDER — DEXMEDETOMIDINE HYDROCHLORIDE 100 UG/ML
50 INJECTION, SOLUTION INTRAVENOUS ONCE
Status: DISCONTINUED | OUTPATIENT
Start: 2023-11-14 | End: 2023-11-14

## 2023-11-14 RX ORDER — SULFAMETHOXAZOLE AND TRIMETHOPRIM 800; 160 MG/1; MG/1
1 TABLET ORAL 2 TIMES DAILY
Status: DISCONTINUED | OUTPATIENT
Start: 2023-11-14 | End: 2023-11-17 | Stop reason: HOSPADM

## 2023-11-14 RX ORDER — LEVOTHYROXINE SODIUM 112 UG/1
112 TABLET ORAL DAILY
Status: DISCONTINUED | OUTPATIENT
Start: 2023-11-15 | End: 2023-11-17 | Stop reason: HOSPADM

## 2023-11-14 RX ORDER — ONDANSETRON 2 MG/ML
INJECTION INTRAMUSCULAR; INTRAVENOUS
Status: DISCONTINUED | OUTPATIENT
Start: 2023-11-14 | End: 2023-11-14

## 2023-11-14 RX ORDER — PHENYLEPHRINE HYDROCHLORIDE 10 MG/ML
INJECTION INTRAVENOUS
Status: DISCONTINUED | OUTPATIENT
Start: 2023-11-14 | End: 2023-11-14

## 2023-11-14 RX ORDER — OXYCODONE AND ACETAMINOPHEN 10; 325 MG/1; MG/1
1 TABLET ORAL EVERY 4 HOURS PRN
Status: DISCONTINUED | OUTPATIENT
Start: 2023-11-14 | End: 2023-11-17 | Stop reason: HOSPADM

## 2023-11-14 RX ORDER — SODIUM CHLORIDE 0.9 G/100ML
IRRIGANT IRRIGATION
Status: DISCONTINUED | OUTPATIENT
Start: 2023-11-14 | End: 2023-11-14 | Stop reason: HOSPADM

## 2023-11-14 RX ORDER — MUPIROCIN 20 MG/G
OINTMENT TOPICAL 2 TIMES DAILY
Status: DISCONTINUED | OUTPATIENT
Start: 2023-11-14 | End: 2023-11-17 | Stop reason: HOSPADM

## 2023-11-14 RX ORDER — ACETAMINOPHEN 325 MG/1
650 TABLET ORAL EVERY 4 HOURS PRN
Status: DISCONTINUED | OUTPATIENT
Start: 2023-11-14 | End: 2023-11-17 | Stop reason: HOSPADM

## 2023-11-14 RX ORDER — PROMETHAZINE HYDROCHLORIDE 25 MG/1
25 TABLET ORAL EVERY 6 HOURS PRN
Status: DISCONTINUED | OUTPATIENT
Start: 2023-11-14 | End: 2023-11-17 | Stop reason: HOSPADM

## 2023-11-14 RX ORDER — ALPRAZOLAM 0.5 MG/1
0.5 TABLET ORAL 4 TIMES DAILY PRN
Status: DISCONTINUED | OUTPATIENT
Start: 2023-11-14 | End: 2023-11-17 | Stop reason: HOSPADM

## 2023-11-14 RX ORDER — SODIUM CHLORIDE 9 MG/ML
INJECTION, SOLUTION INTRAVENOUS
Status: COMPLETED | OUTPATIENT
Start: 2023-11-14 | End: 2023-11-14

## 2023-11-14 RX ORDER — OXYCODONE AND ACETAMINOPHEN 5; 325 MG/1; MG/1
1 TABLET ORAL EVERY 4 HOURS PRN
Status: DISCONTINUED | OUTPATIENT
Start: 2023-11-14 | End: 2023-11-17 | Stop reason: HOSPADM

## 2023-11-14 RX ORDER — LIDOCAINE HYDROCHLORIDE 20 MG/ML
INJECTION INTRAVENOUS
Status: DISCONTINUED | OUTPATIENT
Start: 2023-11-14 | End: 2023-11-14

## 2023-11-14 RX ORDER — FAMOTIDINE 10 MG/ML
INJECTION INTRAVENOUS
Status: DISCONTINUED | OUTPATIENT
Start: 2023-11-14 | End: 2023-11-14

## 2023-11-14 RX ORDER — ONDANSETRON 4 MG/1
8 TABLET, ORALLY DISINTEGRATING ORAL EVERY 8 HOURS PRN
Status: DISCONTINUED | OUTPATIENT
Start: 2023-11-14 | End: 2023-11-17 | Stop reason: HOSPADM

## 2023-11-14 RX ORDER — PAPAVERINE HYDROCHLORIDE 30 MG/ML
INJECTION INTRAMUSCULAR; INTRAVENOUS
Status: DISCONTINUED | OUTPATIENT
Start: 2023-11-14 | End: 2023-11-14 | Stop reason: HOSPADM

## 2023-11-14 RX ORDER — ROCURONIUM BROMIDE 10 MG/ML
INJECTION, SOLUTION INTRAVENOUS
Status: DISCONTINUED | OUTPATIENT
Start: 2023-11-14 | End: 2023-11-14

## 2023-11-14 RX ORDER — HEPARIN SODIUM 1000 [USP'U]/ML
INJECTION, SOLUTION INTRAVENOUS; SUBCUTANEOUS
Status: DISCONTINUED | OUTPATIENT
Start: 2023-11-14 | End: 2023-11-14

## 2023-11-14 RX ORDER — SCOLOPAMINE TRANSDERMAL SYSTEM 1 MG/1
1 PATCH, EXTENDED RELEASE TRANSDERMAL ONCE
Status: COMPLETED | OUTPATIENT
Start: 2023-11-14 | End: 2023-11-17

## 2023-11-14 RX ORDER — ENOXAPARIN SODIUM 100 MG/ML
40 INJECTION SUBCUTANEOUS EVERY 24 HOURS
Status: DISCONTINUED | OUTPATIENT
Start: 2023-11-14 | End: 2023-11-17 | Stop reason: HOSPADM

## 2023-11-14 RX ORDER — SODIUM CHLORIDE, SODIUM LACTATE, POTASSIUM CHLORIDE, CALCIUM CHLORIDE 600; 310; 30; 20 MG/100ML; MG/100ML; MG/100ML; MG/100ML
INJECTION, SOLUTION INTRAVENOUS CONTINUOUS
Status: DISCONTINUED | OUTPATIENT
Start: 2023-11-14 | End: 2023-11-17 | Stop reason: HOSPADM

## 2023-11-14 RX ORDER — MORPHINE SULFATE 2 MG/ML
2 INJECTION, SOLUTION INTRAMUSCULAR; INTRAVENOUS EVERY 4 HOURS PRN
Status: DISCONTINUED | OUTPATIENT
Start: 2023-11-14 | End: 2023-11-17 | Stop reason: HOSPADM

## 2023-11-14 RX ORDER — DIPHENHYDRAMINE HYDROCHLORIDE 50 MG/ML
12.5 INJECTION INTRAMUSCULAR; INTRAVENOUS
Status: DISCONTINUED | OUTPATIENT
Start: 2023-11-14 | End: 2023-11-14 | Stop reason: HOSPADM

## 2023-11-14 RX ORDER — BUSPIRONE HYDROCHLORIDE 5 MG/1
5 TABLET ORAL DAILY
Status: DISCONTINUED | OUTPATIENT
Start: 2023-11-14 | End: 2023-11-17 | Stop reason: HOSPADM

## 2023-11-14 RX ORDER — ONDANSETRON 2 MG/ML
4 INJECTION INTRAMUSCULAR; INTRAVENOUS DAILY PRN
Status: DISCONTINUED | OUTPATIENT
Start: 2023-11-14 | End: 2023-11-14 | Stop reason: HOSPADM

## 2023-11-14 RX ORDER — DEXMEDETOMIDINE HYDROCHLORIDE 100 UG/ML
INJECTION, SOLUTION INTRAVENOUS
Status: DISCONTINUED | OUTPATIENT
Start: 2023-11-14 | End: 2023-11-14

## 2023-11-14 RX ADMIN — KETAMINE HYDROCHLORIDE 20 MG: 50 INJECTION INTRAMUSCULAR; INTRAVENOUS at 07:11

## 2023-11-14 RX ADMIN — FAMOTIDINE 20 MG: 10 INJECTION, SOLUTION INTRAVENOUS at 07:11

## 2023-11-14 RX ADMIN — ROCURONIUM BROMIDE 20 MG: 10 INJECTION, SOLUTION INTRAVENOUS at 08:11

## 2023-11-14 RX ADMIN — SUGAMMADEX 200 MG: 100 INJECTION, SOLUTION INTRAVENOUS at 03:11

## 2023-11-14 RX ADMIN — ROCURONIUM BROMIDE 20 MG: 10 INJECTION, SOLUTION INTRAVENOUS at 01:11

## 2023-11-14 RX ADMIN — ROCURONIUM BROMIDE 20 MG: 10 INJECTION, SOLUTION INTRAVENOUS at 10:11

## 2023-11-14 RX ADMIN — ROCURONIUM BROMIDE 20 MG: 10 INJECTION, SOLUTION INTRAVENOUS at 02:11

## 2023-11-14 RX ADMIN — MIDAZOLAM HYDROCHLORIDE 2 MG: 1 INJECTION, SOLUTION INTRAMUSCULAR; INTRAVENOUS at 07:11

## 2023-11-14 RX ADMIN — DEXMEDETOMIDINE HYDROCHLORIDE 8 MCG: 100 INJECTION, SOLUTION INTRAVENOUS at 09:11

## 2023-11-14 RX ADMIN — ONDANSETRON 8 MG: 4 TABLET, ORALLY DISINTEGRATING ORAL at 08:11

## 2023-11-14 RX ADMIN — PHENYLEPHRINE HYDROCHLORIDE 100 MCG: 10 INJECTION INTRAVENOUS at 07:11

## 2023-11-14 RX ADMIN — PROPOFOL 100 MG: 10 INJECTION, EMULSION INTRAVENOUS at 07:11

## 2023-11-14 RX ADMIN — KETAMINE HYDROCHLORIDE 10 MG: 50 INJECTION INTRAMUSCULAR; INTRAVENOUS at 02:11

## 2023-11-14 RX ADMIN — SODIUM CHLORIDE, SODIUM LACTATE, POTASSIUM CHLORIDE, AND CALCIUM CHLORIDE: .6; .31; .03; .02 INJECTION, SOLUTION INTRAVENOUS at 11:11

## 2023-11-14 RX ADMIN — KETAMINE HYDROCHLORIDE 10 MG: 50 INJECTION INTRAMUSCULAR; INTRAVENOUS at 10:11

## 2023-11-14 RX ADMIN — HYDROMORPHONE HYDROCHLORIDE 0.2 MG: 1 INJECTION, SOLUTION INTRAMUSCULAR; INTRAVENOUS; SUBCUTANEOUS at 05:11

## 2023-11-14 RX ADMIN — SULFAMETHOXAZOLE AND TRIMETHOPRIM 1 TABLET: 800; 160 TABLET ORAL at 08:11

## 2023-11-14 RX ADMIN — SODIUM CHLORIDE, SODIUM LACTATE, POTASSIUM CHLORIDE, AND CALCIUM CHLORIDE: .6; .31; .03; .02 INJECTION, SOLUTION INTRAVENOUS at 03:11

## 2023-11-14 RX ADMIN — KETAMINE HYDROCHLORIDE 10 MG: 50 INJECTION INTRAMUSCULAR; INTRAVENOUS at 12:11

## 2023-11-14 RX ADMIN — SODIUM CHLORIDE: 0.9 INJECTION, SOLUTION INTRAVENOUS at 03:11

## 2023-11-14 RX ADMIN — VANCOMYCIN HYDROCHLORIDE 1000 MG: 1 INJECTION, POWDER, LYOPHILIZED, FOR SOLUTION INTRAVENOUS at 07:11

## 2023-11-14 RX ADMIN — DEXMEDETOMIDINE HYDROCHLORIDE 8 MCG: 100 INJECTION, SOLUTION INTRAVENOUS at 08:11

## 2023-11-14 RX ADMIN — SODIUM CHLORIDE, SODIUM LACTATE, POTASSIUM CHLORIDE, AND CALCIUM CHLORIDE: .6; .31; .03; .02 INJECTION, SOLUTION INTRAVENOUS at 06:11

## 2023-11-14 RX ADMIN — PHENYLEPHRINE HYDROCHLORIDE 200 MCG: 10 INJECTION INTRAVENOUS at 07:11

## 2023-11-14 RX ADMIN — KETAMINE HYDROCHLORIDE 10 MG: 50 INJECTION INTRAMUSCULAR; INTRAVENOUS at 08:11

## 2023-11-14 RX ADMIN — MIDAZOLAM HYDROCHLORIDE 1 MG: 1 INJECTION, SOLUTION INTRAMUSCULAR; INTRAVENOUS at 04:11

## 2023-11-14 RX ADMIN — KETAMINE HYDROCHLORIDE 10 MG: 50 INJECTION INTRAMUSCULAR; INTRAVENOUS at 11:11

## 2023-11-14 RX ADMIN — KETAMINE HYDROCHLORIDE 10 MG: 50 INJECTION INTRAMUSCULAR; INTRAVENOUS at 01:11

## 2023-11-14 RX ADMIN — SODIUM CHLORIDE, SODIUM LACTATE, POTASSIUM CHLORIDE, AND CALCIUM CHLORIDE: .6; .31; .03; .02 INJECTION, SOLUTION INTRAVENOUS at 07:11

## 2023-11-14 RX ADMIN — SODIUM CHLORIDE, SODIUM LACTATE, POTASSIUM CHLORIDE, AND CALCIUM CHLORIDE: .6; .31; .03; .02 INJECTION, SOLUTION INTRAVENOUS at 08:11

## 2023-11-14 RX ADMIN — FENTANYL CITRATE 100 MCG: 50 INJECTION, SOLUTION INTRAMUSCULAR; INTRAVENOUS at 07:11

## 2023-11-14 RX ADMIN — ONDANSETRON 4 MG: 2 INJECTION INTRAMUSCULAR; INTRAVENOUS at 07:11

## 2023-11-14 RX ADMIN — SCOPALAMINE 1 PATCH: 1 PATCH, EXTENDED RELEASE TRANSDERMAL at 06:11

## 2023-11-14 RX ADMIN — ROCURONIUM BROMIDE 50 MG: 10 INJECTION, SOLUTION INTRAVENOUS at 07:11

## 2023-11-14 RX ADMIN — DEXMEDETOMIDINE HYDROCHLORIDE 8 MCG: 100 INJECTION, SOLUTION INTRAVENOUS at 02:11

## 2023-11-14 RX ADMIN — DEXMEDETOMIDINE HYDROCHLORIDE 8 MCG: 100 INJECTION, SOLUTION INTRAVENOUS at 01:11

## 2023-11-14 RX ADMIN — OXYCODONE HYDROCHLORIDE AND ACETAMINOPHEN 1 TABLET: 10; 325 TABLET ORAL at 08:11

## 2023-11-14 RX ADMIN — MUPIROCIN 1 G: 20 OINTMENT TOPICAL at 08:11

## 2023-11-14 RX ADMIN — DEXMEDETOMIDINE HYDROCHLORIDE 8 MCG: 100 INJECTION, SOLUTION INTRAVENOUS at 10:11

## 2023-11-14 RX ADMIN — ROCURONIUM BROMIDE 20 MG: 10 INJECTION, SOLUTION INTRAVENOUS at 12:11

## 2023-11-14 RX ADMIN — ACETAMINOPHEN 1000 MG: 10 INJECTION, SOLUTION INTRAVENOUS at 07:11

## 2023-11-14 RX ADMIN — DEXMEDETOMIDINE HYDROCHLORIDE 8 MCG: 100 INJECTION, SOLUTION INTRAVENOUS at 12:11

## 2023-11-14 RX ADMIN — DIPHENHYDRAMINE HYDROCHLORIDE 6.25 MG: 50 INJECTION INTRAMUSCULAR; INTRAVENOUS at 07:11

## 2023-11-14 RX ADMIN — DEXMEDETOMIDINE HYDROCHLORIDE 8 MCG: 100 INJECTION, SOLUTION INTRAVENOUS at 11:11

## 2023-11-14 RX ADMIN — HEPARIN SODIUM 1500 UNITS: 1000 INJECTION, SOLUTION INTRAVENOUS; SUBCUTANEOUS at 02:11

## 2023-11-14 RX ADMIN — KETAMINE HYDROCHLORIDE 10 MG: 50 INJECTION INTRAMUSCULAR; INTRAVENOUS at 09:11

## 2023-11-14 RX ADMIN — DEXAMETHASONE SODIUM PHOSPHATE 8 MG: 4 INJECTION, SOLUTION INTRAMUSCULAR; INTRAVENOUS at 07:11

## 2023-11-14 RX ADMIN — ROCURONIUM BROMIDE 20 MG: 10 INJECTION, SOLUTION INTRAVENOUS at 11:11

## 2023-11-14 RX ADMIN — ROCURONIUM BROMIDE 20 MG: 10 INJECTION, SOLUTION INTRAVENOUS at 09:11

## 2023-11-14 RX ADMIN — PHENYLEPHRINE HYDROCHLORIDE 0.02 MCG/KG/MIN: 10 INJECTION INTRAVENOUS at 07:11

## 2023-11-14 RX ADMIN — LIDOCAINE HYDROCHLORIDE 100 MG: 20 INJECTION, SOLUTION INTRAVENOUS at 07:11

## 2023-11-14 RX ADMIN — GLYCOPYRROLATE 0.2 MG: 0.2 INJECTION, SOLUTION INTRAMUSCULAR; INTRAVITREAL at 07:11

## 2023-11-14 NOTE — PLAN OF CARE
Attempted to call report to ICU. Was told that ICU 2202 is not ready and RN assigned to patient is not able to take report at this time. Per Dr. De La Cruz, transfer patient to PACU for recovery.

## 2023-11-14 NOTE — TRANSFER OF CARE
Anesthesia Transfer of Care Note    Patient: Blanca Loya    Procedure(s) Performed: Procedure(s) (LRB):  CAPSULECTOMY, BREAST (Bilateral)  RECONSTRUCTION, BREAST, PAP FLAP (Bilateral)    Patient location: PACU    Anesthesia Type: general    Transport from OR: Transported from OR on 2-3 L/min O2 by NC with adequate spontaneous ventilation. Continuous ECG monitoring in transport. Continuous SpO2 monitoring in transport    Post pain: adequate analgesia    Post assessment: no apparent anesthetic complications    Post vital signs: stable    Level of consciousness: awake and alert    Nausea/Vomiting: no nausea/vomiting    Complications: none    Transfer of care protocol was followed      Last vitals: Visit Vitals  /67 (BP Location: Left arm, Patient Position: Lying)   Pulse 68   Temp 36.5 °C (97.7 °F) (Oral)   Resp 16   LMP 09/10/2016   SpO2 98%   Breastfeeding No

## 2023-11-14 NOTE — ANESTHESIA PREPROCEDURE EVALUATION
11/13/2023  Blanca Loya is a 58 y.o., female.           Tobacco Use:  The patient  reports that she has quit smoking. Her smoking use included cigarettes. She has never used smokeless tobacco.     Results for orders placed or performed during the hospital encounter of 11/13/23   EKG 12-lead    Collection Time: 11/13/23  1:04 PM    Narrative    Test Reason : C50.411,Z17.0,    Vent. Rate : 066 BPM     Atrial Rate : 066 BPM     P-R Int : 180 ms          QRS Dur : 074 ms      QT Int : 394 ms       P-R-T Axes : 079 086 079 degrees     QTc Int : 413 ms    Normal sinus rhythm  Normal ECG  No previous ECGs available    Referred By:             Confirmed By:              Lab Results   Component Value Date    WBC 5.19 11/13/2023    HGB 12.8 11/13/2023    HCT 38.2 11/13/2023    MCV 96 11/13/2023     11/13/2023     BMP  Lab Results   Component Value Date     11/13/2023    K 3.5 11/13/2023     11/13/2023    CO2 31 (H) 11/13/2023    BUN 12 11/13/2023    CREATININE 0.8 11/13/2023    CALCIUM 9.7 11/13/2023    ANIONGAP 6 (L) 11/13/2023    GLU 97 11/13/2023    GLU 89 09/26/2023    GLU 88 05/18/2023       No results found for this or any previous visit.         Pre-op Assessment    I have reviewed the Patient Summary Reports.     I have reviewed the Nursing Notes. I have reviewed the NPO Status.   I have reviewed the Medications.     Review of Systems  Anesthesia Hx:  No problems with previous Anesthesia             Denies Family Hx of Anesthesia complications.    Denies Personal Hx of Anesthesia complications.                    Social:  Former Smoker, No Alcohol Use       Hematology/Oncology:  Hematology Normal                       --  Cancer in past history:       Breast    right axillary node dissection no lymphedema surgery, chemotherapy and radiation   Oncology Comments: Malignant neoplasm of  upper-outer quadrant of right female breast  S/P bilateral mastectomy     EENT/Dental:  EENT/Dental Normal  Acoustic neuroma - Left   Patient post excision   Acquired deafness   Ears General/Symptom(s) Hearing Impairment: deaf - left    Acoustic neuroma        Cardiovascular:  Cardiovascular Normal                  ECG has been reviewed.                          Pulmonary:  Pulmonary Normal                       Renal/:  Renal/ Normal                 Hepatic/GI:  Hepatic/GI Normal                 Musculoskeletal:  Arthritis   Arthralgia of cervical spine  Decreased range of motion of intervertebral discs of cervical spine    Muscle weakness of upper extremity     Bone Disorders:    Osteoporosis   Spine Disorders: cervical Degenerative disease           Neurological:    Neuromuscular Disease,       Paresthesia                         Movement Disorder Dx, Idiopathic Tremor   Endocrine:   Hypothyroidism          Psych:  Psychiatric History anxiety                 Physical Exam  General: Well nourished, Cooperative, Alert and Oriented    Airway:  Mallampati: III / II  Mouth Opening: Normal  TM Distance: > 6 cm  Tongue: Normal  Neck ROM: Extension Decreased    Dental:  Intact, Caps / Implants    Chest/Lungs:  Clear to auscultation, Normal Respiratory Rate    Heart:  Rate: Normal  Rhythm: Regular Rhythm        Anesthesia Plan  Type of Anesthesia, risks & benefits discussed:    Anesthesia Type: Gen ETT  Intra-op Monitoring Plan: Standard ASA Monitors  Post Op Pain Control Plan: multimodal analgesia and IV/PO Opioids PRN  Induction:  IV  Airway Plan: Direct and Video, Post-Induction  Informed Consent: Informed consent signed with the Patient and all parties understand the risks and agree with anesthesia plan.  All questions answered.   ASA Score: 2  Anesthesia Plan Notes:         GETA  Benadryl 6.25 mg iv, Decadron 8 mg, iv Zofran 4 mg iv, Pepcid 20 mg iv, Scopolamine Patch   Ofirmev 1000 mg iv, Precedex iv, Ketamine  iv  Sugammadex        Ready For Surgery From Anesthesia Perspective.     .

## 2023-11-14 NOTE — ANESTHESIA PROCEDURE NOTES
Intubation    Date/Time: 11/14/2023 7:15 AM    Performed by: Patience Willard CRNA  Authorized by: Timoteo Ruelas MD    Intubation:     Induction:  Intravenous    Intubated:  Postinduction    Mask Ventilation:  Easy mask    Attempts:  1    Attempted By:  CRNA    Method of Intubation:  Direct    Blade:  [place] 3    Laryngeal View Grade: Grade I - full view of cords      Difficult Airway Encountered?: No      Complications:  None    Airway Device:  Oral endotracheal tube    Airway Device Size:  7.5    Style/Cuff Inflation:  Cuffed (inflated to minimal occlusive pressure)    Tube secured:  20    Secured at:  The lips    Placement Verified By:  Capnometry    Complicating Factors:  None    Findings Post-Intubation:  BS equal bilateral and atraumatic/condition of teeth unchanged

## 2023-11-14 NOTE — H&P
58 year old female with a history of breast cancer treated with mastectomy, XRT/chemo, and implant-based reconstruction presents with capsular contracture.      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 2017    S/P bilateral mastectomy 10/23/2019     Past Surgical History:   Procedure Laterality Date    BREAST RECONSTRUCTION      BREAST REVISION SURGERY Left 2018    Procedure: BREAST REVISION SURGERY LEFT IMF ELEVATION WITH ALLODERM;  Surgeon: Holger Umana MD;  Location: [address];  Service: Plastics;  Laterality: Left;    BREAST SURGERY      COLONOSCOPY N/A 2020    Procedure: COLONOSCOPY;  Surgeon: Rocco Edmonds III, MD;  Location: [facility];  Service: Endoscopy;  Laterality: N/A;    NEUROMA SURGERY      [place] ear     Social History     Tobacco Use    Smoking status: Former     Current packs/day: 0.00     Types: Cigarettes     Quit date: 1987     Years since quittin.0    Smokeless tobacco: Never   Substance Use Topics    Alcohol use: No    Drug use: No     Review of patient's allergies indicates:   Allergen Reactions    Demerol [meperidine] Anxiety    Clindamycin Itching and Rash     Per pt on 18    Ibuprofen Nausea Only    Kenalog [triamcinolone acetonide] Other (See Comments)     Hot flashes, flushing of the skin.  (sensitivity to injection noted that was given on 3/6/18)    Micro-k [potassium chloride] Itching     Headache      Pcn [penicillins] Rash     Review of Systems   All other systems reviewed and are negative.      There were no vitals filed for this visit.    Physical Exam   Constitutional: She is oriented to person, place, and time. No distress.   HENT:   Head: Normocephalic.   Mouth/Throat: Mucous membranes are moist.   Pulmonary/Chest: Effort  normal. No respiratory distress.   Neurological: She is alert and oriented to person, place, and time.   Skin: Skin is warm and dry.     Psychiatric: Mood normal.   Breasts: Capsular contracture present right breast  Implants in place bilaterally.       Assessment:  History of breast cancer  Acquired absence of bilateral breasts  Capsular contracture, right breast      Plan:  -To OR today for explant bilateral implants, capsulectomy, and bilateral PAP flaps to bilateral breasts.

## 2023-11-15 PROCEDURE — 99900035 HC TECH TIME PER 15 MIN (STAT)

## 2023-11-15 PROCEDURE — 63600175 PHARM REV CODE 636 W HCPCS: Performed by: PHYSICIAN ASSISTANT

## 2023-11-15 PROCEDURE — 20000000 HC ICU ROOM

## 2023-11-15 PROCEDURE — 94799 UNLISTED PULMONARY SVC/PX: CPT

## 2023-11-15 PROCEDURE — 94761 N-INVAS EAR/PLS OXIMETRY MLT: CPT

## 2023-11-15 PROCEDURE — 25000003 PHARM REV CODE 250: Performed by: PHYSICIAN ASSISTANT

## 2023-11-15 PROCEDURE — 99900031 HC PATIENT EDUCATION (STAT)

## 2023-11-15 PROCEDURE — 25000003 PHARM REV CODE 250: Performed by: PLASTIC SURGERY

## 2023-11-15 RX ADMIN — OXYCODONE HYDROCHLORIDE AND ACETAMINOPHEN 1 TABLET: 5; 325 TABLET ORAL at 06:11

## 2023-11-15 RX ADMIN — SULFAMETHOXAZOLE AND TRIMETHOPRIM 1 TABLET: 800; 160 TABLET ORAL at 09:11

## 2023-11-15 RX ADMIN — OXYCODONE HYDROCHLORIDE AND ACETAMINOPHEN 1 TABLET: 5; 325 TABLET ORAL at 01:11

## 2023-11-15 RX ADMIN — MUPIROCIN 1 G: 20 OINTMENT TOPICAL at 09:11

## 2023-11-15 RX ADMIN — ENOXAPARIN SODIUM 40 MG: 40 INJECTION SUBCUTANEOUS at 06:11

## 2023-11-15 RX ADMIN — OXYCODONE HYDROCHLORIDE AND ACETAMINOPHEN 1 TABLET: 10; 325 TABLET ORAL at 06:11

## 2023-11-15 RX ADMIN — OXYCODONE HYDROCHLORIDE AND ACETAMINOPHEN 1 TABLET: 10; 325 TABLET ORAL at 04:11

## 2023-11-15 RX ADMIN — OXYCODONE HYDROCHLORIDE AND ACETAMINOPHEN 1 TABLET: 5; 325 TABLET ORAL at 09:11

## 2023-11-15 RX ADMIN — ONDANSETRON 8 MG: 4 TABLET, ORALLY DISINTEGRATING ORAL at 02:11

## 2023-11-15 RX ADMIN — SODIUM CHLORIDE, SODIUM LACTATE, POTASSIUM CHLORIDE, AND CALCIUM CHLORIDE: .6; .31; .03; .02 INJECTION, SOLUTION INTRAVENOUS at 01:11

## 2023-11-15 RX ADMIN — BUSPIRONE HYDROCHLORIDE 5 MG: 5 TABLET ORAL at 09:11

## 2023-11-15 RX ADMIN — OXYCODONE HYDROCHLORIDE AND ACETAMINOPHEN 1 TABLET: 10; 325 TABLET ORAL at 12:11

## 2023-11-15 RX ADMIN — LEVOTHYROXINE SODIUM 112 MCG: 0.11 TABLET ORAL at 06:11

## 2023-11-15 NOTE — PLAN OF CARE
Pt resting comfortably in bed at this time, attempting to sleep. Post-op breast flap x 2, LUDY drains x 4. Monitoring output of drains, coloration of chest, incision sites, doppler blood flow, and urine output. Explained pain management to patient, verbalized understanding.         Problem: Adult Inpatient Plan of Care  Goal: Plan of Care Review  Outcome: Ongoing, Progressing  Goal: Patient-Specific Goal (Individualized)  Outcome: Ongoing, Progressing  Goal: Absence of Hospital-Acquired Illness or Injury  Outcome: Ongoing, Progressing  Goal: Optimal Comfort and Wellbeing  Outcome: Ongoing, Progressing  Goal: Readiness for Transition of Care  Outcome: Ongoing, Progressing     Problem: Infection  Goal: Absence of Infection Signs and Symptoms  Outcome: Ongoing, Progressing     Problem: Fall Injury Risk  Goal: Absence of Fall and Fall-Related Injury  Outcome: Ongoing, Progressing

## 2023-11-15 NOTE — ANESTHESIA POSTPROCEDURE EVALUATION
Anesthesia Post Evaluation    Patient: Blanca Loya    Procedure(s) Performed: Procedure(s) (LRB):  CAPSULECTOMY, BREAST (Bilateral)  RECONSTRUCTION, BREAST, PAP FLAP (Bilateral)    Final Anesthesia Type: general      Patient location during evaluation: PACU  Patient participation: Yes- Able to Participate  Level of consciousness: awake and alert  Post-procedure vital signs: reviewed and stable  Pain management: adequate  Airway patency: patent    PONV status at discharge: No PONV  Anesthetic complications: no      Cardiovascular status: stable  Respiratory status: unassisted and spontaneous ventilation  Hydration status: euvolemic  Follow-up not needed.          Vitals Value Taken Time   /63 11/14/23 2000   Temp 36 °C (96.8 °F) 11/14/23 1645   Pulse 70 11/14/23 2000   Resp 15 11/14/23 2000   SpO2 100 % 11/14/23 2000   Vitals shown include unvalidated device data.      Event Time   Out of Recovery 17:50:00         Pain/Ning Score: Pain Rating Prior to Med Admin: 7 (11/14/2023  5:20 PM)  Pain Rating Post Med Admin: 5 (11/14/2023  6:00 PM)  Ning Score: 9 (11/14/2023  5:45 PM)

## 2023-11-15 NOTE — CARE UPDATE
11/15/23 1014   Patient Assessment/Suction   Level of Consciousness (AVPU) alert   Respiratory Effort Normal   Expansion/Accessory Muscles/Retractions no use of accessory muscles   All Lung Fields Breath Sounds clear   Rhythm/Pattern, Respiratory unlabored   Cough Frequency no cough   PRE-TX-O2   Device (Oxygen Therapy) room air   Pulse Oximetry Type Continuous   $ Pulse Oximetry - Single Charge Pulse Oximetry - Single   Education   $ Education 15 min;Other (see comment)  (SATS)

## 2023-11-15 NOTE — RESPIRATORY THERAPY
"   11/14/23 1915   Patient Assessment/Suction   Level of Consciousness (AVPU) alert   Respiratory Effort Normal;Unlabored   PRE-TX-O2   Device (Oxygen Therapy) room air   SpO2 98 %   Pulse Oximetry Type Continuous   $ Pulse Oximetry - Multiple Charge Pulse Oximetry - Multiple   Pulse (!) 56   Resp 13   BP (!) 95/53   IBW/VT Calculations   Height 5' 4" (1.626 m)   IBW/kg (Calculated) Female 54.7 kg   Low Range Vt 4cc/kg FEMALE 218.8 mL   Low Range Vt 6cc/kg FEMALE 328.2 mL   Adult Moderate Range vt 8cc/kg FEMALE 437.6 mL   Adult High Range Vt 10cc/kg FEMALE 547 mL   Education   $ Education 15 min   Respiratory Evaluation   $ Care Plan Tech Time 15 min   $ Eval/Re-eval Charges Re-evaluation       "

## 2023-11-15 NOTE — NURSING
"Medicated for pain.  Attempted to get pt oob to chair.  Able to with 1 assist.  Pt became more hypotensive in chair while checking drains.  Also c/o being woozy and light headed.  PO zofran given.  BP sys 70s, face pale in color.  Remained alert and following commands.  BP rechecked and lower.  Pt assisted back to bed with another nurse.  Bed placed in "beach chair" position.  And sys BP returned to 95/47 with a MAP of 68.  Color to face returned and pt reports beginning to feel better.  VITO Melton made aware.  Keep IVF while hypotension.  Attempt chair/ambulation 2 hours post pain medication.  Work on removing tyson.  "

## 2023-11-15 NOTE — PROGRESS NOTES
POD1 s/p bilateral explant, capsulectomy, and bilateral PAP flaps.  Overall, doing well.  Reports more pain in the left breast than right.       Vitals:    11/15/23 0600   BP: (!) 99/51   Pulse: 84   Resp: 18   Temp:        Bilateral breast flaps soft, but appropriately edematous.  Breast skin ecchymotic.   + doppler signals bilaterally.   Minimal bloody output bilateral breast drains (recently emptied, 36 ml and 41 ml overnight/yesterday)      Plan:  -Discontinue tyson.  -Regular diet.  -Q4h flap checks.  -Up in chair with meals and throughout the day.  -Encourage IS.  -Ambulate patient in the unit BID.

## 2023-11-15 NOTE — PLAN OF CARE
[facility]  Initial Discharge Assessment       Primary Care Provider: Chaparro Shepherd III, MD    Admission Diagnosis: Hx of breast cancer [Z85.3]  Malignant neoplasm of upper-outer quadrant of right breast in female, estrogen receptor positive [C50.411, Z17.0]    Admission Date: 11/14/2023  Expected Discharge Date:     Pt is a 58-year-old female who arrived from home with Hx of breast cancer problem. Information verified as correct on facesheet. The assessment was completed at the patient's bedside.  Pt lives with spouse, Chris Loya [phone]. Pt does have a Living Will. The Pt is oriented to person, places, and times. PCP last seen last week.  Pt denies Coumadin, Dialysis, DME, HH, and has not been readmitted into the hospital in the last thirty days.  Pt is capable of performing ADLs without assistance. Pt drivers to and from medical appointments. Pt reports she takes medication as prescribed; pharmacy used [facility].  Pt verbalized plan to discharge home via family transport. Pt has no other needs to be addressed at this time. CM reviewed the chart and will continue to monitor.       Transition of Care Barriers: None    Payor: BLUE CROSS BLUE SHIELD / Plan: BLUE CONNECT / Product Type: HMO /     Extended Emergency Contact Information  Primary Emergency Contact: Chris Loya  Address: [address] [place] [address]           [place] [address] [place]  Mobile Phone: [phone]  Relation: Spouse    Discharge Plan A: Home with family  Discharge Plan B: Home      [facility] [place] - [address]  [address]  [facility] [place] [address]  Phone: [phone] Fax: [phone]    [id number] - [place] - [address]  [address]  [facility] [place] [address]  Phone: [phone] Fax: [phone]      Initial Assessment (most recent)       Adult Discharge Assessment - 11/15/23 1103          Discharge  Assessment    Assessment Type Discharge Planning Assessment     Confirmed/corrected address, phone number and insurance Yes     Confirmed Demographics Correct on Facesheet     Source of Information patient     When was your last doctors appointment? --   Last week    Communicated VINEET with patient/caregiver Date not available/Unable to determine     Reason For Admission Hx of breast cancer     People in Home spouse     Facility Arrived From: home     Do you expect to return to your current living situation? Yes     Do you have help at home or someone to help you manage your care at home? Yes     Who are your caregiver(s) and their phone number(s)? Chris Loya/spouse [phone]     Prior to hospitilization cognitive status: Alert/Oriented     Current cognitive status: Alert/Oriented     Home Accessibility wheelchair accessible     Equipment Currently Used at Home none     Readmission within 30 days? No     Patient currently being followed by outpatient case management? No     Do you currently have service(s) that help you manage your care at home? No     Do you take prescription medications? Yes     Do you have prescription coverage? Yes     Coverage Payor: [employer] CROSS BLUE SHIELD - BLUE CONNECT     Do you have any problems affording any of your prescribed medications? No     Is the patient taking medications as prescribed? yes     Who is going to help you get home at discharge? Chris Loya/spouse [phone]     How do you get to doctors appointments? car, drives self     Are you on dialysis? No     Do you take coumadin? No     DME Needed Upon Discharge  none     Discharge Plan discussed with: Patient     Transition of Care Barriers None     Discharge Plan A Home with family     Discharge Plan B Home

## 2023-11-15 NOTE — NURSING
1800:  pt arrived from PACU.  Settled in room.  Internal doppler assessed to right and left breast with PACU nurse.  LUDY drains x4.  1 to each breast and 1 to each thigh.  Oriented to room.  Oliveira to gravity.

## 2023-11-16 PROCEDURE — 25000003 PHARM REV CODE 250: Performed by: PHYSICIAN ASSISTANT

## 2023-11-16 PROCEDURE — 94761 N-INVAS EAR/PLS OXIMETRY MLT: CPT

## 2023-11-16 PROCEDURE — 20000000 HC ICU ROOM

## 2023-11-16 PROCEDURE — 25000003 PHARM REV CODE 250: Performed by: PLASTIC SURGERY

## 2023-11-16 PROCEDURE — 63600175 PHARM REV CODE 636 W HCPCS: Performed by: PHYSICIAN ASSISTANT

## 2023-11-16 RX ADMIN — LEVOTHYROXINE SODIUM 112 MCG: 0.11 TABLET ORAL at 06:11

## 2023-11-16 RX ADMIN — OXYCODONE HYDROCHLORIDE AND ACETAMINOPHEN 1 TABLET: 10; 325 TABLET ORAL at 06:11

## 2023-11-16 RX ADMIN — SULFAMETHOXAZOLE AND TRIMETHOPRIM 1 TABLET: 800; 160 TABLET ORAL at 08:11

## 2023-11-16 RX ADMIN — ENOXAPARIN SODIUM 40 MG: 40 INJECTION SUBCUTANEOUS at 05:11

## 2023-11-16 RX ADMIN — MUPIROCIN 1 G: 20 OINTMENT TOPICAL at 08:11

## 2023-11-16 RX ADMIN — OXYCODONE HYDROCHLORIDE AND ACETAMINOPHEN 1 TABLET: 10; 325 TABLET ORAL at 10:11

## 2023-11-16 RX ADMIN — BUSPIRONE HYDROCHLORIDE 5 MG: 5 TABLET ORAL at 08:11

## 2023-11-16 RX ADMIN — OXYCODONE HYDROCHLORIDE AND ACETAMINOPHEN 1 TABLET: 10; 325 TABLET ORAL at 02:11

## 2023-11-16 RX ADMIN — OXYCODONE HYDROCHLORIDE AND ACETAMINOPHEN 1 TABLET: 10; 325 TABLET ORAL at 07:11

## 2023-11-16 RX ADMIN — SODIUM CHLORIDE, SODIUM LACTATE, POTASSIUM CHLORIDE, AND CALCIUM CHLORIDE: .6; .31; .03; .02 INJECTION, SOLUTION INTRAVENOUS at 05:11

## 2023-11-16 RX ADMIN — OXYCODONE HYDROCHLORIDE AND ACETAMINOPHEN 1 TABLET: 10; 325 TABLET ORAL at 03:11

## 2023-11-16 RX ADMIN — OXYCODONE HYDROCHLORIDE AND ACETAMINOPHEN 1 TABLET: 10; 325 TABLET ORAL at 11:11

## 2023-11-16 NOTE — PLAN OF CARE
Pt progressing.  Oliveira removed this afternoon.  Tolerating diet.  Able to walk briefly in halls this evening and oob to chair.  JPx4.  Neurovasc checks in tact.  Surgical sights warm and with out discoloration.  No bruising.  Internal dopplers in use and strong.  Pain control with PRN medication per MAR.

## 2023-11-16 NOTE — NURSING
Nurses Note -- 4 Eyes      11/14/2023   7:00PM      Skin assessed during: Admit      [x] No Altered Skin Integrity Present    [x]Prevention Measures Documented      [] Yes- Altered Skin Integrity Present or Discovered   [] LDA Added if Not in Epic (Describe Wound)   [] New Altered Skin Integrity was Present on Admit and Documented in LDA   [] Wound Image Taken    Wound Care Consulted? No    Attending Nurse:  Aurora Jeong RN/Staff Member:  ANNAMARIA Interiano

## 2023-11-16 NOTE — CARE UPDATE
11/15/23 1922   Patient Assessment/Suction   Level of Consciousness (AVPU) alert   Respiratory Effort Normal;Unlabored   Expansion/Accessory Muscles/Retractions no use of accessory muscles   PRE-TX-O2   Device (Oxygen Therapy) room air   SpO2 99 %   Pulse Oximetry Type Continuous   $ Pulse Oximetry - Multiple Charge Pulse Oximetry - Multiple   Pulse 85   Resp 20   Positioning   Head of Bed (HOB) Positioning HOB at 30-45 degrees   Education   $ Education Other (see comment);15 min  (sats)   Respiratory Evaluation   $ Care Plan Tech Time 15 min   $ Eval/Re-eval Charges Re-evaluation

## 2023-11-16 NOTE — PROGRESS NOTES
"[facility]  Adult Nutrition   Progress Note (Initial Assessment)     SUMMARY     Recommendations  Recommendation/Intervention: 1. Continue Regular diet as tolerated. 2. Offer ONS should intake decrease. 3.  to obtain daily menu choices.  Nutrition Goal Status: new  Communication of RD Recs: discussed on rounds    Dietitian Rounds Brief  RD screen for ICU admit day 2. Pt is post op bilateral explant (breast), capsulectomy, and bilateral PAP flaps. Diet advanced to regular this am with 75% of breakfast consumed per rounds.Plan is for pt to dc today per rounds. Pt intake is good per nursing; no signs of malnutrition. RD to monitor for intake, labs, and status change PRN.    Malnutrition Assessment   No overt signs of malnutrition.          Diet order:   Current Diet Order: Regular diet           Evaluation of Received Nutrient/Fluid Intake  IV Fluid (mL): 3000  Protein Required: meeting needs  Fluid Required: meeting needs  Tolerance: tolerating     % Intake of Estimated Energy Needs: 50 - 75 %  % Meal Intake: 50 - 75 %      Intake/Output Summary (Last 24 hours) at 11/16/2023 1333  Last data filed at 11/16/2023 0715  Gross per 24 hour   Intake 2792 ml   Output 1429 ml   Net 1363 ml        Anthropometrics  Temp: 98.2 °F (36.8 °C)  Height: 5' 4" (162.6 cm)  Height (inches): 64 in  Weight Method: Bed Scale  Weight: 55.3 kg (121 lb 14.6 oz)  Weight (lb): 121.92 lb  Ideal Body Weight (IBW), Female: 120 lb  % Ideal Body Weight, Female (lb): 101.6 %  BMI (Calculated): 20.9  BMI Grade: 18.5-24.9 - normal       Estimated/Assessed Needs  Weight Used For Calorie Calculations: 55.3 kg (121 lb 14.6 oz)  Energy Calorie Requirements (kcal): 7801-3883 / day (30-35 kcal/day)  Energy Need Method: Kcal/kg  Protein Requirements: 66-83 gm/day (1.2-1.5 gm/kg)  Weight Used For Protein Calculations: 55.3 kg (121 lb 14.6 oz)     Estimated Fluid Requirement Method: RDA Method  RDA Method (mL): 1659       Reason for " "Assessment  Reason For Assessment: identified at risk by screening criteria (day 2 ICU)  Diagnosis: surgery/postoperative complications  Relevant Medical History: breast cancer  with surgical reconstruction  Interdisciplinary Rounds: attended    Nutrition/Diet History  Spiritual, Cultural Beliefs, Hindu Practices, Values that Affect Care: no  Food Allergies: NKFA  Factors Affecting Nutritional Intake: None identified at this time    Nutrition Risk Screen  Nutrition Risk Screen: no indicators present     MST Score: 0  Have you recently lost weight without trying?: No  Weight loss score: 0  Have you been eating poorly because of a decreased appetite?: No  Appetite score: 0       Weight History:  Wt Readings from Last 5 Encounters:   11/14/23 55.3 kg (121 lb 14.6 oz)   11/13/23 55.3 kg (122 lb)   11/10/23 57.6 kg (126 lb 15.2 oz)   09/27/23 56.2 kg (123 lb 14.4 oz)   08/11/23 56.6 kg (124 lb 11.2 oz)        Lab/Procedures/Meds: Pertinent Labs/Meds Reviewed    Medications:Pertinent Medications Reviewed  Scheduled Meds:   busPIRone  5 mg Oral Daily    enoxparin  40 mg Subcutaneous Q24H (prophylaxis, 1700)    levothyroxine  112 mcg Oral Daily    mupirocin   Nasal BID    scopolamine  1 patch Transdermal Once    sulfamethoxazole-trimethoprim 800-160mg  1 tablet Oral BID     Continuous Infusions:   lactated ringers 125 mL/hr at 11/16/23 0518     PRN Meds:.acetaminophen, ALPRAZolam, morphine, ondansetron, oxyCODONE-acetaminophen, oxyCODONE-acetaminophen, promethazine, sodium chloride 0.9%    Labs: Pertinent Labs Reviewed  Clinical Chemistry:  Recent Labs   Lab 11/13/23  1358      K 3.5      CO2 31*   GLU 97   BUN 12   CREATININE 0.8   CALCIUM 9.7   ANIONGAP 6*     CBC:   Recent Labs   Lab 11/13/23  1358   WBC 5.19   RBC 3.99*   HGB 12.8   HCT 38.2      MCV 96   MCH 32.1*   MCHC 33.5     Lipid Panel:  No results for input(s): "CHOL", "HDL", "LDLCALC", "TRIG", "CHOLHDL" in the last 168 hours.  Cardiac " "Profile:  No results for input(s): "BNP", "CPK", "CPKMB", "TROPONINI", "CKTOTAL" in the last 168 hours.  Inflammatory Labs:  No results for input(s): "CRP" in the last 168 hours.  Diabetes:  No results for input(s): "HGBA1C", "POCTGLUCOSE" in the last 168 hours.  Thyroid & Parathyroid:  No results for input(s): "TSH", "FREET4", "Q0YXAUA", "U9JZSPQ", "THYROIDAB" in the last 168 hours.    Monitor and Evaluation  Food and Nutrient Intake: food and beverage intake, energy intake  Food and Nutrient Adminstration: diet order  Knowledge/Beliefs/Attitudes: food and nutrition knowledge/skill  Physical Activity and Function: nutrition-related ADLs and IADLs  Anthropometric Measurements: weight change, body mass index, weight  Biochemical Data, Medical Tests and Procedures: electrolyte and renal panel, gastrointestinal profile, glucose/endocrine profile, inflammatory profile, lipid profile  Nutrition-Focused Physical Findings: overall appearance     Nutrition Risk  Level of Risk/Frequency of Follow-up: moderate - high     Nutrition Follow-Up  RD Follow-up?: Yes      Lisa Pedersen RD, LDN 11/16/2023 1:33 PM     "

## 2023-11-17 VITALS
WEIGHT: 121.94 LBS | TEMPERATURE: 98 F | BODY MASS INDEX: 20.82 KG/M2 | OXYGEN SATURATION: 98 % | RESPIRATION RATE: 20 BRPM | DIASTOLIC BLOOD PRESSURE: 65 MMHG | HEART RATE: 75 BPM | SYSTOLIC BLOOD PRESSURE: 109 MMHG | HEIGHT: 64 IN

## 2023-11-17 PROCEDURE — 94760 N-INVAS EAR/PLS OXIMETRY 1: CPT

## 2023-11-17 PROCEDURE — 63600175 PHARM REV CODE 636 W HCPCS: Performed by: PHYSICIAN ASSISTANT

## 2023-11-17 PROCEDURE — 99900031 HC PATIENT EDUCATION (STAT)

## 2023-11-17 PROCEDURE — 25000003 PHARM REV CODE 250: Performed by: PHYSICIAN ASSISTANT

## 2023-11-17 RX ADMIN — OXYCODONE HYDROCHLORIDE AND ACETAMINOPHEN 1 TABLET: 10; 325 TABLET ORAL at 05:11

## 2023-11-17 RX ADMIN — SODIUM CHLORIDE, SODIUM LACTATE, POTASSIUM CHLORIDE, AND CALCIUM CHLORIDE: .6; .31; .03; .02 INJECTION, SOLUTION INTRAVENOUS at 05:11

## 2023-11-17 RX ADMIN — SULFAMETHOXAZOLE AND TRIMETHOPRIM 1 TABLET: 800; 160 TABLET ORAL at 08:11

## 2023-11-17 RX ADMIN — BUSPIRONE HYDROCHLORIDE 5 MG: 5 TABLET ORAL at 08:11

## 2023-11-17 RX ADMIN — LEVOTHYROXINE SODIUM 112 MCG: 0.11 TABLET ORAL at 05:11

## 2023-11-17 RX ADMIN — OXYCODONE HYDROCHLORIDE AND ACETAMINOPHEN 1 TABLET: 10; 325 TABLET ORAL at 09:11

## 2023-11-17 NOTE — CARE UPDATE
11/17/23 0908   Patient Assessment/Suction   Level of Consciousness (AVPU) alert   Respiratory Effort Unlabored   Expansion/Accessory Muscles/Retractions no use of accessory muscles   All Lung Fields Breath Sounds clear   Rhythm/Pattern, Respiratory unlabored   Cough Frequency no cough   PRE-TX-O2   Device (Oxygen Therapy) room air   SpO2 98 %   Pulse Oximetry Type Intermittent   $ Pulse Oximetry - Single Charge Pulse Oximetry - Single   Pulse 75   Resp 17   Education   $ Education Other (see comment);15 min  (sats)

## 2023-11-17 NOTE — DISCHARGE SUMMARY
[facility]  Discharge Note  Short Stay    Procedure(s) (LRB):  CAPSULECTOMY, BREAST (Bilateral)  RECONSTRUCTION, BREAST, PAP FLAP (Bilateral)      OUTCOME: Patient tolerated treatment/procedure well without complication and is now ready for discharge.    DISPOSITION: Home or Self Care    FINAL DIAGNOSIS:  Hx of breast cancer    FOLLOWUP: In clinic    DISCHARGE INSTRUCTIONS:    Discharge Procedure Orders   Change dressing (specify)   Order Comments: Keep all incisions padded and wear surgical garments.  Transition to surgical garment when it arrives to her house.     Activity as tolerated   Order Comments: No strenuous activity for 6 weeks.  Light activity okay.     Shower on day dressing removed (No bath)   Order Comments: Okay to shower today.   Remove leg wraps, but gray dressings to stay while showering.  Leg drains may get wet.   Pat dry and rewrap legs and reapply bra.        TIME SPENT ON DISCHARGE: 30 minutes

## 2023-11-17 NOTE — NURSING
Assessed Bilateral breast ,minimal pain at rest about a 5. Left breasr with god coloring and warmth,no swellingobserved shahida drain intact, with serous drainage.right breast same as left breast. Preparing for pt to go home.

## 2023-11-17 NOTE — PLAN OF CARE
11/17/23 1117   Final Note   Assessment Type Final Discharge Note   Anticipated Discharge Disposition Home   What phone number can be called within the next 1-3 days to see how you are doing after discharge? [phone]   Post-Acute Status   Discharge Delays None known at this time     Discharge orders and chart reviewed with no further post-acute discharge needs identified at this time.  At this time, patient is cleared for discharge from Case Management standpoint.

## 2023-11-17 NOTE — NURSING
Discharged to private vehicle, went over all discharge instructions and she understands her meds have been electronically scan to her pharmacy.d/c'd her heplock with cath intact ans covered with bandages. with pt.

## 2023-11-28 NOTE — OP NOTE
Preop Dx:   1. Personal history of breast cancer     Postop Dx:  1. Personal history of breast cancer     Surgeon:  1. Quintin Winslow MD     Assistant:  Linh Kohli PA-C     Procedure performed:  Left breast reconstruction with vPAP free flap  2.   Right breast reconstruction with vPAP free flap  3.   Left breast implant removal and capsulectomy  4.   Right breast implant removal and capsulectomy     Indication for procedure: Patient is a 58F with history of breast cancer, implant reconstruction, and subsequent symptomatic capsular contracture requiring explant and autologous reconstruction.      Anesthesia: General endotracheal     Blood Loss: 200 cc     Specimens: none from my portion of the procedure     Drains:  Bilateral 15 Solo drains to breasts  Bilateral 15 Solo drains to thighs     Complications: none     Procedure in detail: After risks and benefits were discussed the patient was taken to the operating room and placed on the operating table. After anesthesia was induced the patient was prepped and draped in a sterile fashion. We began to raise the right PAP flap. Starting with the anterior incision I dissected through subcutaneous tissue and muscle fascia until the dominant  seen on preoperative CTA was identified. This was isolated and dissected through the muscle, splitting the fibers as the dissection continued deep, until the take-off from the profunda artery was reached.      Next I began a similar dissection on the contralateral PAP flap. Starting with the anterior incision I dissected through subcutaneous tissue and muscle fascia until the dominant  seen on preoperative CTA was identified. This was isolated and dissected through the muscle, splitting the fibers as the dissection continued deep, until the take-off from the profunda artery was reached.       At this point we began to prepare the chest recipient sites. Starting on the left side we reopened the previous vertical  incision, and performed an en bloc capsulectomy and explantation. Next, we used liposomal bupivicaine to perform intercostal and pectoral nerve blocks, and then placed a 15 Solo drain. After obtaining meticulous hemostasis, we split the pectoralis muscle fibers along the 3rd interspace, and dissected the perichondrium off of the medial aspect of the 3rd and 4th ribs. Using a Rongeur we resected half of each rib in order to increase the space available for dissection of the recipient vessels. Using bipolar cautery the intercostal soft tissue was dissected from lateral to medial until the ALEXIS and IMV were visualized. The vessels were then circumferentially dissected in preperation for microsurgical anastomosis. The process was then repeated on the contralateral side. We performed en bloc capsulectomy and explantation, obtained hemostasis, applied liposomal bupivicaine for nerve blocks, and placed a 15 Solo drain. The pectoralis muscle was split in the 3rd interspace. The perichondrium was dissected off the 3rd and 4th ribs, and half of each removed. The intercostal soft tissue was dissected from lateral to medial until the ALEXIS and IMV were identified, and then these were freed up circumferentially. The right implant was 500 cc, and the left was 500 cc.      At this point the left PAP flap was harvested, clipping and dividing the pedicle, and transferred to the right chest wall. The final flap weight on that side was 250 grams. The flap was deepithelialized and meticulously shaped with 3-0 vicryl sutures until the preferred structure was obtained. The flap was then secured to the chest wall, and we then performed the venous anastomosis using a 2.0 mm  device. The arterial anastomosis was performed using 9-0 nylon simple interrupted sutures. The microvascular clamps were released, and vascular inflow and outflow were confirmed. An implantable doppler was placed around the artery for postoperative monitoring,  and it was then inset into the breast pocket using 2-0 vicryl sutures. The breast skin incisions were closed using 2-0 monoderm quill.      Next the right PAP flap was harvested, clipping and dividing the pedicle, and transferred to the right chest wall. The final flap weight on that side was 268 grams. The flap was deepithelialized and meticulously shaped with 3-0 vicryl sutures until the preferred structure was obtained. The flap was then secured to the chest wall, and we then performed the venous anastomoses using two 2.0 mm couplers for each comitante. The arterial anastomosis was performed using 9-0 nylon simple interrupted sutures. The microvascular clamps were released, and vascular inflow and outflow were confirmed. An implantable doppler was placed around the artery for postoperative monitoring, and it was then inset into the breast pocket using 2-0 vicryl sutures. The breast skin incisions were closed using 2-0 monoderm quill.      At this point we focused on closure of the bilateral thigh donor sites. Liposomal bupivicaine was used to performed for pain control. A 15 Solo drain was placed in each thigh prior to closure. The tissue edges were brought together and a deep layer of 0 PDS suture used for initial closure, followed by 2-0 vicryl sutures, and the skin closed using a two layer running deep dermal and subcuticular 2-0 monoderm quill suture. The same technique was used for both thighs.      That completed the procedure. The patient was cleaned and dressed, extubated, taken to recovery, tolerated the procedure well, there were no complications.

## 2023-12-28 ENCOUNTER — ANESTHESIA EVENT (OUTPATIENT)
Dept: SURGERY | Facility: HOSPITAL | Age: 58
End: 2023-12-28
Payer: COMMERCIAL

## 2023-12-28 ENCOUNTER — HOSPITAL ENCOUNTER (OUTPATIENT)
Dept: PREADMISSION TESTING | Facility: HOSPITAL | Age: 58
Discharge: HOME | End: 2023-12-28
Attending: PLASTIC SURGERY
Payer: COMMERCIAL

## 2023-12-28 VITALS
SYSTOLIC BLOOD PRESSURE: 127 MMHG | TEMPERATURE: 98 F | BODY MASS INDEX: 21.51 KG/M2 | HEART RATE: 100 BPM | OXYGEN SATURATION: 98 % | WEIGHT: 126 LBS | HEIGHT: 64 IN | DIASTOLIC BLOOD PRESSURE: 68 MMHG

## 2023-12-28 RX ORDER — ONDANSETRON 4 MG/1
4 TABLET, ORALLY DISINTEGRATING ORAL EVERY 8 HOURS PRN
COMMUNITY
Start: 2023-12-27

## 2023-12-28 RX ORDER — LEVOCARNITINE TARTRATE 500 MG
1 CAPSULE ORAL DAILY
COMMUNITY

## 2023-12-28 RX ORDER — CIPROFLOXACIN 500 MG/1
500 TABLET ORAL 2 TIMES DAILY
COMMUNITY
Start: 2023-12-27

## 2023-12-28 NOTE — DISCHARGE INSTRUCTIONS
INSTRUCTIONS  To confirm your doctor has scheduled your surgery for:12/29/23        Morning of surgery please check in with registration near [facility] Entrance then proceed to Outpatient Surgery Department.    Preop nurses will call the afternoon prior to surgery between 4:00 and 6:00 PM with your final arrival time.  PLEASE NOTE:  The surgery schedule has many variables which may affect the time of your surgery case. Family members should be available if your surgery time changes. Plan to be here the day of your procedure between 4-6 hours.    TAKE ONLY THESE MEDICATIONS WITH A SMALL SIP OF WATER THE MORNING OF SURGERY: see list    DO NOT TAKE THESE MEDICATIONS 5-7 DAYS PRIOR to your procedure per your surgeon's request: ASPIRIN, ALEVE, BC powder, GORDON SELTZER, IBUPROFEN, FISH OIL, VITAMIN E, OR HERBALS   (May take Tylenol)    If you are prescribed any types of blood thinners (Aspirin, Coumadin, Plavix, Pradaxa, Xarelto, Aggrenox, Effient, Eliquis, Savasya, Brilinta or any other), please ask your surgeon how many days before scheduled procedure should you stop taking them. You may also need to verify with prescribing physician if it is OK to stop your blood thinners.      INSTRUCTIONS IMPORTANT!!  Do not eat or drink anything after midnight.  ONLY if you are diabetic, check your sugar in the morning before your procedure.  Do not smoke, vape or drink alcoholic beverages 24 hours prior to your procedure.  Shower the night before AND the morning of your procedure with a Chlorhexidine wash such as hibiclens or Dial antibacterial soap from neck down. You may use your own shampoo and face wash. This helps your skin to be as bacteria free as possible.  If you wear contact lenses, dentures, hearing aids or glasses, bring a container to put them in and give to a family member.    Please leave all jewelry, piercings and valuables at home.  DO NOT remove hair from the surgery site.   If your condition changes such as  fever, cough, etc, please notify your surgeon.   ONLY if you have been diagnosed with sleep apnea please bring your C-PAP machine.  ONLY if you wear home oxygen please bring your portable oxygen tank the day of your procedure.   ONLY for patients requiring bowel prep, written instructions will be given by your doctor's office.  ONLY if you have a neuro stimulator, please bring the controller with you the morning of surgery  Make arrangements in advance for transportation home by a responsible adult.  You must make arrangements for transportation, TAXI'S, UBER'S OR LYFTS ARE NOT ALLOWED.        If you have any questions about these instructions, call Pre-Op Admit  Nursing at [phone] or the Pre-Op Day Surgery Unit at [phone].

## 2023-12-29 ENCOUNTER — ANESTHESIA (OUTPATIENT)
Dept: SURGERY | Facility: HOSPITAL | Age: 58
End: 2023-12-29
Payer: COMMERCIAL

## 2023-12-29 ENCOUNTER — HOSPITAL ENCOUNTER (OUTPATIENT)
Facility: HOSPITAL | Age: 58
Discharge: HOME | End: 2023-12-29
Attending: PLASTIC SURGERY | Admitting: PLASTIC SURGERY
Payer: COMMERCIAL

## 2023-12-29 VITALS
WEIGHT: 126 LBS | RESPIRATION RATE: 16 BRPM | HEIGHT: 64 IN | OXYGEN SATURATION: 98 % | BODY MASS INDEX: 21.51 KG/M2 | DIASTOLIC BLOOD PRESSURE: 76 MMHG | SYSTOLIC BLOOD PRESSURE: 134 MMHG | TEMPERATURE: 98 F | HEART RATE: 73 BPM

## 2023-12-29 DIAGNOSIS — S71.109S: Primary | ICD-10-CM

## 2023-12-29 PROCEDURE — D9220A PRA ANESTHESIA: ICD-10-PCS | Mod: ANES,,, | Performed by: ANESTHESIOLOGY

## 2023-12-29 PROCEDURE — 36000706: Performed by: PLASTIC SURGERY

## 2023-12-29 PROCEDURE — 71000039 HC RECOVERY, EACH ADD'L HOUR: Performed by: PLASTIC SURGERY

## 2023-12-29 PROCEDURE — 63600175 PHARM REV CODE 636 W HCPCS: Performed by: NURSE ANESTHETIST, CERTIFIED REGISTERED

## 2023-12-29 PROCEDURE — 71000015 HC POSTOP RECOV 1ST HR: Performed by: PLASTIC SURGERY

## 2023-12-29 PROCEDURE — 37000008 HC ANESTHESIA 1ST 15 MINUTES: Performed by: PLASTIC SURGERY

## 2023-12-29 PROCEDURE — 25000003 PHARM REV CODE 250: Performed by: NURSE ANESTHETIST, CERTIFIED REGISTERED

## 2023-12-29 PROCEDURE — 71000033 HC RECOVERY, INTIAL HOUR: Performed by: PLASTIC SURGERY

## 2023-12-29 PROCEDURE — 71000016 HC POSTOP RECOV ADDL HR: Performed by: PLASTIC SURGERY

## 2023-12-29 PROCEDURE — 25000003 PHARM REV CODE 250: Performed by: ANESTHESIOLOGY

## 2023-12-29 PROCEDURE — 63600175 PHARM REV CODE 636 W HCPCS: Performed by: ANESTHESIOLOGY

## 2023-12-29 PROCEDURE — D9220A PRA ANESTHESIA: Mod: CRNA,,, | Performed by: NURSE ANESTHETIST, CERTIFIED REGISTERED

## 2023-12-29 PROCEDURE — 36000707: Performed by: PLASTIC SURGERY

## 2023-12-29 PROCEDURE — D9220A PRA ANESTHESIA: Mod: ANES,,, | Performed by: ANESTHESIOLOGY

## 2023-12-29 PROCEDURE — D9220A PRA ANESTHESIA: ICD-10-PCS | Mod: CRNA,,, | Performed by: NURSE ANESTHETIST, CERTIFIED REGISTERED

## 2023-12-29 PROCEDURE — 27201423 OPTIME MED/SURG SUP & DEVICES STERILE SUPPLY: Performed by: PLASTIC SURGERY

## 2023-12-29 PROCEDURE — C9290 INJ, BUPIVACAINE LIPOSOME: HCPCS | Performed by: PLASTIC SURGERY

## 2023-12-29 PROCEDURE — 37000009 HC ANESTHESIA EA ADD 15 MINS: Performed by: PLASTIC SURGERY

## 2023-12-29 PROCEDURE — 63600175 PHARM REV CODE 636 W HCPCS: Performed by: PLASTIC SURGERY

## 2023-12-29 RX ORDER — HYDROMORPHONE HYDROCHLORIDE 1 MG/ML
0.2 INJECTION, SOLUTION INTRAMUSCULAR; INTRAVENOUS; SUBCUTANEOUS EVERY 5 MIN PRN
Status: COMPLETED | OUTPATIENT
Start: 2023-12-29 | End: 2023-12-29

## 2023-12-29 RX ORDER — DIPHENHYDRAMINE HYDROCHLORIDE 50 MG/ML
12.5 INJECTION INTRAMUSCULAR; INTRAVENOUS
Status: DISCONTINUED | OUTPATIENT
Start: 2023-12-29 | End: 2023-12-29 | Stop reason: HOSPADM

## 2023-12-29 RX ORDER — CEFAZOLIN SODIUM 1 G/3ML
INJECTION, POWDER, FOR SOLUTION INTRAMUSCULAR; INTRAVENOUS
Status: DISCONTINUED | OUTPATIENT
Start: 2023-12-29 | End: 2023-12-29

## 2023-12-29 RX ORDER — CEFAZOLIN SODIUM 1 G/3ML
INJECTION, POWDER, FOR SOLUTION INTRAMUSCULAR; INTRAVENOUS
Status: DISCONTINUED | OUTPATIENT
Start: 2023-12-29 | End: 2023-12-29 | Stop reason: HOSPADM

## 2023-12-29 RX ORDER — MIDAZOLAM HYDROCHLORIDE 1 MG/ML
INJECTION INTRAMUSCULAR; INTRAVENOUS
Status: DISCONTINUED | OUTPATIENT
Start: 2023-12-29 | End: 2023-12-29

## 2023-12-29 RX ORDER — FAMOTIDINE 10 MG/ML
INJECTION INTRAVENOUS
Status: DISCONTINUED | OUTPATIENT
Start: 2023-12-29 | End: 2023-12-29

## 2023-12-29 RX ORDER — DEXAMETHASONE SODIUM PHOSPHATE 4 MG/ML
INJECTION, SOLUTION INTRA-ARTICULAR; INTRALESIONAL; INTRAMUSCULAR; INTRAVENOUS; SOFT TISSUE
Status: DISCONTINUED | OUTPATIENT
Start: 2023-12-29 | End: 2023-12-29

## 2023-12-29 RX ORDER — PROPOFOL 10 MG/ML
INJECTION, EMULSION INTRAVENOUS
Status: DISCONTINUED | OUTPATIENT
Start: 2023-12-29 | End: 2023-12-29

## 2023-12-29 RX ORDER — OXYCODONE HYDROCHLORIDE 5 MG/1
5 TABLET ORAL ONCE
Status: COMPLETED | OUTPATIENT
Start: 2023-12-29 | End: 2023-12-29

## 2023-12-29 RX ORDER — ONDANSETRON 2 MG/ML
INJECTION INTRAMUSCULAR; INTRAVENOUS
Status: DISCONTINUED | OUTPATIENT
Start: 2023-12-29 | End: 2023-12-29

## 2023-12-29 RX ORDER — ACETAMINOPHEN 10 MG/ML
INJECTION, SOLUTION INTRAVENOUS
Status: DISCONTINUED | OUTPATIENT
Start: 2023-12-29 | End: 2023-12-29

## 2023-12-29 RX ORDER — OXYCODONE HYDROCHLORIDE 5 MG/1
5 TABLET ORAL
Status: DISCONTINUED | OUTPATIENT
Start: 2023-12-29 | End: 2023-12-29 | Stop reason: HOSPADM

## 2023-12-29 RX ORDER — ONDANSETRON HYDROCHLORIDE 2 MG/ML
4 INJECTION, SOLUTION INTRAVENOUS DAILY PRN
Status: DISCONTINUED | OUTPATIENT
Start: 2023-12-29 | End: 2023-12-29 | Stop reason: HOSPADM

## 2023-12-29 RX ORDER — GENTAMICIN 40 MG/ML
INJECTION, SOLUTION INTRAMUSCULAR; INTRAVENOUS
Status: DISCONTINUED | OUTPATIENT
Start: 2023-12-29 | End: 2023-12-29 | Stop reason: HOSPADM

## 2023-12-29 RX ORDER — PHENYLEPHRINE HYDROCHLORIDE 10 MG/ML
INJECTION INTRAVENOUS
Status: DISCONTINUED | OUTPATIENT
Start: 2023-12-29 | End: 2023-12-29

## 2023-12-29 RX ORDER — LIDOCAINE HYDROCHLORIDE 20 MG/ML
INJECTION INTRAVENOUS
Status: DISCONTINUED | OUTPATIENT
Start: 2023-12-29 | End: 2023-12-29

## 2023-12-29 RX ORDER — FENTANYL CITRATE 50 UG/ML
INJECTION, SOLUTION INTRAMUSCULAR; INTRAVENOUS
Status: DISCONTINUED | OUTPATIENT
Start: 2023-12-29 | End: 2023-12-29

## 2023-12-29 RX ORDER — DEXMEDETOMIDINE HYDROCHLORIDE 100 UG/ML
INJECTION, SOLUTION INTRAVENOUS
Status: DISCONTINUED | OUTPATIENT
Start: 2023-12-29 | End: 2023-12-29

## 2023-12-29 RX ADMIN — HYDROMORPHONE HYDROCHLORIDE 0.2 MG: 1 INJECTION, SOLUTION INTRAMUSCULAR; INTRAVENOUS; SUBCUTANEOUS at 11:12

## 2023-12-29 RX ADMIN — CEFAZOLIN 1 G: 330 INJECTION, POWDER, FOR SOLUTION INTRAMUSCULAR; INTRAVENOUS at 08:12

## 2023-12-29 RX ADMIN — OXYCODONE HYDROCHLORIDE 5 MG: 5 TABLET ORAL at 12:12

## 2023-12-29 RX ADMIN — MIDAZOLAM HYDROCHLORIDE 2 MG: 1 INJECTION, SOLUTION INTRAMUSCULAR; INTRAVENOUS at 08:12

## 2023-12-29 RX ADMIN — FENTANYL CITRATE 50 MCG: 50 INJECTION, SOLUTION INTRAMUSCULAR; INTRAVENOUS at 10:12

## 2023-12-29 RX ADMIN — ONDANSETRON 4 MG: 2 INJECTION INTRAMUSCULAR; INTRAVENOUS at 08:12

## 2023-12-29 RX ADMIN — HYDROMORPHONE HYDROCHLORIDE 0.2 MG: 1 INJECTION, SOLUTION INTRAMUSCULAR; INTRAVENOUS; SUBCUTANEOUS at 12:12

## 2023-12-29 RX ADMIN — FENTANYL CITRATE 50 MCG: 50 INJECTION, SOLUTION INTRAMUSCULAR; INTRAVENOUS at 09:12

## 2023-12-29 RX ADMIN — SODIUM CHLORIDE, SODIUM LACTATE, POTASSIUM CHLORIDE, AND CALCIUM CHLORIDE: .6; .31; .03; .02 INJECTION, SOLUTION INTRAVENOUS at 08:12

## 2023-12-29 RX ADMIN — LIDOCAINE HYDROCHLORIDE 60 MG: 20 INJECTION, SOLUTION INTRAVENOUS at 08:12

## 2023-12-29 RX ADMIN — OXYCODONE HYDROCHLORIDE 5 MG: 5 TABLET ORAL at 11:12

## 2023-12-29 RX ADMIN — DEXMEDETOMIDINE HYDROCHLORIDE 8 MCG: 100 INJECTION, SOLUTION INTRAVENOUS at 10:12

## 2023-12-29 RX ADMIN — FAMOTIDINE 20 MG: 10 INJECTION, SOLUTION INTRAVENOUS at 08:12

## 2023-12-29 RX ADMIN — PHENYLEPHRINE HYDROCHLORIDE 100 MCG: 10 INJECTION INTRAVENOUS at 10:12

## 2023-12-29 RX ADMIN — DEXMEDETOMIDINE HYDROCHLORIDE 8 MCG: 100 INJECTION, SOLUTION INTRAVENOUS at 08:12

## 2023-12-29 RX ADMIN — PROPOFOL 200 MG: 10 INJECTION, EMULSION INTRAVENOUS at 08:12

## 2023-12-29 RX ADMIN — ACETAMINOPHEN 1000 MG: 10 INJECTION, SOLUTION INTRAVENOUS at 09:12

## 2023-12-29 NOTE — DISCHARGE INSTRUCTIONS
FOLLOW THE WRITTEN INSTRUCTIONS I REVIEWED WITH YOU AND A COPY GIVEN TO YOU.  MAY SHOWER IN 24 HOURS WITH COMPRESSION GARMENT ON.   DO NOT REMOVE THE DRESSINGS OR LUDY DRAINS FROM THE INCISIONS, DOCTOR WILL REMOVE IN FOLLOW UP APPOINTMENT.  DO NOT DRIVE, SIGN LEGAL DOCUMENTS OR SHOWER IN THE NEXT 24 HOURS.  NO TUB BATHS OR SWIMMING UNTIL RELEASED BY THE DOCTOR.

## 2023-12-29 NOTE — ANESTHESIA PREPROCEDURE EVALUATION
12/29/2023  Blanca Loya is a 58 y.o., female.           Tobacco Use:  The patient  reports that she quit smoking about 36 years ago. Her smoking use included cigarettes. She has never used smokeless tobacco.     Results for orders placed or performed during the hospital encounter of 11/13/23   EKG 12-lead    Collection Time: 11/13/23  1:04 PM    Narrative    Test Reason : C50.411,Z17.0,    Vent. Rate : 066 BPM     Atrial Rate : 066 BPM     P-R Int : 180 ms          QRS Dur : 074 ms      QT Int : 394 ms       P-R-T Axes : 079 086 079 degrees     QTc Int : 413 ms    Normal sinus rhythm  Normal ECG  No previous ECGs available  Confirmed by Christiano iMranda MD ([id number]) on 11/19/2023 6:27:04 PM    Referred By:             Confirmed By:Christiano Miranda MD             Lab Results   Component Value Date    WBC 5.19 11/13/2023    HGB 12.8 11/13/2023    HCT 38.2 11/13/2023    MCV 96 11/13/2023     11/13/2023     BMP  Lab Results   Component Value Date     11/13/2023    K 3.5 11/13/2023     11/13/2023    CO2 31 (H) 11/13/2023    BUN 12 11/13/2023    CREATININE 0.8 11/13/2023    CALCIUM 9.7 11/13/2023    ANIONGAP 6 (L) 11/13/2023    GLU 97 11/13/2023    GLU 89 09/26/2023    GLU 88 05/18/2023       No results found for this or any previous visit.         Pre-op Assessment    I have reviewed the Patient Summary Reports.     I have reviewed the Nursing Notes. I have reviewed the NPO Status.   I have reviewed the Medications.     Review of Systems  Anesthesia Hx:  No problems with previous Anesthesia             Denies Family Hx of Anesthesia complications.    Denies Personal Hx of Anesthesia complications.                    Social:  Former Smoker, No Alcohol Use       Hematology/Oncology:  Hematology Normal                       --  Cancer in past history:       Breast    right axillary node dissection  no lymphedema surgery, chemotherapy and radiation   Oncology Comments: Malignant neoplasm of upper-outer quadrant of right female breast  S/P bilateral mastectomy     EENT/Dental:  EENT/Dental Normal  Acoustic neuroma - Left   Patient post excision   Acquired deafness   Ears General/Symptom(s) Hearing Impairment: deaf - left    Acoustic neuroma        Cardiovascular:  Cardiovascular Normal                  ECG has been reviewed.                          Pulmonary:  Pulmonary Normal                       Renal/:  Renal/ Normal                 Hepatic/GI:  Hepatic/GI Normal                 Musculoskeletal:  Arthritis   Arthralgia of cervical spine  Decreased range of motion of intervertebral discs of cervical spine    Muscle weakness of upper extremity     Bone Disorders:    Osteoporosis   Spine Disorders: cervical Degenerative disease           Neurological:    Neuromuscular Disease,       Paresthesia                         Movement Disorder Dx, Idiopathic Tremor   Endocrine:   Hypothyroidism          Psych:  Psychiatric History anxiety                 Physical Exam  General: Well nourished, Cooperative, Alert and Oriented    Airway:  Mallampati: III / II  Mouth Opening: Normal  TM Distance: > 6 cm  Tongue: Normal  Neck ROM: Extension Decreased    Dental:  Intact, Caps / Implants    Chest/Lungs:  Clear to auscultation, Normal Respiratory Rate    Heart:  Rate: Normal  Rhythm: Regular Rhythm        Anesthesia Plan  Type of Anesthesia, risks & benefits discussed:    Anesthesia Type: Gen Supraglottic Airway  Intra-op Monitoring Plan: Standard ASA Monitors  Post Op Pain Control Plan: multimodal analgesia and IV/PO Opioids PRN  Induction:  IV  Airway Plan: , Post-Induction  Informed Consent: Informed consent signed with the Patient and all parties understand the risks and agree with anesthesia plan.  All questions answered.   ASA Score: 3  Anesthesia Plan Notes: LMA  Zofran, Pepcid  Ofirmev 1000 mg iv         Ready  For Surgery From Anesthesia Perspective.     .

## 2023-12-29 NOTE — DISCHARGE SUMMARY
[facility]  Discharge Note  Short Stay    Procedure(s) (LRB):  CLOSURE WOUND  (THIGH) (Bilateral)      OUTCOME: Patient tolerated treatment/procedure well without complication and is now ready for discharge.    DISPOSITION: Home or Self Care    FINAL DIAGNOSIS:  Open thigh wound, unspecified laterality, sequela    FOLLOWUP: In clinic    DISCHARGE INSTRUCTIONS:    Discharge Procedure Orders   Change dressing (specify)   Order Comments: Keep incisions padded and wear compression garment.  Keep a record of drain output and bring to clinic.    Keep grey dressings in place until clinic follow up. Okay for the grey dressings to get wet in the shower.     Activity as tolerated   Order Comments: Light activity, such as walking, okay.  No strenuous activity for 6 weeks.     Shower on day dressing removed (No bath)   Order Comments: Okay to shower in 24 hours.  Keep grey dressings in place until clinic follow up.        TIME SPENT ON DISCHARGE: 20 minutes

## 2023-12-29 NOTE — H&P
58 year old female with a history of breast cancer presents today for bilateral thigh wound closure.  She previously had breast reconstruction using PAP flaps and dehisced at bilateral thigh donor sites.  She has been performing wet to dry dressing changes twice daily without much improvement in her wound size.      Past Medical History:   Diagnosis Date    Acoustic neuroma     Acquired deafness     acoustic neuroma    Chronic fatigue     Collagenous colitis     Estrogen receptor positive 2017    Fibromyalgia     Hypothyroidism, unspecified     Malignant neoplasm of upper-outer quadrant of right female breast 2017    S/P bilateral mastectomy 10/23/2019     Past Surgical History:   Procedure Laterality Date    BREAST CAPSULECTOMY Bilateral 2023    Procedure: CAPSULECTOMY, BREAST;  Surgeon: Quintin Winslow MD;  Location: [facility] OR;  Service: Plastics;  Laterality: Bilateral;    BREAST RECONSTRUCTION      BREAST REVISION SURGERY Left 2018    Procedure: BREAST REVISION SURGERY LEFT IMF ELEVATION WITH ALLODERM;  Surgeon: Holger Umana MD;  Location: [address];  Service: Plastics;  Laterality: Left;    BREAST SURGERY      COLONOSCOPY N/A 2020    Procedure: COLONOSCOPY;  Surgeon: Rocco Edmonds III, MD;  Location: [facility] ENDO;  Service: Endoscopy;  Laterality: N/A;    NEUROMA SURGERY      [place] ear    RECONSTRUCTION OF BREAST WITH DEEP INFERIOR EPIGASTRIC ARTERY  (ELSY) FLAP Bilateral 2023    Procedure: RECONSTRUCTION, BREAST, PAP FLAP;  Surgeon: Quintin Winslow MD;  Location: [facility] OR;  Service: Plastics;  Laterality: Bilateral;  bilateral thigh to bilateral breast     Social History     Tobacco Use    Smoking status: Former     Current packs/day: 0.00     Types: Cigarettes     Quit date: 1987     Years since quittin.1    Smokeless tobacco: Never   Substance Use Topics    Alcohol use: No    Drug use: No     Review of patient's allergies indicates:    Allergen Reactions    Demerol [meperidine] Anxiety    Clindamycin Itching and Rash     Per pt on 8/31/18    Ibuprofen Nausea Only    Kenalog [triamcinolone acetonide] Other (See Comments)     Hot flashes, flushing of the skin.  (sensitivity to injection noted that was given on 3/6/18)    Micro-k [potassium chloride] Itching     Headache      Pcn [penicillins] Rash     Review of Systems   All other systems reviewed and are negative.  Bilateral thigh wounds    Vitals:    12/29/23 0757   BP: 117/66   Pulse:    Resp:    Temp:        Physical Exam   Constitutional: No distress.   HENT:   Head: Normocephalic.   Mouth/Throat: Mucous membranes are moist.   Cardiovascular:  Normal rate and normal pulses.            Pulmonary/Chest: Effort normal. No respiratory distress.   Abdominal: Normal appearance.   Neurological: She is alert.   Skin: Skin is warm and dry.      Bilateral thigh wounds present.  Right thigh wound with clean granulation tissue.  Left thigh wound with fibrinous material within the proximal portion of the incision.  No signs of infection.       Assessment:  Bilateral thigh wounds  History of breast cancer  Acquired absence of bilateral breasts and nipples      Plan:  -To OR today for closure of bilateral thigh wounds with application of provena wound vac.

## 2023-12-29 NOTE — PLAN OF CARE
I CALLED OR SURGERY ROOM, SPOKE WITH TELLY AND INFORMED HER TO INFORM DR MARIA PT , SALAZARJOSE ORDAZTON,NEEDS A PRESCRIPTION FOR PAIN MED SENT TO [facility], ROSA INFORMED HIM AND HE STATED HE WOULD WHEN HE COMPLETED HIS SURGERY.

## 2023-12-29 NOTE — ANESTHESIA POSTPROCEDURE EVALUATION
Anesthesia Post Evaluation    Patient: Blanca Loya    Procedure(s) Performed: Procedure(s) (LRB):  CLOSURE WOUND (Bilateral)    Final Anesthesia Type: general      Patient location during evaluation: PACU  Patient participation: Yes- Able to Participate  Level of consciousness: awake and alert  Post-procedure vital signs: reviewed and stable  Pain management: adequate  Airway patency: patent    PONV status at discharge: No PONV  Anesthetic complications: no      Cardiovascular status: stable  Respiratory status: unassisted and spontaneous ventilation  Hydration status: euvolemic  Follow-up not needed.              Vitals Value Taken Time   /66 12/29/23 1230   Temp 36.2 °C (97.1 °F) 12/29/23 1102   Pulse 76 12/29/23 1239   Resp 20 12/29/23 1239   SpO2 100 % 12/29/23 1238   Vitals shown include unvalidated device data.      Event Time   Out of Recovery 12:41:51         Pain/Ning Score: Pain Rating Prior to Med Admin: 7 (12/29/2023 12:20 PM)  Ning Score: 10 (12/29/2023 12:30 PM)

## 2023-12-29 NOTE — PLAN OF CARE
I INFORMED PT AND HER  HOW TO CARE FOR THE LUDY DRAIN, THEY BOTH STATED THEY KNEW HOW TO DRAIN, MEASURE ,RECORD AND HOW TO REAPPLY SUCTION FROM PREVIOUS SURGERY.

## 2024-01-16 NOTE — OP NOTE
Surgeon: Quintin Winslow MD     Assistant: Linh Kohli PA-C     Preop Dx:  Personal history of breast cancer  Open wound to bilateral thighs     Postop Dx:  Personal history of breast cancer  Open wound to bilateral thighs     Procedure Performed:  Adjacent tissue transfer left thigh 25x10 cm  Adjacent tissue transfer right thigh 25x10 cm     Indication: 58F with recent capsulectomy, explant, and bilateral breast reconstruction with PAP flaps, now with subsequent dehiscence of bilateral thigh donor sites.     Anesthesia: General endotracheal     Blood Loss: 50 cc     Specimens: none     Drains: Bilateral 15 round Solo drains     Procedure in detail: After anesthesia was induced the patient was placed in the lithotomy position, and prepped and draped in a sterile fashion. Beginning at the right thigh, and elliptical excision was designed in order to clear the wound of open and necrotic tissue. This excision was taken down to the underlying muscle. Next, we mobilized the posterior and anterior flaps of skin in the subfascial plane until adequate advancement had been achieved for a tension-free closure. A 15 drain was placed, and then the incision was closed using 0 PDS deep sutures, followed by a two layered 2-0 monoderm quill closure of the skin. We then repeated the procedure on the contralateral thigh, excising the open and necrotic wound, mobilizing the skin flaps in the subfascial plan, during which we encountered a deep seroma pocket. We excised the bursa, and then placed a 15 Solo drain. The skin was closed using deep 0 PDS sutures followed by a two layered 2-0 monoderm quill closure.      That completed the procedure, the patient was extubated, taken to recover, tolerated the procedure well, there were no complications.

## 2024-01-25 ENCOUNTER — TELEPHONE (OUTPATIENT)
Dept: HEMATOLOGY/ONCOLOGY | Facility: CLINIC | Age: 59
End: 2024-01-25

## 2024-01-25 DIAGNOSIS — Z85.3 HISTORY OF BREAST CANCER: ICD-10-CM

## 2024-01-25 DIAGNOSIS — Z17.0 MALIGNANT NEOPLASM OF UPPER-OUTER QUADRANT OF RIGHT BREAST IN FEMALE, ESTROGEN RECEPTOR POSITIVE: Primary | ICD-10-CM

## 2024-01-25 DIAGNOSIS — C50.411 MALIGNANT NEOPLASM OF UPPER-OUTER QUADRANT OF RIGHT BREAST IN FEMALE, ESTROGEN RECEPTOR POSITIVE: Primary | ICD-10-CM

## 2024-01-25 NOTE — TELEPHONE ENCOUNTER
Orders placed for bone scan and CT chest/abd/pelvis with contrast per Vee's orders to do so. Attempted to call patient to make aware PET denied and of above orders placed, no answer LVM with instruction to call me with any questions she may have.

## 2024-01-26 ENCOUNTER — TELEPHONE (OUTPATIENT)
Dept: HEMATOLOGY/ONCOLOGY | Facility: CLINIC | Age: 59
End: 2024-01-26

## 2024-01-26 NOTE — TELEPHONE ENCOUNTER
Monica in scheduling made aware of need to schedule CT and bone scan. States she will call patient to schedule.

## 2024-02-07 ENCOUNTER — PATIENT MESSAGE (OUTPATIENT)
Dept: FAMILY MEDICINE | Facility: CLINIC | Age: 59
End: 2024-02-07
Payer: COMMERCIAL

## 2024-02-07 DIAGNOSIS — E03.9 HYPOTHYROIDISM, UNSPECIFIED TYPE: Primary | ICD-10-CM

## 2024-02-09 ENCOUNTER — TELEPHONE (OUTPATIENT)
Dept: HEMATOLOGY/ONCOLOGY | Facility: CLINIC | Age: 59
End: 2024-02-09

## 2024-02-09 DIAGNOSIS — C50.411 MALIGNANT NEOPLASM OF UPPER-OUTER QUADRANT OF RIGHT BREAST IN FEMALE, ESTROGEN RECEPTOR POSITIVE: Primary | ICD-10-CM

## 2024-02-09 DIAGNOSIS — Z17.0 MALIGNANT NEOPLASM OF UPPER-OUTER QUADRANT OF RIGHT BREAST IN FEMALE, ESTROGEN RECEPTOR POSITIVE: Primary | ICD-10-CM

## 2024-02-14 ENCOUNTER — TELEPHONE (OUTPATIENT)
Dept: HEMATOLOGY/ONCOLOGY | Facility: CLINIC | Age: 59
End: 2024-02-14

## 2024-02-14 ENCOUNTER — HOSPITAL ENCOUNTER (OUTPATIENT)
Dept: RADIOLOGY | Facility: HOSPITAL | Age: 59
Discharge: HOME | End: 2024-02-14
Attending: NURSE PRACTITIONER
Payer: COMMERCIAL

## 2024-02-14 DIAGNOSIS — Z85.3 HISTORY OF BREAST CANCER: ICD-10-CM

## 2024-02-14 DIAGNOSIS — C50.411 MALIGNANT NEOPLASM OF UPPER-OUTER QUADRANT OF RIGHT BREAST IN FEMALE, ESTROGEN RECEPTOR POSITIVE: ICD-10-CM

## 2024-02-14 DIAGNOSIS — Z17.0 MALIGNANT NEOPLASM OF UPPER-OUTER QUADRANT OF RIGHT BREAST IN FEMALE, ESTROGEN RECEPTOR POSITIVE: ICD-10-CM

## 2024-02-14 PROCEDURE — A9503 TC99M MEDRONATE: HCPCS

## 2024-02-14 PROCEDURE — 25500020 PHARM REV CODE 255: Performed by: NURSE PRACTITIONER

## 2024-02-14 PROCEDURE — 74177 CT ABD & PELVIS W/CONTRAST: CPT | Mod: TC

## 2024-02-14 RX ADMIN — IOHEXOL 100 ML: 350 INJECTION, SOLUTION INTRAVENOUS at 12:02

## 2024-02-14 NOTE — TELEPHONE ENCOUNTER
I spoke with pt and reminded her to have labs done at [facility] prior to appt here on 2/21/24 pt verbalized understanding and states she plans on doing them tomorrow along with dr anselmo farris.

## 2024-02-16 ENCOUNTER — OFFICE VISIT (OUTPATIENT)
Dept: FAMILY MEDICINE | Facility: CLINIC | Age: 59
End: 2024-02-16
Payer: COMMERCIAL

## 2024-02-16 VITALS
OXYGEN SATURATION: 100 % | SYSTOLIC BLOOD PRESSURE: 126 MMHG | HEART RATE: 92 BPM | BODY MASS INDEX: 21 KG/M2 | HEIGHT: 64 IN | WEIGHT: 123 LBS | DIASTOLIC BLOOD PRESSURE: 74 MMHG

## 2024-02-16 DIAGNOSIS — Z85.3 HX OF BREAST CANCER: ICD-10-CM

## 2024-02-16 DIAGNOSIS — Z17.0 MALIGNANT NEOPLASM OF UPPER-OUTER QUADRANT OF RIGHT BREAST IN FEMALE, ESTROGEN RECEPTOR POSITIVE: ICD-10-CM

## 2024-02-16 DIAGNOSIS — Z90.13 S/P BILATERAL MASTECTOMY: Primary | ICD-10-CM

## 2024-02-16 DIAGNOSIS — F33.9 DEPRESSION, RECURRENT: ICD-10-CM

## 2024-02-16 DIAGNOSIS — E03.9 HYPOTHYROIDISM, UNSPECIFIED TYPE: ICD-10-CM

## 2024-02-16 DIAGNOSIS — D33.3 LEFT ACOUSTIC NEUROMA: ICD-10-CM

## 2024-02-16 DIAGNOSIS — E87.6 HYPOKALEMIA: ICD-10-CM

## 2024-02-16 DIAGNOSIS — C50.411 MALIGNANT NEOPLASM OF UPPER-OUTER QUADRANT OF RIGHT BREAST IN FEMALE, ESTROGEN RECEPTOR POSITIVE: ICD-10-CM

## 2024-02-16 DIAGNOSIS — R79.89 ELEVATED LFTS: ICD-10-CM

## 2024-02-16 LAB
ALBUMIN SERPL-MCNC: 4.4 G/DL (ref 3.8–4.9)
ALBUMIN/GLOB SERPL: 1.8 {RATIO} (ref 1.2–2.2)
ALP SERPL-CCNC: 129 IU/L (ref 44–121)
ALT SERPL-CCNC: 52 IU/L (ref 0–32)
AST SERPL-CCNC: 75 IU/L (ref 0–40)
BASOPHILS # BLD AUTO: 0 X10E3/UL (ref 0–0.2)
BASOPHILS NFR BLD AUTO: 1 %
BILIRUB SERPL-MCNC: 0.7 MG/DL (ref 0–1.2)
BUN SERPL-MCNC: 12 MG/DL (ref 6–24)
BUN/CREAT SERPL: 16 (ref 9–23)
CALCIUM SERPL-MCNC: 10 MG/DL (ref 8.7–10.2)
CHLORIDE SERPL-SCNC: 103 MMOL/L (ref 96–106)
CHOLEST SERPL-MCNC: 189 MG/DL (ref 100–199)
CO2 SERPL-SCNC: 25 MMOL/L (ref 20–29)
CREAT SERPL-MCNC: 0.75 MG/DL (ref 0.57–1)
EOSINOPHIL # BLD AUTO: 0 X10E3/UL (ref 0–0.4)
EOSINOPHIL NFR BLD AUTO: 1 %
ERYTHROCYTE [DISTWIDTH] IN BLOOD BY AUTOMATED COUNT: 13.5 % (ref 11.7–15.4)
EST. GFR  (NO RACE VARIABLE): 92 ML/MIN/1.73
GLOBULIN SER CALC-MCNC: 2.4 G/DL (ref 1.5–4.5)
GLUCOSE SERPL-MCNC: 85 MG/DL (ref 70–99)
HCT VFR BLD AUTO: 36.3 % (ref 34–46.6)
HDLC SERPL-MCNC: 74 MG/DL
HGB BLD-MCNC: 11.7 G/DL (ref 11.1–15.9)
IMM GRANULOCYTES # BLD AUTO: 0 X10E3/UL (ref 0–0.1)
IMM GRANULOCYTES NFR BLD AUTO: 0 %
LDLC SERPL CALC-MCNC: 101 MG/DL (ref 0–99)
LYMPHOCYTES # BLD AUTO: 0.7 X10E3/UL (ref 0.7–3.1)
LYMPHOCYTES NFR BLD AUTO: 22 %
MCH RBC QN AUTO: 29.3 PG (ref 26.6–33)
MCHC RBC AUTO-ENTMCNC: 32.2 G/DL (ref 31.5–35.7)
MCV RBC AUTO: 91 FL (ref 79–97)
MONOCYTES # BLD AUTO: 0.4 X10E3/UL (ref 0.1–0.9)
MONOCYTES NFR BLD AUTO: 11 %
NEUTROPHILS # BLD AUTO: 2.1 X10E3/UL (ref 1.4–7)
NEUTROPHILS NFR BLD AUTO: 65 %
PLATELET # BLD AUTO: 345 X10E3/UL (ref 150–450)
POTASSIUM SERPL-SCNC: 4.2 MMOL/L (ref 3.5–5.2)
PROT SERPL-MCNC: 6.8 G/DL (ref 6–8.5)
RBC # BLD AUTO: 3.99 X10E6/UL (ref 3.77–5.28)
SODIUM SERPL-SCNC: 142 MMOL/L (ref 134–144)
TRIGL SERPL-MCNC: 78 MG/DL (ref 0–149)
TSH SERPL DL<=0.005 MIU/L-ACNC: 0.32 UIU/ML (ref 0.45–4.5)
VLDLC SERPL CALC-MCNC: 14 MG/DL (ref 5–40)
WBC # BLD AUTO: 3.3 X10E3/UL (ref 3.4–10.8)

## 2024-02-16 PROCEDURE — 1159F MED LIST DOCD IN RCRD: CPT | Mod: CPTII,S$GLB,, | Performed by: FAMILY MEDICINE

## 2024-02-16 PROCEDURE — 3074F SYST BP LT 130 MM HG: CPT | Mod: CPTII,S$GLB,, | Performed by: FAMILY MEDICINE

## 2024-02-16 PROCEDURE — 1160F RVW MEDS BY RX/DR IN RCRD: CPT | Mod: CPTII,S$GLB,, | Performed by: FAMILY MEDICINE

## 2024-02-16 PROCEDURE — 3078F DIAST BP <80 MM HG: CPT | Mod: CPTII,S$GLB,, | Performed by: FAMILY MEDICINE

## 2024-02-16 PROCEDURE — 99999 PR PBB SHADOW E&M-EST. PATIENT-LVL III: CPT | Mod: PBBFAC,,, | Performed by: FAMILY MEDICINE

## 2024-02-16 PROCEDURE — 99214 OFFICE O/P EST MOD 30 MIN: CPT | Mod: S$GLB,,, | Performed by: FAMILY MEDICINE

## 2024-02-16 PROCEDURE — 3008F BODY MASS INDEX DOCD: CPT | Mod: CPTII,S$GLB,, | Performed by: FAMILY MEDICINE

## 2024-02-16 RX ORDER — LEVOTHYROXINE SODIUM 112 UG/1
112 TABLET ORAL DAILY
Qty: 90 TABLET | Refills: 0 | Status: SHIPPED | OUTPATIENT
Start: 2024-02-16 | End: 2024-05-15

## 2024-02-16 RX ORDER — ONDANSETRON 4 MG/1
4 TABLET, FILM COATED ORAL EVERY 8 HOURS PRN
COMMUNITY
Start: 2023-12-07

## 2024-02-19 NOTE — PROGRESS NOTES
Subjective:       Patient ID: Blanca Loya is a 58 y.o. female.    Chief Complaint: Follow-up (3 month )    Surgery in November.  Breast implants removed had breast reconstruction using skin from thigh.  Incisions opened then had to be packed continued to spread did not improve and had surgery again December the 29th to close the incisions.  Had been on some Cipro off of that now for 2 months.  Incisions are finally healing.  History of breast cancer.  Has elevated liver functions.  AST 75 and ALT 52.  Has history of left acoustic neuroma.  Also hypokalemia.  And hypothyroidism.  TSH is 0.317.  Hemoglobin is 11.7.  Cholesterol 189 HDL seventy four LDL is 101.    Physical examination.  Vital signs noted.  Neck is without bruit.  Chest clear.  Heart regular rate and rhythm.  Extremities without edema.          Objective:        Assessment:       1. S/P bilateral mastectomy    2. Malignant neoplasm of upper-outer quadrant of right breast in female, estrogen receptor positive    3. Hx of breast cancer    4. Hypothyroidism, unspecified type    5. Elevated LFTs    6. Hypokalemia    7. Left acoustic neuroma    8. Depression, recurrent        Plan:       S/P bilateral mastectomy    Malignant neoplasm of upper-outer quadrant of right breast in female, estrogen receptor positive    Hx of breast cancer    Hypothyroidism, unspecified type  -     TSH; Future; Expected date: 05/16/2024    Elevated LFTs  -     Hepatic Function Panel; Future; Expected date: 03/01/2024    Hypokalemia    Left acoustic neuroma    Depression, recurrent    Other orders  -     levothyroxine (SYNTHROID) 112 MCG tablet; Take 1 tablet (112 mcg total) by mouth once daily.  Dispense: 90 tablet; Refill: 0    Decrease levothyroxine 112-6-1/2 pills per week.  She has someone 0s at home she can alternate [address] also.  TSH in 3 months.  Repeat liver functions in 3 weeks.  Follow-up here in 3 months.  Continue follow-up with surgeon.

## 2024-02-20 ENCOUNTER — TELEPHONE (OUTPATIENT)
Dept: HEMATOLOGY/ONCOLOGY | Facility: CLINIC | Age: 59
End: 2024-02-20

## 2024-02-20 PROBLEM — Z85.3 HX OF BREAST CANCER: Status: RESOLVED | Noted: 2023-11-14 | Resolved: 2024-02-20

## 2024-02-20 NOTE — TELEPHONE ENCOUNTER
----- Message from Bronson Carrillo MD sent at 2/20/2024 11:11 AM CST -----  If she wants to hold off for now, that is fine - just make sure we keep eye out for the next labs  ----- Message -----  From: Jenn Villalobos, RN  Sent: 2/19/2024   4:21 PM CST  To: Bronson Carrillo MD; Berniefaraz Flowerspb Carrillo, patient said Dr. Shepherd also review these labs and wanted her to get redrawn in about 3 weeks. She said she recently had 2 surgeries and they are not sure if the labs are abnormal due to anesthesia, pain meds, etc. Do you still want her to get the US now or hold off for now?     Sharyn patient said he  is sick with the flu and she would like to move her appointment out about 1 week, can you please call her to reschedule.   ----- Message -----  From: Bronson Carrillo MD  Sent: 2/16/2024  12:32 PM CST  To: Chaparro Shepherd III, MD; #    LFT's are up again. Let's get US of liver and see if GI can see her again

## 2024-02-20 NOTE — TELEPHONE ENCOUNTER
Patient made aware ok to hold off on US at this time if she would like and to please call when she has her labs redrawn so that Dr. Carrillo can review these results. Verbalized understanding. Sharyn ARELY made aware to call patient to move tomorrow's scheduled appt out 1 week as patient said her  has the flu and although she is feeling ok she does not want to risk bringing the flu into the cancer center.

## 2024-02-27 NOTE — PROGRESS NOTES
[facility] Hematology/Oncology  PROGRESS NOTE - Follow-up Visit      Subjective:       Patient ID:   NAME: Blanca Loya : 1965     58 y.o. female    Referring Doc: Cora  Other Physicians: Lyndsay Gonsales, CARLY Barillas; Mary Gonsales    Chief Complaint:  Breast ca f/u    History of Present Illness:     Patient is being seen today in person for a regularly scheduled follow-up appointment. She is here by herself.    She saw Dr Shepherd on 2024      No CP, SOB, HA's or N/V.  She is now feeling much better and almost back to her normal self    She had reconstruction surgery with Dr Winslow on 2023; she had some incision healing issues on legs and required second surgery with restitching on thighs on 2023; doing better now; she sees them again on 2024    Recent bump in LFt's and she saw Dr Edmonds again this past Friday; Dr Shepherd is aware    She has been off all oral antihormones.                   Discussed Covid19 precautions - she did not get her vaccinations; she had covid in Aug 2021           ROS:   GEN: normal without any fever, night sweats or weight loss; anxiety issues generalized aches and pains;    HEENT: normal with no HA's, sore throat, stiff neck, changes in vision  CV: normal with no CP, SOB, PND, RUGGIERO or orthopnea  PULM: normal with no SOB, cough, hemoptysis, sputum or pleuritic pain  GI: normal with no abdominal pain, nausea, vomiting, constipation, diarrhea, melanotic stools, BRBPR, or hematemesis; chronic GI issues  : normal with no hematuria, dysuria  BREAST: s/p reconstruction with flaps  SKIN: normal with no rash, erythema, bruising, or swelling    Allergies:  Review of patient's allergies indicates:   Allergen Reactions    Demerol [meperidine] Anxiety    Pcn [penicillins] Rash       Medications:    Current Outpatient Medications:     ALPRAZolam (XANAX) 0.5 MG tablet, TK 1 T PO QD PRN, Disp: 30 tablet, Rfl: 5    ascorbic acid, vitamin C, (VITAMIN C)  "1000 MG tablet, Take 1,000 mg by mouth once daily., Disp: , Rfl:     b complex vitamins tablet, Take 1 tablet by mouth once daily., Disp: , Rfl:     budesonide (ENTOCORT EC) 3 mg capsule, Take 9 mg by mouth as needed., Disp: , Rfl:     butalbital-acetaminophen-caffeine -40 mg (FIORICET, ESGIC) -40 mg per tablet, Take 1 tablet by mouth every 6 (six) hours as needed for Pain or Headaches., Disp: 28 tablet, Rfl: 2    CHROMIUM ORAL, Take by mouth., Disp: , Rfl:     ciprofloxacin HCl (CIPRO) 500 MG tablet, Take 500 mg by mouth 2 (two) times daily., Disp: , Rfl:     levOCARNitine (L-CARNITINE) 500 mg Cap, Take 1 tablet by mouth once daily., Disp: , Rfl:     levothyroxine (SYNTHROID) 112 MCG tablet, Take 1 tablet (112 mcg total) by mouth once daily., Disp: 90 tablet, Rfl: 0    ondansetron (ZOFRAN) 4 MG tablet, Take 4 mg by mouth every 8 (eight) hours as needed., Disp: , Rfl:     ondansetron (ZOFRAN-ODT) 4 MG TbDL, Take 4 mg by mouth every 8 (eight) hours as needed., Disp: , Rfl:     POTASSIUM ACETATE MISC, 99 Doses by Misc.(Non-Drug; Combo Route) route 2 (two) times a day., Disp: , Rfl:     busPIRone (BUSPAR) 5 MG Tab, Take 1 tablet (5 mg total) by mouth 2 (two) times daily. (Patient not taking: Reported on 2/16/2024), Disp: 180 tablet, Rfl: 1    PMHx/PSHx Updates:  See patient's last visit with me on 9/27/2023  See H&P on 9/8/2016        Pathology:    Colon biopsy  9/22/2020:    COLON, RANDOM, BIOPSY:   - COLLAGENOUS COLITIS.       See path note on 8/30/2016      Objective:     Vitals:  Blood pressure 122/60, pulse 91, temperature 97.1 °F (36.2 °C), resp. rate 16, height 5' 4" (1.626 m), weight 54 kg (119 lb 1.6 oz), last menstrual period 09/10/2016.        Physical Examination:   GEN: no apparent distress, comfortable; AAOx3  HEAD: atraumatic and normocephalic  EYES: no conjunctival pallor or muddiness, no icterus; normal pupil reaction to ambient light  ENT: OMM, no pharyngeal erythema, external bilateral " ears WNL; no visible thrush or ulcers  NECK: no masses or swelling, trachea midline, no visible LAD/LN's   CV: no palpitations; no pedal edema; no noticeable JVD or neck vein distension  CHEST: Normal respiratory effort; chest wall breath movements symmetrical; no audible wheezing  ABDOM: non-distended; no bloating  MUSC/Skeletal: ROM normal; joints visibly normal; no deformities or arthropathy  EXTREM: no clubbing, cyanosis, inflammation or swelling  SKIN: no rashes, lesions, ulcers, petechiae or subcutaneous nodules  : no tyson  NEURO: moving all 4 extremities; AAOx3; no tremors  PSYCH: normal mood, affect and behavior  LYMPH: no visible LN's or LAD  Breast: s/p reconstruction with flaps; recent thigh engraftment healing issues - better          Labs:     Lab Results   Component Value Date    WBC 3.3 (L) 02/15/2024    HGB 11.7 02/15/2024    HCT 36.3 02/15/2024    MCV 91 02/15/2024     02/15/2024       CMP  Sodium   Date Value Ref Range Status   02/15/2024 142 134 - 144 mmol/L Final   04/23/2019 143 134 - 144 mmol/L      Potassium   Date Value Ref Range Status   02/15/2024 4.2 3.5 - 5.2 mmol/L Final     Chloride   Date Value Ref Range Status   02/15/2024 103 96 - 106 mmol/L Final   04/23/2019 103 98 - 110 mmol/L      CO2   Date Value Ref Range Status   02/15/2024 25 20 - 29 mmol/L Final     Glucose   Date Value Ref Range Status   02/15/2024 85 70 - 99 mg/dL Final   04/23/2019 88 70 - 99 mg/dL      BUN   Date Value Ref Range Status   02/15/2024 12 6 - 24 mg/dL Final     Creatinine   Date Value Ref Range Status   02/15/2024 0.75 0.57 - 1.00 mg/dL Final   04/23/2019 0.60 0.60 - 1.40 mg/dL      Calcium   Date Value Ref Range Status   02/15/2024 10.0 8.7 - 10.2 mg/dL Final     Total Protein   Date Value Ref Range Status   01/13/2022 7.7 6.0 - 8.4 g/dL Final     Albumin   Date Value Ref Range Status   02/15/2024 4.4 3.8 - 4.9 g/dL Final   01/13/2022 4.5 3.5 - 5.2 g/dL Final   04/23/2019 4.0 3.1 - 4.7 g/dL       Total Bilirubin   Date Value Ref Range Status   02/15/2024 0.7 0.0 - 1.2 mg/dL Final     Alkaline Phosphatase   Date Value Ref Range Status   01/13/2022 72 55 - 135 U/L Final     AST   Date Value Ref Range Status   02/15/2024 75 (H) 0 - 40 IU/L Final     ALT   Date Value Ref Range Status   02/15/2024 52 (H) 0 - 32 IU/L Final     Anion Gap   Date Value Ref Range Status   11/13/2023 6 (L) 8 - 16 mmol/L Final     eGFR if    Date Value Ref Range Status   01/13/2022 >60.0 >60 mL/min/1.73 m^2 Final     eGFR if non    Date Value Ref Range Status   01/13/2022 >60.0 >60 mL/min/1.73 m^2 Final     Comment:     Calculation used to obtain the estimated glomerular filtration  rate (eGFR) is the CKD-EPI equation.                Radiology/Diagnostic Studies:    CT Chest/abdom/pelvis  2/14/2024:    IMPRESSION:     No CT evidence of metastatic disease involving the chest, abdomen, or pelvis in this patient with history of breast cancer status post bilateral mastectomies.    Bone scan  2/14/2024:    FINDINGS:  Anterior and posterior whole body planar images show no abnormal osteoblastic activity or photopenic defect of concern for osseous metastasis.     Moderate activity posteriorly about the superior cervical spine, to the left of midline, correlates with moderate to severe left C3-C4 facet joint osteoarthrosis. Mild to moderate activity at the lumbosacral junction correlates with disc degeneration at L5-S1.     Soft tissue uptake is physiologic.     IMPRESSION:  No evidence of osseous metastasis.      PET 2/10/2023:    IMPRESSION:     Status post bilateral mastectomies.  No evidence of FDG avid residual, recurrent, or thoracic metastatic disease.     Nonspecific focus of increased hepatic FDG activity near the gallbladder fossa, new compared to 2020. This could represent misregistration artifact from bowel activity, gallbladder inflammation, with metastatic hepatic lesion felt less likely -  however not entirely excluded. Consider dedicated hepatic protocol MRI imaging for further evaluation.           CXR  1/6/2022:    IMPRESSION:     No acute cardiopulmonary abnormality.         PET 9/24/2020:    Impression:     1. No evidence of recurrence of disease or metastatic disease.  2. Several colon air-fluid levels, suggesting nonspecific diarrheal illness.         US  11/7/2019:  Impression       Normal right upper quadrant ultrasound.         MRI head  2/17/2020:    Impression:     Stable appearance of previously defined enhancing extra-axial mass which extends from the left internal auditory canal into the left cerebellopontine angle cistern and along the adjacent temporal bone.      PET/CT  5/13/2019:    IMPRESSION:    1. No evidence of FDG avid metastatic disease.  2. Incidental findings as above.          Chest/Abdom CT  8/24/2018:      IMPRESSION:  1. No evidence of metastatic disease in the chest, abdomen, or the pelvis.  2. Additional observations as above           I have reviewed all available lab results and radiology reports.    Assessment/Plan:   (1) 58 y.o. female with diagnosis of right breast cancer  - s/p bilateral mastectomies on 9/26/16 followed by reconstruction  - followed by Dr Bear with [facility]  - she had 2 out of 7 LN's that were positive  - she was a Stage IIA  - ER+/FL+ and her2nu neg  - s/p AC x4 and taxol x4  - she has seen Dr Gonsales with rad/onc for XRT and completed 5 weeks last month  - discussed the pro's and con's of tamoxifen versus arimidex, and in light of the pap issues, arimidex is probably the better choice  - she had been on arimidex but it was discontinued because of her liver  - she has some mood swings and weight gain  - PET done on 9/21/17 with no evidence of mets;   - recent CT chest/abdom on 8/24/2018 showed no evidence on cancer  - s/p prior repeat surgery with right-sided lift with Dr Umana with repeat evaluation in Nov 2018   - she had a slow post-op  "recovery and has residual aches and pains  - she had PET on 5/13/2019  - she has been on arimidex and now femara which she both discontinued; she is having some generalized aches and pains;   - discussed aromasine and she is willing to give it a try; she does not want to take tamoxifen due to the clot and ovarian cancer risks   - she had been on the aromasin for 5 weeks but then developed elevated LFTs and it was discontinued    10/5/2020:  - latest PET on 9/24/2020 with no evidence of cancer  - s/p bilat mastectomies with reconstructions so she does not get mammograms    2/4/2021:  - she has been off all oral antihormone  - She had prior colonoscopy with Dr Edmonds and was diagnsoed with "collagenous" colitis.     6/14/2021:  - she seems to be doing ok overall    1/13/2022:  - no new issues  - recent CXR negative    7/13/2022:  -  She has some anxiety issues and jitteriness.  - she has some gum issues.   - She has been off all oral antihormones.    - She has been on steroids per Dr Edmonds and plans to see him again this summer some time  - She saw Dr Shepherd in June 2022 1/19/2023:  - no appreciable mass or LAD  - will get PEt as precaution    5/24/2023:  - she had PET scan back in Feb 2023  - she is considering having elective breast surgery - will refer to Dr Winslow for evaluation    9/27/2023:  - seeing Dr Winslow in Nov 2023 about reconstruction surgery  - repeat PET in Feb 2024 if insurance permits    2/28/2024:  - She is now feeling much better and almost back to her normal self  - She had reconstruction surgery with Dr Winslow on 11/28/2023; she had some incision healing issues on legs and required second surgery with restitching on thighs on 12/29/2023; doing better now; she sees them again on march 11th 2024  - Recent bump in LFt's and she saw Dr Edmonds again this past Friday; Dr Shepherd is aware  - recent CT scans and bone scan earlier this month on chart         (2) Mild leucopenia/anemia " "secondary to chemotherapy (resolved)    6/14/2021:  - latest wbc is at  4.14 - latest hgb was at 12.4    1/13/2021:  - repeat labs pending  - labs from oct 2021 with wbc at 3.0    7/14/2022:  - latest CBC adequate    1/19/2023:  - latest WBC at 3.1    5/24/2023:  - latest wbc at 3.2 and relatively stable    9/27/2023:  - CBC is WNL currently     (3) Prior abnormal PAP issues - followed by Dr Cline with GYN; latest pap was negative per patient and she was also HPV negative     (4) Hx of acoustic neuroma in past - s/p XRT in 2010; followed by Dr Gonsales  - recent MRI stable per patient in Feb 2020     (5) Chronic fibromyalgia - she is not being followed by anyone; she may be having a flare-up      (6) Collagenous colitis -    - GI issues with prior bout of colitis - seen by Dr Edmonds with GI    - prior mild elevation bili and alk phos - resolved  - she had liver biopsy and EGD with "inflammation" per patient  - s/p esophageal dilation       2/4/2021:  - She had prior colonoscopy in Nov 2020 with Dr Edmonds and was diagnsoed with "collagenous" colitis.     6/14/2021:  - bowels are stable    7/14/2022:  - patient to f/u with Dr Edmonds this summer    2/28/2024:  - she saw Dr Edmonds this past Friday  - getting repeat LFT labs in 3 weeks         (7) RUE - resolved - possible lymphedema issues - she no longer sees PT  - also seen by Dr Liao    (8) Hypokalemia -on supplements - patient wanted to try OTC and did not want prescription meds    (9) Occasional palpitations - seen by Dr Hernandez with cardiology    (10) Thyroid issues - followed by PCP        1. Malignant neoplasm of upper-outer quadrant of right breast in female, estrogen receptor positive        2. Estrogen receptor positive        3. S/P bilateral mastectomy                PLAN:  1. Check up to date labs as directed - getting repeat LFT's in 3 weeks  2. F/U with PCP, Surg and GYN  3. Hold on any antihormone therapy indefinitely for now  4.  F/u " with Dr Edmonds with GI as directed  5.  F/u with cardiology    6. F/u with PCP   7. F/u with Dr Winslow on March 11th 2024       RTC in 3 -4 months    Fax note to Chaparro Shepherd III, *, CARLY Bear Mannina, Babycos; Pham eHrnandez     Total Time spent on patient:    I spent over 25 mins of time with the patient. Reviewed results of the recently ordered labs, tests, reports and studies; made directives with regards to the results. Over half of this time was spent couseling and coordinating care, making treatment and analytical decisions; ordering necessary labs, tests and studies; and discussing treatment options and setting up treatment plan(s) if indicated.        Discussion:     COVID-19 Discussion:    I had long discussion with patient and any applicable family about the COVID-19 coronavirus epidemic and the recommended precautions with regard to cancer and/or hematology patients. I have re-iterated the CDC recommendations for adequate hand washing, use of hand -like products, and coughing into elbow, etc. In addition, especially for our patients who are on chemotherapy and/or our otherwise immunocompromised patients, I have recommended avoidance of crowds, including movie theaters, restaurants, churches, etc. I have recommended avoidance of any sick or symptomatic family members and/or friends. I have also recommended avoidance of any raw and unwashed food products, and general avoidance of food items that have not been prepared by themselves. The patient has been asked to call us immediately with any symptom developments, issues, questions or other general concerns.            I have explained all of the above in detail and the patient understands all of the current recommendation(s). I have answered all of their questions to the best of my ability and to their complete satisfaction.   The patient is to continue with the current management plan.            Electronically signed by  Bronson Carrillo MD

## 2024-02-28 ENCOUNTER — OFFICE VISIT (OUTPATIENT)
Dept: HEMATOLOGY/ONCOLOGY | Facility: CLINIC | Age: 59
End: 2024-02-28
Payer: COMMERCIAL

## 2024-02-28 VITALS
HEIGHT: 64 IN | RESPIRATION RATE: 16 BRPM | SYSTOLIC BLOOD PRESSURE: 122 MMHG | WEIGHT: 119.13 LBS | TEMPERATURE: 97 F | HEART RATE: 91 BPM | DIASTOLIC BLOOD PRESSURE: 60 MMHG | BODY MASS INDEX: 20.34 KG/M2

## 2024-02-28 DIAGNOSIS — Z17.0 ESTROGEN RECEPTOR POSITIVE: ICD-10-CM

## 2024-02-28 DIAGNOSIS — Z90.13 S/P BILATERAL MASTECTOMY: ICD-10-CM

## 2024-02-28 DIAGNOSIS — C50.411 MALIGNANT NEOPLASM OF UPPER-OUTER QUADRANT OF RIGHT BREAST IN FEMALE, ESTROGEN RECEPTOR POSITIVE: Primary | ICD-10-CM

## 2024-02-28 DIAGNOSIS — Z17.0 MALIGNANT NEOPLASM OF UPPER-OUTER QUADRANT OF RIGHT BREAST IN FEMALE, ESTROGEN RECEPTOR POSITIVE: Primary | ICD-10-CM

## 2024-02-28 PROCEDURE — 1160F RVW MEDS BY RX/DR IN RCRD: CPT | Mod: CPTII,S$GLB,, | Performed by: INTERNAL MEDICINE

## 2024-02-28 PROCEDURE — 3074F SYST BP LT 130 MM HG: CPT | Mod: CPTII,S$GLB,, | Performed by: INTERNAL MEDICINE

## 2024-02-28 PROCEDURE — 3008F BODY MASS INDEX DOCD: CPT | Mod: CPTII,S$GLB,, | Performed by: INTERNAL MEDICINE

## 2024-02-28 PROCEDURE — 1159F MED LIST DOCD IN RCRD: CPT | Mod: CPTII,S$GLB,, | Performed by: INTERNAL MEDICINE

## 2024-02-28 PROCEDURE — 99214 OFFICE O/P EST MOD 30 MIN: CPT | Mod: S$GLB,,, | Performed by: INTERNAL MEDICINE

## 2024-02-28 PROCEDURE — 3078F DIAST BP <80 MM HG: CPT | Mod: CPTII,S$GLB,, | Performed by: INTERNAL MEDICINE

## 2024-03-08 LAB
ALBUMIN SERPL-MCNC: 4.4 G/DL (ref 3.8–4.9)
ALP SERPL-CCNC: 118 IU/L (ref 44–121)
ALT SERPL-CCNC: 26 IU/L (ref 0–32)
AST SERPL-CCNC: 29 IU/L (ref 0–40)
BILIRUB DIRECT SERPL-MCNC: 0.16 MG/DL (ref 0–0.4)
BILIRUB SERPL-MCNC: 0.7 MG/DL (ref 0–1.2)
PROT SERPL-MCNC: 7 G/DL (ref 6–8.5)
TSH SERPL DL<=0.005 MIU/L-ACNC: 0.75 UIU/ML (ref 0.45–4.5)

## 2024-03-19 ENCOUNTER — TELEPHONE (OUTPATIENT)
Dept: HEMATOLOGY/ONCOLOGY | Facility: CLINIC | Age: 59
End: 2024-03-19

## 2024-03-19 NOTE — TELEPHONE ENCOUNTER
----- Message from Lisa Hurtado sent at 3/18/2024 11:53 AM CDT -----  The patient said her labs are back and she was told to call so Dr. COSME could look at them. # [phone]

## 2024-03-19 NOTE — TELEPHONE ENCOUNTER
Lab results reviewed with Dr. Carrillo who states liver function labs are back in normal range and labs are stable at this time.

## 2024-05-13 ENCOUNTER — PATIENT OUTREACH (OUTPATIENT)
Dept: ADMINISTRATIVE | Facility: HOSPITAL | Age: 59
End: 2024-05-13
Payer: COMMERCIAL

## 2024-05-13 NOTE — PROGRESS NOTES
Population Health Chart Review & Patient Outreach Details      Additional [facility] Health Notes:               Updates Requested / Reviewed:      Updated Care Coordination Note, Care Everywhere, , and External Sources: [facility], [facility], and [facility]         Health Maintenance Topics Overdue:      VBHM Score: 1     Mammogram                       Health Maintenance Topic(s) Outreach Outcomes & Actions Taken:    Cervical Cancer Screening - Outreach Outcomes & Actions Taken  : External Records Uploaded & Care Team Updated if Applicable    Breast Cancer Screening - Outreach Outcomes & Actions Taken  : bilateral Mastectomy ,

## 2024-05-15 RX ORDER — LEVOTHYROXINE SODIUM 112 UG/1
TABLET ORAL
Qty: 90 TABLET | Refills: 3 | Status: SHIPPED | OUTPATIENT
Start: 2024-05-15

## 2024-05-15 NOTE — TELEPHONE ENCOUNTER
Refill Decision Note   Blanca Loya  is requesting a refill authorization.  Brief Assessment and Rationale for Refill:  Approve     Medication Therapy Plan:         Comments:     Note composed:6:09 AM 05/15/2024

## 2024-05-15 NOTE — TELEPHONE ENCOUNTER
No care due was identified.  [facility] Embedded Care Due Messages. Reference number: [id number].   5/15/2024 3:55:46 AM CDT

## 2024-05-29 ENCOUNTER — PATIENT MESSAGE (OUTPATIENT)
Dept: FAMILY MEDICINE | Facility: CLINIC | Age: 59
End: 2024-05-29
Payer: COMMERCIAL

## 2024-05-29 DIAGNOSIS — E03.9 HYPOTHYROIDISM, UNSPECIFIED TYPE: Primary | ICD-10-CM

## 2024-05-31 ENCOUNTER — OFFICE VISIT (OUTPATIENT)
Dept: FAMILY MEDICINE | Facility: CLINIC | Age: 59
End: 2024-05-31
Payer: COMMERCIAL

## 2024-05-31 VITALS
TEMPERATURE: 98 F | HEART RATE: 77 BPM | WEIGHT: 127 LBS | DIASTOLIC BLOOD PRESSURE: 74 MMHG | BODY MASS INDEX: 21.68 KG/M2 | SYSTOLIC BLOOD PRESSURE: 120 MMHG | HEIGHT: 64 IN | OXYGEN SATURATION: 98 %

## 2024-05-31 DIAGNOSIS — Z17.0 MALIGNANT NEOPLASM OF UPPER-OUTER QUADRANT OF RIGHT BREAST IN FEMALE, ESTROGEN RECEPTOR POSITIVE: ICD-10-CM

## 2024-05-31 DIAGNOSIS — E03.9 HYPOTHYROIDISM, UNSPECIFIED TYPE: ICD-10-CM

## 2024-05-31 DIAGNOSIS — Z90.13 S/P BILATERAL MASTECTOMY: Primary | ICD-10-CM

## 2024-05-31 DIAGNOSIS — C50.411 MALIGNANT NEOPLASM OF UPPER-OUTER QUADRANT OF RIGHT BREAST IN FEMALE, ESTROGEN RECEPTOR POSITIVE: ICD-10-CM

## 2024-05-31 DIAGNOSIS — F41.9 ANXIETY: ICD-10-CM

## 2024-05-31 DIAGNOSIS — D33.3 LEFT ACOUSTIC NEUROMA: ICD-10-CM

## 2024-05-31 DIAGNOSIS — J02.9 SORE THROAT: ICD-10-CM

## 2024-05-31 DIAGNOSIS — Z85.3 HX OF BREAST CANCER: ICD-10-CM

## 2024-05-31 PROCEDURE — 1160F RVW MEDS BY RX/DR IN RCRD: CPT | Mod: CPTII,S$GLB,, | Performed by: FAMILY MEDICINE

## 2024-05-31 PROCEDURE — 3078F DIAST BP <80 MM HG: CPT | Mod: CPTII,S$GLB,, | Performed by: FAMILY MEDICINE

## 2024-05-31 PROCEDURE — 3008F BODY MASS INDEX DOCD: CPT | Mod: CPTII,S$GLB,, | Performed by: FAMILY MEDICINE

## 2024-05-31 PROCEDURE — G2211 COMPLEX E/M VISIT ADD ON: HCPCS | Mod: S$GLB,,, | Performed by: FAMILY MEDICINE

## 2024-05-31 PROCEDURE — 1159F MED LIST DOCD IN RCRD: CPT | Mod: CPTII,S$GLB,, | Performed by: FAMILY MEDICINE

## 2024-05-31 PROCEDURE — 3074F SYST BP LT 130 MM HG: CPT | Mod: CPTII,S$GLB,, | Performed by: FAMILY MEDICINE

## 2024-05-31 PROCEDURE — 99214 OFFICE O/P EST MOD 30 MIN: CPT | Mod: S$GLB,,, | Performed by: FAMILY MEDICINE

## 2024-05-31 PROCEDURE — 99999 PR PBB SHADOW E&M-EST. PATIENT-LVL IV: CPT | Mod: PBBFAC,,, | Performed by: FAMILY MEDICINE

## 2024-05-31 RX ORDER — ALPRAZOLAM 0.5 MG/1
TABLET ORAL
Qty: 30 TABLET | Refills: 2 | Status: SHIPPED | OUTPATIENT
Start: 2024-05-31

## 2024-05-31 NOTE — PROGRESS NOTES
Subjective:       Patient ID: Blanca Loya is a 58 y.o. female.    Chief Complaint: Follow-up      Left acoustic neuroma.  Doing okay from the surgery for that sore throat gums are also slightly sore.  Status post bilateral mastectomy.   Bone scan negative.  CT chest abdomen pelvis normal also. History of breast cancer.  Follow-up current.  BMI of 21.  Anxiety off BuSpar now.  Needs p.r.n. Xanax.  Mother now 93.    Physical examination.  Vital signs noted.  No acute distress.  Neck without bruit no adenopathy.  Pharynx without erythema gums no redness.  Chest clear.  Heart regular rate rhythm.  Abdomen bowel sounds are positive soft nontender.        Objective:        Assessment:       1. S/P bilateral mastectomy    2. Malignant neoplasm of upper-outer quadrant of right breast in female, estrogen receptor positive    3. Left acoustic neuroma    4. Hypothyroidism, unspecified type    5. Sore throat    6. Hx of breast cancer    7. BMI 21.0-21.9, adult    8. Anxiety        Plan:       S/P bilateral mastectomy    Malignant neoplasm of upper-outer quadrant of right breast in female, estrogen receptor positive    Left acoustic neuroma    Hypothyroidism, unspecified type  -     C-Reactive Protein; Future; Expected date: 05/31/2024  -     TSH; Future; Expected date: 05/31/2024  -     CBC Auto Differential; Future; Expected date: 05/31/2024  -     Comprehensive Metabolic Panel; Future; Expected date: 05/31/2024  -     Sedimentation Rate; Future; Expected date: 05/31/2024    Sore throat  -     C-Reactive Protein; Future; Expected date: 05/31/2024  -     TSH; Future; Expected date: 05/31/2024  -     CBC Auto Differential; Future; Expected date: 05/31/2024  -     Comprehensive Metabolic Panel; Future; Expected date: 05/31/2024  -     Sedimentation Rate; Future; Expected date: 05/31/2024    Hx of breast cancer    BMI 21.0-21.9, adult    Anxiety    Other orders  -     ALPRAZolam (XANAX) 0.5 MG tablet; TK 1 T PO QD PRN   Dispense: 30 tablet; Refill: 2      Sed rate CRP TSH CBC CMP.  Declines all vaccines.  Follow-up in 3 months.  Refill Xanax 0.5 30 pills 1 daily maximum p.r.n..  Continue Oncology follow-up.

## 2024-06-12 LAB
ALBUMIN SERPL-MCNC: 4.4 G/DL (ref 3.8–4.9)
ALBUMIN/GLOB SERPL: 1.8 {RATIO}
ALP SERPL-CCNC: 124 IU/L (ref 44–121)
ALT SERPL-CCNC: 18 IU/L (ref 0–32)
AST SERPL-CCNC: 27 IU/L (ref 0–40)
BASOPHILS # BLD AUTO: 0 X10E3/UL (ref 0–0.2)
BASOPHILS NFR BLD AUTO: 1 %
BILIRUB SERPL-MCNC: 0.6 MG/DL (ref 0–1.2)
BUN SERPL-MCNC: 14 MG/DL (ref 6–24)
BUN/CREAT SERPL: 18 (ref 9–23)
CALCIUM SERPL-MCNC: 9.9 MG/DL (ref 8.7–10.2)
CHLORIDE SERPL-SCNC: 103 MMOL/L (ref 96–106)
CO2 SERPL-SCNC: 26 MMOL/L (ref 20–29)
CREAT SERPL-MCNC: 0.77 MG/DL (ref 0.57–1)
CRP SERPL-MCNC: 3 MG/L (ref 0–10)
EOSINOPHIL # BLD AUTO: 0.1 X10E3/UL (ref 0–0.4)
EOSINOPHIL NFR BLD AUTO: 2 %
ERYTHROCYTE [DISTWIDTH] IN BLOOD BY AUTOMATED COUNT: 14.6 % (ref 11.7–15.4)
ERYTHROCYTE [SEDIMENTATION RATE] IN BLOOD BY WESTERGREN METHOD: 8 MM/HR (ref 0–40)
EST. GFR  (NO RACE VARIABLE): 89 ML/MIN/1.73
GLOBULIN SER CALC-MCNC: 2.5 G/DL (ref 1.5–4.5)
GLUCOSE SERPL-MCNC: 89 MG/DL (ref 70–99)
HCT VFR BLD AUTO: 38 % (ref 34–46.6)
HGB BLD-MCNC: 12.3 G/DL (ref 11.1–15.9)
IMM GRANULOCYTES # BLD AUTO: 0 X10E3/UL (ref 0–0.1)
IMM GRANULOCYTES NFR BLD AUTO: 0 %
LYMPHOCYTES # BLD AUTO: 0.9 X10E3/UL (ref 0.7–3.1)
LYMPHOCYTES NFR BLD AUTO: 23 %
MCH RBC QN AUTO: 29.7 PG (ref 26.6–33)
MCHC RBC AUTO-ENTMCNC: 32.4 G/DL (ref 31.5–35.7)
MCV RBC AUTO: 92 FL (ref 79–97)
MONOCYTES # BLD AUTO: 0.4 X10E3/UL (ref 0.1–0.9)
MONOCYTES NFR BLD AUTO: 10 %
NEUTROPHILS # BLD AUTO: 2.6 X10E3/UL (ref 1.4–7)
NEUTROPHILS NFR BLD AUTO: 64 %
PLATELET # BLD AUTO: 314 X10E3/UL (ref 150–450)
POTASSIUM SERPL-SCNC: 4.1 MMOL/L (ref 3.5–5.2)
PROT SERPL-MCNC: 6.9 G/DL (ref 6–8.5)
RBC # BLD AUTO: 4.14 X10E6/UL (ref 3.77–5.28)
SODIUM SERPL-SCNC: 141 MMOL/L (ref 134–144)
TSH SERPL DL<=0.005 MIU/L-ACNC: 2.48 UIU/ML (ref 0.45–4.5)
WBC # BLD AUTO: 4 X10E3/UL (ref 3.4–10.8)

## 2024-07-02 NOTE — PROGRESS NOTES
[facility] Hematology/Oncology  PROGRESS NOTE - Follow-up Visit      Subjective:       Patient ID:   NAME: Blanca Loya : 1965     58 y.o. female    Referring Doc: Cora  Other Physicians: Lyndsay Gonsales, CARLY Barillas; Mary Gonsales    Chief Complaint:  Breast ca f/u    History of Present Illness:     Patient is being seen today in person for a regularly scheduled follow-up appointment. She is here by herself.    She recently when home with visit her mom who has dementia; she is under a lot of stress as result    She saw Dr Shepherd on 2024     No CP, SOB, HA's or N/V.  Chronic aches and pains    She previously had reconstruction surgery with Dr Winslow on 2023; she saw them again in May 2024 and is doing much better with some residual post-op aches in the legs    Labs on  with AST and ALT now back down    She has been off all oral antihormones.                   Discussed Covid19 precautions - she did not get her vaccinations; she had covid in Aug 2021           ROS:   GEN: normal without any fever, night sweats or weight loss; anxiety issues generalized aches and pains;    HEENT: normal with no HA's, sore throat, stiff neck, changes in vision  CV: normal with no CP, SOB, PND, RUGGIERO or orthopnea  PULM: normal with no SOB, cough, hemoptysis, sputum or pleuritic pain  GI: normal with no abdominal pain, nausea, vomiting, constipation, diarrhea, melanotic stools, BRBPR, or hematemesis;  : normal with no hematuria, dysuria  BREAST: s/p reconstruction with flaps  SKIN: normal with no rash, erythema, bruising, or swelling    Allergies:  Review of patient's allergies indicates:   Allergen Reactions    Demerol [meperidine] Anxiety    Pcn [penicillins] Rash       Medications:    Current Outpatient Medications:     ALPRAZolam (XANAX) 0.5 MG tablet, TK 1 T PO QD PRN, Disp: 30 tablet, Rfl: 2    ascorbic acid, vitamin C, (VITAMIN C) 1000 MG tablet, Take 1,000 mg by mouth once daily., Disp: , Rfl:     " b complex vitamins tablet, Take 1 tablet by mouth once daily., Disp: , Rfl:     budesonide (ENTOCORT EC) 3 mg capsule, Take 9 mg by mouth as needed., Disp: , Rfl:     butalbital-acetaminophen-caffeine -40 mg (FIORICET, ESGIC) -40 mg per tablet, Take 1 tablet by mouth every 6 (six) hours as needed for Pain or Headaches., Disp: 28 tablet, Rfl: 2    CHROMIUM ORAL, Take by mouth., Disp: , Rfl:     levOCARNitine (L-CARNITINE) 500 mg Cap, Take 1 tablet by mouth once daily., Disp: , Rfl:     levothyroxine (SYNTHROID) 112 MCG tablet, TAKE 1 TABLET(112 MCG) BY MOUTH DAILY, Disp: 90 tablet, Rfl: 3    POTASSIUM ACETATE MISC, 99 Doses by Misc.(Non-Drug; Combo Route) route 2 (two) times a day., Disp: , Rfl:     busPIRone (BUSPAR) 5 MG Tab, Take 1 tablet (5 mg total) by mouth 2 (two) times daily. (Patient not taking: Reported on 2/16/2024), Disp: 180 tablet, Rfl: 1    ciprofloxacin HCl (CIPRO) 500 MG tablet, Take 500 mg by mouth 2 (two) times daily. (Patient not taking: Reported on 7/3/2024), Disp: , Rfl:     ondansetron (ZOFRAN) 4 MG tablet, Take 4 mg by mouth every 8 (eight) hours as needed. (Patient not taking: Reported on 7/3/2024), Disp: , Rfl:     ondansetron (ZOFRAN-ODT) 4 MG TbDL, Take 4 mg by mouth every 8 (eight) hours as needed. (Patient not taking: Reported on 7/3/2024), Disp: , Rfl:     PMHx/PSHx Updates:  See patient's last visit with me on 2/28/2024  See H&P on 9/8/2016        Pathology:    Colon biopsy  9/22/2020:    COLON, RANDOM, BIOPSY:   - COLLAGENOUS COLITIS.       See path note on 8/30/2016      Objective:     Vitals:  Blood pressure 129/81, pulse 87, temperature 97.5 °F (36.4 °C), resp. rate 18, height 5' 4" (1.626 m), weight 59.1 kg (130 lb 3.2 oz), last menstrual period 09/10/2016.        Physical Examination:   GEN: no apparent distress, comfortable; AAOx3  HEAD: atraumatic and normocephalic  EYES: no conjunctival pallor or muddiness, no icterus; normal pupil reaction to ambient light  ENT: " OMM, no pharyngeal erythema, external bilateral ears WNL; no visible thrush or ulcers  NECK: no masses or swelling, trachea midline, no visible LAD/LN's   CV: no palpitations; no pedal edema; no noticeable JVD or neck vein distension  CHEST: Normal respiratory effort; chest wall breath movements symmetrical; no audible wheezing  ABDOM: non-distended; no bloating  MUSC/Skeletal: ROM normal; joints visibly normal; no deformities or arthropathy  EXTREM: no clubbing, cyanosis, inflammation or swelling  SKIN: no rashes, lesions, ulcers, petechiae or subcutaneous nodules  : no tyson  NEURO: moving all 4 extremities; AAOx3; no tremors  PSYCH: normal mood, affect and behavior  LYMPH: no visible LN's or LAD  Breast: s/p reconstruction with flaps; s/p thigh engraftment            Labs:     Lab Results   Component Value Date    WBC 4.0 06/11/2024    HGB 12.3 06/11/2024    HCT 38.0 06/11/2024    MCV 92 06/11/2024     06/11/2024       CMP  Sodium   Date Value Ref Range Status   06/11/2024 141 134 - 144 mmol/L Final   04/23/2019 143 134 - 144 mmol/L      Potassium   Date Value Ref Range Status   06/11/2024 4.1 3.5 - 5.2 mmol/L Final     Chloride   Date Value Ref Range Status   06/11/2024 103 96 - 106 mmol/L Final   04/23/2019 103 98 - 110 mmol/L      CO2   Date Value Ref Range Status   06/11/2024 26 20 - 29 mmol/L Final     Glucose   Date Value Ref Range Status   06/11/2024 89 70 - 99 mg/dL Final   04/23/2019 88 70 - 99 mg/dL      BUN   Date Value Ref Range Status   06/11/2024 14 6 - 24 mg/dL Final     Creatinine   Date Value Ref Range Status   06/11/2024 0.77 0.57 - 1.00 mg/dL Final   04/23/2019 0.60 0.60 - 1.40 mg/dL      Calcium   Date Value Ref Range Status   06/11/2024 9.9 8.7 - 10.2 mg/dL Final     Total Protein   Date Value Ref Range Status   01/13/2022 7.7 6.0 - 8.4 g/dL Final     Albumin   Date Value Ref Range Status   06/11/2024 4.4 3.8 - 4.9 g/dL Final   01/13/2022 4.5 3.5 - 5.2 g/dL Final   04/23/2019 4.0 3.1  - 4.7 g/dL      Total Bilirubin   Date Value Ref Range Status   06/11/2024 0.6 0.0 - 1.2 mg/dL Final     Alkaline Phosphatase   Date Value Ref Range Status   01/13/2022 72 55 - 135 U/L Final     AST   Date Value Ref Range Status   06/11/2024 27 0 - 40 IU/L Final     ALT   Date Value Ref Range Status   06/11/2024 18 0 - 32 IU/L Final     Anion Gap   Date Value Ref Range Status   11/13/2023 6 (L) 8 - 16 mmol/L Final     eGFR if    Date Value Ref Range Status   01/13/2022 >60.0 >60 mL/min/1.73 m^2 Final     eGFR if non    Date Value Ref Range Status   01/13/2022 >60.0 >60 mL/min/1.73 m^2 Final     Comment:     Calculation used to obtain the estimated glomerular filtration  rate (eGFR) is the CKD-EPI equation.                Radiology/Diagnostic Studies:    CT Chest/abdom/pelvis  2/14/2024:    IMPRESSION:     No CT evidence of metastatic disease involving the chest, abdomen, or pelvis in this patient with history of breast cancer status post bilateral mastectomies.    Bone scan  2/14/2024:    FINDINGS:  Anterior and posterior whole body planar images show no abnormal osteoblastic activity or photopenic defect of concern for osseous metastasis.     Moderate activity posteriorly about the superior cervical spine, to the left of midline, correlates with moderate to severe left C3-C4 facet joint osteoarthrosis. Mild to moderate activity at the lumbosacral junction correlates with disc degeneration at L5-S1.     Soft tissue uptake is physiologic.     IMPRESSION:  No evidence of osseous metastasis.      PET 2/10/2023:    IMPRESSION:     Status post bilateral mastectomies.  No evidence of FDG avid residual, recurrent, or thoracic metastatic disease.     Nonspecific focus of increased hepatic FDG activity near the gallbladder fossa, new compared to 2020. This could represent misregistration artifact from bowel activity, gallbladder inflammation, with metastatic hepatic lesion felt less likely -  however not entirely excluded. Consider dedicated hepatic protocol MRI imaging for further evaluation.           CXR  1/6/2022:    IMPRESSION:     No acute cardiopulmonary abnormality.         PET 9/24/2020:    Impression:     1. No evidence of recurrence of disease or metastatic disease.  2. Several colon air-fluid levels, suggesting nonspecific diarrheal illness.         US  11/7/2019:  Impression       Normal right upper quadrant ultrasound.         MRI head  2/17/2020:    Impression:     Stable appearance of previously defined enhancing extra-axial mass which extends from the left internal auditory canal into the left cerebellopontine angle cistern and along the adjacent temporal bone.      PET/CT  5/13/2019:    IMPRESSION:    1. No evidence of FDG avid metastatic disease.  2. Incidental findings as above.          Chest/Abdom CT  8/24/2018:      IMPRESSION:  1. No evidence of metastatic disease in the chest, abdomen, or the pelvis.  2. Additional observations as above           I have reviewed all available lab results and radiology reports.    Assessment/Plan:   (1) 58 y.o. female with diagnosis of right breast cancer  - s/p bilateral mastectomies on 9/26/16 followed by reconstruction  - followed by Dr Bear with [facility]  - she had 2 out of 7 LN's that were positive  - she was a Stage IIA  - ER+/OH+ and her2nu neg  - s/p AC x4 and taxol x4  - she has seen Dr Gonsales with rad/onc for XRT and completed 5 weeks last month  - discussed the pro's and con's of tamoxifen versus arimidex, and in light of the pap issues, arimidex is probably the better choice  - she had been on arimidex but it was discontinued because of her liver  - she has some mood swings and weight gain  - PET done on 9/21/17 with no evidence of mets;   - recent CT chest/abdom on 8/24/2018 showed no evidence on cancer  - s/p prior repeat surgery with right-sided lift with Dr Umana with repeat evaluation in Nov 2018   - she had a slow post-op  "recovery and has residual aches and pains  - she had PET on 5/13/2019  - she has been on arimidex and now femara which she both discontinued; she is having some generalized aches and pains;   - discussed aromasine and she is willing to give it a try; she does not want to take tamoxifen due to the clot and ovarian cancer risks   - she had been on the aromasin for 5 weeks but then developed elevated LFTs and it was discontinued    10/5/2020:  - latest PET on 9/24/2020 with no evidence of cancer  - s/p bilat mastectomies with reconstructions so she does not get mammograms    2/4/2021:  - she has been off all oral antihormone  - She had prior colonoscopy with Dr Edmonds and was diagnsoed with "collagenous" colitis.     6/14/2021:  - she seems to be doing ok overall    1/13/2022:  - no new issues  - recent CXR negative    7/13/2022:  -  She has some anxiety issues and jitteriness.  - she has some gum issues.   - She has been off all oral antihormones.    - She has been on steroids per Dr Edmonds and plans to see him again this summer some time  - She saw Dr Shepherd in June 2022 1/19/2023:  - no appreciable mass or LAD  - will get PEt as precaution    5/24/2023:  - she had PET scan back in Feb 2023  - she is considering having elective breast surgery - will refer to Dr Winslow for evaluation    9/27/2023:  - seeing Dr Winslow in Nov 2023 about reconstruction surgery  - repeat PET in Feb 2024 if insurance permits    2/28/2024:  - She is now feeling much better and almost back to her normal self  - She had reconstruction surgery with Dr Winslow on 11/28/2023; she had some incision healing issues on legs and required second surgery with restitching on thighs on 12/29/2023; doing better now; she sees them again on march 11th 2024  - Recent bump in LFt's and she saw Dr Edmonds again this past Friday; Dr Shepherd is aware  - recent CT scans and bone scan earlier this month on chart    7/3/2024:  - she saw Dr Shepherd " "recently  - labs are adequate  - she has a lot of family stress and stress at work         (2) Mild leucopenia/anemia secondary to chemotherapy (resolved)    6/14/2021:  - latest wbc is at  4.14 - latest hgb was at 12.4    1/13/2021:  - repeat labs pending  - labs from oct 2021 with wbc at 3.0    7/14/2022:  - latest CBC adequate    1/19/2023:  - latest WBC at 3.1    5/24/2023:  - latest wbc at 3.2 and relatively stable    9/27/2023:  - CBC is WNL currently    7/3/2024:  - latest CBC/plat WNL     (3) Prior abnormal PAP issues - followed by Dr Cline with GYN; latest pap was negative per patient and she was also HPV negative     (4) Hx of acoustic neuroma in past - s/p XRT in 2010; followed by Dr Gonsales  - recent MRI stable per patient in Feb 2020     (5) Chronic fibromyalgia - she is not being followed by anyone; she may be having a flare-up      (6) Collagenous colitis -    - GI issues with prior bout of colitis - seen by Dr Edmonds with GI    - prior mild elevation bili and alk phos - resolved  - she had liver biopsy and EGD with "inflammation" per patient  - s/p esophageal dilation       2/4/2021:  - She had prior colonoscopy in Nov 2020 with Dr Edmonds and was diagnsoed with "collagenous" colitis.     6/14/2021:  - bowels are stable    7/14/2022:  - patient to f/u with Dr Edmonds this summer    2/28/2024:  - she saw Dr Edmonds this past Friday  - getting repeat LFT labs in 3 weeks    7/3/2024:  - latest LFt's with AST/ALT wnl         (7) RUE - resolved - possible lymphedema issues - she no longer sees PT  - also seen by Dr Liao    (8) Hypokalemia -on supplements - patient wanted to try OTC and did not want prescription meds    (9) Occasional palpitations - seen by Dr Hernandez with cardiology    (10) Thyroid issues - followed by PCP        1. Malignant neoplasm of upper-outer quadrant of right breast in female, estrogen receptor positive        2. Estrogen receptor positive        3. S/P bilateral " mastectomy                PLAN:  1. Check up to date labs as directed  2. F/U with PCP, Surg and GYN  3. Hold on any antihormone therapy indefinitely for now  4.  F/u with Dr Edmonds with GI as directed  5.  F/u with cardiology    6. F/u with PCP   7. F/u with Dr Winslow in couple of months       RTC in 6 months    Fax note to Chaparro Shepherd III, *, CARLY Bear Mannina, Babycos; Grey; Bianca Winslow     Total Time spent on patient:    I spent over 25 mins of time with the patient. Reviewed results of the recently ordered labs, tests, reports and studies; made directives with regards to the results. Over half of this time was spent couseling and coordinating care, making treatment and analytical decisions; ordering necessary labs, tests and studies; and discussing treatment options and setting up treatment plan(s) if indicated.        Discussion:     COVID-19 Discussion:    I had long discussion with patient and any applicable family about the COVID-19 coronavirus epidemic and the recommended precautions with regard to cancer and/or hematology patients. I have re-iterated the CDC recommendations for adequate hand washing, use of hand -like products, and coughing into elbow, etc. In addition, especially for our patients who are on chemotherapy and/or our otherwise immunocompromised patients, I have recommended avoidance of crowds, including movie theaters, restaurants, churches, etc. I have recommended avoidance of any sick or symptomatic family members and/or friends. I have also recommended avoidance of any raw and unwashed food products, and general avoidance of food items that have not been prepared by themselves. The patient has been asked to call us immediately with any symptom developments, issues, questions or other general concerns.            I have explained all of the above in detail and the patient understands all of the current recommendation(s). I have answered all of  their questions to the best of my ability and to their complete satisfaction.   The patient is to continue with the current management plan.            Electronically signed by Bronson Carrillo MD

## 2024-07-03 ENCOUNTER — OFFICE VISIT (OUTPATIENT)
Facility: CLINIC | Age: 59
End: 2024-07-03
Payer: COMMERCIAL

## 2024-07-03 VITALS
RESPIRATION RATE: 18 BRPM | WEIGHT: 130.19 LBS | DIASTOLIC BLOOD PRESSURE: 81 MMHG | TEMPERATURE: 98 F | SYSTOLIC BLOOD PRESSURE: 129 MMHG | HEART RATE: 87 BPM | BODY MASS INDEX: 22.23 KG/M2 | HEIGHT: 64 IN

## 2024-07-03 DIAGNOSIS — Z90.13 S/P BILATERAL MASTECTOMY: ICD-10-CM

## 2024-07-03 DIAGNOSIS — Z17.0 ESTROGEN RECEPTOR POSITIVE: ICD-10-CM

## 2024-07-03 DIAGNOSIS — Z17.0 MALIGNANT NEOPLASM OF UPPER-OUTER QUADRANT OF RIGHT BREAST IN FEMALE, ESTROGEN RECEPTOR POSITIVE: Primary | ICD-10-CM

## 2024-07-03 DIAGNOSIS — C50.411 MALIGNANT NEOPLASM OF UPPER-OUTER QUADRANT OF RIGHT BREAST IN FEMALE, ESTROGEN RECEPTOR POSITIVE: Primary | ICD-10-CM

## 2024-07-03 PROCEDURE — 99999 PR PBB SHADOW E&M-EST. PATIENT-LVL IV: CPT | Mod: PBBFAC,,, | Performed by: INTERNAL MEDICINE

## 2024-08-29 ENCOUNTER — PATIENT MESSAGE (OUTPATIENT)
Dept: FAMILY MEDICINE | Facility: CLINIC | Age: 59
End: 2024-08-29
Payer: COMMERCIAL

## 2024-09-06 ENCOUNTER — OFFICE VISIT (OUTPATIENT)
Dept: FAMILY MEDICINE | Facility: CLINIC | Age: 59
End: 2024-09-06
Payer: COMMERCIAL

## 2024-09-06 VITALS
SYSTOLIC BLOOD PRESSURE: 110 MMHG | OXYGEN SATURATION: 99 % | DIASTOLIC BLOOD PRESSURE: 68 MMHG | HEIGHT: 64 IN | HEART RATE: 68 BPM | WEIGHT: 133.38 LBS | BODY MASS INDEX: 22.77 KG/M2

## 2024-09-06 DIAGNOSIS — D33.3 LEFT ACOUSTIC NEUROMA: ICD-10-CM

## 2024-09-06 DIAGNOSIS — E03.9 HYPOTHYROIDISM, UNSPECIFIED TYPE: Primary | ICD-10-CM

## 2024-09-06 DIAGNOSIS — Z90.13 S/P BILATERAL MASTECTOMY: ICD-10-CM

## 2024-09-06 DIAGNOSIS — Z85.3 HX OF BREAST CANCER: ICD-10-CM

## 2024-09-06 DIAGNOSIS — H91.90 HEARING LOSS, UNSPECIFIED HEARING LOSS TYPE, UNSPECIFIED LATERALITY: ICD-10-CM

## 2024-09-06 DIAGNOSIS — Z01.00 ROUTINE EYE EXAM: ICD-10-CM

## 2024-09-06 PROCEDURE — 99999 PR PBB SHADOW E&M-EST. PATIENT-LVL IV: CPT | Mod: PBBFAC,,, | Performed by: FAMILY MEDICINE

## 2024-09-06 PROCEDURE — 1159F MED LIST DOCD IN RCRD: CPT | Mod: CPTII,S$GLB,, | Performed by: FAMILY MEDICINE

## 2024-09-06 PROCEDURE — 99214 OFFICE O/P EST MOD 30 MIN: CPT | Mod: S$GLB,,, | Performed by: FAMILY MEDICINE

## 2024-09-06 PROCEDURE — 3078F DIAST BP <80 MM HG: CPT | Mod: CPTII,S$GLB,, | Performed by: FAMILY MEDICINE

## 2024-09-06 PROCEDURE — 3074F SYST BP LT 130 MM HG: CPT | Mod: CPTII,S$GLB,, | Performed by: FAMILY MEDICINE

## 2024-09-06 PROCEDURE — 1160F RVW MEDS BY RX/DR IN RCRD: CPT | Mod: CPTII,S$GLB,, | Performed by: FAMILY MEDICINE

## 2024-09-06 PROCEDURE — 3008F BODY MASS INDEX DOCD: CPT | Mod: CPTII,S$GLB,, | Performed by: FAMILY MEDICINE

## 2024-09-06 RX ORDER — LEVOTHYROXINE SODIUM 112 UG/1
112 TABLET ORAL
Qty: 90 TABLET | Refills: 3 | Status: SHIPPED | OUTPATIENT
Start: 2024-09-06

## 2024-09-06 NOTE — PROGRESS NOTES
SCRIBE #1 NOTE: I, Melani Galeas, am scribing for, and in the presence of, Chaparro Shepherd III, MD. I have scribed the entire note.     Subjective:       Patient ID: Blanca Loya is a 58 y.o. female.    Chief Complaint: Follow-up (3mth)    Blanca Loya is a 58 y.o. female who presents for a 3 month follow-up. Hearing loss. Left acoustic neuroma. Status post bilateral mastectomy in 2016. History of breast cancer. BMI of 22. Anxiety, on Xanax p.r.n.. Hypothyroidism, on 112 mcg Levothyroxine. TSH is 0.979. Covid, pneumonia, shingles, tetanus, and flu vaccines are due. Colonoscopy is current. Eye exam is due.          Review of Systems   Constitutional: Negative.    HENT: Negative.     Eyes: Negative.    Respiratory: Negative.     Cardiovascular: Negative.    Gastrointestinal: Negative.    Endocrine: Negative.    Genitourinary: Negative.    Musculoskeletal: Negative.    Skin: Negative.    Allergic/Immunologic: Negative.    Neurological: Negative.    Hematological: Negative.    Psychiatric/Behavioral: Negative.     All other systems reviewed and are negative.      Objective:      Physical examination: Vital signs noted. No acute distress. EOM intact. Tympanic membranes are normal bilaterally. No Cerumen impaction. No carotid bruit. Regular heart rate and rhythm. Lungs clear to auscultation bilaterally. Abdomen bowel sounds are positive soft and nontender. Extremities without edema. 2+ pedal pulses.      Assessment:       1. Hypothyroidism, unspecified type    2. Left acoustic neuroma    3. S/P bilateral mastectomy    4. Hx of breast cancer    5. Hearing loss, unspecified hearing loss type, unspecified laterality    6. Routine eye exam        Plan:       Hypothyroidism, unspecified type  -     Comprehensive Metabolic Panel; Future; Expected date: 03/06/2025  -     TSH; Future; Expected date: 03/06/2025    Left acoustic neuroma  -     Comprehensive Metabolic Panel; Future; Expected date: 03/06/2025  -     CBC Auto  Differential; Future; Expected date: 03/06/2025    S/P bilateral mastectomy    Hx of breast cancer  -     Comprehensive Metabolic Panel; Future; Expected date: 03/06/2025  -     CBC Auto Differential; Future; Expected date: 03/06/2025    Hearing loss, unspecified hearing loss type, unspecified laterality  -     Ambulatory referral/consult to ENT; Future; Expected date: 09/13/2024    Routine eye exam  -     Ambulatory referral/consult to Ophthalmology; Future; Expected date: 09/13/2024    Other orders  -     levothyroxine (SYNTHROID) 112 MCG tablet; Take 1 tablet (112 mcg total) by mouth before breakfast.  Dispense: 90 tablet; Refill: 3    To ENT at [facility] regarding her hearing.  Referred her to Ophthalmology regarding her vision check.  Declines all vaccines.  Continue her same levothyroxine dose.  Follow-up in 6 months with TSH CBC CMP.  Continue Oncology follow-up.    I, Dr Chaparro Shepherd, personally performed the services described in this documentation. All medical record entries made by the scribe were at my direction and in my presence. I have reviewed the chart and agree that the record reflects my personal performance and is accurate and complete.

## 2024-10-02 ENCOUNTER — OFFICE VISIT (OUTPATIENT)
Dept: OTOLARYNGOLOGY | Facility: CLINIC | Age: 59
End: 2024-10-02
Payer: COMMERCIAL

## 2024-10-02 ENCOUNTER — CLINICAL SUPPORT (OUTPATIENT)
Dept: AUDIOLOGY | Facility: CLINIC | Age: 59
End: 2024-10-02
Payer: COMMERCIAL

## 2024-10-02 VITALS
WEIGHT: 132.69 LBS | BODY MASS INDEX: 22.65 KG/M2 | HEART RATE: 75 BPM | SYSTOLIC BLOOD PRESSURE: 113 MMHG | HEIGHT: 64 IN | DIASTOLIC BLOOD PRESSURE: 57 MMHG

## 2024-10-02 DIAGNOSIS — H91.92 DEAFNESS IN LEFT EAR: ICD-10-CM

## 2024-10-02 DIAGNOSIS — H91.90 HEARING LOSS, UNSPECIFIED HEARING LOSS TYPE, UNSPECIFIED LATERALITY: ICD-10-CM

## 2024-10-02 DIAGNOSIS — Z92.3 STATUS POST GAMMA KNIFE TREATMENT: ICD-10-CM

## 2024-10-02 DIAGNOSIS — H91.92 ACQUIRED DEAFNESS OF LEFT EAR: ICD-10-CM

## 2024-10-02 DIAGNOSIS — D33.3 UNILATERAL VESTIBULAR SCHWANNOMA: ICD-10-CM

## 2024-10-02 DIAGNOSIS — M26.629 TMJPDS (TEMPOROMANDIBULAR JOINT PAIN DYSFUNCTION SYNDROME): ICD-10-CM

## 2024-10-02 DIAGNOSIS — H93.8X1 EAR FULLNESS, RIGHT: Primary | ICD-10-CM

## 2024-10-02 DIAGNOSIS — Z01.10: Primary | ICD-10-CM

## 2024-10-02 PROCEDURE — 3078F DIAST BP <80 MM HG: CPT | Mod: CPTII,S$GLB,, | Performed by: OTOLARYNGOLOGY

## 2024-10-02 PROCEDURE — 92567 TYMPANOMETRY: CPT | Mod: S$GLB,,, | Performed by: AUDIOLOGIST

## 2024-10-02 PROCEDURE — 99999 PR PBB SHADOW E&M-EST. PATIENT-LVL I: CPT | Mod: PBBFAC,,, | Performed by: AUDIOLOGIST

## 2024-10-02 PROCEDURE — 99203 OFFICE O/P NEW LOW 30 MIN: CPT | Mod: S$GLB,,, | Performed by: OTOLARYNGOLOGY

## 2024-10-02 PROCEDURE — 1159F MED LIST DOCD IN RCRD: CPT | Mod: CPTII,S$GLB,, | Performed by: OTOLARYNGOLOGY

## 2024-10-02 PROCEDURE — 3074F SYST BP LT 130 MM HG: CPT | Mod: CPTII,S$GLB,, | Performed by: OTOLARYNGOLOGY

## 2024-10-02 PROCEDURE — 3008F BODY MASS INDEX DOCD: CPT | Mod: CPTII,S$GLB,, | Performed by: OTOLARYNGOLOGY

## 2024-10-02 PROCEDURE — 92557 COMPREHENSIVE HEARING TEST: CPT | Mod: S$GLB,,, | Performed by: AUDIOLOGIST

## 2024-10-02 PROCEDURE — 99999 PR PBB SHADOW E&M-EST. PATIENT-LVL IV: CPT | Mod: PBBFAC,,, | Performed by: OTOLARYNGOLOGY

## 2024-10-02 NOTE — PROGRESS NOTES
[facility]    Subjective:      Patient: Blanca Loya Patient PCP: Chaparro Shepherd III, MD         :  1965     Sex:  female      MRN:  [id number]          Date of Visit: 10/02/2024      Chief Complaint: Hearing Loss (Pt does not have hearing on left side since , but her right side feels full and bothers her.  Pt had audiogram done today.)      Patient ID: Blanca Loya is a 59 y.o. female     Patient is a  former smoker with a past medical history of left acoustic neuroma with surgical treatment referred to me by Dr. Chaparro Shepherd III in consultation for concerning right-sided hearing loss/ fullness feeling a need to pop.  No sinonasal complaints of congestion or allergy.  No vertigo.  No sudden change..    Left ear profoundly deaf, right ear normal thresholds in tympanogram.  Fifteen dB SRT with 100% discrimination score.  No prior audiogram for comparison.        Labs:  WBC   Date Value Ref Range Status   2024 4.0 3.4 - 10.8 x10E3/uL Final     Hemoglobin   Date Value Ref Range Status   2024 13.3 11.1 - 15.9 g/dL Final     Platelets   Date Value Ref Range Status   2024 343 150 - 450 x10E3/uL Final     Creatinine   Date Value Ref Range Status   2024 0.82 0.57 - 1.00 mg/dL Final     TSH   Date Value Ref Range Status   2024 0.979 0.450 - 4.500 uIU/mL Final       Past Medical History  She has a past medical history of Acoustic neuroma, Acquired deafness, Chronic fatigue, Collagenous colitis, Estrogen receptor positive, Fibromyalgia, Hypothyroidism, unspecified, Malignant neoplasm of upper-outer quadrant of right female breast, and S/P bilateral mastectomy.    Family / Surgical / Social History  Her family history includes No Known Problems in her father and mother.    Past Surgical History:   Procedure Laterality Date    BREAST CAPSULECTOMY Bilateral 2023    Procedure: CAPSULECTOMY, BREAST;  Surgeon: Quintin Winslow MD;  Location: [facility];  Service: Plastics;   Laterality: Bilateral;    BREAST RECONSTRUCTION      BREAST REVISION SURGERY Left 2018    Procedure: BREAST REVISION SURGERY LEFT IMF ELEVATION WITH ALLODERM;  Surgeon: Holger Umana MD;  Location: [address];  Service: Plastics;  Laterality: Left;    BREAST SURGERY      CLOSURE OF WOUND Bilateral 2023    Procedure: CLOSURE WOUND;  Surgeon: Quintin Winslow MD;  Location: [facility] OR;  Service: Plastics;  Laterality: Bilateral;  INCISIONAL VAC (PROVENA)    COLONOSCOPY N/A 2020    Procedure: COLONOSCOPY;  Surgeon: Rocco Edmonds III, MD;  Location: [facility] ENDO;  Service: Endoscopy;  Laterality: N/A;    MASTECTOMY Bilateral 2019    Per Dr. Santiago note    NEUROMA SURGERY      [place] ear    RECONSTRUCTION OF BREAST WITH DEEP INFERIOR EPIGASTRIC ARTERY  (ELSY) FLAP Bilateral 2023    Procedure: RECONSTRUCTION, BREAST, PAP FLAP;  Surgeon: Quintin Winslow MD;  Location: [facility] OR;  Service: Plastics;  Laterality: Bilateral;  bilateral thigh to bilateral breast       Social History     Tobacco Use    Smoking status: Former     Current packs/day: 0.00     Types: Cigarettes     Quit date: 1987     Years since quittin.9    Smokeless tobacco: Never   Substance and Sexual Activity    Alcohol use: No    Drug use: No    Sexual activity: Not Currently       Medications  She has a current medication list which includes the following prescription(s): alprazolam, ascorbic acid (vitamin c), b complex vitamins, budesonide, butalbital-acetaminophen-caffeine -40 mg, chromium, ciprofloxacin hcl, l-carnitine, levothyroxine, ondansetron, ondansetron, and potassium acetate.      Allergies  Review of patient's allergies indicates:   Allergen Reactions    Demerol [meperidine] Anxiety    Clindamycin Itching and Rash     Per pt on 18    Ibuprofen Nausea Only    Kenalog [triamcinolone acetonide] Other (See Comments)     Hot flashes, flushing of the skin.  (sensitivity to  "injection noted that was given on 3/6/18)    Micro-k [potassium chloride] Itching     Headache      Pcn [penicillins] Rash       All medications, allergies, and past history have been reviewed.    Objective:      Vitals:      7/3/2024     1:08 PM 9/6/2024     2:59 PM 10/2/2024     1:42 PM   Vitals - 1 value per visit   SYSTOLIC 129 110 113   DIASTOLIC 81 68 57   Pulse 87 68 75   Temp 97.5 °F (36.4 °C)     Resp 18     SPO2  99 %    Weight (lb) 130.2 133.38 132.72   Weight (kg) 59.058 60.5 60.2   Height 5' 4" (1.626 m) 5' 4" (1.626 m) 5' 4" (1.626 m)   BMI (Calculated) 22.3 22.9 22.8   Pain Score Four Zero Three       Body surface area is 1.65 meters squared.    Physical Exam:    GENERAL  APPEARANCE -  alert, appears stated age, and cooperative  BARRIER(S) TO COMMUNICATION -  none VOICE - appropriate for age and gender    INTEGUMENTARY  no suspicious head and neck lesions    HEENT  HEAD: Normocephalic, without obvious abnormality, atraumatic  FACE: INSPECTION - Symmetric, no signs of trauma, no suspicious lesion(s)      STRENGTH - facial symmetry intact     PALPATION -  No masses     SALIVARY GLANDS - non-tender with no appreciable mass    NECK/THYROID: normal atraumatic, no neck masses, normal thyroid, no jvd    EYES  Normal occular alignment and mobility with no visible nystagmus at rest    EARS/NOSE/MOUTH/THROAT  EARS  PINNAE AND EXTERNAL EARS - no suspicious lesion OTOSCOPIC EXAM (surgical microscopy was used for visualization/instrumentation): EAR EXAM - Normal ear canals, tympanic membranes and mobility, and middle ear spaces bilaterally.  HEARING - grossly intact to voice/finger rub on right    NOSE AND SINUSES  EXTERNAL NOSE - Grossly normal for age/sex  SEPTUM - S deformity, nonobstructive TURBINATES - within normal limits MUCOSA - within normal limits     MOUTH AND THROAT   ORAL CAVITY, LIPS, TEETH, GUMS & TONGUE - moist, no suspicious lesions  OROPHARYNX /TONSILS/PHARYNGEAL WALLS/HYPOPHARYNX - no erythema " or exudates  NASOPHARYNX - limited mirror exam - unable to visualize due to anatomy/gag  LARYNX -  - limited mirror exam - unable to visualize due to anatomy/gag      CHEST AND LUNG   INSPECTION & AUSCULTATION - normal effort, no stridor    CARDIOVASCULAR  AUSCULTATION & PERIPHERAL VASCULAR - regular rate and rhythm.    NEUROLOGIC  MENTAL STATUS - alert, interactive CRANIAL NERVES - normal    LYMPHATIC  HEAD AND NECK - non-palpable; SUPRACLAVICULAR - deferred      Procedure(s):  None          Assessment:      Problem List Items Addressed This Visit    None  Visit Diagnoses       Ear fullness, right    -  Primary    Hearing loss, unspecified hearing loss type, unspecified laterality        Acquired deafness of left ear        Unilateral vestibular schwannoma        Status post gamma knife treatment        TMJPDS (temporomandibular joint pain dysfunction syndrome)                     Plan:      Normal hearing on the right ear.  Fullness is likely more a reflection of her ongoing TMJ issues in any Eustachian tube dysfunction though there is a deviated septum.  No concern from a schwannoma standpoint.  Had subtotal resection in 2007 with rapid regrowth requiring gamma knife treatment in 2010 reportedly stable up through MRI with Dr. Danielson in 2022 with no further surveillance recommended.      Recommend hearing preservation/protection.  Annual testing of the hearing then every few years.  Immediate evaluation with any sudden change.      Information on TMJ provided.  To wear her mouth guard more regularly.  Patient defers evaluation and treatment by PT.    Follow up as needed          Voice recognition software was used in the creation of this note/communication and any sound-alike errors which may have occurred from its use should be taken in context when interpreting.  If such errors prevent a clear understanding of the note/communication, please contact the office for clarification.

## 2024-10-02 NOTE — PATIENT INSTRUCTIONS
TMJ Syndrome / Sprain    This is a condition with chronic or recurrent pain in the joint of the jaw (in front of the ear). Just like all other joints in the body (knees, hips, etc.) the jaw joint can be sprained or chronically injured over time. The jaw joint capsule can wear out just like other joints in the body.    The pain may cause limited motion of the jaw, a locking or catching sensation, clicking, popping, or grinding sounds from the joint with movement. It is a very common cause of headache, earache or neck pain. Other symptoms include ringing in the ear, and pressure/fullness of the ear.    The nerve that gives sensation to the jaw joint also gives sensation to the ear and part of the face. This is why pain in the face or ear can be coming from the jaw joint. It is sometimes caused by inflammation in the joint, injury or wear-and-tear of the cartilage in the joint, involuntary grinding of the teeth or poorly fitting dentures. Emotional stress and tension are often a factor. Most cases resolve completely within a few months with proper treatment.    Home Care:  1) Rest the jaw by avoiding crunchy or hard foods to chew. Do not eat hard or sticky candies. Soft foods and liquids are easier on the jaw. Protect your jaw while yawning.  2) Hot compress (small towel soaked in hot water or heating pad) applied to the jaw may give relief by reducing muscle spasm. Some people get relief with cold packs, so try both and see which one works best for you.  3) You may use ibuprofen (Motrin, Advil) to control pain, unless another medicine was prescribed.   If you weight between 130 and 150 lb, you may take 600 mg of Ibuprofen every 6 hours as needed for a few weeks, then less frequently following this. If you weight > 150 lb, you may take 800 mg every 6 hours for the same time period. Extended use of Ibuprofen is typically OK, but not every 6 hours (usually one or two times per day.)   [ NOTE : If you have chronic liver  "or kidney disease or ever had a stomach ulcer or GI bleeding, talk with your doctor before using these medicines.]  4) If you suspect emotional stress is related to your condition.  a) Try to identify the sources of stress in your life. It may not be obvious! These may include:  -- Daily hassles of life that pile up (traffic jams, missed appointments, car troubles)  -- Major life changes, both good (new baby, job promotion) and bad (loss of job, loss of loved one)  -- Overload: feeling that you have too many responsibilities and can't take care of everything at once  -- Helplessness: feeling like your problems are more than you can solve  b) When possible, do something about the source of your stress: avoid hassles, limit the amount of change that is happening in your life at one time and take a break when you feel overloaded.  c) Unfortunately, many stressful situations cannot be avoided. Therefore, it is necessary to learn HOW TO MANAGE STRESS better. There are many proven methods that work and will reduce your anxiety. These include simple things like exercise, good nutrition and adequate rest. Also, there are certain techniques that are helpful: relaxation and breathing exercises, visualization, biofeedback, meditation or simply taking some time-out to clear your mind. For more information about this, consult your doctor or go to a local bookstore and review the many books and tapes available on this subject.    Mouthguards for Grinding:  Some TMJ issues are worsened by nightly teeth grinding. This can create muscle tension and strain on the jaw joint. Most mouthguards protect the teeth and do NOT reduce the clenching. If the goal is to reduce clenching, I recommend trying an over the counter nightguard called "SmartGuard."  This can be purchased over-the-counter and is available through vendors such as [employer] and [employer].   This mouthguard fits only on the front teeth. As a result, it prevents your molars from " contacting, preventing a forceful clench. This should be worn for brief period of time (weeks or months) as it can alter the bite with prolonged use. Most people will notice improvement during this time.

## 2024-10-02 NOTE — PROGRESS NOTES
Blanca Loya was seen on 10/02/2024 for an audiological evaluation. Pt was alone during today's visit. Pertinent complaints today include hearing loss. Pt denies history of loud noise exposure and denies early onset of genetic family history of hearing loss. Pt reported history of ear surgery on the left side that has left her with no hearing on that side. Otoscopy revealed no cerumen in both ears. The tympanic membrane was visualized AU prior to proceeding with the hearing testing.     Results reveal hearing within normal limits for the right ear and  profound sensorineural hearing loss for the left ear.    Speech Reception Thresholds were  15 dBHL for the right ear and No Response for the left ear.    Word recognition scores were excellent for the right ear and  No Response  for the left ear.   Tympanograms were Type A for the right ear and Type A for the left ear.    Recommendations: 1) Follow up with ENT     2) Annual hearing evaluation    Audiogram results were reviewed in detail with patient and all questions were answered. Results will be reviewed by the referring provider at the completion of this note. All complaints were addressed during this visit to the patient's satisfaction.

## 2024-12-27 ENCOUNTER — TELEPHONE (OUTPATIENT)
Facility: CLINIC | Age: 59
End: 2024-12-27
Payer: COMMERCIAL

## 2024-12-27 DIAGNOSIS — C50.411 MALIGNANT NEOPLASM OF UPPER-OUTER QUADRANT OF RIGHT BREAST IN FEMALE, ESTROGEN RECEPTOR POSITIVE: Primary | ICD-10-CM

## 2024-12-27 DIAGNOSIS — Z17.0 MALIGNANT NEOPLASM OF UPPER-OUTER QUADRANT OF RIGHT BREAST IN FEMALE, ESTROGEN RECEPTOR POSITIVE: Primary | ICD-10-CM

## 2024-12-31 ENCOUNTER — TELEPHONE (OUTPATIENT)
Facility: CLINIC | Age: 59
End: 2024-12-31
Payer: COMMERCIAL

## 2025-01-09 ENCOUNTER — OFFICE VISIT (OUTPATIENT)
Dept: OPHTHALMOLOGY | Facility: CLINIC | Age: 60
End: 2025-01-09
Payer: COMMERCIAL

## 2025-01-09 DIAGNOSIS — H04.123 DRY EYE SYNDROME OF BOTH EYES: Primary | ICD-10-CM

## 2025-01-09 DIAGNOSIS — H25.13 NUCLEAR SCLEROTIC CATARACT, BILATERAL: ICD-10-CM

## 2025-01-09 DIAGNOSIS — H43.393 VITREOUS FLOATERS OF BOTH EYES: ICD-10-CM

## 2025-01-09 PROCEDURE — 99999 PR PBB SHADOW E&M-EST. PATIENT-LVL III: CPT | Mod: PBBFAC,,, | Performed by: OPHTHALMOLOGY

## 2025-01-09 PROCEDURE — 92015 DETERMINE REFRACTIVE STATE: CPT | Mod: S$GLB,,, | Performed by: OPHTHALMOLOGY

## 2025-01-09 PROCEDURE — 1159F MED LIST DOCD IN RCRD: CPT | Mod: CPTII,S$GLB,, | Performed by: OPHTHALMOLOGY

## 2025-01-09 PROCEDURE — 92004 COMPRE OPH EXAM NEW PT 1/>: CPT | Mod: S$GLB,,, | Performed by: OPHTHALMOLOGY

## 2025-01-09 NOTE — PATIENT INSTRUCTIONS
"What is dry eye?   -- Dry eye happens when your eyes either do not make enough tears or the tears that they make evaporate (go away) too quickly. This causes your eyes to feel dry and irritated. The medical term for dry eye is "keratoconjunctivitis sicca."    What causes dry eye?  -- Many people have dry eye, including about one-third of older adults. A few people with dry eye might have another disease, such as Sjögren's syndrome, diabetes, or Parkinson disease. Some medicines can cause dry eye symptoms or make them worse.    What are the symptoms of dry eye?   -- People have different symptoms but the most common ones are eyes that:  ?Feel dry or burn  ?Look red  Other symptoms can include:  ?Being bothered by light  ?Feeling like something is in your eyes  ?Blurry vision  ?Watery eyes  ?Discomfort wearing contact lenses  In some people the symptoms are worse when it is cold or windy or if they are in a dry environment.    How is dry eye treated? -- Artificial tears or lubricant eye drops are the main treatment for dry eye. They can keep the eye moist and help with the symptoms of dry eye. You can buy artificial tears at the drug store or grocery store without a prescription. They come in liquid, gel, or ointments. Your doctor or nurse will help you decide which form is best for you. It is usually best to avoid eye drops that are meant to reduce redness.    Artificial tears help with the symptoms of dry eye, but they do not cure the condition. They work only as long as you keep using them.  Other things you can do to help improve your symptoms are:  ?Try to blink a lot, especially when you are reading or using the computer. This helps keep your eye moist.  ?Avoid excess air conditioning or heating as much as you can. Also avoid sitting directly in the flow of the cold or hot air.  ?Use a humidifier in your bedroom and any other space where you spend a lot of time.  ?Avoid smoke and smoky air  ?Wear protective " eyewear when you are outside. Glasses that cover more of your face, or have special shields on the sides, can protect your eyes from wind and dry air.    If your dry eye does not get better, your doctor can do more tests and might suggest other treatments. These include prescription eye drops or ointments, special glasses or goggles, oral medicines (pills), or minor surgical procedures.    What are floaters?     Floaters look like small specks, dots, circles, lines or cobwebs in your field of vision. While they seem to be in front of your eye, they are floating inside. Floaters are tiny clumps of gel or cells inside the vitreous that fills your eye. What you see are the shadows these clumps cast on your retina.    You usually notice floaters when looking  at something plain, like a blank wall or a blue leanne.    As we age, our vitreous starts to thicken or shrink. Sometimes clumps or strands form in the vitreous. If the vitreous pulls away from the back of the eye, it is called posterior vitreous detachment. Floaters usually happen with posterior vitreous detachment. They are not serious, and they tend to fade or go away over time. Severe floaters can be removed by surgery, but this has risks and is seldom necessary.    You are more likely to get floaters if you:    are nearsighted (you need glasses to see far away)    have had surgery for cataracts    have had inflammation (swelling) inside the eye      What are flashes?     Flashes can look like flashing lights or lightning streaks in your field of vision. Some people compare them to seeing stars after being hit on the head. You might see flashes on and off for weeks, or even months. Flashes happen when the vitreous rubs or pulls on your retina.    As people age, it is common to see flashes occasionally.       When floaters and flashes are serious     Most floaters and flashes are not a problem. However, there are times when they can be signs of a serious  condition. Here is when you should call an ophthalmologist right away:    you notice a lot of new floaters    you have a lot of flashes    a shadow appears in your peripheral (side) vision    a gray curtain covers part of  your vision    These floaters and flashes could be symptoms of a torn or detached retina. This is when the retina pulls away from the back of your eye. This is a serious condition that needs to be treated.    Summary    Floaters are dark specks or dots in your field of vision. They are shadows you see from clumps of vitreous gel in your eye. Flashes are flashes of light that look like lightning streaks in your field of vision. Flashes occur when the vitreous gel rubs or pulls on your retina.    Floaters and flashes are quite common as people age. However, they can be signs of a retinal detachment, which is a serious problem. If you suddenly have a lot of floaters and see flashes, and you notice changes in your vision, call your ophthalmologist right away.

## 2025-01-09 NOTE — PROGRESS NOTES
HPI    New patient present for routine exam    Pt states having decrease vision with current specs, satates she feels   they were not made correctly. Also feels like vision is shaky at times. Pt   states she thinks she sees FOL at times but unsure which eye.    Denies pain/floaters    AT's PRN    Last edited by Viola Andrew on 1/9/2025  1:14 PM.            Assessment /Plan     For exam results, see Encounter Report.    Dry eye syndrome of both eyes  -     Ambulatory referral/consult to Ophthalmology    Nuclear sclerotic cataract, bilateral    Vitreous floaters of both eyes      1. Dry eye syndrome of both eyes (Primary)  Not well treated  Previously tried punctal plugs with limited benefit  Pt declines restasis at this time  Recommend NPATs up to QID OU  Gel qhs    2. Nuclear sclerotic cataract, bilateral  Mild  New glasses Rx today    3. Vitreous floaters of both eyes  RD precautions reviewed    F/u 1 year, optometry

## 2025-02-04 NOTE — PROGRESS NOTES
[facility] Hematology/Oncology  PROGRESS NOTE - Follow-up Visit      Subjective:       Patient ID:   NAME: Blanca Loya : 1965     59 y.o. female    Referring Doc: Cora  Other Physicians: Lyndsay Gonsales, CARLY Barillas; Mary Gonsales    Chief Complaint:  Breast ca f/u    History of Present Illness:     Patient is being seen today in person for a regularly scheduled follow-up appointment. She is here by herself.    She saw Dr Tong with ENT on 10/2/2024    She saw Dr Shepherd in 2024     No CP, SOB, HA's or N/V.  Chronic aches and pains    She previously had reconstruction surgery with Dr Winslow on 2023; she saw them again in May 2024 and is doing much better with some residual post-op aches in the legs    She is finished with Dr Winslow as of Dec 2024 - implants are out                 ROS:   GEN: normal without any fever, night sweats or weight loss; anxiety issues generalized aches and pains;    HEENT: normal with no HA's, sore throat, stiff neck, changes in vision  CV: normal with no CP, SOB, PND, RUGGIERO or orthopnea  PULM: normal with no SOB, cough, hemoptysis, sputum or pleuritic pain  GI: normal with no abdominal pain, nausea, vomiting, constipation, diarrhea, melanotic stools, BRBPR, or hematemesis;  : normal with no hematuria, dysuria  BREAST: s/p reconstruction with flaps; implants out  SKIN: normal with no rash, erythema, bruising, or swelling    Allergies:  Review of patient's allergies indicates:   Allergen Reactions    Demerol [meperidine] Anxiety    Pcn [penicillins] Rash       Medications:    Current Outpatient Medications:     ALPRAZolam (XANAX) 0.5 MG tablet, TK 1 T PO QD PRN, Disp: 30 tablet, Rfl: 2    ascorbic acid, vitamin C, (VITAMIN C) 1000 MG tablet, Take 1,000 mg by mouth once daily., Disp: , Rfl:     b complex vitamins tablet, Take 1 tablet by mouth once daily., Disp: , Rfl:     budesonide (ENTOCORT EC) 3 mg capsule, Take 9 mg by mouth as needed., Disp: , Rfl:  "    butalbital-acetaminophen-caffeine -40 mg (FIORICET, ESGIC) -40 mg per tablet, Take 1 tablet by mouth every 6 (six) hours as needed for Pain or Headaches., Disp: 28 tablet, Rfl: 2    CHROMIUM ORAL, Take by mouth., Disp: , Rfl:     levOCARNitine (L-CARNITINE) 500 mg Cap, Take 1 tablet by mouth once daily., Disp: , Rfl:     levothyroxine (SYNTHROID) 112 MCG tablet, Take 1 tablet (112 mcg total) by mouth before breakfast., Disp: 90 tablet, Rfl: 3    ondansetron (ZOFRAN-ODT) 4 MG TbDL, Take 4 mg by mouth every 8 (eight) hours as needed., Disp: , Rfl:     POTASSIUM ACETATE MISC, 99 Doses by Misc.(Non-Drug; Combo Route) route 2 (two) times a day., Disp: , Rfl:     ciprofloxacin HCl (CIPRO) 500 MG tablet, Take 500 mg by mouth 2 (two) times daily. (Patient not taking: Reported on 2/5/2025), Disp: , Rfl:     ondansetron (ZOFRAN) 4 MG tablet, Take 4 mg by mouth every 8 (eight) hours as needed. (Patient not taking: Reported on 2/5/2025), Disp: , Rfl:     PMHx/PSHx Updates:  See patient's last visit with me on 7/3/2024  See H&P on 9/8/2016        Pathology:    Colon biopsy  9/22/2020:    COLON, RANDOM, BIOPSY:   - COLLAGENOUS COLITIS.       See path note on 8/30/2016      Objective:     Vitals:  Blood pressure 102/61, pulse 70, temperature 97.1 °F (36.2 °C), temperature source Temporal, resp. rate 18, height 5' 4" (1.626 m), weight 58.2 kg (128 lb 4.8 oz), last menstrual period 09/10/2016, SpO2 98%.        Physical Examination:   GEN: no apparent distress, comfortable; AAOx3  HEAD: atraumatic and normocephalic  EYES: no conjunctival pallor or muddiness, no icterus; normal pupil reaction to ambient light  ENT: OMM, no pharyngeal erythema, external bilateral ears WNL; no visible thrush or ulcers  NECK: no masses or swelling, trachea midline, no visible LAD/LN's   CV: no palpitations; no pedal edema; no noticeable JVD or neck vein distension  CHEST: Normal respiratory effort; chest wall breath movements symmetrical; " no audible wheezing  ABDOM: non-distended; no bloating  MUSC/Skeletal: ROM normal; joints visibly normal; no deformities or arthropathy  EXTREM: no clubbing, cyanosis, inflammation or swelling  SKIN: no rashes, lesions, ulcers, petechiae or subcutaneous nodules  : no tyson  NEURO: moving all 4 extremities; AAOx3; no tremors  PSYCH: normal mood, affect and behavior  LYMPH: no visible LN's or LAD  Breast: s/p reconstruction with flaps; s/p thigh engraftment; implants out           Labs:     Lab Results   Component Value Date    WBC 3.7 02/03/2025    HGB 12.9 02/03/2025    HCT 39.2 02/03/2025    MCV 98 (H) 02/03/2025     02/03/2025       CMP  Sodium   Date Value Ref Range Status   02/03/2025 142 134 - 144 mmol/L Final   04/23/2019 143 134 - 144 mmol/L      Potassium   Date Value Ref Range Status   02/03/2025 3.8 3.5 - 5.2 mmol/L Final     Chloride   Date Value Ref Range Status   02/03/2025 102 96 - 106 mmol/L Final   04/23/2019 103 98 - 110 mmol/L      CO2   Date Value Ref Range Status   02/03/2025 22 20 - 29 mmol/L Final     Glucose   Date Value Ref Range Status   02/03/2025 87 70 - 99 mg/dL Final   04/23/2019 88 70 - 99 mg/dL      BUN   Date Value Ref Range Status   02/03/2025 15 6 - 24 mg/dL Final     Creatinine   Date Value Ref Range Status   02/03/2025 0.76 0.57 - 1.00 mg/dL Final   04/23/2019 0.60 0.60 - 1.40 mg/dL      Calcium   Date Value Ref Range Status   02/03/2025 9.9 8.7 - 10.2 mg/dL Final     Total Protein   Date Value Ref Range Status   01/13/2022 7.7 6.0 - 8.4 g/dL Final     Albumin   Date Value Ref Range Status   02/03/2025 4.6 3.8 - 4.9 g/dL Final   01/13/2022 4.5 3.5 - 5.2 g/dL Final   04/23/2019 4.0 3.1 - 4.7 g/dL      Total Bilirubin   Date Value Ref Range Status   02/03/2025 0.8 0.0 - 1.2 mg/dL Final     Alkaline Phosphatase   Date Value Ref Range Status   01/13/2022 72 55 - 135 U/L Final     AST   Date Value Ref Range Status   02/03/2025 35 0 - 40 IU/L Final     ALT   Date Value Ref  Range Status   02/03/2025 26 0 - 32 IU/L Final     Anion Gap   Date Value Ref Range Status   11/13/2023 6 (L) 8 - 16 mmol/L Final     eGFR if    Date Value Ref Range Status   01/13/2022 >60.0 >60 mL/min/1.73 m^2 Final     eGFR if non    Date Value Ref Range Status   01/13/2022 >60.0 >60 mL/min/1.73 m^2 Final     Comment:     Calculation used to obtain the estimated glomerular filtration  rate (eGFR) is the CKD-EPI equation.                Radiology/Diagnostic Studies:    CT Chest/abdom/pelvis  2/14/2024:    IMPRESSION:     No CT evidence of metastatic disease involving the chest, abdomen, or pelvis in this patient with history of breast cancer status post bilateral mastectomies.    Bone scan  2/14/2024:    FINDINGS:  Anterior and posterior whole body planar images show no abnormal osteoblastic activity or photopenic defect of concern for osseous metastasis.     Moderate activity posteriorly about the superior cervical spine, to the left of midline, correlates with moderate to severe left C3-C4 facet joint osteoarthrosis. Mild to moderate activity at the lumbosacral junction correlates with disc degeneration at L5-S1.     Soft tissue uptake is physiologic.     IMPRESSION:  No evidence of osseous metastasis.      PET 2/10/2023:    IMPRESSION:     Status post bilateral mastectomies.  No evidence of FDG avid residual, recurrent, or thoracic metastatic disease.     Nonspecific focus of increased hepatic FDG activity near the gallbladder fossa, new compared to 2020. This could represent misregistration artifact from bowel activity, gallbladder inflammation, with metastatic hepatic lesion felt less likely - however not entirely excluded. Consider dedicated hepatic protocol MRI imaging for further evaluation.           CXR  1/6/2022:    IMPRESSION:     No acute cardiopulmonary abnormality.         PET 9/24/2020:    Impression:     1. No evidence of recurrence of disease or metastatic  disease.  2. Several colon air-fluid levels, suggesting nonspecific diarrheal illness.         US  11/7/2019:  Impression       Normal right upper quadrant ultrasound.         MRI head  2/17/2020:    Impression:     Stable appearance of previously defined enhancing extra-axial mass which extends from the left internal auditory canal into the left cerebellopontine angle cistern and along the adjacent temporal bone.      PET/CT  5/13/2019:    IMPRESSION:    1. No evidence of FDG avid metastatic disease.  2. Incidental findings as above.          Chest/Abdom CT  8/24/2018:      IMPRESSION:  1. No evidence of metastatic disease in the chest, abdomen, or the pelvis.  2. Additional observations as above           I have reviewed all available lab results and radiology reports.    Assessment/Plan:   (1) 59 y.o. female with diagnosis of right breast cancer  - s/p bilateral mastectomies on 9/26/16 followed by reconstruction  - followed by Dr Bear with [facility]  - she had 2 out of 7 LN's that were positive  - she was a Stage IIA  - ER+/MO+ and her2nu neg  - s/p AC x4 and taxol x4  - she has seen Dr Gonsales with rad/onc for XRT and completed 5 weeks last month  - discussed the pro's and con's of tamoxifen versus arimidex, and in light of the pap issues, arimidex is probably the better choice  - she had been on arimidex but it was discontinued because of her liver  - she has some mood swings and weight gain  - PET done on 9/21/17 with no evidence of mets;   - recent CT chest/abdom on 8/24/2018 showed no evidence on cancer  - s/p prior repeat surgery with right-sided lift with Dr Umana with repeat evaluation in Nov 2018   - she had a slow post-op recovery and has residual aches and pains  - she had PET on 5/13/2019  - she has been on arimidex and now femara which she both discontinued; she is having some generalized aches and pains;   - discussed aromasine and she is willing to give it a try; she does not want to take  "tamoxifen due to the clot and ovarian cancer risks   - she had been on the aromasin for 5 weeks but then developed elevated LFTs and it was discontinued    10/5/2020:  - latest PET on 9/24/2020 with no evidence of cancer  - s/p bilat mastectomies with reconstructions so she does not get mammograms    2/4/2021:  - she has been off all oral antihormone  - She had prior colonoscopy with Dr Edmonds and was diagnsoed with "collagenous" colitis.     6/14/2021:  - she seems to be doing ok overall    1/13/2022:  - no new issues  - recent CXR negative    7/13/2022:  -  She has some anxiety issues and jitteriness.  - she has some gum issues.   - She has been off all oral antihormones.    - She has been on steroids per Dr Edmonds and plans to see him again this summer some time  - She saw Dr Shepherd in June 2022 1/19/2023:  - no appreciable mass or LAD  - will get PEt as precaution    5/24/2023:  - she had PET scan back in Feb 2023  - she is considering having elective breast surgery - will refer to Dr Winslow for evaluation    9/27/2023:  - seeing Dr Winslow in Nov 2023 about reconstruction surgery  - repeat PET in Feb 2024 if insurance permits    2/28/2024:  - She is now feeling much better and almost back to her normal self  - She had reconstruction surgery with Dr Winslow on 11/28/2023; she had some incision healing issues on legs and required second surgery with restitching on thighs on 12/29/2023; doing better now; she sees them again on march 11th 2024  - Recent bump in LFt's and she saw Dr Edmonds again this past Friday; Dr Shepherd is aware  - recent CT scans and bone scan earlier this month on chart    7/3/2024:  - she saw Dr Shepherd recently  - labs are adequate  - she has a lot of family stress and stress at work    2/5/2025:  - she is feeling better overall  - labs are adequate  - f/u in 12 months should be ok         (2) Mild leucopenia/anemia secondary to chemotherapy (resolved)    6/14/2021:  - latest wbc is " "at  4.14 - latest hgb was at 12.4    1/13/2021:  - repeat labs pending  - labs from oct 2021 with wbc at 3.0    7/14/2022:  - latest CBC adequate    1/19/2023:  - latest WBC at 3.1    5/24/2023:  - latest wbc at 3.2 and relatively stable    9/27/2023:  - CBC is WNL currently    7/3/2024:  - latest CBC/plat WNL    2/5/2025:  - latest CBC is WNL     (3) Prior abnormal PAP issues - followed by Dr Cline with GYN; latest pap was negative per patient and she was also HPV negative     (4) Hx of acoustic neuroma in past - s/p XRT in 2010; followed by Dr Gonsales  - recent MRI stable per patient in Feb 2020     (5) Chronic fibromyalgia - she is not being followed by anyone; she may be having a flare-up      (6) Collagenous colitis -    - GI issues with prior bout of colitis - seen by Dr Edmonds with GI    - prior mild elevation bili and alk phos - resolved  - she had liver biopsy and EGD with "inflammation" per patient  - s/p esophageal dilation       2/4/2021:  - She had prior colonoscopy in Nov 2020 with Dr Edmonds and was diagnsoed with "collagenous" colitis.     6/14/2021:  - bowels are stable    7/14/2022:  - patient to f/u with Dr Edmonds this summer    2/28/2024:  - she saw Dr Edmonds this past Friday  - getting repeat LFT labs in 3 weeks    7/3/2024:  - latest LFt's with AST/ALT wnl    2/5/2025:  - latest LFt's WNL         (7) RUE - resolved - possible lymphedema issues - she no longer sees PT  - also seen by Dr Liao    (8) Hypokalemia -on supplements - patient wanted to try OTC and did not want prescription meds    (9) Occasional palpitations - seen by Dr Hernandez with cardiology    (10) Thyroid issues - followed by PCP        1. Malignant neoplasm of upper-outer quadrant of right breast in female, estrogen receptor positive        2. Estrogen receptor positive        3. S/P bilateral mastectomy                PLAN:  1. Check up to date labs as directed by her PCP  2. F/U with PCP, Surg and GYN  3. Hold " on any antihormone therapy indefinitely for now  4.  F/u with Dr Edmonds with GI as directed  5.  F/u with cardiology    6. F/u with PCP          RTC in 12 months    Fax note to Chaparro Shepherd III, *, CARLY Bear Mannina, Lyndsay; Grey; Bianca Winslow     Total Time spent on patient:    I spent over 25 mins of time with the patient. Reviewed results of the recently ordered labs, tests, reports and studies; made directives with regards to the results. Over half of this time was spent couseling and coordinating care, making treatment and analytical decisions; ordering necessary labs, tests and studies; and discussing treatment options and setting up treatment plan(s) if indicated.        Discussion:     COVID-19 Discussion:    I had long discussion with patient and any applicable family about the COVID-19 coronavirus epidemic and the recommended precautions with regard to cancer and/or hematology patients. I have re-iterated the CDC recommendations for adequate hand washing, use of hand -like products, and coughing into elbow, etc. In addition, especially for our patients who are on chemotherapy and/or our otherwise immunocompromised patients, I have recommended avoidance of crowds, including movie theaters, restaurants, churches, etc. I have recommended avoidance of any sick or symptomatic family members and/or friends. I have also recommended avoidance of any raw and unwashed food products, and general avoidance of food items that have not been prepared by themselves. The patient has been asked to call us immediately with any symptom developments, issues, questions or other general concerns.       I have explained all of the above in detail and the patient understands all of the current recommendation(s). I have answered all of their questions to the best of my ability and to their complete satisfaction.   The patient is to continue with the current management  plan.            Electronically signed by Bronson Carrillo MD

## 2025-02-05 ENCOUNTER — OFFICE VISIT (OUTPATIENT)
Facility: CLINIC | Age: 60
End: 2025-02-05
Payer: COMMERCIAL

## 2025-02-05 VITALS
OXYGEN SATURATION: 98 % | RESPIRATION RATE: 18 BRPM | WEIGHT: 128.31 LBS | HEART RATE: 70 BPM | TEMPERATURE: 97 F | BODY MASS INDEX: 21.91 KG/M2 | SYSTOLIC BLOOD PRESSURE: 102 MMHG | DIASTOLIC BLOOD PRESSURE: 61 MMHG | HEIGHT: 64 IN

## 2025-02-05 DIAGNOSIS — Z17.0 ESTROGEN RECEPTOR POSITIVE: ICD-10-CM

## 2025-02-05 DIAGNOSIS — C50.411 MALIGNANT NEOPLASM OF UPPER-OUTER QUADRANT OF RIGHT BREAST IN FEMALE, ESTROGEN RECEPTOR POSITIVE: Primary | ICD-10-CM

## 2025-02-05 DIAGNOSIS — Z90.13 S/P BILATERAL MASTECTOMY: ICD-10-CM

## 2025-02-05 DIAGNOSIS — Z17.0 MALIGNANT NEOPLASM OF UPPER-OUTER QUADRANT OF RIGHT BREAST IN FEMALE, ESTROGEN RECEPTOR POSITIVE: Primary | ICD-10-CM

## 2025-02-05 PROCEDURE — 3078F DIAST BP <80 MM HG: CPT | Mod: CPTII,S$GLB,, | Performed by: INTERNAL MEDICINE

## 2025-02-05 PROCEDURE — 99999 PR PBB SHADOW E&M-EST. PATIENT-LVL IV: CPT | Mod: PBBFAC,,, | Performed by: INTERNAL MEDICINE

## 2025-02-05 PROCEDURE — 1160F RVW MEDS BY RX/DR IN RCRD: CPT | Mod: CPTII,S$GLB,, | Performed by: INTERNAL MEDICINE

## 2025-02-05 PROCEDURE — 99214 OFFICE O/P EST MOD 30 MIN: CPT | Mod: S$GLB,,, | Performed by: INTERNAL MEDICINE

## 2025-02-05 PROCEDURE — 1159F MED LIST DOCD IN RCRD: CPT | Mod: CPTII,S$GLB,, | Performed by: INTERNAL MEDICINE

## 2025-02-05 PROCEDURE — 3008F BODY MASS INDEX DOCD: CPT | Mod: CPTII,S$GLB,, | Performed by: INTERNAL MEDICINE

## 2025-02-05 PROCEDURE — 3074F SYST BP LT 130 MM HG: CPT | Mod: CPTII,S$GLB,, | Performed by: INTERNAL MEDICINE

## 2025-02-05 PROCEDURE — G2211 COMPLEX E/M VISIT ADD ON: HCPCS | Mod: S$GLB,,, | Performed by: INTERNAL MEDICINE

## 2025-03-07 ENCOUNTER — OFFICE VISIT (OUTPATIENT)
Dept: FAMILY MEDICINE | Facility: CLINIC | Age: 60
End: 2025-03-07
Payer: COMMERCIAL

## 2025-03-07 VITALS
SYSTOLIC BLOOD PRESSURE: 102 MMHG | WEIGHT: 124.69 LBS | BODY MASS INDEX: 21.29 KG/M2 | TEMPERATURE: 98 F | OXYGEN SATURATION: 98 % | HEART RATE: 71 BPM | HEIGHT: 64 IN | DIASTOLIC BLOOD PRESSURE: 70 MMHG

## 2025-03-07 DIAGNOSIS — E03.9 HYPOTHYROIDISM, UNSPECIFIED TYPE: ICD-10-CM

## 2025-03-07 DIAGNOSIS — D75.89 MACROCYTOSIS: ICD-10-CM

## 2025-03-07 DIAGNOSIS — C50.919 MALIGNANT NEOPLASM OF FEMALE BREAST, UNSPECIFIED ESTROGEN RECEPTOR STATUS, UNSPECIFIED LATERALITY, UNSPECIFIED SITE OF BREAST: ICD-10-CM

## 2025-03-07 DIAGNOSIS — D33.3 LEFT ACOUSTIC NEUROMA: ICD-10-CM

## 2025-03-07 DIAGNOSIS — F41.9 ANXIETY: ICD-10-CM

## 2025-03-07 DIAGNOSIS — M81.0 OSTEOPOROSIS, UNSPECIFIED OSTEOPOROSIS TYPE, UNSPECIFIED PATHOLOGICAL FRACTURE PRESENCE: ICD-10-CM

## 2025-03-07 DIAGNOSIS — K52.831 COLLAGENOUS COLITIS: ICD-10-CM

## 2025-03-07 PROCEDURE — 3008F BODY MASS INDEX DOCD: CPT | Mod: CPTII,S$GLB,, | Performed by: FAMILY MEDICINE

## 2025-03-07 PROCEDURE — 1159F MED LIST DOCD IN RCRD: CPT | Mod: CPTII,S$GLB,, | Performed by: FAMILY MEDICINE

## 2025-03-07 PROCEDURE — 1160F RVW MEDS BY RX/DR IN RCRD: CPT | Mod: CPTII,S$GLB,, | Performed by: FAMILY MEDICINE

## 2025-03-07 PROCEDURE — 99999 PR PBB SHADOW E&M-EST. PATIENT-LVL V: CPT | Mod: PBBFAC,,, | Performed by: FAMILY MEDICINE

## 2025-03-07 PROCEDURE — 3074F SYST BP LT 130 MM HG: CPT | Mod: CPTII,S$GLB,, | Performed by: FAMILY MEDICINE

## 2025-03-07 PROCEDURE — 3078F DIAST BP <80 MM HG: CPT | Mod: CPTII,S$GLB,, | Performed by: FAMILY MEDICINE

## 2025-03-07 PROCEDURE — 99214 OFFICE O/P EST MOD 30 MIN: CPT | Mod: S$GLB,,, | Performed by: FAMILY MEDICINE

## 2025-03-07 RX ORDER — ALPRAZOLAM 0.5 MG/1
TABLET ORAL
Qty: 30 TABLET | Refills: 2 | Status: SHIPPED | OUTPATIENT
Start: 2025-03-07

## 2025-03-07 NOTE — PATIENT INSTRUCTIONS
Over The counter  -Calcium 600 mg every 12 hours  -Vit d 2,000 IU daily     Xanax- All medications will be sent to pharm after 5:30 pm today     Blood test due now/soon - do not have to fast

## 2025-03-07 NOTE — PROGRESS NOTES
SCRIBE #1 NOTE: I, Tutu Mayfield, am scribing for, and in the presence of, Chaparro Shepherd III, MD. I have scribed the entire note.     Subjective:       Patient ID: Blanca Loya is a 59 y.o. female.    Chief Complaint: Follow-up (6 month)    Ms. Blanca Loya is here for a 6-month follow up after her last appointment on 9/6/2024. S/P bilateral mastectomy. Hx breast cancer for 8-9 years. The patient states that she has been walking more and does stretches at night. BMI of 21.40. Cardiovascular good. No chest pain. No palpitations. Blood pressure is 102/70. Hypertension controlled. Hyperlipidemia. The patient saw Dr. Carrillo last month and is due for a follow up in 12 months. Hypothyroidism. Collagenous colitis. Colonoscopy is current as of 9/20, done with Dr. Edmonds. Anxiety. Xanax refill needed. Left acoustic neuroma. Osteoporosis. Last bone density was -2.5. Hypothyroidism. Macrocytosis. MCV 98.    Review of Systems   Constitutional:  Negative for chills and fever.   HENT:  Negative for congestion and sore throat.    Eyes:  Negative for visual disturbance.   Respiratory:  Negative for chest tightness and shortness of breath.    Cardiovascular:  Negative for chest pain.   Gastrointestinal:  Negative for nausea.   Endocrine: Negative for polydipsia and polyuria.   Genitourinary:  Negative for dysuria and flank pain.   Musculoskeletal:  Negative for back pain, neck pain and neck stiffness.   Skin:  Negative for rash.   Neurological:  Negative for weakness.   Hematological:  Does not bruise/bleed easily.   Psychiatric/Behavioral:  Negative for behavioral problems.    All other systems reviewed and are negative.      Objective:      Physical examination: Vital signs noted. No acute distress. No carotid bruit. Regular heart rate and rhythm. Lungs clear to auscultation bilaterally. Abdomen bowel sounds positive soft and nontender. Extremities without edema. 2+ pedal pulses.      Assessment:       1. BMI 21.0-21.9,  adult    2. Hypothyroidism, unspecified type    3. Anxiety    4. Macrocytosis    5. Osteoporosis, unspecified osteoporosis type, unspecified pathological fracture presence    6. Malignant neoplasm of female breast, unspecified estrogen receptor status, unspecified laterality, unspecified site of breast    7. Left acoustic neuroma    8. Collagenous colitis        Plan:       BMI 21.0-21.9, adult    Hypothyroidism, unspecified type  -     TSH; Future; Expected date: 03/07/2025    Anxiety  -     ALPRAZolam (XANAX) 0.5 MG tablet; TK 1 T PO QD PRN  Dispense: 30 tablet; Refill: 2    Macrocytosis  -     Vitamin B12; Future; Expected date: 03/07/2025  -     Folate; Future; Expected date: 03/07/2025    Osteoporosis, unspecified osteoporosis type, unspecified pathological fracture presence    Malignant neoplasm of female breast, unspecified estrogen receptor status, unspecified laterality, unspecified site of breast    Left acoustic neuroma    Collagenous colitis    Do TSH.  Check B12 and folic acid due to the microcytosis.  Calcium 600 b.i.d. vitamin-D 2000 per day.  Six-month follow-up.  Refill the Xanax 0.5 30 with 2 refills.  All care gaps addressed or discussed.     I, Chaparro Shepherd III, MD, personally performed the services described in this documentation. All medical record entries made by the scribe were at my direction and in my presence. I have reviewed the chart and agree that the record reflects my personal performance and is accurate and complete.

## 2025-03-10 NOTE — PROGRESS NOTES
Subjective:       Patient ID: Blanca Loya is a 59 y.o. female.    Chief Complaint: Follow-up (6 month)    History left acoustic neuroma.  Doing well from that surgery.  Breast cancer saw Dr. Woody SHEPPARD last month.  Check again in 12 months.  Has been 8 or 9 year since her cancer.  Had implants removed.  Osteoporosis -2.5.  Declines any treatment.  Has hypothyroidism.  BMI of 21.  Walking some for exercise.  Macrocytosis MCV 98.  Has collagenous colitis diagnosed in September of 2020.  Some ongoing diarrhea.  Colonoscopy in his most likely past due.  Anxiety.  Needs Xanax refill.      Physical examination.  Vital signs noted.  Neck without bruit.  Chest clear.  Heart regular rate and rhythm without murmur gallop.  Abdomen bowel sounds are positive soft nontender.  Extremities without edema positive pedal pulses.        Objective:        Assessment:       1. BMI 21.0-21.9, adult    2. Hypothyroidism, unspecified type    3. Anxiety    4. Macrocytosis    5. Osteoporosis, unspecified osteoporosis type, unspecified pathological fracture presence    6. Malignant neoplasm of female breast, unspecified estrogen receptor status, unspecified laterality, unspecified site of breast    7. Left acoustic neuroma    8. Collagenous colitis        Plan:       BMI 21.0-21.9, adult    Hypothyroidism, unspecified type  -     TSH; Future; Expected date: 03/07/2025    Anxiety  -     ALPRAZolam (XANAX) 0.5 MG tablet; TK 1 T PO QD PRN  Dispense: 30 tablet; Refill: 2    Macrocytosis  -     Vitamin B12; Future; Expected date: 03/07/2025  -     Folate; Future; Expected date: 03/07/2025    Osteoporosis, unspecified osteoporosis type, unspecified pathological fracture presence    Malignant neoplasm of female breast, unspecified estrogen receptor status, unspecified laterality, unspecified site of breast    Left acoustic neuroma    Collagenous colitis    Do TSH.  Check B12 and folic acid due to the microcytosis.  Use calcium 600 b.i.d. and  vitamin-D 2000 per day.  Six-month follow-up.  Refill Xanax 0.5 30 with 2 refills.  Follow-up with Hematology Oncology as scheduled.  Gastroenterology follow-up.

## 2025-04-03 ENCOUNTER — RESULTS FOLLOW-UP (OUTPATIENT)
Dept: FAMILY MEDICINE | Facility: CLINIC | Age: 60
End: 2025-04-03
Payer: COMMERCIAL

## (undated) DEVICE — ADHESIVE DERMABOND SKIN DNX12

## (undated) DEVICE — POUCH INSTRUMENT 1018

## (undated) DEVICE — SUT 2/0 36IN COATED VICRYL

## (undated) DEVICE — SYRINGE HYPODERMC LL 22G 1.5 ECLIPSE 30

## (undated) DEVICE — PACK UNIVERSAL 88761

## (undated) DEVICE — CLIP APPLIER MEDIUM MSM20

## (undated) DEVICE — MARKER SKIN W/ RULER 77734

## (undated) DEVICE — SUTURE ETHILON 9-0 BV100-4 2829G

## (undated) DEVICE — SYR 50CC LL

## (undated) DEVICE — ELECTRODE REM PLYHSV RETURN 9

## (undated) DEVICE — Device

## (undated) DEVICE — SUTURE MONOCRYL 4-0 PS-2 Y496G

## (undated) DEVICE — SEE MEDLINE ITEM 157128

## (undated) DEVICE — SPONGE DERMACEA GAUZE 4X4

## (undated) DEVICE — CLAMP VEIN MEDIUM DOUBLE

## (undated) DEVICE — BULB DRAIN 100CC 70740

## (undated) DEVICE — SKINMARKER & RULER REGULAR X-F

## (undated) DEVICE — ELECTRODE BLADE INSULATED 1 IN

## (undated) DEVICE — DRAIN ROUND HUBLSS 15FFR 072229

## (undated) DEVICE — SUTURE MONOCRYL 4-0 RB-1 Y214H

## (undated) DEVICE — SEE MEDLINE ITEM 152512

## (undated) DEVICE — PAD ABD 8X10 STERILE

## (undated) DEVICE — TRAY FOLEY 16FR INFECTION CONT

## (undated) DEVICE — PAD ABD 5X9   7196D

## (undated) DEVICE — SYSTEM SKIN CLOSURE 60CM 2-OCTYL CYANOACRYLATE WITH MESH

## (undated) DEVICE — COVER XRAY 24.5X24IN

## (undated) DEVICE — BOVIE SUCTION

## (undated) DEVICE — SUTURE PDS #0 27 CT-1 PDP340H

## (undated) DEVICE — DRESSING XEROFORM FOIL PK 1X8

## (undated) DEVICE — GOWN SURGICAL X-LARGE

## (undated) DEVICE — SUT 2-0 VICRYL / CT-1

## (undated) DEVICE — SUTURE STRATFIX TISSUE 1 PDO CTX 36 X 36

## (undated) DEVICE — SUTURE VICRYL 3-0 SH 27 VCP416H

## (undated) DEVICE — SEE MEDLINE ITEM 146417

## (undated) DEVICE — PENCIL BOVIE E2515H

## (undated) DEVICE — TRAY MINOR GEN SURG

## (undated) DEVICE — SUTURE MONODERM 2/0 QUILL 3/8 CIRCLE

## (undated) DEVICE — UNDERGLOVE BIOGEL PI MICRO BLUE SZ 8

## (undated) DEVICE — NDL HYPO REG 25G X 1 1/2

## (undated) DEVICE — CLAMP VEIN MEDIUM SINGLE

## (undated) DEVICE — SUCTION YANKAUER 34970

## (undated) DEVICE — SYS PRINEO SKIN CLOSURE

## (undated) DEVICE — SPONGE LAP 18X18 PREWASHED

## (undated) DEVICE — GAUZE SPONGE 4X4 12PLY

## (undated) DEVICE — DRESSING GAUZE 6PLY 4X4

## (undated) DEVICE — SYRINGE 20ML 302830

## (undated) DEVICE — SUT MCRYL PLUS 4-0 PS2 27IN

## (undated) DEVICE — GOWN SURG. AERO CHROME XXL

## (undated) DEVICE — SPONGE LAP 18X18

## (undated) DEVICE — TIP BOVIE TEFLON E1450X

## (undated) DEVICE — GOWN AERO CHROME W/ TOWEL XL

## (undated) DEVICE — DRAPE 3/4

## (undated) DEVICE — PREP CHLORA 10.5ML ORANGE

## (undated) DEVICE — PAD BOVIE ADULT

## (undated) DEVICE — SUTURE MONOCRYL 3-0 SH 27 Y416H

## (undated) DEVICE — BANDAGE KERLIX   441106

## (undated) DEVICE — SYRINGE 10ML 302995

## (undated) DEVICE — MICROCLIPS

## (undated) DEVICE — RETRACTOR STAY PENILE 5MM (BLUE)

## (undated) DEVICE — SUT ETHILON 5-0 18IN PLAIN

## (undated) DEVICE — SEE MEDLINE ITEM 152622

## (undated) DEVICE — SPONGE XRAY DETECTABLE 4X4

## (undated) DEVICE — GLOVE BIOGEL PI GOLD SZ 6.5

## (undated) DEVICE — DRAPE WARMER ORS-100

## (undated) DEVICE — RULER SURGICAL MICRO-GRID 1 SQ MM

## (undated) DEVICE — SUT PROLENE 5-0 PS-2 18

## (undated) DEVICE — SUT CTD VICRYL 4-0 UND PS-1

## (undated) DEVICE — BAG DECANTER 10-102

## (undated) DEVICE — EVACUATOR WOUND BULB 100CC

## (undated) DEVICE — SUTURE PROLENE 2-0 SH 36 8523H

## (undated) DEVICE — SYR 30CC LUER LOCK

## (undated) DEVICE — SYS. SKIN CLOSURE DERMABOND PRINEO

## (undated) DEVICE — STAPLER SKIN ROTATING HEAD

## (undated) DEVICE — STAPLER SKIN NON ROTATE PXW35

## (undated) DEVICE — SUT PDS II 2-0 CT1

## (undated) DEVICE — SYRINGE 5ML 309646

## (undated) DEVICE — SPONGE EYE MICRO I-SPEAR

## (undated) DEVICE — CONTAINER SPECIMEN 4 OZ STERILE 1053

## (undated) DEVICE — GLOVE BIOGEL PI   GOLD SIZE 8

## (undated) DEVICE — TOWEL OR BLUE      MDT2168284

## (undated) DEVICE — BLADE SCALPEL #10 371110

## (undated) DEVICE — DRESSING POST OP MEPILEX AG  4X14

## (undated) DEVICE — SUT CTD VICRYL 3-0 PS-1

## (undated) DEVICE — PROBE DOPPLER EXTENSION CABLE

## (undated) DEVICE — TIP BOVIE ENT 2.75      E1455

## (undated) DEVICE — BLADE SURG CARBON STEEL #10

## (undated) DEVICE — DRESSING TEGADERM 2 3/8 X 2.75

## (undated) DEVICE — SYS CLSR DERMABOND PRINEO 22CM

## (undated) DEVICE — SOL 9P NACL IRR PIC IL

## (undated) DEVICE — SUTURE ETHILON 2-0 PS-2 18 593H

## (undated) DEVICE — PACK UNIVERSAL SPLIT II

## (undated) DEVICE — GAUZE VASELINE XEROFORM 5X9 8884433605

## (undated) DEVICE — CLIP APPLIER SM MCS20

## (undated) DEVICE — SOLUTION IRRI H2O BOTTLE 1000ML

## (undated) DEVICE — SUTURE PROLENE 4-0 PS-2 18 8682H

## (undated) DEVICE — SUT MONOCRYL 3-0 PS-2 UND

## (undated) DEVICE — PREP CHLORA 26ML

## (undated) DEVICE — MICROCLIPS SF

## (undated) DEVICE — BLADE SURG #15 CARBON STEEL

## (undated) DEVICE — ELECTRODE EXTENDED BLADE

## (undated) DEVICE — DRESSING TEGADERM 4X4 3/4 TD1004

## (undated) DEVICE — SUT ETHILON 4-0 PC5 18IN

## (undated) DEVICE — SUTURE VICRYL 3-0 18 SH VCP864D

## (undated) DEVICE — NDL SPINAL SPINOCAN 22GX3.5

## (undated) DEVICE — SEE MEDLINE ITEM 152487

## (undated) DEVICE — SUTURE MONOCRYL 3-0 27 PS-2 MCP427H

## (undated) DEVICE — PROBE DOPPLER  SWARTZ STANDARD

## (undated) DEVICE — GAUZE DERMACEA LOW PLY 2X4YRDS

## (undated) DEVICE — DRAIN CHANNEL ROUND 15FR

## (undated) DEVICE — GEL ULTRASONIC STERILE 20GR 01-01

## (undated) DEVICE — BLANKET HYPOTHERMIA LARGE

## (undated) DEVICE — SET FLUID TRANSFER ASEPTIC

## (undated) DEVICE — SUT SILK 2-0 PS 18IN BLACK

## (undated) DEVICE — BANDAGE ACE STERILE 6 REB3116

## (undated) DEVICE — CORD FORCRP 12FT E0512

## (undated) DEVICE — BLADE SCALPEL #15 371115

## (undated) DEVICE — APPLICATOR CHLORAPREP ORN 26ML

## (undated) DEVICE — TRAY GENERAL SURGERY

## (undated) DEVICE — BRA POST SURGICAL 34-36 B-D

## (undated) DEVICE — SUCTION COAGULATOR 10FR 6IN

## (undated) DEVICE — SOL NS 1000CC

## (undated) DEVICE — BANDAGE COBAN 6 CBN1106